# Patient Record
Sex: FEMALE | Race: WHITE | NOT HISPANIC OR LATINO | Employment: OTHER | URBAN - METROPOLITAN AREA
[De-identification: names, ages, dates, MRNs, and addresses within clinical notes are randomized per-mention and may not be internally consistent; named-entity substitution may affect disease eponyms.]

---

## 2017-05-08 ENCOUNTER — TRANSCRIBE ORDERS (OUTPATIENT)
Dept: LAB | Facility: CLINIC | Age: 72
End: 2017-05-08

## 2017-05-08 ENCOUNTER — APPOINTMENT (OUTPATIENT)
Dept: LAB | Facility: CLINIC | Age: 72
End: 2017-05-08
Payer: MEDICARE

## 2017-05-08 DIAGNOSIS — E03.9 UNSPECIFIED HYPOTHYROIDISM: ICD-10-CM

## 2017-05-08 DIAGNOSIS — Z79.899 ENCOUNTER FOR LONG-TERM (CURRENT) USE OF OTHER MEDICATIONS: ICD-10-CM

## 2017-05-08 DIAGNOSIS — Z79.82 ENCOUNTER FOR LONG-TERM (CURRENT) USE OF ASPIRIN: ICD-10-CM

## 2017-05-08 DIAGNOSIS — E78.5 OTHER AND UNSPECIFIED HYPERLIPIDEMIA: ICD-10-CM

## 2017-05-08 DIAGNOSIS — E55.9 UNSPECIFIED VITAMIN D DEFICIENCY: ICD-10-CM

## 2017-05-08 DIAGNOSIS — E10.10 TYPE 1 DIABETES MELLITUS WITH KETOACIDOSIS WITHOUT COMA (HCC): Primary | ICD-10-CM

## 2017-05-08 DIAGNOSIS — I10 ESSENTIAL HYPERTENSION, MALIGNANT: ICD-10-CM

## 2017-05-08 DIAGNOSIS — E10.10 TYPE 1 DIABETES MELLITUS WITH KETOACIDOSIS WITHOUT COMA (HCC): ICD-10-CM

## 2017-05-08 LAB
25(OH)D3 SERPL-MCNC: 32.1 NG/ML (ref 30–100)
ALBUMIN SERPL BCP-MCNC: 3.7 G/DL (ref 3.5–5)
ALP SERPL-CCNC: 110 U/L (ref 46–116)
ALT SERPL W P-5'-P-CCNC: 19 U/L (ref 12–78)
ANION GAP SERPL CALCULATED.3IONS-SCNC: 6 MMOL/L (ref 4–13)
AST SERPL W P-5'-P-CCNC: 16 U/L (ref 5–45)
BILIRUB SERPL-MCNC: 0.64 MG/DL (ref 0.2–1)
BUN SERPL-MCNC: 23 MG/DL (ref 5–25)
CALCIUM SERPL-MCNC: 9.3 MG/DL (ref 8.3–10.1)
CHLORIDE SERPL-SCNC: 102 MMOL/L (ref 100–108)
CHOLEST SERPL-MCNC: 215 MG/DL (ref 50–200)
CO2 SERPL-SCNC: 30 MMOL/L (ref 21–32)
CREAT SERPL-MCNC: 0.92 MG/DL (ref 0.6–1.3)
CREAT UR-MCNC: 68.3 MG/DL
ERYTHROCYTE [DISTWIDTH] IN BLOOD BY AUTOMATED COUNT: 13.2 % (ref 11.6–15.1)
EST. AVERAGE GLUCOSE BLD GHB EST-MCNC: 189 MG/DL
GFR SERPL CREATININE-BSD FRML MDRD: 59.8 ML/MIN/1.73SQ M
GLUCOSE P FAST SERPL-MCNC: 260 MG/DL (ref 65–99)
HBA1C MFR BLD: 8.2 % (ref 4.2–6.3)
HCT VFR BLD AUTO: 42.9 % (ref 34.8–46.1)
HDLC SERPL-MCNC: 70 MG/DL (ref 40–60)
HGB BLD-MCNC: 13.6 G/DL (ref 11.5–15.4)
LDLC SERPL CALC-MCNC: 102 MG/DL (ref 0–100)
MCH RBC QN AUTO: 29.2 PG (ref 26.8–34.3)
MCHC RBC AUTO-ENTMCNC: 31.7 G/DL (ref 31.4–37.4)
MCV RBC AUTO: 92 FL (ref 82–98)
MICROALBUMIN UR-MCNC: <5 MG/L (ref 0–20)
MICROALBUMIN/CREAT 24H UR: <7 MG/G CREATININE (ref 0–30)
PLATELET # BLD AUTO: 251 THOUSANDS/UL (ref 149–390)
PMV BLD AUTO: 12.6 FL (ref 8.9–12.7)
POTASSIUM SERPL-SCNC: 4.4 MMOL/L (ref 3.5–5.3)
PROT SERPL-MCNC: 7.4 G/DL (ref 6.4–8.2)
RBC # BLD AUTO: 4.65 MILLION/UL (ref 3.81–5.12)
SODIUM SERPL-SCNC: 138 MMOL/L (ref 136–145)
T4 FREE SERPL-MCNC: 0.88 NG/DL (ref 0.76–1.46)
TRIGL SERPL-MCNC: 214 MG/DL
TSH SERPL DL<=0.05 MIU/L-ACNC: 3.02 UIU/ML (ref 0.36–3.74)
WBC # BLD AUTO: 7.3 THOUSAND/UL (ref 4.31–10.16)

## 2017-05-08 PROCEDURE — 80053 COMPREHEN METABOLIC PANEL: CPT

## 2017-05-08 PROCEDURE — 83036 HEMOGLOBIN GLYCOSYLATED A1C: CPT

## 2017-05-08 PROCEDURE — 82306 VITAMIN D 25 HYDROXY: CPT

## 2017-05-08 PROCEDURE — 36415 COLL VENOUS BLD VENIPUNCTURE: CPT

## 2017-05-08 PROCEDURE — 85027 COMPLETE CBC AUTOMATED: CPT

## 2017-05-08 PROCEDURE — 84443 ASSAY THYROID STIM HORMONE: CPT

## 2017-05-08 PROCEDURE — 84439 ASSAY OF FREE THYROXINE: CPT

## 2017-05-08 PROCEDURE — 82570 ASSAY OF URINE CREATININE: CPT

## 2017-05-08 PROCEDURE — 80061 LIPID PANEL: CPT

## 2017-05-08 PROCEDURE — 82043 UR ALBUMIN QUANTITATIVE: CPT

## 2017-09-11 ENCOUNTER — APPOINTMENT (OUTPATIENT)
Dept: LAB | Facility: CLINIC | Age: 72
End: 2017-09-11
Payer: MEDICARE

## 2017-09-11 ENCOUNTER — TRANSCRIBE ORDERS (OUTPATIENT)
Dept: LAB | Facility: CLINIC | Age: 72
End: 2017-09-11

## 2017-09-11 DIAGNOSIS — E55.9 UNSPECIFIED VITAMIN D DEFICIENCY: ICD-10-CM

## 2017-09-11 DIAGNOSIS — E13.8 DIABETES MELLITUS OF OTHER TYPE WITH COMPLICATION, UNSPECIFIED LONG TERM INSULIN USE STATUS: ICD-10-CM

## 2017-09-11 DIAGNOSIS — I15.9 SECONDARY HYPERTENSION: Primary | ICD-10-CM

## 2017-09-11 DIAGNOSIS — E03.9 UNSPECIFIED HYPOTHYROIDISM: ICD-10-CM

## 2017-09-11 DIAGNOSIS — E78.5 HYPERLIPIDEMIA, UNSPECIFIED HYPERLIPIDEMIA TYPE: ICD-10-CM

## 2017-09-11 DIAGNOSIS — I15.9 SECONDARY HYPERTENSION: ICD-10-CM

## 2017-09-11 DIAGNOSIS — E13.8 DIABETES MELLITUS OF OTHER TYPE WITH COMPLICATION: ICD-10-CM

## 2017-09-11 LAB
25(OH)D3 SERPL-MCNC: 38 NG/ML (ref 30–100)
ALBUMIN SERPL BCP-MCNC: 3.5 G/DL (ref 3.5–5)
ALP SERPL-CCNC: 98 U/L (ref 46–116)
ALT SERPL W P-5'-P-CCNC: 14 U/L (ref 12–78)
ANION GAP SERPL CALCULATED.3IONS-SCNC: 7 MMOL/L (ref 4–13)
AST SERPL W P-5'-P-CCNC: 14 U/L (ref 5–45)
BILIRUB SERPL-MCNC: 0.59 MG/DL (ref 0.2–1)
BUN SERPL-MCNC: 17 MG/DL (ref 5–25)
CALCIUM SERPL-MCNC: 8.9 MG/DL (ref 8.3–10.1)
CHLORIDE SERPL-SCNC: 106 MMOL/L (ref 100–108)
CO2 SERPL-SCNC: 28 MMOL/L (ref 21–32)
CREAT SERPL-MCNC: 0.96 MG/DL (ref 0.6–1.3)
EST. AVERAGE GLUCOSE BLD GHB EST-MCNC: 171 MG/DL
GFR SERPL CREATININE-BSD FRML MDRD: 58 ML/MIN/1.73SQ M
GLUCOSE P FAST SERPL-MCNC: 139 MG/DL (ref 65–99)
HBA1C MFR BLD: 7.6 % (ref 4.2–6.3)
LDLC SERPL DIRECT ASSAY-MCNC: 93 MG/DL (ref 0–100)
POTASSIUM SERPL-SCNC: 4.7 MMOL/L (ref 3.5–5.3)
PROT SERPL-MCNC: 7.3 G/DL (ref 6.4–8.2)
SODIUM SERPL-SCNC: 141 MMOL/L (ref 136–145)
T4 FREE SERPL-MCNC: 0.92 NG/DL (ref 0.76–1.46)
TSH SERPL DL<=0.05 MIU/L-ACNC: 5.09 UIU/ML (ref 0.36–3.74)

## 2017-09-11 PROCEDURE — 84443 ASSAY THYROID STIM HORMONE: CPT

## 2017-09-11 PROCEDURE — 83721 ASSAY OF BLOOD LIPOPROTEIN: CPT

## 2017-09-11 PROCEDURE — 83036 HEMOGLOBIN GLYCOSYLATED A1C: CPT

## 2017-09-11 PROCEDURE — 80053 COMPREHEN METABOLIC PANEL: CPT

## 2017-09-11 PROCEDURE — 36415 COLL VENOUS BLD VENIPUNCTURE: CPT

## 2017-09-11 PROCEDURE — 84439 ASSAY OF FREE THYROXINE: CPT

## 2017-09-11 PROCEDURE — 82306 VITAMIN D 25 HYDROXY: CPT

## 2018-01-10 ENCOUNTER — TRANSCRIBE ORDERS (OUTPATIENT)
Dept: LAB | Facility: CLINIC | Age: 73
End: 2018-01-10

## 2018-01-10 ENCOUNTER — APPOINTMENT (OUTPATIENT)
Dept: LAB | Facility: CLINIC | Age: 73
End: 2018-01-10
Payer: MEDICARE

## 2018-01-10 DIAGNOSIS — E03.9 HYPOTHYROIDISM, UNSPECIFIED TYPE: Primary | ICD-10-CM

## 2018-01-10 DIAGNOSIS — E11.8 TYPE 2 DIABETES MELLITUS WITH COMPLICATION, UNSPECIFIED LONG TERM INSULIN USE STATUS: ICD-10-CM

## 2018-01-10 DIAGNOSIS — N39.0 URINARY TRACT INFECTION WITHOUT HEMATURIA, SITE UNSPECIFIED: ICD-10-CM

## 2018-01-10 DIAGNOSIS — E03.9 HYPOTHYROIDISM, UNSPECIFIED TYPE: ICD-10-CM

## 2018-01-10 LAB
BACTERIA UR QL AUTO: ABNORMAL /HPF
BILIRUB UR QL STRIP: NEGATIVE
CLARITY UR: CLEAR
COLOR UR: YELLOW
EST. AVERAGE GLUCOSE BLD GHB EST-MCNC: 160 MG/DL
GLUCOSE UR STRIP-MCNC: NEGATIVE MG/DL
HBA1C MFR BLD: 7.2 % (ref 4.2–6.3)
HGB UR QL STRIP.AUTO: NEGATIVE
HYALINE CASTS #/AREA URNS LPF: ABNORMAL /LPF
KETONES UR STRIP-MCNC: NEGATIVE MG/DL
LEUKOCYTE ESTERASE UR QL STRIP: NEGATIVE
NITRITE UR QL STRIP: NEGATIVE
NON-SQ EPI CELLS URNS QL MICRO: ABNORMAL /HPF
PH UR STRIP.AUTO: 5.5 [PH] (ref 4.5–8)
PROT UR STRIP-MCNC: NEGATIVE MG/DL
RBC #/AREA URNS AUTO: ABNORMAL /HPF
SP GR UR STRIP.AUTO: 1.01 (ref 1–1.03)
T4 FREE SERPL-MCNC: 1.26 NG/DL (ref 0.76–1.46)
TSH SERPL DL<=0.05 MIU/L-ACNC: 2.02 UIU/ML (ref 0.36–3.74)
UROBILINOGEN UR QL STRIP.AUTO: 0.2 E.U./DL
WBC #/AREA URNS AUTO: ABNORMAL /HPF

## 2018-01-10 PROCEDURE — 87086 URINE CULTURE/COLONY COUNT: CPT

## 2018-01-10 PROCEDURE — 84439 ASSAY OF FREE THYROXINE: CPT

## 2018-01-10 PROCEDURE — 81001 URINALYSIS AUTO W/SCOPE: CPT

## 2018-01-10 PROCEDURE — 83036 HEMOGLOBIN GLYCOSYLATED A1C: CPT

## 2018-01-10 PROCEDURE — 36415 COLL VENOUS BLD VENIPUNCTURE: CPT

## 2018-01-10 PROCEDURE — 84443 ASSAY THYROID STIM HORMONE: CPT

## 2018-01-11 LAB — BACTERIA UR CULT: NORMAL

## 2018-07-02 ENCOUNTER — APPOINTMENT (OUTPATIENT)
Dept: LAB | Facility: CLINIC | Age: 73
End: 2018-07-02
Payer: MEDICARE

## 2018-07-02 ENCOUNTER — TRANSCRIBE ORDERS (OUTPATIENT)
Dept: LAB | Facility: CLINIC | Age: 73
End: 2018-07-02

## 2018-07-02 DIAGNOSIS — E78.00 HYPERCHOLESTEROLEMIA: ICD-10-CM

## 2018-07-02 DIAGNOSIS — E78.5 HYPERLIPIDEMIA, UNSPECIFIED HYPERLIPIDEMIA TYPE: ICD-10-CM

## 2018-07-02 DIAGNOSIS — I10 HYPERTENSION, UNSPECIFIED TYPE: ICD-10-CM

## 2018-07-02 DIAGNOSIS — E03.9 HYPOTHYROIDISM, UNSPECIFIED TYPE: ICD-10-CM

## 2018-07-02 DIAGNOSIS — Z79.02 ENCOUNTER FOR CURRENT LONG TERM USE OF ANTITHROMBOTIC DRUG: ICD-10-CM

## 2018-07-02 DIAGNOSIS — E55.9 VITAMIN D DEFICIENCY: Primary | ICD-10-CM

## 2018-07-02 DIAGNOSIS — E11.8 TYPE 2 DIABETES MELLITUS WITH COMPLICATION, UNSPECIFIED WHETHER LONG TERM INSULIN USE: ICD-10-CM

## 2018-07-02 DIAGNOSIS — E55.9 VITAMIN D DEFICIENCY: ICD-10-CM

## 2018-07-02 LAB
25(OH)D3 SERPL-MCNC: 38.4 NG/ML (ref 30–100)
ALBUMIN SERPL BCP-MCNC: 3.7 G/DL (ref 3.5–5)
ALP SERPL-CCNC: 84 U/L (ref 46–116)
ALT SERPL W P-5'-P-CCNC: 16 U/L (ref 12–78)
ANION GAP SERPL CALCULATED.3IONS-SCNC: 11 MMOL/L (ref 4–13)
AST SERPL W P-5'-P-CCNC: 15 U/L (ref 5–45)
BILIRUB SERPL-MCNC: 0.5 MG/DL (ref 0.2–1)
BUN SERPL-MCNC: 20 MG/DL (ref 5–25)
CALCIUM SERPL-MCNC: 8.8 MG/DL (ref 8.3–10.1)
CHLORIDE SERPL-SCNC: 105 MMOL/L (ref 100–108)
CHOLEST SERPL-MCNC: 190 MG/DL (ref 50–200)
CO2 SERPL-SCNC: 26 MMOL/L (ref 21–32)
CREAT SERPL-MCNC: 1 MG/DL (ref 0.6–1.3)
CREAT UR-MCNC: 61.4 MG/DL
EST. AVERAGE GLUCOSE BLD GHB EST-MCNC: 177 MG/DL
GFR SERPL CREATININE-BSD FRML MDRD: 55 ML/MIN/1.73SQ M
GLUCOSE P FAST SERPL-MCNC: 229 MG/DL (ref 65–99)
HBA1C MFR BLD: 7.8 % (ref 4.2–6.3)
HDLC SERPL-MCNC: 64 MG/DL (ref 40–60)
LDLC SERPL CALC-MCNC: 82 MG/DL (ref 0–100)
MICROALBUMIN UR-MCNC: <5 MG/L (ref 0–20)
MICROALBUMIN/CREAT 24H UR: <8 MG/G CREATININE (ref 0–30)
NONHDLC SERPL-MCNC: 126 MG/DL
POTASSIUM SERPL-SCNC: 4.1 MMOL/L (ref 3.5–5.3)
PROT SERPL-MCNC: 7.2 G/DL (ref 6.4–8.2)
SODIUM SERPL-SCNC: 142 MMOL/L (ref 136–145)
T4 FREE SERPL-MCNC: 0.95 NG/DL (ref 0.76–1.46)
TRIGL SERPL-MCNC: 218 MG/DL
TSH SERPL DL<=0.05 MIU/L-ACNC: 2 UIU/ML (ref 0.36–3.74)

## 2018-07-02 PROCEDURE — 36415 COLL VENOUS BLD VENIPUNCTURE: CPT

## 2018-07-02 PROCEDURE — 82043 UR ALBUMIN QUANTITATIVE: CPT

## 2018-07-02 PROCEDURE — 83036 HEMOGLOBIN GLYCOSYLATED A1C: CPT

## 2018-07-02 PROCEDURE — 80061 LIPID PANEL: CPT

## 2018-07-02 PROCEDURE — 84443 ASSAY THYROID STIM HORMONE: CPT

## 2018-07-02 PROCEDURE — 84439 ASSAY OF FREE THYROXINE: CPT

## 2018-07-02 PROCEDURE — 82570 ASSAY OF URINE CREATININE: CPT

## 2018-07-02 PROCEDURE — 82306 VITAMIN D 25 HYDROXY: CPT

## 2018-07-02 PROCEDURE — 80053 COMPREHEN METABOLIC PANEL: CPT

## 2018-07-27 ENCOUNTER — TRANSCRIBE ORDERS (OUTPATIENT)
Dept: ADMINISTRATIVE | Facility: HOSPITAL | Age: 73
End: 2018-07-27

## 2018-07-27 DIAGNOSIS — Z12.39 SCREENING BREAST EXAMINATION: Primary | ICD-10-CM

## 2018-07-31 ENCOUNTER — APPOINTMENT (OUTPATIENT)
Dept: LAB | Facility: CLINIC | Age: 73
End: 2018-07-31
Payer: MEDICARE

## 2018-07-31 ENCOUNTER — TRANSCRIBE ORDERS (OUTPATIENT)
Dept: LAB | Facility: CLINIC | Age: 73
End: 2018-07-31

## 2018-07-31 DIAGNOSIS — N39.0 URINARY TRACT INFECTION WITHOUT HEMATURIA, SITE UNSPECIFIED: ICD-10-CM

## 2018-07-31 DIAGNOSIS — N39.0 URINARY TRACT INFECTION WITHOUT HEMATURIA, SITE UNSPECIFIED: Primary | ICD-10-CM

## 2018-07-31 LAB
BACTERIA UR QL AUTO: ABNORMAL /HPF
BILIRUB UR QL STRIP: NEGATIVE
CLARITY UR: CLEAR
COLOR UR: YELLOW
GLUCOSE UR STRIP-MCNC: ABNORMAL MG/DL
HGB UR QL STRIP.AUTO: NEGATIVE
HYALINE CASTS #/AREA URNS LPF: ABNORMAL /LPF
KETONES UR STRIP-MCNC: NEGATIVE MG/DL
LEUKOCYTE ESTERASE UR QL STRIP: ABNORMAL
NITRITE UR QL STRIP: NEGATIVE
NON-SQ EPI CELLS URNS QL MICRO: ABNORMAL /HPF
PH UR STRIP.AUTO: 6 [PH] (ref 4.5–8)
PROT UR STRIP-MCNC: NEGATIVE MG/DL
RBC #/AREA URNS AUTO: ABNORMAL /HPF
SP GR UR STRIP.AUTO: 1.01 (ref 1–1.03)
UROBILINOGEN UR QL STRIP.AUTO: 0.2 E.U./DL
WBC #/AREA URNS AUTO: ABNORMAL /HPF

## 2018-07-31 PROCEDURE — 81001 URINALYSIS AUTO W/SCOPE: CPT

## 2018-07-31 PROCEDURE — 87086 URINE CULTURE/COLONY COUNT: CPT

## 2018-08-01 LAB — BACTERIA UR CULT: NORMAL

## 2018-08-24 ENCOUNTER — HOSPITAL ENCOUNTER (OUTPATIENT)
Dept: MAMMOGRAPHY | Facility: HOSPITAL | Age: 73
Discharge: HOME | End: 2018-08-24
Attending: INTERNAL MEDICINE
Payer: MEDICARE

## 2018-08-24 DIAGNOSIS — Z12.39 SCREENING BREAST EXAMINATION: ICD-10-CM

## 2018-08-24 PROCEDURE — 77067 SCR MAMMO BI INCL CAD: CPT

## 2018-08-24 PROCEDURE — 77063 BREAST TOMOSYNTHESIS BI: CPT

## 2019-04-11 ENCOUNTER — HOSPITAL ENCOUNTER (EMERGENCY)
Facility: HOSPITAL | Age: 74
Discharge: HOME | End: 2019-04-11
Attending: EMERGENCY MEDICINE | Admitting: EMERGENCY MEDICINE
Payer: MEDICARE

## 2019-04-11 ENCOUNTER — APPOINTMENT (EMERGENCY)
Dept: RADIOLOGY | Facility: HOSPITAL | Age: 74
End: 2019-04-11
Payer: MEDICARE

## 2019-04-11 VITALS
HEART RATE: 68 BPM | TEMPERATURE: 97 F | BODY MASS INDEX: 24.92 KG/M2 | SYSTOLIC BLOOD PRESSURE: 129 MMHG | HEIGHT: 61 IN | OXYGEN SATURATION: 99 % | RESPIRATION RATE: 16 BRPM | DIASTOLIC BLOOD PRESSURE: 46 MMHG | WEIGHT: 132 LBS

## 2019-04-11 DIAGNOSIS — T14.8XXA SPLINTER IN SKIN: Primary | ICD-10-CM

## 2019-04-11 PROCEDURE — 73140 X-RAY EXAM OF FINGER(S): CPT

## 2019-04-11 PROCEDURE — 99283 EMERGENCY DEPT VISIT LOW MDM: CPT

## 2019-04-11 RX ORDER — INSULIN ASPART 100 [IU]/ML
15 INJECTION, SUSPENSION SUBCUTANEOUS
Status: ON HOLD | COMMUNITY
Start: 2016-04-18 | End: 2021-04-28 | Stop reason: SDUPTHER

## 2019-04-11 RX ORDER — DIPHENOXYLATE HYDROCHLORIDE AND ATROPINE SULFATE 2.5; .025 MG/1; MG/1
1 TABLET ORAL
COMMUNITY
End: 2021-01-08 | Stop reason: ALTCHOICE

## 2019-04-11 RX ORDER — GINSENG 100 MG
1 CAPSULE ORAL ONCE
Status: COMPLETED | OUTPATIENT
Start: 2019-04-11 | End: 2019-04-11

## 2019-04-11 RX ORDER — ATORVASTATIN CALCIUM 20 MG/1
20 TABLET, FILM COATED ORAL
COMMUNITY
Start: 2014-01-17 | End: 2022-02-28 | Stop reason: SDUPTHER

## 2019-04-11 RX ORDER — ALPRAZOLAM 0.5 MG/1
0.25 TABLET ORAL
COMMUNITY
Start: 2016-04-15 | End: 2021-01-08 | Stop reason: ALTCHOICE

## 2019-04-11 RX ORDER — CEFTRIAXONE 1 G/50ML
1000 INJECTION, SOLUTION INTRAVENOUS ONCE
Status: DISCONTINUED | OUTPATIENT
Start: 2019-04-11 | End: 2019-04-11

## 2019-04-11 RX ORDER — LISINOPRIL 20 MG/1
TABLET ORAL
COMMUNITY
Start: 2014-01-17 | End: 2022-04-22 | Stop reason: SDUPTHER

## 2019-04-11 RX ORDER — LEVOTHYROXINE SODIUM 0.07 MG/1
TABLET ORAL
COMMUNITY
Start: 2016-02-28 | End: 2022-05-02 | Stop reason: SDUPTHER

## 2019-04-11 RX ADMIN — BACITRACIN ZINC 1 SMALL APPLICATION: 500 OINTMENT TOPICAL at 11:07

## 2019-04-16 ENCOUNTER — TRANSCRIBE ORDERS (OUTPATIENT)
Dept: ADMINISTRATIVE | Facility: HOSPITAL | Age: 74
End: 2019-04-16

## 2019-04-16 DIAGNOSIS — Z79.899 LONG TERM USE OF DRUG: ICD-10-CM

## 2019-04-16 DIAGNOSIS — I10 HYPERTENSION, UNSPECIFIED TYPE: ICD-10-CM

## 2019-04-16 DIAGNOSIS — I65.23 BILATERAL CAROTID ARTERY STENOSIS: ICD-10-CM

## 2019-04-16 DIAGNOSIS — E78.5 HYPERLIPIDEMIA, UNSPECIFIED HYPERLIPIDEMIA TYPE: ICD-10-CM

## 2019-04-16 DIAGNOSIS — E11.8 TYPE 2 DIABETES MELLITUS WITH COMPLICATION, UNSPECIFIED WHETHER LONG TERM INSULIN USE: Primary | ICD-10-CM

## 2019-04-16 DIAGNOSIS — E55.9 VITAMIN D DEFICIENCY: ICD-10-CM

## 2019-04-16 DIAGNOSIS — E03.9 HYPOTHYROIDISM, UNSPECIFIED TYPE: ICD-10-CM

## 2019-04-18 ENCOUNTER — APPOINTMENT (OUTPATIENT)
Dept: LAB | Facility: CLINIC | Age: 74
End: 2019-04-18
Payer: MEDICARE

## 2019-04-18 DIAGNOSIS — Z79.899 LONG TERM USE OF DRUG: ICD-10-CM

## 2019-04-18 DIAGNOSIS — E03.9 HYPOTHYROIDISM, UNSPECIFIED TYPE: ICD-10-CM

## 2019-04-18 DIAGNOSIS — I10 HYPERTENSION, UNSPECIFIED TYPE: ICD-10-CM

## 2019-04-18 DIAGNOSIS — E11.8 TYPE 2 DIABETES MELLITUS WITH COMPLICATION, UNSPECIFIED WHETHER LONG TERM INSULIN USE: ICD-10-CM

## 2019-04-18 DIAGNOSIS — E55.9 VITAMIN D DEFICIENCY: ICD-10-CM

## 2019-04-18 DIAGNOSIS — E78.5 HYPERLIPIDEMIA, UNSPECIFIED HYPERLIPIDEMIA TYPE: ICD-10-CM

## 2019-04-18 LAB
25(OH)D3 SERPL-MCNC: 57 NG/ML (ref 30–100)
ALBUMIN SERPL BCP-MCNC: 3.7 G/DL (ref 3.5–5)
ALP SERPL-CCNC: 78 U/L (ref 46–116)
ALT SERPL W P-5'-P-CCNC: 13 U/L (ref 12–78)
ANION GAP SERPL CALCULATED.3IONS-SCNC: 6 MMOL/L (ref 4–13)
AST SERPL W P-5'-P-CCNC: 12 U/L (ref 5–45)
BILIRUB SERPL-MCNC: 0.44 MG/DL (ref 0.2–1)
BUN SERPL-MCNC: 22 MG/DL (ref 5–25)
CALCIUM SERPL-MCNC: 9.2 MG/DL (ref 8.3–10.1)
CHLORIDE SERPL-SCNC: 107 MMOL/L (ref 100–108)
CHOLEST SERPL-MCNC: 181 MG/DL (ref 50–200)
CO2 SERPL-SCNC: 27 MMOL/L (ref 21–32)
CREAT SERPL-MCNC: 1.09 MG/DL (ref 0.6–1.3)
CREAT UR-MCNC: 65.9 MG/DL
ERYTHROCYTE [DISTWIDTH] IN BLOOD BY AUTOMATED COUNT: 12.4 % (ref 11.6–15.1)
EST. AVERAGE GLUCOSE BLD GHB EST-MCNC: 160 MG/DL
GFR SERPL CREATININE-BSD FRML MDRD: 50 ML/MIN/1.73SQ M
GLUCOSE P FAST SERPL-MCNC: 219 MG/DL (ref 65–99)
HBA1C MFR BLD: 7.2 % (ref 4.2–6.3)
HCT VFR BLD AUTO: 40.3 % (ref 34.8–46.1)
HDLC SERPL-MCNC: 64 MG/DL (ref 40–60)
HGB BLD-MCNC: 12.4 G/DL (ref 11.5–15.4)
LDLC SERPL CALC-MCNC: 86 MG/DL (ref 0–100)
MCH RBC QN AUTO: 29.1 PG (ref 26.8–34.3)
MCHC RBC AUTO-ENTMCNC: 30.8 G/DL (ref 31.4–37.4)
MCV RBC AUTO: 95 FL (ref 82–98)
MICROALBUMIN UR-MCNC: <5 MG/L (ref 0–20)
MICROALBUMIN/CREAT 24H UR: <8 MG/G CREATININE (ref 0–30)
NONHDLC SERPL-MCNC: 117 MG/DL
PLATELET # BLD AUTO: 181 THOUSANDS/UL (ref 149–390)
PMV BLD AUTO: 12.3 FL (ref 8.9–12.7)
POTASSIUM SERPL-SCNC: 4.4 MMOL/L (ref 3.5–5.3)
PROT SERPL-MCNC: 7 G/DL (ref 6.4–8.2)
RBC # BLD AUTO: 4.26 MILLION/UL (ref 3.81–5.12)
SODIUM SERPL-SCNC: 140 MMOL/L (ref 136–145)
T4 FREE SERPL-MCNC: 1.14 NG/DL (ref 0.76–1.46)
TRIGL SERPL-MCNC: 157 MG/DL
TSH SERPL DL<=0.05 MIU/L-ACNC: 1.85 UIU/ML (ref 0.36–3.74)
WBC # BLD AUTO: 7.37 THOUSAND/UL (ref 4.31–10.16)

## 2019-04-18 PROCEDURE — 84439 ASSAY OF FREE THYROXINE: CPT

## 2019-04-18 PROCEDURE — 84443 ASSAY THYROID STIM HORMONE: CPT

## 2019-04-18 PROCEDURE — 82043 UR ALBUMIN QUANTITATIVE: CPT | Performed by: INTERNAL MEDICINE

## 2019-04-18 PROCEDURE — 82570 ASSAY OF URINE CREATININE: CPT | Performed by: INTERNAL MEDICINE

## 2019-04-18 PROCEDURE — 80053 COMPREHEN METABOLIC PANEL: CPT

## 2019-04-18 PROCEDURE — 36415 COLL VENOUS BLD VENIPUNCTURE: CPT

## 2019-04-18 PROCEDURE — 83036 HEMOGLOBIN GLYCOSYLATED A1C: CPT

## 2019-04-18 PROCEDURE — 85027 COMPLETE CBC AUTOMATED: CPT

## 2019-04-18 PROCEDURE — 82306 VITAMIN D 25 HYDROXY: CPT

## 2019-04-18 PROCEDURE — 80061 LIPID PANEL: CPT

## 2019-04-19 ENCOUNTER — HOSPITAL ENCOUNTER (OUTPATIENT)
Dept: NON INVASIVE DIAGNOSTICS | Facility: HOSPITAL | Age: 74
Discharge: HOME | End: 2019-04-19
Attending: INTERNAL MEDICINE
Payer: MEDICARE

## 2019-04-19 DIAGNOSIS — I65.23 BILATERAL CAROTID ARTERY STENOSIS: ICD-10-CM

## 2019-04-19 PROCEDURE — 93880 EXTRACRANIAL BILAT STUDY: CPT | Performed by: SURGERY

## 2019-04-19 PROCEDURE — 93880 EXTRACRANIAL BILAT STUDY: CPT

## 2019-05-27 ENCOUNTER — OFFICE VISIT (OUTPATIENT)
Dept: URGENT CARE | Facility: CLINIC | Age: 74
End: 2019-05-27
Payer: MEDICARE

## 2019-05-27 VITALS
WEIGHT: 132 LBS | BODY MASS INDEX: 24.92 KG/M2 | HEIGHT: 61 IN | DIASTOLIC BLOOD PRESSURE: 66 MMHG | TEMPERATURE: 98.7 F | RESPIRATION RATE: 16 BRPM | OXYGEN SATURATION: 97 % | SYSTOLIC BLOOD PRESSURE: 140 MMHG | HEART RATE: 88 BPM

## 2019-05-27 DIAGNOSIS — J06.9 VIRAL UPPER RESPIRATORY TRACT INFECTION: Primary | ICD-10-CM

## 2019-05-27 PROCEDURE — 99204 OFFICE O/P NEW MOD 45 MIN: CPT | Performed by: NURSE PRACTITIONER

## 2019-05-27 PROCEDURE — G0463 HOSPITAL OUTPT CLINIC VISIT: HCPCS | Performed by: NURSE PRACTITIONER

## 2019-06-18 ENCOUNTER — APPOINTMENT (OUTPATIENT)
Dept: LAB | Facility: CLINIC | Age: 74
End: 2019-06-18
Payer: MEDICARE

## 2019-06-18 ENCOUNTER — TRANSCRIBE ORDERS (OUTPATIENT)
Dept: LAB | Facility: CLINIC | Age: 74
End: 2019-06-18

## 2019-06-18 DIAGNOSIS — N39.0 URINARY TRACT INFECTION WITHOUT HEMATURIA, SITE UNSPECIFIED: ICD-10-CM

## 2019-06-18 DIAGNOSIS — N39.0 URINARY TRACT INFECTION WITHOUT HEMATURIA, SITE UNSPECIFIED: Primary | ICD-10-CM

## 2019-06-18 LAB
BACTERIA UR QL AUTO: ABNORMAL /HPF
BILIRUB UR QL STRIP: NEGATIVE
CLARITY UR: CLEAR
COLOR UR: YELLOW
GLUCOSE UR STRIP-MCNC: NEGATIVE MG/DL
HGB UR QL STRIP.AUTO: NEGATIVE
HYALINE CASTS #/AREA URNS LPF: ABNORMAL /LPF
KETONES UR STRIP-MCNC: NEGATIVE MG/DL
LEUKOCYTE ESTERASE UR QL STRIP: ABNORMAL
NITRITE UR QL STRIP: NEGATIVE
NON-SQ EPI CELLS URNS QL MICRO: ABNORMAL /HPF
PH UR STRIP.AUTO: 6 [PH]
PROT UR STRIP-MCNC: NEGATIVE MG/DL
RBC #/AREA URNS AUTO: ABNORMAL /HPF
SP GR UR STRIP.AUTO: 1.02 (ref 1–1.03)
UROBILINOGEN UR QL STRIP.AUTO: 0.2 E.U./DL
WBC #/AREA URNS AUTO: ABNORMAL /HPF

## 2019-06-18 PROCEDURE — 87086 URINE CULTURE/COLONY COUNT: CPT

## 2019-06-18 PROCEDURE — 81001 URINALYSIS AUTO W/SCOPE: CPT

## 2019-06-20 LAB — BACTERIA UR CULT: NORMAL

## 2019-07-16 ENCOUNTER — TRANSCRIBE ORDERS (OUTPATIENT)
Dept: ADMINISTRATIVE | Facility: HOSPITAL | Age: 74
End: 2019-07-16

## 2019-07-16 DIAGNOSIS — Z12.39 BREAST SCREENING, UNSPECIFIED: Primary | ICD-10-CM

## 2019-08-26 ENCOUNTER — HOSPITAL ENCOUNTER (OUTPATIENT)
Dept: MAMMOGRAPHY | Facility: HOSPITAL | Age: 74
Discharge: HOME | End: 2019-08-26
Attending: OBSTETRICS & GYNECOLOGY
Payer: MEDICARE

## 2019-08-26 VITALS — HEIGHT: 61 IN | BODY MASS INDEX: 24.17 KG/M2 | WEIGHT: 128 LBS

## 2019-08-26 DIAGNOSIS — Z12.39 BREAST SCREENING, UNSPECIFIED: ICD-10-CM

## 2019-08-26 PROCEDURE — 77063 BREAST TOMOSYNTHESIS BI: CPT

## 2019-08-26 PROCEDURE — 77067 SCR MAMMO BI INCL CAD: CPT

## 2019-10-03 ENCOUNTER — APPOINTMENT (OUTPATIENT)
Dept: RADIOLOGY | Facility: CLINIC | Age: 74
End: 2019-10-03
Payer: MEDICARE

## 2019-10-03 ENCOUNTER — TRANSCRIBE ORDERS (OUTPATIENT)
Dept: RADIOLOGY | Facility: CLINIC | Age: 74
End: 2019-10-03

## 2019-10-03 DIAGNOSIS — R05.8 PRODUCTIVE COUGH: ICD-10-CM

## 2019-10-03 DIAGNOSIS — R05.8 PRODUCTIVE COUGH: Primary | ICD-10-CM

## 2019-10-03 PROCEDURE — 71046 X-RAY EXAM CHEST 2 VIEWS: CPT

## 2019-11-25 ENCOUNTER — TRANSCRIBE ORDERS (OUTPATIENT)
Dept: LAB | Facility: CLINIC | Age: 74
End: 2019-11-25

## 2019-11-25 ENCOUNTER — APPOINTMENT (OUTPATIENT)
Dept: LAB | Facility: CLINIC | Age: 74
End: 2019-11-25
Payer: MEDICARE

## 2019-11-25 DIAGNOSIS — E55.9 VITAMIN D DEFICIENCY: ICD-10-CM

## 2019-11-25 DIAGNOSIS — Z79.899 ENCOUNTER FOR LONG-TERM (CURRENT) USE OF OTHER MEDICATIONS: ICD-10-CM

## 2019-11-25 DIAGNOSIS — Z79.01 ENCOUNTER FOR CURRENT LONG-TERM USE OF ANTICOAGULANTS: ICD-10-CM

## 2019-11-25 DIAGNOSIS — I10 HYPERTENSION, UNSPECIFIED TYPE: ICD-10-CM

## 2019-11-25 DIAGNOSIS — E78.5 HYPERLIPIDEMIA, UNSPECIFIED HYPERLIPIDEMIA TYPE: ICD-10-CM

## 2019-11-25 DIAGNOSIS — E11.69 TYPE 2 DIABETES MELLITUS WITH OTHER SPECIFIED COMPLICATION, UNSPECIFIED WHETHER LONG TERM INSULIN USE (HCC): ICD-10-CM

## 2019-11-25 DIAGNOSIS — I10 HYPERTENSION, UNSPECIFIED TYPE: Primary | ICD-10-CM

## 2019-11-25 LAB
25(OH)D3 SERPL-MCNC: 59.9 NG/ML (ref 30–100)
ALBUMIN SERPL BCP-MCNC: 3.9 G/DL (ref 3.5–5)
ALP SERPL-CCNC: 76 U/L (ref 46–116)
ALT SERPL W P-5'-P-CCNC: 12 U/L (ref 12–78)
ANION GAP SERPL CALCULATED.3IONS-SCNC: 6 MMOL/L (ref 4–13)
AST SERPL W P-5'-P-CCNC: 9 U/L (ref 5–45)
BACTERIA UR QL AUTO: ABNORMAL /HPF
BILIRUB SERPL-MCNC: 0.46 MG/DL (ref 0.2–1)
BILIRUB UR QL STRIP: NEGATIVE
BUN SERPL-MCNC: 22 MG/DL (ref 5–25)
CALCIUM SERPL-MCNC: 9.6 MG/DL (ref 8.3–10.1)
CHLORIDE SERPL-SCNC: 107 MMOL/L (ref 100–108)
CHOLEST SERPL-MCNC: 187 MG/DL (ref 50–200)
CLARITY UR: ABNORMAL
CO2 SERPL-SCNC: 30 MMOL/L (ref 21–32)
COLOR UR: YELLOW
CREAT SERPL-MCNC: 1.06 MG/DL (ref 0.6–1.3)
CREAT UR-MCNC: 93.4 MG/DL
ERYTHROCYTE [DISTWIDTH] IN BLOOD BY AUTOMATED COUNT: 12.7 % (ref 11.6–15.1)
EST. AVERAGE GLUCOSE BLD GHB EST-MCNC: 157 MG/DL
GFR SERPL CREATININE-BSD FRML MDRD: 51 ML/MIN/1.73SQ M
GLUCOSE P FAST SERPL-MCNC: 181 MG/DL (ref 65–99)
GLUCOSE UR STRIP-MCNC: NEGATIVE MG/DL
HBA1C MFR BLD: 7.1 % (ref 4.2–6.3)
HCT VFR BLD AUTO: 41.1 % (ref 34.8–46.1)
HDLC SERPL-MCNC: 64 MG/DL
HGB BLD-MCNC: 12.8 G/DL (ref 11.5–15.4)
HGB UR QL STRIP.AUTO: NEGATIVE
HYALINE CASTS #/AREA URNS LPF: ABNORMAL /LPF
KETONES UR STRIP-MCNC: NEGATIVE MG/DL
LDLC SERPL CALC-MCNC: 92 MG/DL (ref 0–100)
LEUKOCYTE ESTERASE UR QL STRIP: ABNORMAL
MCH RBC QN AUTO: 28.4 PG (ref 26.8–34.3)
MCHC RBC AUTO-ENTMCNC: 31.1 G/DL (ref 31.4–37.4)
MCV RBC AUTO: 91 FL (ref 82–98)
MICROALBUMIN UR-MCNC: 5.6 MG/L (ref 0–20)
MICROALBUMIN/CREAT 24H UR: 6 MG/G CREATININE (ref 0–30)
NITRITE UR QL STRIP: NEGATIVE
NON-SQ EPI CELLS URNS QL MICRO: ABNORMAL /HPF
NONHDLC SERPL-MCNC: 123 MG/DL
PH UR STRIP.AUTO: 5.5 [PH]
PLATELET # BLD AUTO: 248 THOUSANDS/UL (ref 149–390)
PMV BLD AUTO: 11.9 FL (ref 8.9–12.7)
POTASSIUM SERPL-SCNC: 4.5 MMOL/L (ref 3.5–5.3)
PROT SERPL-MCNC: 7 G/DL (ref 6.4–8.2)
PROT UR STRIP-MCNC: NEGATIVE MG/DL
RBC # BLD AUTO: 4.5 MILLION/UL (ref 3.81–5.12)
RBC #/AREA URNS AUTO: ABNORMAL /HPF
SODIUM SERPL-SCNC: 143 MMOL/L (ref 136–145)
SP GR UR STRIP.AUTO: 1.01 (ref 1–1.03)
T4 FREE SERPL-MCNC: 0.99 NG/DL (ref 0.76–1.46)
TRIGL SERPL-MCNC: 154 MG/DL
TSH SERPL DL<=0.05 MIU/L-ACNC: 2.29 UIU/ML (ref 0.36–3.74)
UROBILINOGEN UR QL STRIP.AUTO: 0.2 E.U./DL
WBC # BLD AUTO: 7.66 THOUSAND/UL (ref 4.31–10.16)
WBC #/AREA URNS AUTO: ABNORMAL /HPF

## 2019-11-25 PROCEDURE — 84439 ASSAY OF FREE THYROXINE: CPT

## 2019-11-25 PROCEDURE — 80053 COMPREHEN METABOLIC PANEL: CPT

## 2019-11-25 PROCEDURE — 36415 COLL VENOUS BLD VENIPUNCTURE: CPT

## 2019-11-25 PROCEDURE — 85027 COMPLETE CBC AUTOMATED: CPT

## 2019-11-25 PROCEDURE — 82043 UR ALBUMIN QUANTITATIVE: CPT

## 2019-11-25 PROCEDURE — 80061 LIPID PANEL: CPT

## 2019-11-25 PROCEDURE — 87086 URINE CULTURE/COLONY COUNT: CPT

## 2019-11-25 PROCEDURE — 82570 ASSAY OF URINE CREATININE: CPT

## 2019-11-25 PROCEDURE — 84443 ASSAY THYROID STIM HORMONE: CPT

## 2019-11-25 PROCEDURE — 83036 HEMOGLOBIN GLYCOSYLATED A1C: CPT

## 2019-11-25 PROCEDURE — 81001 URINALYSIS AUTO W/SCOPE: CPT

## 2019-11-25 PROCEDURE — 82306 VITAMIN D 25 HYDROXY: CPT

## 2019-11-27 LAB — BACTERIA UR CULT: NORMAL

## 2019-12-11 ENCOUNTER — APPOINTMENT (OUTPATIENT)
Dept: RADIOLOGY | Facility: HOSPITAL | Age: 74
End: 2019-12-11
Attending: INTERNAL MEDICINE
Payer: MEDICARE

## 2019-12-11 ENCOUNTER — TRANSCRIBE ORDERS (OUTPATIENT)
Dept: URGENT CARE | Facility: HOSPITAL | Age: 74
End: 2019-12-11

## 2019-12-11 DIAGNOSIS — R10.9 ABDOMINAL PAIN, UNSPECIFIED ABDOMINAL LOCATION: ICD-10-CM

## 2019-12-11 DIAGNOSIS — R20.0 NUMBNESS: ICD-10-CM

## 2019-12-11 DIAGNOSIS — R10.9 ABDOMINAL PAIN, UNSPECIFIED ABDOMINAL LOCATION: Primary | ICD-10-CM

## 2019-12-11 PROCEDURE — 73030 X-RAY EXAM OF SHOULDER: CPT

## 2019-12-11 PROCEDURE — 72050 X-RAY EXAM NECK SPINE 4/5VWS: CPT

## 2020-03-31 ENCOUNTER — APPOINTMENT (EMERGENCY)
Dept: CT IMAGING | Facility: HOSPITAL | Age: 75
End: 2020-03-31
Payer: MEDICARE

## 2020-03-31 ENCOUNTER — HOSPITAL ENCOUNTER (EMERGENCY)
Facility: HOSPITAL | Age: 75
Discharge: HOME | End: 2020-03-31
Attending: EMERGENCY MEDICINE
Payer: MEDICARE

## 2020-03-31 VITALS
RESPIRATION RATE: 22 BRPM | TEMPERATURE: 99 F | SYSTOLIC BLOOD PRESSURE: 119 MMHG | OXYGEN SATURATION: 98 % | HEART RATE: 77 BPM | WEIGHT: 128 LBS | DIASTOLIC BLOOD PRESSURE: 58 MMHG | BODY MASS INDEX: 24.19 KG/M2

## 2020-03-31 DIAGNOSIS — N28.9 RENAL INSUFFICIENCY: ICD-10-CM

## 2020-03-31 DIAGNOSIS — M54.9 BACK PAIN: ICD-10-CM

## 2020-03-31 DIAGNOSIS — F41.9 ANXIETY: Primary | ICD-10-CM

## 2020-03-31 LAB
ALBUMIN SERPL BCP-MCNC: 4.2 G/DL (ref 3.5–5.7)
ALP SERPL-CCNC: 67 U/L (ref 55–165)
ALT SERPL W P-5'-P-CCNC: 9 U/L (ref 7–52)
AMPHETAMINES SERPL QL SCN: NEGATIVE
ANION GAP SERPL CALCULATED.3IONS-SCNC: 10 MMOL/L (ref 4–13)
APAP SERPL-MCNC: <10 UG/ML (ref 10–20)
AST SERPL W P-5'-P-CCNC: 13 U/L (ref 13–39)
ATRIAL RATE: 76 BPM
BARBITURATES UR QL: NEGATIVE
BASOPHILS # BLD AUTO: 0.1 THOUSANDS/ΜL (ref 0–0.1)
BASOPHILS NFR BLD AUTO: 1 % (ref 0–2)
BENZODIAZ UR QL: NEGATIVE
BILIRUB SERPL-MCNC: 0.5 MG/DL (ref 0.2–1)
BILIRUB UR QL STRIP: NEGATIVE
BUN SERPL-MCNC: 31 MG/DL (ref 7–25)
CALCIUM SERPL-MCNC: 9.6 MG/DL (ref 8.6–10.5)
CHLORIDE SERPL-SCNC: 103 MMOL/L (ref 98–107)
CK SERPL-CCNC: 36 U/L (ref 30–192)
CLARITY UR: CLEAR
CO2 SERPL-SCNC: 25 MMOL/L (ref 21–31)
COCAINE UR QL: NEGATIVE
COLOR UR: NORMAL
CREAT SERPL-MCNC: 1.27 MG/DL (ref 0.6–1.2)
EOSINOPHIL # BLD AUTO: 0.3 THOUSAND/ΜL (ref 0–0.61)
EOSINOPHIL NFR BLD AUTO: 3 % (ref 0–5)
ERYTHROCYTE [DISTWIDTH] IN BLOOD BY AUTOMATED COUNT: 13.6 % (ref 11.5–14.5)
ETHANOL SERPL-MCNC: <10 MG/DL
GFR SERPL CREATININE-BSD FRML MDRD: 41 ML/MIN/1.73SQ M
GLUCOSE SERPL-MCNC: 191 MG/DL (ref 65–99)
GLUCOSE UR STRIP-MCNC: NEGATIVE MG/DL
HCT VFR BLD AUTO: 38.9 % (ref 42–47)
HGB BLD-MCNC: 12.8 G/DL (ref 12–16)
HGB UR QL STRIP.AUTO: NEGATIVE
KETONES UR STRIP-MCNC: NEGATIVE MG/DL
LEUKOCYTE ESTERASE UR QL STRIP: NEGATIVE
LYMPHOCYTES # BLD AUTO: 3 THOUSANDS/ΜL (ref 0.6–4.47)
LYMPHOCYTES NFR BLD AUTO: 31 % (ref 21–51)
MAGNESIUM SERPL-MCNC: 2.2 MG/DL (ref 1.9–2.7)
MCH RBC QN AUTO: 29.5 PG (ref 26–34)
MCHC RBC AUTO-ENTMCNC: 32.8 G/DL (ref 31–37)
MCV RBC AUTO: 90 FL (ref 81–99)
METHADONE UR QL: NEGATIVE
MONOCYTES # BLD AUTO: 0.7 THOUSAND/ΜL (ref 0.17–1.22)
MONOCYTES NFR BLD AUTO: 8 % (ref 2–12)
NEUTROPHILS # BLD AUTO: 5.7 THOUSANDS/ΜL (ref 1.4–6.5)
NEUTS SEG NFR BLD AUTO: 58 % (ref 42–75)
NITRITE UR QL STRIP: NEGATIVE
OPIATES UR QL SCN: NEGATIVE
P AXIS: 76 DEGREES
PCP UR QL: NEGATIVE
PH UR STRIP.AUTO: 5.5 [PH]
PLATELET # BLD AUTO: 229 THOUSANDS/UL (ref 149–390)
PLATELET BLD QL SMEAR: ADEQUATE
PLATELET CLUMP BLD QL SMEAR: SLIGHT
PMV BLD AUTO: 9.9 FL (ref 8.6–11.7)
POTASSIUM SERPL-SCNC: 4.7 MMOL/L (ref 3.5–5.5)
PR INTERVAL: 150 MS
PROT SERPL-MCNC: 6.4 G/DL (ref 6.4–8.9)
PROT UR STRIP-MCNC: NEGATIVE MG/DL
QRS AXIS: 40 DEGREES
QRSD INTERVAL: 82 MS
QT INTERVAL: 386 MS
QTC INTERVAL: 434 MS
RBC # BLD AUTO: 4.33 MILLION/UL (ref 3.9–5.2)
RBC MORPH BLD: NORMAL
SALICYLATES SERPL-MCNC: <5 MG/DL (ref 10–30)
SODIUM SERPL-SCNC: 138 MMOL/L (ref 134–143)
SP GR UR STRIP.AUTO: 1.02 (ref 1–1.03)
T WAVE AXIS: 61 DEGREES
THC UR QL: NEGATIVE
TROPONIN I SERPL-MCNC: <0.03 NG/ML
TSH SERPL DL<=0.05 MIU/L-ACNC: 3.73 UIU/ML (ref 0.45–5.33)
UROBILINOGEN UR QL STRIP.AUTO: 0.2 E.U./DL
VENTRICULAR RATE: 76 BPM
WBC # BLD AUTO: 9.8 THOUSAND/UL (ref 4.8–10.8)

## 2020-03-31 PROCEDURE — 82550 ASSAY OF CK (CPK): CPT | Performed by: EMERGENCY MEDICINE

## 2020-03-31 PROCEDURE — 83735 ASSAY OF MAGNESIUM: CPT | Performed by: EMERGENCY MEDICINE

## 2020-03-31 PROCEDURE — 80053 COMPREHEN METABOLIC PANEL: CPT | Performed by: EMERGENCY MEDICINE

## 2020-03-31 PROCEDURE — 93005 ELECTROCARDIOGRAM TRACING: CPT

## 2020-03-31 PROCEDURE — 36415 COLL VENOUS BLD VENIPUNCTURE: CPT | Performed by: EMERGENCY MEDICINE

## 2020-03-31 PROCEDURE — 99285 EMERGENCY DEPT VISIT HI MDM: CPT | Performed by: EMERGENCY MEDICINE

## 2020-03-31 PROCEDURE — 84484 ASSAY OF TROPONIN QUANT: CPT | Performed by: EMERGENCY MEDICINE

## 2020-03-31 PROCEDURE — 85025 COMPLETE CBC W/AUTO DIFF WBC: CPT | Performed by: EMERGENCY MEDICINE

## 2020-03-31 PROCEDURE — 80329 ANALGESICS NON-OPIOID 1 OR 2: CPT | Performed by: EMERGENCY MEDICINE

## 2020-03-31 PROCEDURE — 84443 ASSAY THYROID STIM HORMONE: CPT | Performed by: EMERGENCY MEDICINE

## 2020-03-31 PROCEDURE — 71260 CT THORAX DX C+: CPT

## 2020-03-31 PROCEDURE — 80307 DRUG TEST PRSMV CHEM ANLYZR: CPT | Performed by: EMERGENCY MEDICINE

## 2020-03-31 PROCEDURE — 96374 THER/PROPH/DIAG INJ IV PUSH: CPT

## 2020-03-31 PROCEDURE — 74177 CT ABD & PELVIS W/CONTRAST: CPT

## 2020-03-31 PROCEDURE — 80320 DRUG SCREEN QUANTALCOHOLS: CPT | Performed by: EMERGENCY MEDICINE

## 2020-03-31 PROCEDURE — 81003 URINALYSIS AUTO W/O SCOPE: CPT | Performed by: EMERGENCY MEDICINE

## 2020-03-31 PROCEDURE — 96361 HYDRATE IV INFUSION ADD-ON: CPT

## 2020-03-31 PROCEDURE — 93010 ELECTROCARDIOGRAM REPORT: CPT | Performed by: INTERNAL MEDICINE

## 2020-03-31 PROCEDURE — 99285 EMERGENCY DEPT VISIT HI MDM: CPT

## 2020-03-31 RX ORDER — LORAZEPAM 2 MG/ML
0.5 INJECTION INTRAMUSCULAR ONCE
Status: COMPLETED | OUTPATIENT
Start: 2020-03-31 | End: 2020-03-31

## 2020-03-31 RX ADMIN — SODIUM CHLORIDE 500 ML: 0.9 INJECTION, SOLUTION INTRAVENOUS at 08:32

## 2020-03-31 RX ADMIN — IOHEXOL 100 ML: 350 INJECTION, SOLUTION INTRAVENOUS at 08:13

## 2020-03-31 RX ADMIN — LORAZEPAM 0.5 MG: 2 INJECTION INTRAMUSCULAR; INTRAVENOUS at 06:08

## 2020-07-01 DIAGNOSIS — Z01.812 PRE-PROCEDURAL LABORATORY EXAMINATIONS: ICD-10-CM

## 2020-07-01 PROCEDURE — U0003 INFECTIOUS AGENT DETECTION BY NUCLEIC ACID (DNA OR RNA); SEVERE ACUTE RESPIRATORY SYNDROME CORONAVIRUS 2 (SARS-COV-2) (CORONAVIRUS DISEASE [COVID-19]), AMPLIFIED PROBE TECHNIQUE, MAKING USE OF HIGH THROUGHPUT TECHNOLOGIES AS DESCRIBED BY CMS-2020-01-R: HCPCS

## 2020-07-02 ENCOUNTER — ANESTHESIA EVENT (OUTPATIENT)
Dept: GASTROENTEROLOGY | Facility: HOSPITAL | Age: 75
End: 2020-07-02

## 2020-07-06 ENCOUNTER — HOSPITAL ENCOUNTER (OUTPATIENT)
Dept: GASTROENTEROLOGY | Facility: HOSPITAL | Age: 75
Setting detail: OUTPATIENT SURGERY
Discharge: HOME | End: 2020-07-06
Attending: COLON & RECTAL SURGERY
Payer: MEDICARE

## 2020-07-06 ENCOUNTER — ANESTHESIA (OUTPATIENT)
Dept: GASTROENTEROLOGY | Facility: HOSPITAL | Age: 75
End: 2020-07-06

## 2020-07-06 VITALS
HEART RATE: 72 BPM | RESPIRATION RATE: 18 BRPM | DIASTOLIC BLOOD PRESSURE: 62 MMHG | SYSTOLIC BLOOD PRESSURE: 133 MMHG | OXYGEN SATURATION: 100 % | TEMPERATURE: 98.9 F

## 2020-07-06 DIAGNOSIS — Z12.11 SCREENING FOR COLON CANCER: ICD-10-CM

## 2020-07-06 DIAGNOSIS — Z86.010 HISTORY OF COLON POLYPS: ICD-10-CM

## 2020-07-06 LAB
GLUCOSE SERPL-MCNC: 223 MG/DL (ref 65–140)
SARS-COV-2 RNA SPEC QL NAA+PROBE: NOT DETECTED

## 2020-07-06 PROCEDURE — 82948 REAGENT STRIP/BLOOD GLUCOSE: CPT

## 2020-07-06 PROCEDURE — 88305 TISSUE EXAM BY PATHOLOGIST: CPT | Performed by: PATHOLOGY

## 2020-07-06 RX ORDER — SODIUM CHLORIDE 9 MG/ML
125 INJECTION, SOLUTION INTRAVENOUS CONTINUOUS
Status: DISCONTINUED | OUTPATIENT
Start: 2020-07-06 | End: 2020-07-10 | Stop reason: HOSPADM

## 2020-07-06 RX ORDER — PROPOFOL 10 MG/ML
INJECTION, EMULSION INTRAVENOUS AS NEEDED
Status: DISCONTINUED | OUTPATIENT
Start: 2020-07-06 | End: 2020-07-06 | Stop reason: SURG

## 2020-07-06 RX ORDER — ONDANSETRON 2 MG/ML
4 INJECTION INTRAMUSCULAR; INTRAVENOUS ONCE AS NEEDED
Status: DISCONTINUED | OUTPATIENT
Start: 2020-07-06 | End: 2020-07-10 | Stop reason: HOSPADM

## 2020-07-06 RX ADMIN — PROPOFOL 50 MG: 10 INJECTION, EMULSION INTRAVENOUS at 07:37

## 2020-07-06 RX ADMIN — SODIUM CHLORIDE: 0.9 INJECTION, SOLUTION INTRAVENOUS at 07:31

## 2020-07-06 RX ADMIN — PROPOFOL 50 MG: 10 INJECTION, EMULSION INTRAVENOUS at 07:45

## 2020-07-06 RX ADMIN — PROPOFOL 100 MG: 10 INJECTION, EMULSION INTRAVENOUS at 07:33

## 2020-07-06 NOTE — DISCHARGE INSTRUCTIONS
[facility] Robles [address], [address] [place] [address] Johnathan  Phone: [phone]    DISCHARGE INSTRUCTIONS:    1   ___ Complete Exam - Normal    2   ___ Exam normal, but entire colon not seen  We will discuss this with you  3   ___ Polyp(s) removed by "burning" - NO pathology report will follow    4   _1__ Polyp(s) removed by excision  Pathology report will be available in 4-5 days   Someone from our office will call you with results  5   ___ Exam prompted biopsies  Pathology report will be available in 4-5 days   Someone from our office will call you with results  6   ___ Exam demonstrated findings that need treatment  Prescriptions will be   Given to you  Return to our office in ____ weeks  Please call for appt  7   ___ Original office visit or colonoscopy findings necessitate an office visit  Please call to set up a new appointment    8   ___ Medication  __________________________________________        [address]:    - Go straight home and rest today    - No driving or drinking alcohol for 24 hours    - Resume regular diet and medications unless otherwise instructed  Coumadin and Plavix are blood thinners  You can resume these medications on __________  IF YOU ARE HAVING ANY FEVER, BLEEDING OR PERSISTENT PAIN IN THE ABDOMEN, PLEASE CALL OUR OFFICE ANY DAY OR TIME  [phone]        Colorectal Polyps   WHAT YOU NEED TO KNOW:   Colorectal polyps are small growths of tissue in the lining of the colon and rectum  Most polyps are hyperplastic polyps and are usually benign (noncancerous)  Certain types of polyps, called adenomatous polyps, may turn into cancer  DISCHARGE INSTRUCTIONS:   Follow up with your healthcare provider or gastroenterologist as directed: You may need to return for more tests, such as another colonoscopy  Write down your questions so you remember to ask them during your visits    Reduce your risk for colorectal polyps:   · Eat a variety of healthy foods:  Healthy foods include fruit, vegetables, whole-grain breads, low-fat dairy products, beans, lean meat, and fish  Ask if you need to be on a special diet  · Maintain a healthy weight:  Ask your healthcare provider if you need to lose weight and how much you need to lose  Ask for help with a weight loss program     · Exercise:  Begin to exercise slowly and do more as you get stronger  Talk with your healthcare provider before you start an exercise program      · Limit alcohol:  Your risk for polyps increases the more you drink  · Do not smoke: If you smoke, it is never too late to quit  Ask for information about how to stop  For support and more information:   · Cristal Badillo ([facility]) [address], [place] [address]  Phone: [phone]  Web Address: www digestive  [employer]  Contact your healthcare provider or gastroenterologist if:   · You have a fever  · You have chills, a cough, or feel weak and achy  · You have abdominal pain that does not go away or gets worse after you take medicine  · Your abdomen is swollen  · You are losing weight without trying  · You have questions or concerns about your condition or care  Seek care immediately or call 911 if:   · You have sudden shortness of breath  · You have a fast heart rate, fast breathing, or are too dizzy to stand up  · You have severe abdominal pain  · You see blood in your bowel movement  © 2017 [address] Information is for End User's use only and may not be sold, redistributed or otherwise used for commercial purposes  All illustrations and images included in CareNotes® are the copyrighted property of [employer]  or Ritchie Kaufman  The above information is an  only  It is not intended as medical advice for individual conditions or treatments   Talk to your doctor, nurse or pharmacist before following any medical regimen to see if it is safe and effective for you

## 2020-07-06 NOTE — INTERVAL H&P NOTE
H&P reviewed  After examining the patient I find no changes in the patients condition since the H&P had been written      Vitals:    07/06/20 0718   BP: (!) 212/85   Pulse:    Resp:    Temp:    SpO2:

## 2020-07-06 NOTE — PROGRESS NOTES
Patient took her own blood sugar  Results were 237  Patient took her own insulin  Snack taken of diet gingerale and 2 packs of roberto crackers

## 2020-07-06 NOTE — ANESTHESIA PREPROCEDURE EVALUATION
Review of Systems/Medical History  Patient summary reviewed  Chart reviewed  No history of anesthetic complications     Cardiovascular  Hyperlipidemia, Hypertension controlled,    Pulmonary  Negative pulmonary ROS        GI/Hepatic  Negative GI/hepatic ROS          Negative  ROS        Endo/Other  Diabetes well controlled type 2 Insulin,      GYN  Negative gynecology ROS          Hematology  Negative hematology ROS      Musculoskeletal  Negative musculoskeletal ROS        Neurology  Negative neurology ROS      Psychology   Negative psychology ROS Anxiety, Depression ,              Physical Exam    Airway    Mallampati score: II  TM Distance: >3 FB  Neck ROM: full     Dental   No notable dental hx     Cardiovascular  Rhythm: regular, Rate: normal, Cardiovascular exam normal    Pulmonary  Pulmonary exam normal Breath sounds clear to auscultation,     Other Findings        Anesthesia Plan  ASA Score- 2     Anesthesia Type- IV sedation with anesthesia and general with ASA Monitors  Additional Monitors:   Airway Plan:         Plan Factors-Patient not instructed to abstain from smoking on day of procedure  Patient did not smoke on day of surgery  Induction- intravenous  Postoperative Plan-     Informed Consent- Anesthetic plan and risks discussed with patient

## 2020-07-15 ENCOUNTER — TRANSCRIBE ORDERS (OUTPATIENT)
Dept: URGENT CARE | Facility: CLINIC | Age: 75
End: 2020-07-15

## 2020-07-15 ENCOUNTER — APPOINTMENT (OUTPATIENT)
Dept: LAB | Facility: CLINIC | Age: 75
End: 2020-07-15
Payer: MEDICARE

## 2020-07-15 DIAGNOSIS — E55.9 VITAMIN D DEFICIENCY: ICD-10-CM

## 2020-07-15 DIAGNOSIS — Z79.4 TYPE 2 DIABETES MELLITUS WITHOUT COMPLICATION, WITH LONG-TERM CURRENT USE OF INSULIN (HCC): ICD-10-CM

## 2020-07-15 DIAGNOSIS — Z79.899 ENCOUNTER FOR LONG-TERM (CURRENT) USE OF OTHER MEDICATIONS: ICD-10-CM

## 2020-07-15 DIAGNOSIS — E78.2 MIXED HYPERLIPIDEMIA: ICD-10-CM

## 2020-07-15 DIAGNOSIS — I10 ESSENTIAL HYPERTENSION: Primary | ICD-10-CM

## 2020-07-15 DIAGNOSIS — I10 ESSENTIAL HYPERTENSION: ICD-10-CM

## 2020-07-15 DIAGNOSIS — E11.9 TYPE 2 DIABETES MELLITUS WITHOUT COMPLICATION, WITH LONG-TERM CURRENT USE OF INSULIN (HCC): ICD-10-CM

## 2020-07-15 LAB
25(OH)D3 SERPL-MCNC: 50.9 NG/ML (ref 30–100)
ALBUMIN SERPL BCP-MCNC: 3.6 G/DL (ref 3.5–5)
ALP SERPL-CCNC: 82 U/L (ref 46–116)
ALT SERPL W P-5'-P-CCNC: 14 U/L (ref 12–78)
ANION GAP SERPL CALCULATED.3IONS-SCNC: 6 MMOL/L (ref 4–13)
AST SERPL W P-5'-P-CCNC: 12 U/L (ref 5–45)
BASOPHILS # BLD AUTO: 0.08 THOUSANDS/ΜL (ref 0–0.1)
BASOPHILS NFR BLD AUTO: 1 % (ref 0–1)
BILIRUB SERPL-MCNC: 0.31 MG/DL (ref 0.2–1)
BUN SERPL-MCNC: 19 MG/DL (ref 5–25)
CALCIUM SERPL-MCNC: 9.5 MG/DL (ref 8.3–10.1)
CHLORIDE SERPL-SCNC: 105 MMOL/L (ref 100–108)
CHOLEST SERPL-MCNC: 203 MG/DL (ref 50–200)
CO2 SERPL-SCNC: 28 MMOL/L (ref 21–32)
CREAT SERPL-MCNC: 1.06 MG/DL (ref 0.6–1.3)
CREAT UR-MCNC: 55.5 MG/DL
EOSINOPHIL # BLD AUTO: 0.23 THOUSAND/ΜL (ref 0–0.61)
EOSINOPHIL NFR BLD AUTO: 3 % (ref 0–6)
ERYTHROCYTE [DISTWIDTH] IN BLOOD BY AUTOMATED COUNT: 12.4 % (ref 11.6–15.1)
EST. AVERAGE GLUCOSE BLD GHB EST-MCNC: 169 MG/DL
GFR SERPL CREATININE-BSD FRML MDRD: 51 ML/MIN/1.73SQ M
GLUCOSE P FAST SERPL-MCNC: 220 MG/DL (ref 65–99)
HBA1C MFR BLD: 7.5 %
HCT VFR BLD AUTO: 42.2 % (ref 34.8–46.1)
HDLC SERPL-MCNC: 69 MG/DL
HGB BLD-MCNC: 13 G/DL (ref 11.5–15.4)
IMM GRANULOCYTES # BLD AUTO: 0.04 THOUSAND/UL (ref 0–0.2)
IMM GRANULOCYTES NFR BLD AUTO: 1 % (ref 0–2)
LDLC SERPL CALC-MCNC: 95 MG/DL (ref 0–100)
LYMPHOCYTES # BLD AUTO: 2.21 THOUSANDS/ΜL (ref 0.6–4.47)
LYMPHOCYTES NFR BLD AUTO: 29 % (ref 14–44)
MCH RBC QN AUTO: 29.1 PG (ref 26.8–34.3)
MCHC RBC AUTO-ENTMCNC: 30.8 G/DL (ref 31.4–37.4)
MCV RBC AUTO: 95 FL (ref 82–98)
MICROALBUMIN UR-MCNC: <5 MG/L (ref 0–20)
MICROALBUMIN/CREAT 24H UR: <9 MG/G CREATININE (ref 0–30)
MONOCYTES # BLD AUTO: 0.52 THOUSAND/ΜL (ref 0.17–1.22)
MONOCYTES NFR BLD AUTO: 7 % (ref 4–12)
NEUTROPHILS # BLD AUTO: 4.52 THOUSANDS/ΜL (ref 1.85–7.62)
NEUTS SEG NFR BLD AUTO: 59 % (ref 43–75)
NONHDLC SERPL-MCNC: 134 MG/DL
NRBC BLD AUTO-RTO: 0 /100 WBCS
PLATELET # BLD AUTO: 241 THOUSANDS/UL (ref 149–390)
PMV BLD AUTO: 12.2 FL (ref 8.9–12.7)
POTASSIUM SERPL-SCNC: 4.5 MMOL/L (ref 3.5–5.3)
PROT SERPL-MCNC: 7.2 G/DL (ref 6.4–8.2)
RBC # BLD AUTO: 4.46 MILLION/UL (ref 3.81–5.12)
SODIUM SERPL-SCNC: 139 MMOL/L (ref 136–145)
TRIGL SERPL-MCNC: 194 MG/DL
WBC # BLD AUTO: 7.6 THOUSAND/UL (ref 4.31–10.16)

## 2020-07-15 PROCEDURE — 36415 COLL VENOUS BLD VENIPUNCTURE: CPT | Performed by: INTERNAL MEDICINE

## 2020-07-15 PROCEDURE — 82043 UR ALBUMIN QUANTITATIVE: CPT | Performed by: INTERNAL MEDICINE

## 2020-07-15 PROCEDURE — 82306 VITAMIN D 25 HYDROXY: CPT

## 2020-07-15 PROCEDURE — 80053 COMPREHEN METABOLIC PANEL: CPT

## 2020-07-15 PROCEDURE — 82570 ASSAY OF URINE CREATININE: CPT | Performed by: INTERNAL MEDICINE

## 2020-07-15 PROCEDURE — 80061 LIPID PANEL: CPT

## 2020-07-15 PROCEDURE — 83036 HEMOGLOBIN GLYCOSYLATED A1C: CPT

## 2020-07-15 PROCEDURE — 85025 COMPLETE CBC W/AUTO DIFF WBC: CPT | Performed by: INTERNAL MEDICINE

## 2020-08-03 ENCOUNTER — TRANSCRIBE ORDERS (OUTPATIENT)
Dept: ADMINISTRATIVE | Facility: HOSPITAL | Age: 75
End: 2020-08-03

## 2020-08-03 DIAGNOSIS — Z12.31 SCREENING MAMMOGRAM FOR HIGH-RISK PATIENT: Primary | ICD-10-CM

## 2020-09-03 ENCOUNTER — HOSPITAL ENCOUNTER (OUTPATIENT)
Dept: MAMMOGRAPHY | Facility: HOSPITAL | Age: 75
Discharge: HOME | End: 2020-09-03
Attending: INTERNAL MEDICINE
Payer: MEDICARE

## 2020-09-03 VITALS — WEIGHT: 127 LBS | HEIGHT: 61 IN | BODY MASS INDEX: 23.98 KG/M2

## 2020-09-03 DIAGNOSIS — Z12.31 SCREENING MAMMOGRAM FOR HIGH-RISK PATIENT: ICD-10-CM

## 2020-09-03 PROCEDURE — 77067 SCR MAMMO BI INCL CAD: CPT

## 2020-09-03 PROCEDURE — 77063 BREAST TOMOSYNTHESIS BI: CPT

## 2020-09-04 LAB
LEFT EYE DIABETIC RETINOPATHY: NORMAL
RIGHT EYE DIABETIC RETINOPATHY: NORMAL

## 2020-11-23 ENCOUNTER — LAB (OUTPATIENT)
Dept: LAB | Facility: CLINIC | Age: 75
End: 2020-11-23
Payer: MEDICARE

## 2020-11-23 ENCOUNTER — TRANSCRIBE ORDERS (OUTPATIENT)
Dept: URGENT CARE | Facility: CLINIC | Age: 75
End: 2020-11-23

## 2020-11-23 DIAGNOSIS — E78.5 HYPERLIPIDEMIA, UNSPECIFIED HYPERLIPIDEMIA TYPE: ICD-10-CM

## 2020-11-23 DIAGNOSIS — Z79.899 ENCOUNTER FOR LONG-TERM (CURRENT) USE OF OTHER MEDICATIONS: ICD-10-CM

## 2020-11-23 DIAGNOSIS — E55.9 VITAMIN D DEFICIENCY: ICD-10-CM

## 2020-11-23 DIAGNOSIS — E78.00 HYPERCHOLESTEROLEMIA: ICD-10-CM

## 2020-11-23 DIAGNOSIS — E11.69 TYPE 2 DIABETES MELLITUS WITH OTHER SPECIFIED COMPLICATION, UNSPECIFIED WHETHER LONG TERM INSULIN USE (HCC): ICD-10-CM

## 2020-11-23 DIAGNOSIS — E11.69 TYPE 2 DIABETES MELLITUS WITH OTHER SPECIFIED COMPLICATION, UNSPECIFIED WHETHER LONG TERM INSULIN USE (HCC): Primary | ICD-10-CM

## 2020-11-23 DIAGNOSIS — I10 HYPERTENSION, UNSPECIFIED TYPE: ICD-10-CM

## 2020-11-23 LAB
25(OH)D3 SERPL-MCNC: 45.5 NG/ML (ref 30–100)
ALBUMIN SERPL BCP-MCNC: 3.6 G/DL (ref 3.5–5)
ALP SERPL-CCNC: 85 U/L (ref 46–116)
ALT SERPL W P-5'-P-CCNC: 14 U/L (ref 12–78)
ANION GAP SERPL CALCULATED.3IONS-SCNC: 5 MMOL/L (ref 4–13)
AST SERPL W P-5'-P-CCNC: 13 U/L (ref 5–45)
BILIRUB SERPL-MCNC: 0.49 MG/DL (ref 0.2–1)
BUN SERPL-MCNC: 20 MG/DL (ref 5–25)
CALCIUM SERPL-MCNC: 9.2 MG/DL (ref 8.3–10.1)
CHLORIDE SERPL-SCNC: 111 MMOL/L (ref 100–108)
CHOLEST SERPL-MCNC: 165 MG/DL (ref 50–200)
CO2 SERPL-SCNC: 26 MMOL/L (ref 21–32)
CREAT SERPL-MCNC: 1.09 MG/DL (ref 0.6–1.3)
CREAT UR-MCNC: 108 MG/DL
EST. AVERAGE GLUCOSE BLD GHB EST-MCNC: 137 MG/DL
GFR SERPL CREATININE-BSD FRML MDRD: 49 ML/MIN/1.73SQ M
GLUCOSE P FAST SERPL-MCNC: 154 MG/DL (ref 65–99)
HBA1C MFR BLD: 6.4 %
HDLC SERPL-MCNC: 83 MG/DL
LDLC SERPL CALC-MCNC: 56 MG/DL (ref 0–100)
MICROALBUMIN UR-MCNC: 5.2 MG/L (ref 0–20)
MICROALBUMIN/CREAT 24H UR: 5 MG/G CREATININE (ref 0–30)
NONHDLC SERPL-MCNC: 82 MG/DL
POTASSIUM SERPL-SCNC: 4.4 MMOL/L (ref 3.5–5.3)
PROT SERPL-MCNC: 7 G/DL (ref 6.4–8.2)
SODIUM SERPL-SCNC: 142 MMOL/L (ref 136–145)
TRIGL SERPL-MCNC: 129 MG/DL

## 2020-11-23 PROCEDURE — 80061 LIPID PANEL: CPT

## 2020-11-23 PROCEDURE — 80053 COMPREHEN METABOLIC PANEL: CPT

## 2020-11-23 PROCEDURE — 82043 UR ALBUMIN QUANTITATIVE: CPT | Performed by: INTERNAL MEDICINE

## 2020-11-23 PROCEDURE — 82570 ASSAY OF URINE CREATININE: CPT | Performed by: INTERNAL MEDICINE

## 2020-11-23 PROCEDURE — 83036 HEMOGLOBIN GLYCOSYLATED A1C: CPT

## 2020-11-23 PROCEDURE — 82306 VITAMIN D 25 HYDROXY: CPT

## 2020-11-23 PROCEDURE — 36415 COLL VENOUS BLD VENIPUNCTURE: CPT

## 2021-01-05 ENCOUNTER — TRANSCRIBE ORDERS (OUTPATIENT)
Dept: ADMINISTRATIVE | Facility: HOSPITAL | Age: 76
End: 2021-01-05

## 2021-01-05 DIAGNOSIS — M79.662 PAIN OF LEFT CALF: Primary | ICD-10-CM

## 2021-01-07 DIAGNOSIS — R68.83 CHILLS: ICD-10-CM

## 2021-01-07 DIAGNOSIS — Z20.822 EXPOSURE TO COVID-19 VIRUS: ICD-10-CM

## 2021-01-07 DIAGNOSIS — R52 BODY ACHES: ICD-10-CM

## 2021-01-07 PROCEDURE — U0003 INFECTIOUS AGENT DETECTION BY NUCLEIC ACID (DNA OR RNA); SEVERE ACUTE RESPIRATORY SYNDROME CORONAVIRUS 2 (SARS-COV-2) (CORONAVIRUS DISEASE [COVID-19]), AMPLIFIED PROBE TECHNIQUE, MAKING USE OF HIGH THROUGHPUT TECHNOLOGIES AS DESCRIBED BY CMS-2020-01-R: HCPCS | Performed by: INTERNAL MEDICINE

## 2021-01-07 PROCEDURE — U0005 INFEC AGEN DETEC AMPLI PROBE: HCPCS | Performed by: INTERNAL MEDICINE

## 2021-01-08 ENCOUNTER — HOSPITAL ENCOUNTER (EMERGENCY)
Facility: HOSPITAL | Age: 76
Discharge: HOME | End: 2021-01-08
Attending: INTERNAL MEDICINE
Payer: COMMERCIAL

## 2021-01-08 ENCOUNTER — APPOINTMENT (EMERGENCY)
Dept: NON INVASIVE DIAGNOSTICS | Facility: HOSPITAL | Age: 76
End: 2021-01-08
Payer: COMMERCIAL

## 2021-01-08 VITALS
DIASTOLIC BLOOD PRESSURE: 80 MMHG | HEART RATE: 86 BPM | TEMPERATURE: 98.9 F | SYSTOLIC BLOOD PRESSURE: 183 MMHG | OXYGEN SATURATION: 98 % | RESPIRATION RATE: 16 BRPM

## 2021-01-08 DIAGNOSIS — M54.32 SCIATICA OF LEFT SIDE: ICD-10-CM

## 2021-01-08 DIAGNOSIS — M79.605 LEFT LEG PAIN: Primary | ICD-10-CM

## 2021-01-08 LAB — SARS-COV-2 RNA SPEC QL NAA+PROBE: NOT DETECTED

## 2021-01-08 PROCEDURE — 99282 EMERGENCY DEPT VISIT SF MDM: CPT | Performed by: INTERNAL MEDICINE

## 2021-01-08 PROCEDURE — 99283 EMERGENCY DEPT VISIT LOW MDM: CPT

## 2021-01-08 PROCEDURE — 93971 EXTREMITY STUDY: CPT | Performed by: SURGERY

## 2021-01-08 PROCEDURE — 93971 EXTREMITY STUDY: CPT

## 2021-01-08 NOTE — ED PROVIDER NOTES
History  Chief Complaint   Patient presents with    Leg Pain     49-year-old female presents with left leg pain  She also has low back pain radiating down her gluteal area she has a history of sciatica  Patient is concerned because she has left calf pain for several days  She denies any chest pain pressure heaviness shortness of breath or tachycardia  She denies any cough wheezing respiratory difficulty or hemoptysis  She has had no fever chills or rigors the patient was exposed to COVID over the holiday is she had a COVID test which is negative  Prior to Admission Medications   Prescriptions Last Dose Informant Patient Reported? Taking?    ASPIRIN 81 PO 1/8/2021 at Unknown time  Yes Yes   Sig: Take 81 mg by mouth daily   Cholecalciferol 2000 units CAPS 1/7/2021 at Unknown time  Yes Yes   Sig: Take 1 capsule by mouth   atorvastatin (LIPITOR) 20 mg tablet 1/8/2021 at Unknown time  Yes Yes   Sig: Take 20 mg by mouth   insulin aspart protamine-insulin aspart (NOVOLOG MIX 70/30) 100 units/mL injection   Yes Yes   Sig: Inject 15 Units under the skin 2 (two) times a day before meals    levothyroxine 75 mcg tablet 1/8/2021 at Unknown time  Yes Yes   Sig: levothyroxine 75 mcg tablet   take 1 tablet by mouth once daily   lisinopril (ZESTRIL) 20 mg tablet 1/8/2021 at Unknown time  Yes Yes   Sig: lisinopril 20 mg tablet      Facility-Administered Medications: None       Past Medical History:   Diagnosis Date    Anxiety     Colon polyp     Depression     Diabetes mellitus ([address] )     Diverticulosis     Hyperlipidemia     Hypertension     Osteoporosis        Past Surgical History:   Procedure Laterality Date    APPENDECTOMY      CATARACT EXTRACTION Right     COLONOSCOPY      difficult exam    HYSTERECTOMY      TONSILLECTOMY      WISDOM TOOTH EXTRACTION         Family History   Problem Relation Age of Onset    Parkinsonism Mother     Alzheimer's disease Father     Breast cancer Sister         in her 42's    Lung cancer Sister     No Known Problems Daughter     No Known Problems Maternal Grandmother     No Known Problems Maternal Grandfather     No Known Problems Paternal Grandmother     No Known Problems Paternal Grandfather     No Known Problems Daughter     No Known Problems Maternal Aunt     No Known Problems Maternal Aunt     No Known Problems Maternal Aunt     No Known Problems Paternal Aunt      I have reviewed and agree with the history as documented  E-Cigarette/Vaping    E-Cigarette Use Never User      E-Cigarette/Vaping Substances     Social History     Tobacco Use    Smoking status: Never Smoker    Smokeless tobacco: Never Used   Substance Use Topics    Alcohol use: Never     Frequency: Never    Drug use: Never       Review of Systems   Constitutional: Negative  HENT: Negative  Respiratory: Negative  Cardiovascular: Negative  Gastrointestinal: Negative  Genitourinary: Negative  Musculoskeletal: Positive for gait problem  Skin: Negative  Hematological: Negative  Psychiatric/Behavioral: Negative  Physical Exam  Physical Exam  Vitals signs and nursing note reviewed  Constitutional:       General: She is not in acute distress  Appearance: Normal appearance  She is not ill-appearing, toxic-appearing or diaphoretic  HENT:      Head: Normocephalic and atraumatic  Mouth/Throat:      Mouth: Mucous membranes are dry  Eyes:      Extraocular Movements: Extraocular movements intact  Conjunctiva/sclera: Conjunctivae normal       Pupils: Pupils are equal, round, and reactive to light  Neck:      Musculoskeletal: Normal range of motion and neck supple  Cardiovascular:      Rate and Rhythm: Normal rate and regular rhythm  Pulmonary:      Effort: Pulmonary effort is normal       Breath sounds: Normal breath sounds  Abdominal:      General: Abdomen is flat  Palpations: Abdomen is soft     Musculoskeletal:         General: Tenderness present  No swelling, deformity or signs of injury  Comments: Patient with tenderness in the left calf negative Homans sign  No cords no swelling  No erythema patient with pain on flexion and extension  Skin:     General: Skin is warm and dry  Neurological:      General: No focal deficit present  Mental Status: She is alert and oriented to person, place, and time  Psychiatric:         Mood and Affect: Mood normal          Behavior: Behavior normal          Thought Content: Thought content normal          Vital Signs  ED Triage Vitals [01/08/21 1503]   Temperature Pulse Respirations Blood Pressure SpO2   98 9 °F (37 2 °C) 86 16 (!) 183/80 98 %      Temp Source Heart Rate Source Patient Position - Orthostatic VS BP Location FiO2 (%)   Oral Monitor -- -- --      Pain Score       5           Vitals:    01/08/21 1503   BP: (!) 183/80   Pulse: 86         Visual Acuity      ED Medications  Medications - No data to display    Diagnostic Studies  Results Reviewed     None                 No orders to display              Procedures  Procedures         ED Course                                           MDM  Number of Diagnoses or Management Options  Left leg pain: established and improving  Sciatica of left side: established and improving      Disposition  Final diagnoses:   None     ED Disposition     None      Follow-up Information    None         Patient's Medications   Discharge Prescriptions    No medications on file     No discharge procedures on file      PDMP Review     None          ED Provider  Electronically Signed by           Gayle Cho MD  01/08/21 9254

## 2021-01-08 NOTE — DISCHARGE INSTRUCTIONS
Patient struck to to alternate ice and moist heat to her low back to stretching  She can alternate extra-strength Tylenol and Aleve for discomfort jenni Boggs

## 2021-01-13 ENCOUNTER — OFFICE VISIT (OUTPATIENT)
Dept: URGENT CARE | Facility: CLINIC | Age: 76
End: 2021-01-13
Payer: COMMERCIAL

## 2021-01-13 VITALS
WEIGHT: 132 LBS | TEMPERATURE: 97.8 F | SYSTOLIC BLOOD PRESSURE: 158 MMHG | RESPIRATION RATE: 18 BRPM | BODY MASS INDEX: 24.92 KG/M2 | HEIGHT: 61 IN | OXYGEN SATURATION: 99 % | HEART RATE: 86 BPM | DIASTOLIC BLOOD PRESSURE: 72 MMHG

## 2021-01-13 DIAGNOSIS — T50.Z95A VACCINE REACTION, INITIAL ENCOUNTER: Primary | ICD-10-CM

## 2021-01-13 DIAGNOSIS — L03.114 CELLULITIS OF LEFT UPPER EXTREMITY: ICD-10-CM

## 2021-01-13 PROCEDURE — G0463 HOSPITAL OUTPT CLINIC VISIT: HCPCS | Performed by: NURSE PRACTITIONER

## 2021-01-13 PROCEDURE — 99213 OFFICE O/P EST LOW 20 MIN: CPT | Performed by: NURSE PRACTITIONER

## 2021-01-13 RX ORDER — CEPHALEXIN 500 MG/1
500 CAPSULE ORAL EVERY 8 HOURS SCHEDULED
Qty: 21 CAPSULE | Refills: 0 | Status: SHIPPED | OUTPATIENT
Start: 2021-01-13 | End: 2021-01-20

## 2021-01-13 NOTE — PROGRESS NOTES
[facility]        NAME: Casey Hinkle is a 66 y o  female  : 1945    MRN: [id number]  DATE: 2021  TIME: 6:41 PM    Assessment and Plan   Vaccine reaction, initial encounter [T50  Z95A]  1  Vaccine reaction, initial encounter     2  Cellulitis of left upper extremity  cephalexin (KEFLEX) 500 mg capsule         Patient Instructions     Patient Instructions   This may be a localized reaction versus cellulitis  Will start antibiotic  Cool compresses every 4 hours  Continue to monitor site  If you have any increased swelling, redness or streaking go directly to the ER  Develops any from the rash, shortness breath, chest pain or difficulty breathing go directly to the emergency room  Chief Complaint     Chief Complaint   Patient presents with    Arm Pain     Left arm pain from an pnuemonia shot red and swollen  History of Present Illness   Casey Hinkle presents to the clinic c/o    This is a 58-year-old female here today with complaints of left arm redness, pain and swelling  She states she had her pneumococcal vaccine today  He states area swelled up  She has been you compresses  He states the swelling has actually improved  There is warmth over site  She denies any fevers bodies or chills  No shortness of breath, chest pain or difficulty breathing  No tongue or lip swelling  No hives or rash  Review of Systems   Review of Systems   Constitutional: Negative for activity change, chills, fatigue and fever  Respiratory: Negative  Cardiovascular: Negative  Skin: Positive for rash  Neurological: Negative            Current Medications     Long-Term Medications   Medication Sig Dispense Refill    atorvastatin (LIPITOR) 20 mg tablet Take 20 mg by mouth      Cholecalciferol 2000 units CAPS Take 1 capsule by mouth      insulin aspart protamine-insulin aspart (NOVOLOG MIX 70/30) 100 units/mL injection Inject 15 Units under the skin 2 (two) times a day before meals       levothyroxine 75 mcg tablet levothyroxine 75 mcg tablet   take 1 tablet by mouth once daily      lisinopril (ZESTRIL) 20 mg tablet lisinopril 20 mg tablet         Current Allergies     Allergies as of 01/13/2021 - Reviewed 01/13/2021   Allergen Reaction Noted    Diphenhydramine Shortness Of Breath 04/21/2016    Meperidine Hives and Shortness Of Breath 04/21/2016    Prednisone Hives and Shortness Of Breath 01/19/2018    Bee pollen  04/21/2016    Ciprofloxacin Hives 04/21/2016    Sulfamethoxazole-trimethoprim GI Intolerance 07/03/2019            The following portions of the patient's history were reviewed and updated as appropriate: allergies, current medications, past family history, past medical history, past social history, past surgical history and problem list     Objective   /72   Pulse 86   Temp 97 8 °F (36 6 °C)   Resp 18   Ht 5' 1" (1 549 m)   Wt 59 9 kg (132 lb)   SpO2 99%   BMI 24 94 kg/m²        Physical Exam     Physical Exam  Vitals signs and nursing note reviewed  Constitutional:       Appearance: Normal appearance  Cardiovascular:      Rate and Rhythm: Normal rate and regular rhythm  Pulses: Normal pulses  Heart sounds: Normal heart sounds  Skin:     Comments: Left upper arm: There is redness, swelling and warmth over site  Slight induration  Neurological:      Mental Status: She is alert  Psychiatric:         Mood and Affect: Mood normal          Behavior: Behavior normal          Thought Content:  Thought content normal          Judgment: Judgment normal

## 2021-01-13 NOTE — PATIENT INSTRUCTIONS
This may be a localized reaction versus cellulitis  Will start antibiotic  Cool compresses every 4 hours  Continue to monitor site  If you have any increased swelling, redness or streaking go directly to the ER  Develops any from the rash, shortness breath, chest pain or difficulty breathing go directly to the emergency room

## 2021-03-24 ENCOUNTER — TRANSCRIBE ORDERS (OUTPATIENT)
Dept: URGENT CARE | Facility: CLINIC | Age: 76
End: 2021-03-24

## 2021-03-24 ENCOUNTER — APPOINTMENT (OUTPATIENT)
Dept: LAB | Facility: CLINIC | Age: 76
End: 2021-03-24
Payer: MEDICARE

## 2021-03-24 DIAGNOSIS — E11.9 TYPE 2 DIABETES MELLITUS WITHOUT COMPLICATION, WITH LONG-TERM CURRENT USE OF INSULIN (HCC): ICD-10-CM

## 2021-03-24 DIAGNOSIS — I10 ESSENTIAL HYPERTENSION: ICD-10-CM

## 2021-03-24 DIAGNOSIS — Z79.4 TYPE 2 DIABETES MELLITUS WITHOUT COMPLICATION, WITH LONG-TERM CURRENT USE OF INSULIN (HCC): ICD-10-CM

## 2021-03-24 DIAGNOSIS — E11.9 TYPE 2 DIABETES MELLITUS WITHOUT COMPLICATION, WITH LONG-TERM CURRENT USE OF INSULIN (HCC): Primary | ICD-10-CM

## 2021-03-24 DIAGNOSIS — Z79.4 TYPE 2 DIABETES MELLITUS WITHOUT COMPLICATION, WITH LONG-TERM CURRENT USE OF INSULIN (HCC): Primary | ICD-10-CM

## 2021-03-24 LAB
ALBUMIN SERPL BCP-MCNC: 3.6 G/DL (ref 3.5–5)
ALP SERPL-CCNC: 91 U/L (ref 46–116)
ALT SERPL W P-5'-P-CCNC: 18 U/L (ref 12–78)
ANION GAP SERPL CALCULATED.3IONS-SCNC: 4 MMOL/L (ref 4–13)
AST SERPL W P-5'-P-CCNC: 15 U/L (ref 5–45)
BILIRUB SERPL-MCNC: 0.94 MG/DL (ref 0.2–1)
BUN SERPL-MCNC: 22 MG/DL (ref 5–25)
CALCIUM SERPL-MCNC: 9 MG/DL (ref 8.3–10.1)
CHLORIDE SERPL-SCNC: 104 MMOL/L (ref 100–108)
CO2 SERPL-SCNC: 28 MMOL/L (ref 21–32)
CREAT SERPL-MCNC: 1.17 MG/DL (ref 0.6–1.3)
EST. AVERAGE GLUCOSE BLD GHB EST-MCNC: 154 MG/DL
GFR SERPL CREATININE-BSD FRML MDRD: 45 ML/MIN/1.73SQ M
GLUCOSE P FAST SERPL-MCNC: 154 MG/DL (ref 65–99)
HBA1C MFR BLD: 7 %
POTASSIUM SERPL-SCNC: 4.6 MMOL/L (ref 3.5–5.3)
PROT SERPL-MCNC: 7.1 G/DL (ref 6.4–8.2)
SODIUM SERPL-SCNC: 136 MMOL/L (ref 136–145)

## 2021-03-24 PROCEDURE — 36415 COLL VENOUS BLD VENIPUNCTURE: CPT

## 2021-03-24 PROCEDURE — 80053 COMPREHEN METABOLIC PANEL: CPT

## 2021-03-24 PROCEDURE — 83036 HEMOGLOBIN GLYCOSYLATED A1C: CPT

## 2021-04-11 ENCOUNTER — IMMUNIZATIONS (OUTPATIENT)
Dept: FAMILY MEDICINE CLINIC | Facility: HOSPITAL | Age: 76
End: 2021-04-11

## 2021-04-11 DIAGNOSIS — Z23 ENCOUNTER FOR IMMUNIZATION: Primary | ICD-10-CM

## 2021-04-11 PROCEDURE — 0001A SARS-COV-2 / COVID-19 MRNA VACCINE (PFIZER-BIONTECH) 30 MCG: CPT

## 2021-04-11 PROCEDURE — 91300 SARS-COV-2 / COVID-19 MRNA VACCINE (PFIZER-BIONTECH) 30 MCG: CPT

## 2021-04-25 ENCOUNTER — APPOINTMENT (EMERGENCY)
Dept: RADIOLOGY | Facility: HOSPITAL | Age: 76
DRG: 871 | End: 2021-04-25
Payer: MEDICARE

## 2021-04-25 ENCOUNTER — HOSPITAL ENCOUNTER (INPATIENT)
Facility: HOSPITAL | Age: 76
LOS: 3 days | Discharge: HOME | DRG: 871 | End: 2021-04-28
Attending: FAMILY MEDICINE | Admitting: INTERNAL MEDICINE
Payer: MEDICARE

## 2021-04-25 ENCOUNTER — APPOINTMENT (EMERGENCY)
Dept: CT IMAGING | Facility: HOSPITAL | Age: 76
DRG: 871 | End: 2021-04-25
Payer: MEDICARE

## 2021-04-25 DIAGNOSIS — A41.9 SEPSIS (HCC): ICD-10-CM

## 2021-04-25 DIAGNOSIS — D72.829 LEUKOCYTOSIS: ICD-10-CM

## 2021-04-25 DIAGNOSIS — R30.0 DYSURIA: ICD-10-CM

## 2021-04-25 DIAGNOSIS — E11.9 TYPE 2 DIABETES MELLITUS WITHOUT COMPLICATION, WITHOUT LONG-TERM CURRENT USE OF INSULIN (HCC): ICD-10-CM

## 2021-04-25 DIAGNOSIS — N28.89 URETERITIS: Primary | ICD-10-CM

## 2021-04-25 DIAGNOSIS — I50.9 CHF (CONGESTIVE HEART FAILURE) (HCC): ICD-10-CM

## 2021-04-25 DIAGNOSIS — R53.1 GENERALIZED WEAKNESS: ICD-10-CM

## 2021-04-25 DIAGNOSIS — R50.9 FEVER: ICD-10-CM

## 2021-04-25 DIAGNOSIS — R79.89 ELEVATED LACTIC ACID LEVEL: ICD-10-CM

## 2021-04-25 PROBLEM — F41.1 GENERALIZED ANXIETY DISORDER: Status: ACTIVE | Noted: 2021-04-25

## 2021-04-25 PROBLEM — N18.31 STAGE 3A CHRONIC KIDNEY DISEASE (HCC): Status: ACTIVE | Noted: 2021-04-25

## 2021-04-25 LAB
ALBUMIN SERPL BCP-MCNC: 4.1 G/DL (ref 3.5–5.7)
ALP SERPL-CCNC: 66 U/L (ref 55–165)
ALT SERPL W P-5'-P-CCNC: 6 U/L (ref 7–52)
AMORPH URATE CRY URNS QL MICRO: ABNORMAL /HPF
ANION GAP SERPL CALCULATED.3IONS-SCNC: 11 MMOL/L (ref 4–13)
APTT PPP: 33 SECONDS (ref 23–37)
AST SERPL W P-5'-P-CCNC: 8 U/L (ref 13–39)
ATRIAL RATE: 107 BPM
BACTERIA UR QL AUTO: ABNORMAL /HPF
BILIRUB SERPL-MCNC: 0.7 MG/DL (ref 0.2–1)
BILIRUB UR QL STRIP: NEGATIVE
BNP SERPL-MCNC: 121 PG/ML (ref 1–100)
BUN SERPL-MCNC: 21 MG/DL (ref 7–25)
CALCIUM SERPL-MCNC: 8.8 MG/DL (ref 8.6–10.5)
CHLORIDE SERPL-SCNC: 101 MMOL/L (ref 98–107)
CLARITY UR: CLEAR
CO2 SERPL-SCNC: 25 MMOL/L (ref 21–31)
COLOR UR: YELLOW
CREAT SERPL-MCNC: 1.29 MG/DL (ref 0.6–1.2)
EOSINOPHIL # BLD AUTO: 0.53 THOUSAND/UL (ref 0–0.61)
EOSINOPHIL NFR BLD MANUAL: 2 % (ref 0–6)
ERYTHROCYTE [DISTWIDTH] IN BLOOD BY AUTOMATED COUNT: 13.4 % (ref 11.5–14.5)
GFR SERPL CREATININE-BSD FRML MDRD: 40 ML/MIN/1.73SQ M
GLUCOSE SERPL-MCNC: 137 MG/DL (ref 65–140)
GLUCOSE SERPL-MCNC: 230 MG/DL (ref 65–140)
GLUCOSE SERPL-MCNC: 268 MG/DL (ref 65–99)
GLUCOSE UR STRIP-MCNC: ABNORMAL MG/DL
HCT VFR BLD AUTO: 39.9 % (ref 42–47)
HGB BLD-MCNC: 12.9 G/DL (ref 12–16)
HGB UR QL STRIP.AUTO: ABNORMAL
INR PPP: 1.29 (ref 0.84–1.19)
INR PPP: 1.37 (ref 0.84–1.19)
KETONES UR STRIP-MCNC: ABNORMAL MG/DL
LACTATE SERPL-SCNC: 1.3 MMOL/L (ref 0.5–2)
LACTATE SERPL-SCNC: 2.8 MMOL/L (ref 0.5–2)
LEUKOCYTE ESTERASE UR QL STRIP: NEGATIVE
LYMPHOCYTES # BLD AUTO: 1.05 THOUSAND/UL (ref 0.6–4.47)
LYMPHOCYTES # BLD AUTO: 4 % (ref 20–51)
MAGNESIUM SERPL-MCNC: 1.9 MG/DL (ref 1.9–2.7)
MCH RBC QN AUTO: 28.3 PG (ref 26–34)
MCHC RBC AUTO-ENTMCNC: 32.3 G/DL (ref 31–37)
MCV RBC AUTO: 88 FL (ref 81–99)
MONOCYTES # BLD AUTO: 1.05 THOUSAND/UL (ref 0–1.22)
MONOCYTES NFR BLD AUTO: 4 % (ref 4–12)
NEUTS BAND NFR BLD MANUAL: 11 % (ref 0–8)
NEUTS SEG # BLD: 23.67 THOUSAND/UL (ref 1.81–6.82)
NEUTS SEG NFR BLD AUTO: 79 % (ref 42–75)
NITRITE UR QL STRIP: NEGATIVE
NON-SQ EPI CELLS URNS QL MICRO: ABNORMAL /HPF
P AXIS: 67 DEGREES
PH UR STRIP.AUTO: 5 [PH]
PLATELET # BLD AUTO: 206 THOUSANDS/UL (ref 149–390)
PLATELET BLD QL SMEAR: ADEQUATE
PMV BLD AUTO: 9.9 FL (ref 8.6–11.7)
POTASSIUM SERPL-SCNC: 3.9 MMOL/L (ref 3.5–5.5)
PR INTERVAL: 136 MS
PROT SERPL-MCNC: 6.8 G/DL (ref 6.4–8.9)
PROT UR STRIP-MCNC: ABNORMAL MG/DL
PROTHROMBIN TIME: 16 SECONDS (ref 11.6–14.5)
PROTHROMBIN TIME: 16.7 SECONDS (ref 11.6–14.5)
QRS AXIS: 59 DEGREES
QRSD INTERVAL: 92 MS
QT INTERVAL: 326 MS
QTC INTERVAL: 435 MS
RBC # BLD AUTO: 4.55 MILLION/UL (ref 3.9–5.2)
RBC #/AREA URNS AUTO: ABNORMAL /HPF
RBC MORPH BLD: NORMAL
SARS-COV-2 RNA RESP QL NAA+PROBE: NEGATIVE
SODIUM SERPL-SCNC: 137 MMOL/L (ref 134–143)
SP GR UR STRIP.AUTO: 1.02 (ref 1–1.03)
T WAVE AXIS: 59 DEGREES
TOTAL CELLS COUNTED SPEC: 100
TROPONIN I SERPL-MCNC: 0.03 NG/ML
UROBILINOGEN UR QL STRIP.AUTO: 0.2 E.U./DL
VENTRICULAR RATE: 107 BPM
WBC # BLD AUTO: 26.3 THOUSAND/UL (ref 4.8–10.8)
WBC #/AREA URNS AUTO: ABNORMAL /HPF

## 2021-04-25 PROCEDURE — 83605 ASSAY OF LACTIC ACID: CPT | Performed by: PHYSICIAN ASSISTANT

## 2021-04-25 PROCEDURE — 96365 THER/PROPH/DIAG IV INF INIT: CPT

## 2021-04-25 PROCEDURE — 85730 THROMBOPLASTIN TIME PARTIAL: CPT | Performed by: PHYSICIAN ASSISTANT

## 2021-04-25 PROCEDURE — 85027 COMPLETE CBC AUTOMATED: CPT | Performed by: PHYSICIAN ASSISTANT

## 2021-04-25 PROCEDURE — 74177 CT ABD & PELVIS W/CONTRAST: CPT

## 2021-04-25 PROCEDURE — 84484 ASSAY OF TROPONIN QUANT: CPT | Performed by: PHYSICIAN ASSISTANT

## 2021-04-25 PROCEDURE — 36415 COLL VENOUS BLD VENIPUNCTURE: CPT | Performed by: PHYSICIAN ASSISTANT

## 2021-04-25 PROCEDURE — 93010 ELECTROCARDIOGRAM REPORT: CPT | Performed by: INTERNAL MEDICINE

## 2021-04-25 PROCEDURE — 99285 EMERGENCY DEPT VISIT HI MDM: CPT

## 2021-04-25 PROCEDURE — 87449 NOS EACH ORGANISM AG IA: CPT | Performed by: NURSE PRACTITIONER

## 2021-04-25 PROCEDURE — 93005 ELECTROCARDIOGRAM TRACING: CPT

## 2021-04-25 PROCEDURE — 85610 PROTHROMBIN TIME: CPT | Performed by: NURSE PRACTITIONER

## 2021-04-25 PROCEDURE — 99223 1ST HOSP IP/OBS HIGH 75: CPT | Performed by: NURSE PRACTITIONER

## 2021-04-25 PROCEDURE — 83880 ASSAY OF NATRIURETIC PEPTIDE: CPT | Performed by: NURSE PRACTITIONER

## 2021-04-25 PROCEDURE — U0003 INFECTIOUS AGENT DETECTION BY NUCLEIC ACID (DNA OR RNA); SEVERE ACUTE RESPIRATORY SYNDROME CORONAVIRUS 2 (SARS-COV-2) (CORONAVIRUS DISEASE [COVID-19]), AMPLIFIED PROBE TECHNIQUE, MAKING USE OF HIGH THROUGHPUT TECHNOLOGIES AS DESCRIBED BY CMS-2020-01-R: HCPCS | Performed by: PHYSICIAN ASSISTANT

## 2021-04-25 PROCEDURE — 85610 PROTHROMBIN TIME: CPT | Performed by: PHYSICIAN ASSISTANT

## 2021-04-25 PROCEDURE — 71045 X-RAY EXAM CHEST 1 VIEW: CPT

## 2021-04-25 PROCEDURE — 85007 BL SMEAR W/DIFF WBC COUNT: CPT | Performed by: PHYSICIAN ASSISTANT

## 2021-04-25 PROCEDURE — U0005 INFEC AGEN DETEC AMPLI PROBE: HCPCS | Performed by: PHYSICIAN ASSISTANT

## 2021-04-25 PROCEDURE — 80053 COMPREHEN METABOLIC PANEL: CPT | Performed by: PHYSICIAN ASSISTANT

## 2021-04-25 PROCEDURE — 99285 EMERGENCY DEPT VISIT HI MDM: CPT | Performed by: PHYSICIAN ASSISTANT

## 2021-04-25 PROCEDURE — 96361 HYDRATE IV INFUSION ADD-ON: CPT

## 2021-04-25 PROCEDURE — 84145 PROCALCITONIN (PCT): CPT | Performed by: NURSE PRACTITIONER

## 2021-04-25 PROCEDURE — 1124F ACP DISCUSS-NO DSCNMKR DOCD: CPT | Performed by: INTERNAL MEDICINE

## 2021-04-25 PROCEDURE — 81001 URINALYSIS AUTO W/SCOPE: CPT | Performed by: PHYSICIAN ASSISTANT

## 2021-04-25 PROCEDURE — 87040 BLOOD CULTURE FOR BACTERIA: CPT | Performed by: PHYSICIAN ASSISTANT

## 2021-04-25 PROCEDURE — 82948 REAGENT STRIP/BLOOD GLUCOSE: CPT

## 2021-04-25 PROCEDURE — 71260 CT THORAX DX C+: CPT

## 2021-04-25 PROCEDURE — 81003 URINALYSIS AUTO W/O SCOPE: CPT | Performed by: PHYSICIAN ASSISTANT

## 2021-04-25 PROCEDURE — 87086 URINE CULTURE/COLONY COUNT: CPT | Performed by: NURSE PRACTITIONER

## 2021-04-25 PROCEDURE — 83735 ASSAY OF MAGNESIUM: CPT | Performed by: PHYSICIAN ASSISTANT

## 2021-04-25 RX ORDER — ATORVASTATIN CALCIUM 20 MG/1
20 TABLET, FILM COATED ORAL
Status: DISCONTINUED | OUTPATIENT
Start: 2021-04-25 | End: 2021-04-28 | Stop reason: HOSPADM

## 2021-04-25 RX ORDER — ASPIRIN 81 MG/1
81 TABLET, CHEWABLE ORAL DAILY
Status: DISCONTINUED | OUTPATIENT
Start: 2021-04-26 | End: 2021-04-28 | Stop reason: HOSPADM

## 2021-04-25 RX ORDER — ACETAMINOPHEN 325 MG/1
650 TABLET ORAL EVERY 6 HOURS PRN
Status: DISCONTINUED | OUTPATIENT
Start: 2021-04-25 | End: 2021-04-28 | Stop reason: HOSPADM

## 2021-04-25 RX ORDER — LISINOPRIL 20 MG/1
20 TABLET ORAL DAILY
Status: DISCONTINUED | OUTPATIENT
Start: 2021-04-26 | End: 2021-04-28 | Stop reason: HOSPADM

## 2021-04-25 RX ORDER — CEFTRIAXONE 2 G/50ML
2000 INJECTION, SOLUTION INTRAVENOUS ONCE
Status: DISCONTINUED | OUTPATIENT
Start: 2021-04-25 | End: 2021-04-25

## 2021-04-25 RX ORDER — SODIUM CHLORIDE, SODIUM GLUCONATE, SODIUM ACETATE, POTASSIUM CHLORIDE, MAGNESIUM CHLORIDE, SODIUM PHOSPHATE, DIBASIC, AND POTASSIUM PHOSPHATE .53; .5; .37; .037; .03; .012; .00082 G/100ML; G/100ML; G/100ML; G/100ML; G/100ML; G/100ML; G/100ML
75 INJECTION, SOLUTION INTRAVENOUS ONCE
Status: COMPLETED | OUTPATIENT
Start: 2021-04-25 | End: 2021-04-25

## 2021-04-25 RX ORDER — CEFEPIME HYDROCHLORIDE 1 G/1
1000 INJECTION, POWDER, FOR SOLUTION INTRAMUSCULAR; INTRAVENOUS EVERY 12 HOURS
Status: DISCONTINUED | OUTPATIENT
Start: 2021-04-25 | End: 2021-04-25 | Stop reason: CLARIF

## 2021-04-25 RX ORDER — HEPARIN SODIUM 5000 [USP'U]/ML
5000 INJECTION, SOLUTION INTRAVENOUS; SUBCUTANEOUS EVERY 8 HOURS SCHEDULED
Status: DISCONTINUED | OUTPATIENT
Start: 2021-04-25 | End: 2021-04-28 | Stop reason: HOSPADM

## 2021-04-25 RX ORDER — GUAIFENESIN 600 MG
600 TABLET, EXTENDED RELEASE 12 HR ORAL 2 TIMES DAILY
Status: DISCONTINUED | OUTPATIENT
Start: 2021-04-25 | End: 2021-04-28 | Stop reason: HOSPADM

## 2021-04-25 RX ORDER — CEFEPIME HYDROCHLORIDE 1 G/50ML
1000 INJECTION, SOLUTION INTRAVENOUS EVERY 12 HOURS
Status: DISCONTINUED | OUTPATIENT
Start: 2021-04-25 | End: 2021-04-28 | Stop reason: HOSPADM

## 2021-04-25 RX ORDER — ALPRAZOLAM 0.25 MG/1
0.25 TABLET ORAL 3 TIMES DAILY PRN
Status: DISCONTINUED | OUTPATIENT
Start: 2021-04-25 | End: 2021-04-28 | Stop reason: HOSPADM

## 2021-04-25 RX ORDER — MELATONIN
2000 DAILY
Status: DISCONTINUED | OUTPATIENT
Start: 2021-04-26 | End: 2021-04-28 | Stop reason: HOSPADM

## 2021-04-25 RX ORDER — INSULIN ASPART 100 [IU]/ML
10 INJECTION, SUSPENSION SUBCUTANEOUS
Status: DISCONTINUED | OUTPATIENT
Start: 2021-04-25 | End: 2021-04-26

## 2021-04-25 RX ORDER — LEVOTHYROXINE SODIUM 0.07 MG/1
75 TABLET ORAL
Status: DISCONTINUED | OUTPATIENT
Start: 2021-04-26 | End: 2021-04-28 | Stop reason: HOSPADM

## 2021-04-25 RX ORDER — VANCOMYCIN HYDROCHLORIDE 1 G/200ML
15 INJECTION, SOLUTION INTRAVENOUS ONCE
Status: COMPLETED | OUTPATIENT
Start: 2021-04-25 | End: 2021-04-25

## 2021-04-25 RX ORDER — CEFTRIAXONE 1 G/50ML
1000 INJECTION, SOLUTION INTRAVENOUS EVERY 24 HOURS
Status: DISCONTINUED | OUTPATIENT
Start: 2021-04-25 | End: 2021-04-25

## 2021-04-25 RX ORDER — ACETAMINOPHEN 325 MG/1
650 TABLET ORAL ONCE
Status: COMPLETED | OUTPATIENT
Start: 2021-04-25 | End: 2021-04-25

## 2021-04-25 RX ADMIN — ACETAMINOPHEN 650 MG: 325 TABLET ORAL at 12:35

## 2021-04-25 RX ADMIN — SODIUM CHLORIDE 1000 ML: 0.9 INJECTION, SOLUTION INTRAVENOUS at 12:20

## 2021-04-25 RX ADMIN — AZITHROMYCIN MONOHYDRATE 500 MG: 500 INJECTION, POWDER, LYOPHILIZED, FOR SOLUTION INTRAVENOUS at 22:16

## 2021-04-25 RX ADMIN — CEFEPIME HYDROCHLORIDE 1000 MG: 1 INJECTION, SOLUTION INTRAVENOUS at 21:47

## 2021-04-25 RX ADMIN — HEPARIN SODIUM 5000 UNITS: 5000 INJECTION INTRAVENOUS; SUBCUTANEOUS at 21:47

## 2021-04-25 RX ADMIN — VANCOMYCIN HYDROCHLORIDE 1000 MG: 1 INJECTION, SOLUTION INTRAVENOUS at 15:37

## 2021-04-25 RX ADMIN — SODIUM CHLORIDE 1000 ML: 0.9 INJECTION, SOLUTION INTRAVENOUS at 15:32

## 2021-04-25 RX ADMIN — ATORVASTATIN CALCIUM 20 MG: 20 TABLET, FILM COATED ORAL at 18:22

## 2021-04-25 RX ADMIN — INSULIN LISPRO 2 UNITS: 100 INJECTION, SOLUTION INTRAVENOUS; SUBCUTANEOUS at 18:05

## 2021-04-25 RX ADMIN — INSULIN ASPART 10 UNITS: 100 INJECTION, SUSPENSION SUBCUTANEOUS at 18:43

## 2021-04-25 RX ADMIN — GUAIFENESIN 600 MG: 600 TABLET, EXTENDED RELEASE ORAL at 18:22

## 2021-04-25 RX ADMIN — SODIUM CHLORIDE, SODIUM GLUCONATE, SODIUM ACETATE, POTASSIUM CHLORIDE, MAGNESIUM CHLORIDE, SODIUM PHOSPHATE, DIBASIC, AND POTASSIUM PHOSPHATE 75 ML/HR: .53; .5; .37; .037; .03; .012; .00082 INJECTION, SOLUTION INTRAVENOUS at 18:05

## 2021-04-25 RX ADMIN — IOHEXOL 80 ML: 350 INJECTION, SOLUTION INTRAVENOUS at 15:13

## 2021-04-25 RX ADMIN — PIPERACILLIN SODIUM AND TAZOBACTAM SODIUM 3.38 G: 3; .375 INJECTION, POWDER, LYOPHILIZED, FOR SOLUTION INTRAVENOUS at 15:40

## 2021-04-25 RX ADMIN — ACETAMINOPHEN 650 MG: 325 TABLET ORAL at 19:38

## 2021-04-25 NOTE — ASSESSMENT & PLAN NOTE
·  The patient meets sepsis criteria with tachycardia and leukocytosis with underlying  Possible pneumonia and  Complicated UTI with ascending right ureteritis /pyelitis  ·  received Zosyn and vancomycin in the ED  Will transition antibiotics of cefepime and azithromycin  ·  follow-up with cultures  ·  CT  Chest, abdomen, and pelvis with IV contrast show scattered patchy ground-glass airspace disease at the right lung apex with trace bilateral pleural effusion and CHF  Ascending  right ureteritis/pyelitis     ·  obtain procalcitonin  · Obtain urine for Legionella and strep

## 2021-04-25 NOTE — ED PROVIDER NOTES
History  Chief Complaint   Patient presents with    Weakness - Generalized     Patient seen for UTI previously and finished course of antibiotics  Patient reports fever, chills, weakness and urinary frequency/pain     Patient is a 68-year-old female with a past medical history of hypertension, hyperlipidemia and diabetes mellitus, presenting to the ED for evaluation of fevers, generalized weakness and urinary symptoms  Patient states that she completed a 7-day course of antibiotics two days ago for a UTI  She does not know the name of the antibiotic  Patient reports no improvement of symptoms and continues to have urinary frequency and dysuria  She has also been having fevers and chills since completing antibiotics  She took one Tylenol (325mg) this morning around 9:00AM  She also reports back pain in thoracic and lumbar regions bilaterally  Patient also reports a dry intermittent cough and myalgias over the last day  She also reports constipation over the past 3 days and took MiraLax yesterday with no relief  She denies headaches, dizziness, chest pain, shortness of breath, nausea, vomiting, diarrhea or abdominal pain  Prior to Admission Medications   Prescriptions Last Dose Informant Patient Reported? Taking?    ASPIRIN 81 PO 4/25/2021 at Unknown time  Yes Yes   Sig: Take 81 mg by mouth daily   Cholecalciferol 2000 units CAPS 4/24/2021 at Unknown time  Yes Yes   Sig: Take 1 capsule by mouth   atorvastatin (LIPITOR) 20 mg tablet 4/24/2021 at Unknown time  Yes Yes   Sig: Take 20 mg by mouth   insulin aspart protamine-insulin aspart (NOVOLOG MIX 70/30) 100 units/mL injection 4/25/2021 at Unknown time  Yes Yes   Sig: Inject 15 Units under the skin 2 (two) times a day before meals    levothyroxine 75 mcg tablet 4/25/2021 at Unknown time  Yes Yes   Sig: levothyroxine 75 mcg tablet   take 1 tablet by mouth once daily   lisinopril (ZESTRIL) 20 mg tablet 4/25/2021 at Unknown time  Yes Yes   Sig: lisinopril 20 mg tablet      Facility-Administered Medications: None       Past Medical History:   Diagnosis Date    Anxiety     Colon polyp     Depression     Diabetes mellitus ([address] )     Diverticulosis     Hyperlipidemia     Hypertension     Osteoporosis        Past Surgical History:   Procedure Laterality Date    APPENDECTOMY      CATARACT EXTRACTION Right     COLONOSCOPY      difficult exam    HYSTERECTOMY      TONSILLECTOMY      WISDOM TOOTH EXTRACTION         Family History   Problem Relation Age of Onset   [facility] Parkinsonism Mother     Alzheimer's disease Father     Breast cancer Sister         in her 42's   [facility] Lung cancer Sister     No Known Problems Daughter     No Known Problems Maternal Grandmother     No Known Problems Maternal Grandfather     No Known Problems Paternal Grandmother     No Known Problems Paternal Grandfather     No Known Problems Daughter     No Known Problems Maternal Aunt     No Known Problems Maternal Aunt     No Known Problems Maternal Aunt     No Known Problems Paternal Aunt      I have reviewed and agree with the history as documented  E-Cigarette/Vaping    E-Cigarette Use Never User      E-Cigarette/Vaping Substances     Social History     Tobacco Use    Smoking status: Never Smoker    Smokeless tobacco: Never Used   Substance Use Topics    Alcohol use: Never     Frequency: Never    Drug use: Never       Review of Systems   Constitutional: Positive for chills and fever  Negative for appetite change and fatigue  HENT: Negative for congestion, rhinorrhea, sinus pressure, sinus pain and sore throat  Eyes: Negative for photophobia and visual disturbance  Respiratory: Positive for cough  Negative for shortness of breath and wheezing  Cardiovascular: Negative for chest pain, palpitations and leg swelling  Gastrointestinal: Positive for constipation  Negative for abdominal pain, blood in stool, diarrhea, nausea and vomiting     Genitourinary: Positive for dysuria, frequency and urgency  Negative for difficulty urinating, flank pain and hematuria  Musculoskeletal: Positive for back pain and myalgias  Negative for arthralgias, joint swelling and neck pain  Skin: Negative for color change, pallor and rash  Neurological: Negative for dizziness, syncope, weakness, light-headedness and headaches  Psychiatric/Behavioral: Negative for confusion and sleep disturbance  All other systems reviewed and are negative  Physical Exam  Physical Exam  Vitals signs and nursing note reviewed  Constitutional:       General: She is awake  Appearance: Normal appearance  She is well-developed  She is not toxic-appearing or diaphoretic  HENT:      Head: Normocephalic and atraumatic  Right Ear: External ear normal       Left Ear: External ear normal       Nose: Nose normal       Mouth/Throat:      Lips: Pink  Mouth: Mucous membranes are moist    Eyes:      General: Lids are normal  No scleral icterus  Conjunctiva/sclera: Conjunctivae normal       Pupils: Pupils are equal, round, and reactive to light  Neck:      Musculoskeletal: Full passive range of motion without pain and neck supple  No injury or pain with movement  Cardiovascular:      Rate and Rhythm: Normal rate and regular rhythm  Pulses: Normal pulses  Radial pulses are 2+ on the right side and 2+ on the left side  Heart sounds: Normal heart sounds, S1 normal and S2 normal    Pulmonary:      Effort: Pulmonary effort is normal  No accessory muscle usage  Breath sounds: Normal breath sounds  No stridor  No decreased breath sounds, wheezing, rhonchi or rales  Abdominal:      General: Abdomen is flat  Bowel sounds are normal  There is no distension  Palpations: Abdomen is soft  Tenderness: There is no abdominal tenderness  There is no right CVA tenderness, left CVA tenderness, guarding or rebound  Musculoskeletal:      Right lower leg: No edema  Left lower leg: No edema  Comments: Tenderness to palpation of bilateral thoracic and lumbar paraspinal muscles  No midline tenderness, deformities or step-offs  Strength 5/5 in bilateral lower extremities  Sensation intact, neurovascularly intact  Lymphadenopathy:      Cervical: No cervical adenopathy  Skin:     General: Skin is warm and dry  Capillary Refill: Capillary refill takes less than 2 seconds  Coloration: Skin is not cyanotic, jaundiced or pale  Neurological:      Mental Status: She is alert and oriented to person, place, and time  GCS: GCS eye subscore is 4  GCS verbal subscore is 5  GCS motor subscore is 6  Gait: Gait normal    Psychiatric:         Mood and Affect: Mood normal          Speech: Speech normal          Behavior: Behavior is cooperative           Vital Signs  ED Triage Vitals [04/25/21 1154]   Temperature Pulse Respirations Blood Pressure SpO2   (!) 100 9 °F (38 3 °C) 104 16 (!) 177/85 97 %      Temp Source Heart Rate Source Patient Position - Orthostatic VS BP Location FiO2 (%)   Temporal Monitor Lying Left arm --      Pain Score       4           Vitals:    04/25/21 1400 04/25/21 1430 04/25/21 1645 04/25/21 1711   BP: 127/75 132/61 (!) 177/70 127/60   Pulse: 93 91 98 105   Patient Position - Orthostatic VS:    Lying         Visual Acuity      ED Medications  Medications   aspirin chewable tablet 81 mg (has no administration in time range)   insulin aspart protamine-insulin aspart (NovoLOG 70/30) 100 units/mL subcutaneous injection 10 Units (has no administration in time range)   atorvastatin (LIPITOR) tablet 20 mg (20 mg Oral Given 4/25/21 1822)   cholecalciferol (VITAMIN D3) tablet 2,000 Units (has no administration in time range)   levothyroxine tablet 75 mcg (has no administration in time range)   lisinopril (ZESTRIL) tablet 20 mg (has no administration in time range)   acetaminophen (TYLENOL) tablet 650 mg (has no administration in time range) guaiFENesin (MUCINEX) 12 hr tablet 600 mg (600 mg Oral Given 4/25/21 1822)   heparin (porcine) subcutaneous injection 5,000 Units (has no administration in time range)   insulin lispro (HumaLOG) 100 units/mL subcutaneous injection 1-5 Units (2 Units Subcutaneous Given 4/25/21 1805)   insulin lispro (HumaLOG) 100 units/mL subcutaneous injection 1-5 Units (has no administration in time range)   azithromycin (ZITHROMAX) 500 mg in sodium chloride 0 9 % 250 mL IVPB (has no administration in time range)   ALPRAZolam (XANAX) tablet 0 25 mg (has no administration in time range)   cefepime (MAXIPIME) IVPB (premix in dextrose) 1,000 mg 50 mL (has no administration in time range)   sodium chloride 0 9 % bolus 1,000 mL (0 mL Intravenous Stopped 4/25/21 1337)   acetaminophen (TYLENOL) tablet 650 mg (650 mg Oral Given 4/25/21 1235)   sodium chloride 0 9 % bolus 1,000 mL (0 mL Intravenous Stopped 4/25/21 1805)   piperacillin-tazobactam (ZOSYN) 3 375 g in sodium chloride 0 9 % 100 mL IVPB (0 g Intravenous Stopped 4/25/21 1634)   vancomycin (VANCOCIN) IVPB (premix in dextrose) 1,000 mg 200 mL (0 mg/kg × 59 9 kg Intravenous Stopped 4/25/21 1644)   iohexol (OMNIPAQUE) 350 MG/ML injection (MULTI-DOSE) 80 mL (80 mL Intravenous Given 4/25/21 1513)   multi-electrolyte (PLASMALYTE-A/ISOLYTE-S PH 7 4) IV solution (75 mL/hr Intravenous New Bag 4/25/21 1805)       Diagnostic Studies  Results Reviewed     Procedure Component Value Units Date/Time    Lactic acid 2 Hours [[id number]]  (Normal) Collected: 04/25/21 1359    Lab Status: Final result Specimen: Blood from Arm, Right Updated: 04/25/21 1434     LACTIC ACID 1 3 mmol/L     Narrative:      Result may be elevated if tourniquet was used during collection      Lactic acid [[id number]]  (Abnormal) Collected: 04/25/21 1215    Lab Status: Final result Specimen: Blood from Arm, Left Updated: 04/25/21 1342     LACTIC ACID 2 8 mmol/L     Narrative:      Result may be elevated if tourniquet was used during collection  Novel Coronavirus (Covid-19),PCR [facility] - 2 Hour Stat [[id number]]  (Normal) Collected: 04/25/21 1215    Lab Status: Final result Specimen: Nares from Nasopharyngeal Swab Updated: 04/25/21 1334     SARS-CoV-2 Negative    Narrative: The specimen collection materials, transport medium, and/or testing methodology utilized in the production of these test results have been proven to be reliable in a limited validation with an abbreviated program under the Emergency Utilization Authorization provided by the FDA  Testing reported as "Presumptive positive" will be confirmed with secondary testing to ensure result accuracy  Clinical caution and judgement should be used with the interpretation of these results with consideration of the clinical impression and other laboratory testing  Testing reported as "Positive" or "Negative" has been proven to be accurate according to standard laboratory validation requirements  All testing is performed with control materials showing appropriate reactivity at standard intervals        Urine Microscopic [[id number]]  (Abnormal) Collected: 04/25/21 1251    Lab Status: Final result Specimen: Urine, Clean Catch Updated: 04/25/21 1322     RBC, UA 4-10 /hpf      WBC, UA 2-4 /hpf      Epithelial Cells Moderate /hpf      Bacteria, UA Moderate /hpf      AMORPH URATES Moderate /hpf     UA w Reflex to Microscopic w Reflex to Culture [[id number]]  (Abnormal) Collected: 04/25/21 1251    Lab Status: Final result Specimen: Urine, Clean Catch Updated: 04/25/21 1316     Color, UA Yellow     Clarity, UA Clear     Specific Gravity, UA 1 025     pH, UA 5 0     Leukocytes, UA Negative     Nitrite, UA Negative     Protein, UA 1+ mg/dl      Glucose, UA 3+ mg/dl      Ketones, UA Trace mg/dl      Urobilinogen, UA 0 2 E U /dl      Bilirubin, UA Negative     Blood, UA 2+    Protime-INR [[id number]]  (Abnormal) Collected: 04/25/21 1246    Lab Status: Final result Specimen: Blood from Arm, Left Updated: 04/25/21 1316     Protime 16 0 seconds      INR 1 29    APTT [[id number]]  (Normal) Collected: 04/25/21 1246    Lab Status: Final result Specimen: Blood from Arm, Left Updated: 04/25/21 1316     PTT 33 seconds     Comprehensive metabolic panel [[id number]]  (Abnormal) Collected: 04/25/21 1243    Lab Status: Final result Specimen: Blood from Arm, Right Updated: 04/25/21 1316     Sodium 137 mmol/L      Potassium 3 9 mmol/L      Chloride 101 mmol/L      CO2 25 mmol/L      ANION GAP 11 mmol/L      BUN 21 mg/dL      Creatinine 1 29 mg/dL      Glucose 268 mg/dL      Calcium 8 8 mg/dL      AST 8 U/L      ALT 6 U/L      Alkaline Phosphatase 66 U/L      Total Protein 6 8 g/dL      Albumin 4 1 g/dL      Total Bilirubin 0 70 mg/dL      eGFR 40 ml/min/1 73sq m     Narrative:      [place] guidelines for Chronic Kidney Disease (CKD):     Stage 1 with normal or high GFR (GFR > 90 mL/min/1 73 square meters)    Stage 2 Mild CKD (GFR = 60-89 mL/min/1 73 square meters)    Stage 3A Moderate CKD (GFR = 45-59 mL/min/1 73 square meters)    Stage 3B Moderate CKD (GFR = 30-44 mL/min/1 73 square meters)    Stage 4 Severe CKD (GFR = 15-29 mL/min/1 73 square meters)    Stage 5 End Stage CKD (GFR <15 mL/min/1 73 square meters)  Note: GFR calculation is accurate only with a steady state creatinine    Troponin I [[id number]]  (Normal) Collected: 04/25/21 1243    Lab Status: Final result Specimen: Blood from Arm, Right Updated: 04/25/21 1313     Troponin I 0 03 ng/mL     Magnesium [[id number]]  (Normal) Collected: 04/25/21 1243    Lab Status: Final result Specimen: Blood from Arm, Right Updated: 04/25/21 1308     Magnesium 1 9 mg/dL     Manual Differential (Non Wam) [[id number]]  (Abnormal) Collected: 04/25/21 1214    Lab Status: Final result Specimen: Blood from Arm, Left Updated: 04/25/21 1300     Segmented % 79 %      Bands % 11 %      Lymphocytes % 4 %      Monocytes % 4 %      Eosinophils, % 2 % Neutrophils Absolute 23 67 Thousand/uL      Lymphocytes Absolute 1 05 Thousand/uL      Monocytes Absolute 1 05 Thousand/uL      Eosinophils Absolute 0 53 Thousand/uL      Total Counted 100     RBC Morphology Normal     Platelet Estimate Adequate    Blood culture #2 [[id number]] Collected: 04/25/21 1215    Lab Status: In process Specimen: Blood from Arm, Left Updated: 04/25/21 1245    Blood culture #1 [[id number]] Collected: 04/25/21 1231    Lab Status: In process Specimen: Blood from Arm, Right Updated: 04/25/21 1245    CBC and differential [[id number]]  (Abnormal) Collected: 04/25/21 1214    Lab Status: Final result Specimen: Blood from Arm, Left Updated: 04/25/21 1230     WBC 26 30 Thousand/uL      RBC 4 55 Million/uL      Hemoglobin 12 9 g/dL      Hematocrit 39 9 %      MCV 88 fL      MCH 28 3 pg      MCHC 32 3 g/dL      RDW 13 4 %      MPV 9 9 fL      Platelets 803 Thousands/uL                  CT chest abdomen pelvis w contrast   Final Result by Adan Jolly MD (04/25 1543)      Minimal patchy groundglass airspace disease at the right lung apex consistent with an acute infiltrate in appropriate clinical context  CHF  Findings consistent with ascending right ureteritis/pyelitis              Workstation performed: [id number]         XR chest 1 view portable    (Results Pending)              Procedures  ECG 12 Lead Documentation Only    Date/Time: 4/25/2021 12:34 PM  Performed by: Maritza Mendoza PA-C  Authorized by: Maritza Mendoza PA-C     Indications / Diagnosis:  Generalized weakness  ECG reviewed by me, the ED Provider: yes    Patient location:  ED  Previous ECG:     Previous ECG:  Compared to current    Comparison ECG info:  3/31/20    Similarity:  Changes noted    Comparison to cardiac monitor: Yes    Interpretation:     Interpretation: abnormal    Rate:     ECG rate:  107    ECG rate assessment: tachycardic    Rhythm:     Rhythm: sinus tachycardia    Ectopy:     Ectopy: none    Conduction: Conduction: abnormal      Abnormal conduction: incomplete RBBB    ST segments:     ST segments:  Non-specific  T waves:     T waves: non-specific    Comments:      No STEMI             ED Course  ED Course as of Apr 25 1842   Sun Apr 25, 2021   1214 Temperature(!): 100 9 °F (38 3 °C)   1214 Pulse: 104   1237 WBC(!): 26 30   1323 Creatinine(!): 1 29   1323 Bands Relative(!): 11   1339 SARS-COV-2: Negative   1354 LACTIC ACID(!!): 2 8   1603 Findings consistent with ascending right ureteritis/pyelitis  1630 Minimal patchy groundglass airspace disease at the right lung apex consistent with an acute infiltrate in appropriate clinical context      CHF  Initial Sepsis Screening     Row Name 04/25/21 1238                Is the patient's history suggestive of a new or worsening infection? (!) Yes (Proceed)  -LH        Suspected source of infection  urinary tract infection  -LH        Are two or more of the following signs & symptoms of infection both present and new to the patient? (!) Yes (Proceed)  -LH        Indicate SIRS criteria  Tachycardia > 90 bpm;Leukocytosis (WBC > 89103 IJL);WBC > 10% bands  -        If the answer is yes to both questions, suspicion of sepsis is present  --        If severe sepsis is present AND tissue hypoperfusion perists in the hour after fluid resuscitation or lactate > 4, the patient meets criteria for SEPTIC SHOCK  --        Are any of the following organ dysfunction criteria present within 6 hours of suspected infection and SIRS criteria that are NOT considered to be chronic conditions?   --        Organ dysfunction  Creatinine > 0 5 mg/dl ABOVE BASELINE;Lactate > 2 0 mmol/L  -        Date of presentation of severe sepsis  --        Time of presentation of severe sepsis  --        Tissue hypoperfusion persists in the hour after crystalloid fluid administration, evidenced, by either:  --        Was hypotension present within one hour of the conclusion of crystalloid fluid administration?  --        Date of presentation of septic shock  --        Time of presentation of septic shock  --          User Key  (r) = Recorded By, (t) = Taken By, (c) = Cosigned By    [address] Name Provider Type    Ana GRISSOM PA-C Physician Assistant          SBIRT 22yo+      Most Recent Value   SBIRT (25 yo +)   In order to provide better care to our patients, we are screening all of our patients for alcohol and drug use  Would it be okay to ask you these screening questions? Yes Filed at: 04/25/2021 1253   Initial Alcohol Screen: US AUDIT-C    1  How often do you have a drink containing alcohol?  0 Filed at: 04/25/2021 1253   2  How many drinks containing alcohol do you have on a typical day you are drinking? 0 Filed at: 04/25/2021 1253   3a  Male UNDER 65: How often do you have five or more drinks on one occasion? 0 Filed at: 04/25/2021 1253   3b  FEMALE Any Age, or MALE 65+: How often do you have 4 or more drinks on one occassion? 0 Filed at: 04/25/2021 1253   Audit-C Score  0 Filed at: 04/25/2021 1253   TAHMINA: How many times in the past year have you    Used an illegal drug or used a prescription medication for non-medical reasons? Never Filed at: 04/25/2021 1253                    MDM  Number of Diagnoses or Management Options  CHF (congestive heart failure) ([facility] [address] ): new and requires workup  Dysuria:   Elevated lactic acid level:   Fever:   Generalized weakness:   Leukocytosis: new and requires workup  Sepsis [facility]): new and requires workup  Ureteritis: new and requires workup  Diagnosis management comments: Patient is a 57-year-old female presenting to the ED for evaluation of fevers, generalized weakness and urinary symptoms  Patient was febrile and tachycardic on presentation and met sepsis criteria with WBC count of 26, lactic 2 8 and creatinine 1 29  CT c/a/p shows ascending ureteritis/pyelitis, CHF and possible infiltrate  Covid test negative    Patient given fluids and antibiotics in the ED  Discussed case with SLIM and the patient will be admitted under the service of Dr Barbee Meckel  The management plan was discussed with patient and all questions were answered  Amount and/or Complexity of Data Reviewed  Clinical lab tests: ordered and reviewed  Tests in the radiology section of CPT®: ordered and reviewed    Patient Progress  Patient progress: stable      Disposition  Final diagnoses:   Ureteritis   Sepsis ([facility] [address] )   Leukocytosis   Elevated lactic acid level   Generalized weakness   Fever   Dysuria   CHF (congestive heart failure) ([facility] )     Time reflects when diagnosis was documented in both MDM as applicable and the Disposition within this note     Time User Action Codes Description Comment    4/25/2021  4:07 PM Saddie Osler Add [N28 89] Ureteritis     4/25/2021  4:07 PM JulianKavitha anand Add [A41 9] Sepsis ([facility] [address] )     4/25/2021  4:08 PM JulianKavitha marie Add [D72 829] Leukocytosis     4/25/2021  4:10 PM JulianKavitha anand Add [R79 89] Elevated lactic acid level     4/25/2021  4:10 PM JulianKavitha anand Add [R53 1] Generalized weakness     4/25/2021  4:10 PM JulianKavitha anand Add [R50 9] Fever     4/25/2021  4:10 PM JulianKavitha anand Add [R30 0] Dysuria     4/25/2021  4:30 PM JulianKavitha marie Add [I50 9] CHF (congestive heart failure) [facility])       ED Disposition     ED Disposition Condition Date/Time Comment    Admit Stable Sun Apr 25, 2021  4:30 PM Case was discussed with Omar Brian and the patient's admission status was agreed to be Admission Status: inpatient status to the service of Dr Barbee Meckel          Follow-up Information    None         Current Discharge Medication List      CONTINUE these medications which have NOT CHANGED    Details   ASPIRIN 81 PO Take 81 mg by mouth daily      atorvastatin (LIPITOR) 20 mg tablet Take 20 mg by mouth      Cholecalciferol 2000 units CAPS Take 1 capsule by mouth      insulin aspart protamine-insulin aspart (NOVOLOG MIX 70/30) 100 units/mL injection Inject 15 Units under the skin 2 (two) times a day before meals       levothyroxine 75 mcg tablet levothyroxine 75 mcg tablet   take 1 tablet by mouth once daily      lisinopril (ZESTRIL) 20 mg tablet lisinopril 20 mg tablet           No discharge procedures on file      PDMP Review     None          ED Provider  Electronically Signed by           Joni Moulton PA-C  04/25/21 2464

## 2021-04-25 NOTE — ED NOTES
Patient requests that RN give daughter a call for update  Jody Edwards contacted and left a message at [phone]        Jose Luis Willett RN  04/25/21 1285

## 2021-04-25 NOTE — ASSESSMENT & PLAN NOTE
·  Currently not on any medication at home as she has been trying to controlled with breathing exercise  ·  the patient is requesting some medication for anxiety because is currently worse

## 2021-04-25 NOTE — ASSESSMENT & PLAN NOTE
·  Likely due to complicated urinary tract infection and possible pneumonia   · Continue treatment outlined above  · PT/OT evaluation

## 2021-04-25 NOTE — ASSESSMENT & PLAN NOTE
Wt Readings from Last 3 Encounters:   04/25/21 58 8 kg (129 lb 10 1 oz)   01/13/21 59 9 kg (132 lb)   09/03/20 57 6 kg (127 lb)     ·  no prior records of echocardiogram;  Unknown type  · evidence on CT scan of chest, abdomen, and pelvis    · Will obtain BNP   · The patient does not appears to be overtly volume overloaded   · Will obtain echocardiogram

## 2021-04-25 NOTE — ASSESSMENT & PLAN NOTE
Lab Results   Component Value Date    HGBA1C 7 0 (H) 03/24/2021       Recent Labs     04/25/21  1717   POCGLU 230*       Blood Sugar Average: Last 72 hrs:  (P) 230   ·  will continue with NovoLog 70/30-  Will cut the dose down to 10 b i d    ·  placed on insulin sliding scale

## 2021-04-25 NOTE — PLAN OF CARE
Problem: Potential for Falls  Goal: Patient will remain free of falls  Description: INTERVENTIONS:  - Assess patient frequently for physical needs  -  Identify cognitive and physical deficits and behaviors that affect risk of falls    -  [place] fall precautions as indicated by assessment   - Educate patient/family on patient safety including physical limitations  - Instruct patient to call for assistance with activity based on assessment  - Modify environment to reduce risk of injury  - Consider OT/PT consult to assist with strengthening/mobility  Outcome: Progressing     Problem: PAIN - ADULT  Goal: Verbalizes/displays adequate comfort level or baseline comfort level  Description: Interventions:  - Encourage patient to monitor pain and request assistance  - Assess pain using appropriate pain scale  - Administer analgesics based on type and severity of pain and evaluate response  - Implement non-pharmacological measures as appropriate and evaluate response  - Consider cultural and social influences on pain and pain management  - Notify physician/advanced practitioner if interventions unsuccessful or patient reports new pain  Outcome: Progressing     Problem: INFECTION - ADULT  Goal: Absence or prevention of progression during hospitalization  Description: INTERVENTIONS:  - Assess and monitor for signs and symptoms of infection  - Monitor lab/diagnostic results  - Monitor all insertion sites, i e  indwelling lines, tubes, and drains  - Monitor endotracheal if appropriate and nasal secretions for changes in amount and color  - [place] appropriate cooling/warming therapies per order  - Administer medications as ordered  - Instruct and encourage patient and family to use good hand hygiene technique  - Identify and instruct in appropriate isolation precautions for identified infection/condition  Outcome: Progressing  Goal: Absence of fever/infection during neutropenic period  Description: INTERVENTIONS:  - Monitor WBC    Outcome: Progressing     Problem: SAFETY ADULT  Goal: Patient will remain free of falls  Description: INTERVENTIONS:  - Assess patient frequently for physical needs  -  Identify cognitive and physical deficits and behaviors that affect risk of falls    -  [place] fall precautions as indicated by assessment   - Educate patient/family on patient safety including physical limitations  - Instruct patient to call for assistance with activity based on assessment  - Modify environment to reduce risk of injury  - Consider OT/PT consult to assist with strengthening/mobility  Outcome: Progressing  Goal: Maintain or return to baseline ADL function  Description: INTERVENTIONS:  -  Assess patient's ability to carry out ADLs; assess patient's baseline for ADL function and identify physical deficits which impact ability to perform ADLs (bathing, care of mouth/teeth, toileting, grooming, dressing, etc )  - Assess/evaluate cause of self-care deficits   - Assess range of motion  - Assess patient's mobility; develop plan if impaired  - Assess patient's need for assistive devices and provide as appropriate  - Encourage maximum independence but intervene and supervise when necessary  - Involve family in performance of ADLs  - Assess for home care needs following discharge   - Consider OT consult to assist with ADL evaluation and planning for discharge  - Provide patient education as appropriate  Outcome: Progressing  Goal: Maintain or return mobility status to optimal level  Description: INTERVENTIONS:  - Assess patient's baseline mobility status (ambulation, transfers, stairs, etc )    - Identify cognitive and physical deficits and behaviors that affect mobility  - Identify mobility aids required to assist with transfers and/or ambulation (gait belt, sit-to-stand, lift, walker, cane, etc )  - [place] fall precautions as indicated by assessment  - Record patient progress and toleration of activity level on Mobility SBAR; progress patient to next Phase/Stage  - Instruct patient to call for assistance with activity based on assessment  - Consider rehabilitation consult to assist with strengthening/weightbearing, etc   Outcome: Progressing     Problem: DISCHARGE PLANNING  Goal: Discharge to home or other facility with appropriate resources  Description: INTERVENTIONS:  - Identify barriers to discharge w/patient and caregiver  - Arrange for needed discharge resources and transportation as appropriate  - Identify discharge learning needs (meds, wound care, etc )  - Arrange for interpretive services to assist at discharge as needed  - Refer to Case Management Department for coordinating discharge planning if the patient needs post-hospital services based on physician/advanced practitioner order or complex needs related to functional status, cognitive ability, or social support system  Outcome: Progressing     Problem: Knowledge Deficit  Goal: Patient/family/caregiver demonstrates understanding of disease process, treatment plan, medications, and discharge instructions  Description: Complete learning assessment and assess knowledge base    Interventions:  - Provide teaching at level of understanding  - Provide teaching via preferred learning methods  Outcome: Progressing     Problem: CARDIOVASCULAR - ADULT  Goal: Maintains optimal cardiac output and hemodynamic stability  Description: INTERVENTIONS:  - Monitor I/O, vital signs and rhythm  - Monitor for S/S and trends of decreased cardiac output  - Administer and titrate ordered vasoactive medications to optimize hemodynamic stability  - Assess quality of pulses, skin color and temperature  - Assess for signs of decreased coronary artery perfusion  - Instruct patient to report change in severity of symptoms  Outcome: Progressing  Goal: Absence of cardiac dysrhythmias or at baseline rhythm  Description: INTERVENTIONS:  - Continuous cardiac monitoring, vital signs, obtain 12 lead EKG if ordered  - Administer antiarrhythmic and heart rate control medications as ordered  - Monitor electrolytes and administer replacement therapy as ordered  Outcome: Progressing     Problem: GENITOURINARY - ADULT  Goal: Maintains or returns to baseline urinary function  Description: INTERVENTIONS:  - Assess urinary function  - Encourage oral fluids to ensure adequate hydration if ordered  - Administer IV fluids as ordered to ensure adequate hydration  - Administer ordered medications as needed  - Offer frequent toileting  - Follow urinary retention protocol if ordered  Outcome: Progressing  Goal: Absence of urinary retention  Description: INTERVENTIONS:  - Assess patients ability to void and empty bladder  - Monitor I/O  - Bladder scan as needed  - Discuss with physician/AP medications to alleviate retention as needed  - Discuss catheterization for long term situations as appropriate  Outcome: Progressing  Goal: Urinary catheter remains patent  Description: INTERVENTIONS:  - Assess patency of urinary catheter  - If patient has a chronic griffin, consider changing catheter if non-functioning  - Follow guidelines for intermittent irrigation of non-functioning urinary catheter  Outcome: Progressing     Problem: METABOLIC, FLUID AND ELECTROLYTES - ADULT  Goal: Electrolytes maintained within normal limits  Description: INTERVENTIONS:  - Monitor labs and assess patient for signs and symptoms of electrolyte imbalances  - Administer electrolyte replacement as ordered  - Monitor response to electrolyte replacements, including repeat lab results as appropriate  - Instruct patient on fluid and nutrition as appropriate  Outcome: Progressing  Goal: Fluid balance maintained  Description: INTERVENTIONS:  - Monitor labs   - Monitor I/O and WT  - Instruct patient on fluid and nutrition as appropriate  - Assess for signs & symptoms of volume excess or deficit  Outcome: Progressing  Goal: Glucose maintained within target range  Description: INTERVENTIONS:  - Monitor Blood Glucose as ordered  - Assess for signs and symptoms of hyperglycemia and hypoglycemia  - Administer ordered medications to maintain glucose within target range  - Assess nutritional intake and initiate nutrition service referral as needed  Outcome: Progressing     Problem: HEMATOLOGIC - ADULT  Goal: Maintains hematologic stability  Description: INTERVENTIONS  - Assess for signs and symptoms of bleeding or hemorrhage  - Monitor labs  - Administer supportive blood products/factors as ordered and appropriate  Outcome: Progressing

## 2021-04-25 NOTE — H&P
[address] 1945, 66 y o  female MRN: [id number]  Unit/Bed#: -02 Encounter: [id number]  Primary Care Provider: Dayne Riojas MD   Date and time admitted to hospital: 4/25/2021 11:51 AM    * Sepsis [facility])  Assessment & Plan  ·  The patient meets sepsis criteria with tachycardia and leukocytosis with underlying  Possible pneumonia and  Complicated UTI with ascending right ureteritis /pyelitis  ·  received Zosyn and vancomycin in the ED  Will transition antibiotics of cefepime and azithromycin  ·  follow-up with cultures  ·  CT  Chest, abdomen, and pelvis with IV contrast show scattered patchy ground-glass airspace disease at the right lung apex with trace bilateral pleural effusion and CHF  Ascending  right ureteritis/pyelitis     ·  obtain procalcitonin  · Obtain urine for Legionella and strep    Stage 3a chronic kidney disease [facility])  Assessment & Plan  Lab Results   Component Value Date    EGFR 40 04/25/2021    EGFR 45 03/24/2021    EGFR 49 11/23/2020    CREATININE 1 29 (H) 04/25/2021    CREATININE 1 17 03/24/2021    CREATININE 1 09 11/23/2020   ·  with baseline creatinine appears to be between 1 0-1 2 mg/dL  · Currently is at baseline   · Will give gentle IV fluid with evidence of CHF on CT scan  · Monitor renal function closely   · Avoid nephrotoxin and hypotension    Generalized anxiety disorder  Assessment & Plan  ·  Currently not on any medication at home as she has been trying to controlled with breathing exercise  ·  the patient is requesting some medication for anxiety because is currently worse    Type 2 diabetes mellitus without complication, without long-term current use of insulin [facility])  Assessment & Plan  Lab Results   Component Value Date    HGBA1C 7 0 (H) 03/24/2021       Recent Labs     04/25/21  1717   POCGLU 230*       Blood Sugar Average: Last 72 hrs:  (P) 230   ·  will continue with NovoLog 70/30-  Will cut the dose down to 10 b i d  ·  placed on insulin sliding scale    Congestive heart disease ([facility] [address] )  Assessment & Plan  Wt Readings from Last 3 Encounters:   04/25/21 58 8 kg (129 lb 10 1 oz)   01/13/21 59 9 kg (132 lb)   09/03/20 57 6 kg (127 lb)     ·  no prior records of echocardiogram;  Unknown type  · evidence on CT scan of chest, abdomen, and pelvis  · Will obtain BNP   · The patient does not appears to be overtly volume overloaded   · Will obtain echocardiogram        Generalized weakness  Assessment & Plan  ·  Likely due to complicated urinary tract infection and possible pneumonia   · Continue treatment outlined above  · PT/OT evaluation    VTE Prophylaxis: Heparin  Code Status: full code   Discussion with family: none present during exam   Offered to contact family however the patient would like to do so herself  Anticipated Length of Stay:  Patient will be admitted on an Inpatient basis with an anticipated length of stay of  > 2 midnights  Justification for Hospital Stay:   Sepsis    Chief Complaint:    Generalized weakness    History of Present Illness:    Gustavo Ansari is a 66 y o  female who presents with  Generalized weakness  The patient with past medical history of hypertension, diabetes and anxiety  She presented to the emergency department today with complaint of worsening generalized weakness and dysuria and chills  The patient was seen by her PCP last Friday and was prescribed antibiotics for suspected urinary tract infection  Patient stated that she finish 7 day course of antibiotic-  Unclear what medication it was  She did not completely feel much improved and yesterday developed increasing chills, worsening generalized weakness where she has been sleeping a lot and worsening right lower back pain  Additionally, the patient also complained of dry nonproductive cough which has been slightly worsening  Patient was found to meet sepsis criteria in the ED       Review of Systems:    Review of Systems   Constitutional: Positive for chills  Negative for activity change, appetite change, diaphoresis and fever  HENT: Negative for congestion, ear pain, hearing loss, tinnitus and trouble swallowing  Eyes: Negative for photophobia, pain, discharge, itching and visual disturbance  Respiratory: Positive for cough (dry cough)  Negative for shortness of breath, wheezing and stridor  Cardiovascular: Negative for chest pain, palpitations and leg swelling  Gastrointestinal: Negative for abdominal pain, blood in stool, constipation, diarrhea, nausea and vomiting  Endocrine: Negative for cold intolerance, heat intolerance, polydipsia, polyphagia and polyuria  Genitourinary: Negative for difficulty urinating, dysuria, frequency, hematuria and urgency  Musculoskeletal: Positive for back pain (right lower back )  Negative for gait problem and neck stiffness  Skin: Negative for pallor, rash and wound  Allergic/Immunologic: Negative for environmental allergies, food allergies and immunocompromised state  Neurological: Negative for dizziness, tremors, speech difficulty, weakness, light-headedness, numbness and headaches  Hematological: Negative for adenopathy  Does not bruise/bleed easily  Psychiatric/Behavioral: Negative for confusion, hallucinations and sleep disturbance  The patient is nervous/anxious  Past Medical and Surgical History:     Past Medical History:   Diagnosis Date    Anxiety     Colon polyp     Depression     Diabetes mellitus ([facility] [address] )     Diverticulosis     Hyperlipidemia     Hypertension     Osteoporosis        Past Surgical History:   Procedure Laterality Date    APPENDECTOMY      CATARACT EXTRACTION Right     COLONOSCOPY      difficult exam    HYSTERECTOMY      TONSILLECTOMY      WISDOM TOOTH EXTRACTION         Meds/Allergies:    Prior to Admission medications    Medication Sig Start Date End Date Taking?  Authorizing Provider   ASPIRIN 81 PO Take 81 mg by mouth daily 1/17/14  Yes Historical Provider, MD   atorvastatin (LIPITOR) 20 mg tablet Take 20 mg by mouth 1/17/14  Yes Historical Provider, MD   Cholecalciferol 2000 units CAPS Take 1 capsule by mouth   Yes Historical Provider, MD   insulin aspart protamine-insulin aspart (NOVOLOG MIX 70/30) 100 units/mL injection Inject 15 Units under the skin 2 (two) times a day before meals  4/18/16  Yes Historical Provider, MD   levothyroxine 75 mcg tablet levothyroxine 75 mcg tablet   take 1 tablet by mouth once daily 2/28/16  Yes Historical Provider, MD   lisinopril (ZESTRIL) 20 mg tablet lisinopril 20 mg tablet 1/17/14  Yes Historical Provider, MD     all medications and allergies reviewed    Allergies: Allergies   Allergen Reactions    Diphenhydramine Shortness Of Breath    Meperidine Hives and Shortness Of Breath    Prednisone Hives and Shortness Of Breath    Bee Pollen     Ciprofloxacin Hives    Sulfamethoxazole-Trimethoprim GI Intolerance       Social History:     Marital Status:    Occupation: retired   Patient Pre-hospital Living Situation: alone   Patient Pre-hospital Level of Mobility: independent   Patient Pre-hospital Diet Restrictions: diabetes  Substance Use History:   Social History     Substance and Sexual Activity   Alcohol Use Never    Frequency: Never     Social History     Tobacco Use   Smoking Status Never Smoker   Smokeless Tobacco Never Used     Social History     Substance and Sexual Activity   Drug Use Never       Family History:  I have reviewed the patient's family history    Physical Exam:     Vitals:   Blood Pressure: 127/60 (04/25/21 1711)  Pulse: 105 (04/25/21 1711)  Temperature: 99 7 °F (37 6 °C) (04/25/21 1711)  Temp Source: Temporal (04/25/21 1711)  Respirations: 18 (04/25/21 1711)  Height: 5' 1" (154 9 cm) (04/25/21 1711)  Weight - Scale: 58 8 kg (129 lb 10 1 oz) (04/25/21 1711)  SpO2: 97 % (04/25/21 1711)    Physical Exam  Vitals signs and nursing note reviewed  Constitutional:       General: She is not in acute distress  Appearance: Normal appearance  HENT:      Head: Normocephalic and atraumatic  Right Ear: External ear normal       Left Ear: External ear normal       Nose: Nose normal  No rhinorrhea  Mouth/Throat:      Mouth: Mucous membranes are moist       Pharynx: Oropharynx is clear  Eyes:      General:         Right eye: No discharge  Left eye: No discharge  Pupils: Pupils are equal, round, and reactive to light  Neck:      Musculoskeletal: Normal range of motion and neck supple  No muscular tenderness  Cardiovascular:      Rate and Rhythm: Normal rate and regular rhythm  Pulses: Normal pulses  Heart sounds: Normal heart sounds  No murmur  Pulmonary:      Effort: Pulmonary effort is normal  No respiratory distress  Comments: Coarse breath sounds    Abdominal:      General: Bowel sounds are normal  There is no distension  Palpations: Abdomen is soft  There is no mass  Tenderness: There is no abdominal tenderness  Musculoskeletal: Normal range of motion  General: No swelling or tenderness  Skin:     General: Skin is warm and dry  Capillary Refill: Capillary refill takes less than 2 seconds  Findings: No erythema or rash  Neurological:      General: No focal deficit present  Mental Status: She is alert and oriented to person, place, and time  Mental status is at baseline  Psychiatric:         Mood and Affect: Mood is anxious  Speech: Speech is rapid and pressured  Behavior: Behavior normal          Thought Content: Thought content normal          Additional Data:     Lab Results: I have personally reviewed pertinent reports        Results from last 7 days   Lab Units 04/25/21  1214   WBC Thousand/uL 26 30*   HEMOGLOBIN g/dL 12 9   HEMATOCRIT % 39 9*   PLATELETS Thousands/uL 206   BANDS PCT % 11*   LYMPHO PCT % 4*   MONO PCT % 4   EOS PCT % 2     Results from last 7 days   Lab Units 04/25/21  1243   SODIUM mmol/L 137   POTASSIUM mmol/L 3 9   CHLORIDE mmol/L 101   CO2 mmol/L 25   BUN mg/dL 21   CREATININE mg/dL 1 29*   ANION GAP mmol/L 11   CALCIUM mg/dL 8 8   ALBUMIN g/dL 4 1   TOTAL BILIRUBIN mg/dL 0 70   ALK PHOS U/L 66   ALT U/L 6*   AST U/L 8*   GLUCOSE RANDOM mg/dL 268*     Results from last 7 days   Lab Units 04/25/21  1246   INR  1 29*     Results from last 7 days   Lab Units 04/25/21  1717   POC GLUCOSE mg/dl 230*         Results from last 7 days   Lab Units 04/25/21  1359 04/25/21  1215   LACTIC ACID mmol/L 1 3 2 8*       Imaging: I have personally reviewed pertinent reports  CT chest abdomen pelvis w contrast   Final Result by Jiles Bumpers, MD (04/25 2843)      Minimal patchy groundglass airspace disease at the right lung apex consistent with an acute infiltrate in appropriate clinical context  CHF  Findings consistent with ascending right ureteritis/pyelitis  Workstation performed: [id number]         XR chest 1 view portable    (Results Pending)         EKG, Pathology, and Other Studies Reviewed on Admission:   · EKG:  Sinus tachycardia with incomplete right bundle branch block heart rate 107    Epic / Care Everywhere Records Reviewed: Yes    ** Please Note: This note has been constructed using a voice recognition system   **

## 2021-04-25 NOTE — ASSESSMENT & PLAN NOTE
Lab Results   Component Value Date    EGFR 40 04/25/2021    EGFR 45 03/24/2021    EGFR 49 11/23/2020    CREATININE 1 29 (H) 04/25/2021    CREATININE 1 17 03/24/2021    CREATININE 1 09 11/23/2020   ·  with baseline creatinine appears to be between 1 0-1 2 mg/dL  · Currently is at baseline   · Will give gentle IV fluid with evidence of CHF on CT scan  · Monitor renal function closely   · Avoid nephrotoxin and hypotension

## 2021-04-26 PROBLEM — I10 ESSENTIAL HYPERTENSION: Status: ACTIVE | Noted: 2021-04-26

## 2021-04-26 PROBLEM — E03.9 HYPOTHYROIDISM: Status: ACTIVE | Noted: 2021-04-26

## 2021-04-26 LAB
ANION GAP SERPL CALCULATED.3IONS-SCNC: 8 MMOL/L (ref 4–13)
BASOPHILS # BLD AUTO: 0 THOUSANDS/ΜL (ref 0–0.1)
BASOPHILS NFR BLD AUTO: 0 % (ref 0–2)
BUN SERPL-MCNC: 17 MG/DL (ref 7–25)
CALCIUM SERPL-MCNC: 7.9 MG/DL (ref 8.6–10.5)
CHLORIDE SERPL-SCNC: 106 MMOL/L (ref 98–107)
CO2 SERPL-SCNC: 23 MMOL/L (ref 21–31)
CREAT SERPL-MCNC: 1.05 MG/DL (ref 0.6–1.2)
EOSINOPHIL # BLD AUTO: 0.4 THOUSAND/ΜL (ref 0–0.61)
EOSINOPHIL NFR BLD AUTO: 3 % (ref 0–5)
ERYTHROCYTE [DISTWIDTH] IN BLOOD BY AUTOMATED COUNT: 13.7 % (ref 11.5–14.5)
GFR SERPL CREATININE-BSD FRML MDRD: 51 ML/MIN/1.73SQ M
GLUCOSE SERPL-MCNC: 110 MG/DL (ref 65–140)
GLUCOSE SERPL-MCNC: 139 MG/DL (ref 65–140)
GLUCOSE SERPL-MCNC: 158 MG/DL (ref 65–99)
GLUCOSE SERPL-MCNC: 187 MG/DL (ref 65–140)
GLUCOSE SERPL-MCNC: 206 MG/DL (ref 65–140)
HCT VFR BLD AUTO: 32.7 % (ref 42–47)
HGB BLD-MCNC: 10.9 G/DL (ref 12–16)
L PNEUMO1 AG UR QL IA.RAPID: NEGATIVE
LYMPHOCYTES # BLD AUTO: 0.5 THOUSANDS/ΜL (ref 0.6–4.47)
LYMPHOCYTES NFR BLD AUTO: 3 % (ref 21–51)
MCH RBC QN AUTO: 29 PG (ref 26–34)
MCHC RBC AUTO-ENTMCNC: 33.2 G/DL (ref 31–37)
MCV RBC AUTO: 87 FL (ref 81–99)
MONOCYTES # BLD AUTO: 0.8 THOUSAND/ΜL (ref 0.17–1.22)
MONOCYTES NFR BLD AUTO: 5 % (ref 2–12)
NEUTROPHILS # BLD AUTO: 14.8 THOUSANDS/ΜL (ref 1.4–6.5)
NEUTS SEG NFR BLD AUTO: 90 % (ref 42–75)
PLATELET # BLD AUTO: 170 THOUSANDS/UL (ref 149–390)
PMV BLD AUTO: 10 FL (ref 8.6–11.7)
POTASSIUM SERPL-SCNC: 3.6 MMOL/L (ref 3.5–5.5)
PROCALCITONIN SERPL-MCNC: 1.31 NG/ML
PROCALCITONIN SERPL-MCNC: 1.8 NG/ML
RBC # BLD AUTO: 3.75 MILLION/UL (ref 3.9–5.2)
S PNEUM AG UR QL: NEGATIVE
SODIUM SERPL-SCNC: 137 MMOL/L (ref 134–143)
WBC # BLD AUTO: 16.5 THOUSAND/UL (ref 4.8–10.8)

## 2021-04-26 PROCEDURE — 84145 PROCALCITONIN (PCT): CPT | Performed by: NURSE PRACTITIONER

## 2021-04-26 PROCEDURE — 85025 COMPLETE CBC W/AUTO DIFF WBC: CPT | Performed by: NURSE PRACTITIONER

## 2021-04-26 PROCEDURE — 82948 REAGENT STRIP/BLOOD GLUCOSE: CPT

## 2021-04-26 PROCEDURE — 97163 PT EVAL HIGH COMPLEX 45 MIN: CPT

## 2021-04-26 PROCEDURE — 80048 BASIC METABOLIC PNL TOTAL CA: CPT | Performed by: NURSE PRACTITIONER

## 2021-04-26 PROCEDURE — 97166 OT EVAL MOD COMPLEX 45 MIN: CPT

## 2021-04-26 PROCEDURE — 99232 SBSQ HOSP IP/OBS MODERATE 35: CPT | Performed by: INTERNAL MEDICINE

## 2021-04-26 RX ORDER — AZITHROMYCIN 250 MG/1
250 TABLET, FILM COATED ORAL EVERY 24 HOURS
Status: DISCONTINUED | OUTPATIENT
Start: 2021-04-26 | End: 2021-04-28 | Stop reason: HOSPADM

## 2021-04-26 RX ORDER — INSULIN ASPART 100 [IU]/ML
15 INJECTION, SUSPENSION SUBCUTANEOUS
Status: DISCONTINUED | OUTPATIENT
Start: 2021-04-26 | End: 2021-04-28 | Stop reason: HOSPADM

## 2021-04-26 RX ADMIN — ALPRAZOLAM 0.25 MG: 0.25 TABLET ORAL at 22:17

## 2021-04-26 RX ADMIN — CEFEPIME HYDROCHLORIDE 1000 MG: 1 INJECTION, SOLUTION INTRAVENOUS at 09:17

## 2021-04-26 RX ADMIN — INSULIN ASPART 15 UNITS: 100 INJECTION, SUSPENSION SUBCUTANEOUS at 17:50

## 2021-04-26 RX ADMIN — HEPARIN SODIUM 5000 UNITS: 5000 INJECTION INTRAVENOUS; SUBCUTANEOUS at 22:15

## 2021-04-26 RX ADMIN — AZITHROMYCIN MONOHYDRATE 250 MG: 250 TABLET ORAL at 22:15

## 2021-04-26 RX ADMIN — HEPARIN SODIUM 5000 UNITS: 5000 INJECTION INTRAVENOUS; SUBCUTANEOUS at 15:33

## 2021-04-26 RX ADMIN — INSULIN LISPRO 1 UNITS: 100 INJECTION, SOLUTION INTRAVENOUS; SUBCUTANEOUS at 17:50

## 2021-04-26 RX ADMIN — INSULIN LISPRO 1 UNITS: 100 INJECTION, SOLUTION INTRAVENOUS; SUBCUTANEOUS at 11:58

## 2021-04-26 RX ADMIN — INSULIN ASPART 10 UNITS: 100 INJECTION, SUSPENSION SUBCUTANEOUS at 09:19

## 2021-04-26 RX ADMIN — GUAIFENESIN 600 MG: 600 TABLET, EXTENDED RELEASE ORAL at 09:17

## 2021-04-26 RX ADMIN — ATORVASTATIN CALCIUM 20 MG: 20 TABLET, FILM COATED ORAL at 17:50

## 2021-04-26 RX ADMIN — CEFEPIME HYDROCHLORIDE 1000 MG: 1 INJECTION, SOLUTION INTRAVENOUS at 22:15

## 2021-04-26 RX ADMIN — ASPIRIN 81 MG: 81 TABLET, CHEWABLE ORAL at 09:17

## 2021-04-26 RX ADMIN — HEPARIN SODIUM 5000 UNITS: 5000 INJECTION INTRAVENOUS; SUBCUTANEOUS at 06:03

## 2021-04-26 RX ADMIN — LEVOTHYROXINE SODIUM 75 MCG: 75 TABLET ORAL at 06:03

## 2021-04-26 RX ADMIN — LISINOPRIL 20 MG: 20 TABLET ORAL at 09:17

## 2021-04-26 RX ADMIN — Medication 2000 UNITS: at 09:17

## 2021-04-26 RX ADMIN — GUAIFENESIN 600 MG: 600 TABLET, EXTENDED RELEASE ORAL at 17:50

## 2021-04-26 NOTE — PROGRESS NOTES
[facility]  Progress Note - Reford Labrador 1945, 66 y o  female MRN: [id number]  Unit/Bed#: -02 Encounter: [id number]  Primary Care Provider: Annita Husain MD   Date and time admitted to hospital: 4/25/2021 11:51 AM    * Sepsis [facility])  Assessment & Plan  · Sepsis secondary to right upper lobe pneumonia + UTI/right ureteritis  · Follow up on urine and blood cultures  · Procalcitonin still elevated  · Continue azithromycin and cefepime  Results from last 7 days   Lab Units 04/26/21  0601 04/25/21  1804   PROCALCITONIN ng/ml 1 80* 1 31*       Essential hypertension  Assessment & Plan  · Continue lisinopril 20 mg daily    Hypothyroidism  Assessment & Plan  · Continue levothyroxine    Stage 3a chronic kidney disease ([facility] [address] )  Assessment & Plan  · CKD 3a stable    Results from last 7 days   Lab Units 04/26/21  0601 04/25/21  1243   BUN mg/dL 17 21   CREATININE mg/dL 1 05 1 29*   EGFR ml/min/1 73sq m 51 40       Generalized anxiety disorder  Assessment & Plan  · Started on alprazolam p r n  during hospitalization    Type 2 diabetes mellitus without complication, without long-term current use of insulin [facility])  Assessment & Plan  Lab Results   Component Value Date    HGBA1C 7 0 (H) 03/24/2021     Recent Labs     04/25/21  1717 04/25/21  2017 04/26/21  0632 04/26/21  1052   POCGLU 230* 137 139 187*     · Will increase 70/30 insulin to 15 units b i d   Continue sliding scale  Congestive heart disease ([facility] [address] )  Assessment & Plan  Wt Readings from Last 3 Encounters:   04/25/21 58 8 kg (129 lb 10 1 oz)   01/13/21 59 9 kg (132 lb)   09/03/20 57 6 kg (127 lb)     Results from last 7 days   Lab Units 04/25/21  1804   BNP pg/mL 121*     · Concerning findings for CHF on imaging  Patient does not appear volume overloaded    Will check echocardiogram        VTE Pharmacologic Prophylaxis: VTE Score: 4 Moderate Risk (Score 3-4) - Pharmacological DVT Prophylaxis Ordered: Heparin  Patient Centered Rounds: I have performed bedside rounds with nursing staff today  Discussions with Specialists or Other Care Team Provider:  Case management    Education and Discussions with Family / Patient:  Left voicemail with daughter Gina Graham    Time Spent for Care: 25 mins  More than 50% of total time spent on counseling and coordination of care as described above  Current Length of Stay: 1 day(s)  Current Patient Status: Inpatient   Certification Statement: The patient will continue to require additional inpatient hospital stay due to pneumonia and UTI  Discharge Plan / Estimated Discharge Date: 24-48 hours    Code Status: Level 1 - Full Code      Subjective:   Patient seen and examined  Still feeling weak but better  No chest pain    Objective:   Vitals: Blood pressure 143/66, pulse 90, temperature 99 7 °F (37 6 °C), temperature source Temporal, resp  rate 18, height 5' 1" (1 549 m), weight 58 8 kg (129 lb 10 1 oz), SpO2 93 %  Physical Exam  Vitals signs reviewed  Constitutional:       General: She is not in acute distress  Appearance: Normal appearance  HENT:      Head: Atraumatic  Eyes:      General: No scleral icterus  Cardiovascular:      Rate and Rhythm: Regular rhythm  Heart sounds: Normal heart sounds  Pulmonary:      Breath sounds: Decreased breath sounds present  No wheezing  Abdominal:      General: Bowel sounds are normal       Palpations: Abdomen is soft  Tenderness: There is no guarding or rebound  Musculoskeletal:         General: No swelling  Skin:     General: Skin is warm  Neurological:      Mental Status: She is alert and oriented to person, place, and time     Psychiatric:         Mood and Affect: Mood normal        Additional Data:   Labs:  Results from last 7 days   Lab Units 04/26/21  0601 04/25/21  1804 04/25/21  1246 04/25/21  1214   WBC Thousand/uL 16 50*  --   --  26 30*   HEMOGLOBIN g/dL 10 9*  --   --  12 9   HEMATOCRIT % 32 7*  -- --  39 9*   MCV fL 87  --   --  88   BANDS PCT %  --   --   --  11*   PLATELETS Thousands/uL 170  --   --  206   INR   --  1 37* 1 29*  --      Results from last 7 days   Lab Units 04/26/21  0601 04/25/21  1243   SODIUM mmol/L 137 137   POTASSIUM mmol/L 3 6 3 9   CHLORIDE mmol/L 106 101   CO2 mmol/L 23 25   ANION GAP mmol/L 8 11   BUN mg/dL 17 21   CREATININE mg/dL 1 05 1 29*   CALCIUM mg/dL 7 9* 8 8   ALBUMIN g/dL  --  4 1   TOTAL BILIRUBIN mg/dL  --  0 70   ALK PHOS U/L  --  66   ALT U/L  --  6*   AST U/L  --  8*   EGFR ml/min/1 73sq m 51 40   GLUCOSE RANDOM mg/dL 158* 268*     Results from last 7 days   Lab Units 04/25/21  1243   MAGNESIUM mg/dL 1 9     Results from last 7 days   Lab Units 04/25/21  1243   TROPONIN I ng/mL 0 03          Results from last 7 days   Lab Units 04/26/21  0601  04/25/21  1359 04/25/21  1215   LACTIC ACID mmol/L  --   --  1 3 2 8*   PROCALCITONIN ng/ml 1 80*   < >  --   --     < > = values in this interval not displayed  Results from last 7 days   Lab Units 04/26/21  1052 04/26/21  0632 04/25/21  2017 04/25/21  1717   POC GLUCOSE mg/dl 187* 139 137 230*             * I Have Reviewed All Lab Data Listed Above  Cultures:   Results from last 7 days   Lab Units 04/25/21  2105 04/25/21  1231 04/25/21  1215   BLOOD CULTURE   --  Received in Microbiology Lab  Culture in Progress  Received in Microbiology Lab  Culture in Progress     LEGIONELLA URINARY ANTIGEN  Negative  --   --    STREP PNEUMONIAE ANTIGEN, URINE  Negative  --   --        Results from last 7 days   Lab Units 04/25/21  1215   SARS-COV-2  Negative           Lines/Drains:  Invasive Devices     Peripheral Intravenous Line            Peripheral IV 04/25/21 Left Forearm 1 day    Peripheral IV 04/25/21 Right Wrist 1 day              Telemetry:   Telemetry Orders (From admission, onward)             48 Hour Telemetry Monitoring  Continuous x 48 hours     Comments: sepsis   Question:  Reason for 48 Hour Telemetry  Answer: Arrhythmias Requiring Medical Therapy (eg  SVT, Vtach/fib, Bradycardia, Uncontrolled A-fib)                  Imaging:  Imaging Reports Reviewed Today Include:   Xr Chest 1 View Portable    Result Date: 4/26/2021  Impression: No radiographic evidence of acute intrathoracic process  Workstation performed: [id number]     Ct Chest Abdomen Pelvis W Contrast    Result Date: 4/25/2021  Impression: Minimal patchy groundglass airspace disease at the right lung apex consistent with an acute infiltrate in appropriate clinical context  CHF  Findings consistent with ascending right ureteritis/pyelitis  Workstation performed: [id number]     Scheduled Meds:  Current Facility-Administered Medications   Medication Dose Route Frequency Provider Last Rate    acetaminophen  650 mg Oral Q6H PRN Cleaster Corolla, CRNP      ALPRAZolam  0 25 mg Oral TID PRN Cleaster Corolla, CRNP      aspirin  81 mg Oral Daily Cleaster Corolla, CRNP      atorvastatin  20 mg Oral Daily With Dinner Cleaster Corolla, CRNP      azithromycin  500 mg Intravenous Q24H Cleaster Corolla, CRNP 500 mg (04/25/21 2216)    cefepime  1,000 mg Intravenous Q12H Cleaster Corolla, CRNP 1,000 mg (04/26/21 0917)    cholecalciferol  2,000 Units Oral Daily Cleaster Corolla, CRNP      guaiFENesin  600 mg Oral BID Cleaster Corolla, CRNP      heparin (porcine)  5,000 Units Subcutaneous [facility] Cleaster Corolla, CRNP      insulin aspart protamine-insulin aspart  10 Units Subcutaneous BID AC Cleaster Corolla, CRNP      insulin lispro  1-5 Units Subcutaneous TID AC Cleaster Corolla, CRNP      insulin lispro  1-5 Units Subcutaneous HS Cleaster Corolla, CRNP      levothyroxine  75 mcg Oral Early Morning Cleaster Corolla, CRNP      lisinopril  20 mg Oral Daily Cleaster Corolla, CRNP         DO [facility] Internal Medicine  Hospitalist    ** Please Note: This note has been constructed using a voice recognition system   **

## 2021-04-26 NOTE — PLAN OF CARE
Problem: Potential for Falls  Goal: Patient will remain free of falls  Description: INTERVENTIONS:  - Assess patient frequently for physical needs  -  Identify cognitive and physical deficits and behaviors that affect risk of falls    -  [place] fall precautions as indicated by assessment   - Educate patient/family on patient safety including physical limitations  - Instruct patient to call for assistance with activity based on assessment  - Modify environment to reduce risk of injury  - Consider OT/PT consult to assist with strengthening/mobility  Outcome: Progressing     Problem: PAIN - ADULT  Goal: Verbalizes/displays adequate comfort level or baseline comfort level  Description: Interventions:  - Encourage patient to monitor pain and request assistance  - Assess pain using appropriate pain scale  - Administer analgesics based on type and severity of pain and evaluate response  - Implement non-pharmacological measures as appropriate and evaluate response  - Consider cultural and social influences on pain and pain management  - Notify physician/advanced practitioner if interventions unsuccessful or patient reports new pain  Outcome: Progressing     Problem: INFECTION - ADULT  Goal: Absence or prevention of progression during hospitalization  Description: INTERVENTIONS:  - Assess and monitor for signs and symptoms of infection  - Monitor lab/diagnostic results  - Monitor all insertion sites, i e  indwelling lines, tubes, and drains  - Monitor endotracheal if appropriate and nasal secretions for changes in amount and color  - [place] appropriate cooling/warming therapies per order  - Administer medications as ordered  - Instruct and encourage patient and family to use good hand hygiene technique  - Identify and instruct in appropriate isolation precautions for identified infection/condition  Outcome: Progressing  Goal: Absence of fever/infection during neutropenic period  Description: INTERVENTIONS:  - Monitor WBC    Outcome: Progressing     Problem: SAFETY ADULT  Goal: Patient will remain free of falls  Description: INTERVENTIONS:  - Assess patient frequently for physical needs  -  Identify cognitive and physical deficits and behaviors that affect risk of falls    -  [place] fall precautions as indicated by assessment   - Educate patient/family on patient safety including physical limitations  - Instruct patient to call for assistance with activity based on assessment  - Modify environment to reduce risk of injury  - Consider OT/PT consult to assist with strengthening/mobility  Outcome: Progressing  Goal: Maintain or return to baseline ADL function  Description: INTERVENTIONS:  -  Assess patient's ability to carry out ADLs; assess patient's baseline for ADL function and identify physical deficits which impact ability to perform ADLs (bathing, care of mouth/teeth, toileting, grooming, dressing, etc )  - Assess/evaluate cause of self-care deficits   - Assess range of motion  - Assess patient's mobility; develop plan if impaired  - Assess patient's need for assistive devices and provide as appropriate  - Encourage maximum independence but intervene and supervise when necessary  - Involve family in performance of ADLs  - Assess for home care needs following discharge   - Consider OT consult to assist with ADL evaluation and planning for discharge  - Provide patient education as appropriate  Outcome: Progressing  Goal: Maintain or return mobility status to optimal level  Description: INTERVENTIONS:  - Assess patient's baseline mobility status (ambulation, transfers, stairs, etc )    - Identify cognitive and physical deficits and behaviors that affect mobility  - Identify mobility aids required to assist with transfers and/or ambulation (gait belt, sit-to-stand, lift, walker, cane, etc )  - [place] fall precautions as indicated by assessment  - Record patient progress and toleration of activity level on Mobility SBAR; progress patient to next Phase/Stage  - Instruct patient to call for assistance with activity based on assessment  - Consider rehabilitation consult to assist with strengthening/weightbearing, etc   Outcome: Progressing     Problem: DISCHARGE PLANNING  Goal: Discharge to home or other facility with appropriate resources  Description: INTERVENTIONS:  - Identify barriers to discharge w/patient and caregiver  - Arrange for needed discharge resources and transportation as appropriate  - Identify discharge learning needs (meds, wound care, etc )  - Arrange for interpretive services to assist at discharge as needed  - Refer to Case Management Department for coordinating discharge planning if the patient needs post-hospital services based on physician/advanced practitioner order or complex needs related to functional status, cognitive ability, or social support system  Outcome: Progressing     Problem: Knowledge Deficit  Goal: Patient/family/caregiver demonstrates understanding of disease process, treatment plan, medications, and discharge instructions  Description: Complete learning assessment and assess knowledge base    Interventions:  - Provide teaching at level of understanding  - Provide teaching via preferred learning methods  Outcome: Progressing     Problem: CARDIOVASCULAR - ADULT  Goal: Maintains optimal cardiac output and hemodynamic stability  Description: INTERVENTIONS:  - Monitor I/O, vital signs and rhythm  - Monitor for S/S and trends of decreased cardiac output  - Administer and titrate ordered vasoactive medications to optimize hemodynamic stability  - Assess quality of pulses, skin color and temperature  - Assess for signs of decreased coronary artery perfusion  - Instruct patient to report change in severity of symptoms  Outcome: Progressing  Goal: Absence of cardiac dysrhythmias or at baseline rhythm  Description: INTERVENTIONS:  - Continuous cardiac monitoring, vital signs, obtain 12 lead EKG if ordered  - Administer antiarrhythmic and heart rate control medications as ordered  - Monitor electrolytes and administer replacement therapy as ordered  Outcome: Progressing     Problem: GENITOURINARY - ADULT  Goal: Maintains or returns to baseline urinary function  Description: INTERVENTIONS:  - Assess urinary function  - Encourage oral fluids to ensure adequate hydration if ordered  - Administer IV fluids as ordered to ensure adequate hydration  - Administer ordered medications as needed  - Offer frequent toileting  - Follow urinary retention protocol if ordered  Outcome: Progressing  Goal: Absence of urinary retention  Description: INTERVENTIONS:  - Assess patients ability to void and empty bladder  - Monitor I/O  - Bladder scan as needed  - Discuss with physician/AP medications to alleviate retention as needed  - Discuss catheterization for long term situations as appropriate  Outcome: Progressing  Goal: Urinary catheter remains patent  Description: INTERVENTIONS:  - Assess patency of urinary catheter  - If patient has a chronic griffin, consider changing catheter if non-functioning  - Follow guidelines for intermittent irrigation of non-functioning urinary catheter  Outcome: Progressing     Problem: METABOLIC, FLUID AND ELECTROLYTES - ADULT  Goal: Electrolytes maintained within normal limits  Description: INTERVENTIONS:  - Monitor labs and assess patient for signs and symptoms of electrolyte imbalances  - Administer electrolyte replacement as ordered  - Monitor response to electrolyte replacements, including repeat lab results as appropriate  - Instruct patient on fluid and nutrition as appropriate  Outcome: Progressing  Goal: Fluid balance maintained  Description: INTERVENTIONS:  - Monitor labs   - Monitor I/O and WT  - Instruct patient on fluid and nutrition as appropriate  - Assess for signs & symptoms of volume excess or deficit  Outcome: Progressing  Goal: Glucose maintained within target range  Description: INTERVENTIONS:  - Monitor Blood Glucose as ordered  - Assess for signs and symptoms of hyperglycemia and hypoglycemia  - Administer ordered medications to maintain glucose within target range  - Assess nutritional intake and initiate nutrition service referral as needed  Outcome: Progressing     Problem: HEMATOLOGIC - ADULT  Goal: Maintains hematologic stability  Description: INTERVENTIONS  - Assess for signs and symptoms of bleeding or hemorrhage  - Monitor labs  - Administer supportive blood products/factors as ordered and appropriate  Outcome: Progressing

## 2021-04-26 NOTE — ASSESSMENT & PLAN NOTE
· CKD 3a stable    Results from last 7 days   Lab Units 04/26/21  0601 04/25/21  1243   BUN mg/dL 17 21   CREATININE mg/dL 1 05 1 29*   EGFR ml/min/1 73sq m 51 40

## 2021-04-26 NOTE — ASSESSMENT & PLAN NOTE
· Sepsis secondary to right upper lobe pneumonia + UTI/right ureteritis  · Follow up on urine and blood cultures  · Procalcitonin still elevated  · Continue azithromycin and cefepime      Results from last 7 days   Lab Units 04/26/21  0601 04/25/21  1804   PROCALCITONIN ng/ml 1 80* 1 31*

## 2021-04-26 NOTE — CASE MANAGEMENT
Evaluated the pt at the bedside  Pt was admitted to the hospital for sepsis  Explained the role of CM and the options of discharge planning with the pt  Pt states she lives alone in an apartment with 1 EL  Pt indicated she is completely independent and drives  Pt PCP is Dr Yesenia Hand  Pt gets her medications from [employer] in [place]  Pt reports she daughter will transport her home upon discharge  Patient/caregiver received discharge checklist   Content reviewed  Patient/caregiver encouraged to participate in discharge plan of care prior to discharge home  CM reviewed d/c planning process including the following: identifying help at home, patient preference for d/c planning needs, availability of treatment team to discuss questions or concerns patient and/or family may have regarding understanding medications and recognizing signs and symptoms once discharged  CM also encouraged patient to follow up with all recommended appointments after discharge  Patient advised of importance for patient and family to participate in managing patients medical well being

## 2021-04-26 NOTE — PLAN OF CARE
Problem: PHYSICAL THERAPY ADULT  Goal: Performs mobility at highest level of function for planned discharge setting  See evaluation for individualized goals  Description: Treatment/Interventions: LE strengthening/ROM, Functional transfer training, Therapeutic exercise, Equipment eval/education, Patient/family training, Bed mobility, Gait training, Spoke to nursing  Equipment Recommended: Sp Pierre       See flowsheet documentation for full assessment, interventions and recommendations  Note: Prognosis: Good  Problem List: Impaired balance, Decreased mobility, Decreased strength, Decreased endurance, Decreased safety awareness  Assessment: Pt is 66 y o  female seen for PT evaluation on 4/26/2021 s/p admit to [address] on 4/25/2021 w/ Sepsis ([address] )  PT consulted to assess pt's functional mobility and d/c needs  Order placed for PT eval and tx, w/ up and OOB as tolerated order  PT performed at least 2 patient identifiers during session: Name and wristband  Comorbidities affecting pt's physical performance at time of assessment include: heart disease, type 2 DM, stage 3 CKD, hypertension, and anxiety  Before her admission, pt was independent w/ all functional mobility w/ no AD, ambulates community distances and elevations, lives alone in 1 level apartment and retired  Personal factors affecting pt at time of IE include: unable to perform dynamic tasks in community and limited home support  Please find objective findings from PT assessment regarding body systems outlined above with impairments and limitations including weakness, impaired balance, gait deviations, decreased functional mobility tolerance and fall risk, as well as mobility assessment (need for supervision to contact guard assist w/ all phases of mobility when usually ambulating independently)  The following objective measures performed on IE also reveal limitations: AM-PAC 6-Clicks: 19/62   Pt's clinical presentation is currently unstable/unpredictable seen in pt's presentation of abnormal lab value(s), need for supervision to contact guard assist w/ all phases of mobility when usually mobilizing independently, on telemetry monitoring and ongoing medical assessment  Pt to benefit from continued PT tx to address deficits as defined above and maximize level of functional independent mobility and consistency  From PT/mobility standpoint, recommendation at time of d/c would be home with outpatient rehabilitation pending progress in order to facilitate return to PLOF  PT Discharge Recommendation: Home with outpatient rehabilitation     PT - OK to Discharge: Yes(when medically cleared)    See flowsheet documentation for full assessment

## 2021-04-26 NOTE — ASSESSMENT & PLAN NOTE
Wt Readings from Last 3 Encounters:   04/25/21 58 8 kg (129 lb 10 1 oz)   01/13/21 59 9 kg (132 lb)   09/03/20 57 6 kg (127 lb)     Results from last 7 days   Lab Units 04/25/21  1804   BNP pg/mL 121*     · Concerning findings for CHF on imaging  Patient does not appear volume overloaded    Will check echocardiogram

## 2021-04-26 NOTE — PHYSICAL THERAPY NOTE
Physical Therapy Evaluation     Patient's Name: Elena Sigala    Admitting Diagnosis  Dysuria [R30 0]  CHF (congestive heart failure) ([place] [address] ) [I50 9]  Leukocytosis [D72 829]  Ureteritis [N28 89]  Weakness [R53 1]  Fever [R50 9]  Generalized weakness [R53 1]  Elevated lactic acid level [R79 89]  Sepsis ([place] [address] ) [A41 9]    Problem List  Patient Active Problem List   Diagnosis    Sepsis ([place] [address] )    Generalized weakness    Congestive heart disease ([place] [address] )    Type 2 diabetes mellitus without complication, without long-term current use of insulin [facility])    Generalized anxiety disorder    Stage 3a chronic kidney disease ([place] [address] )       Past Medical History  Past Medical History:   Diagnosis Date    Anxiety     Colon polyp     Depression     Diabetes mellitus ([place] [address] )     Diverticulosis     Hyperlipidemia     Hypertension     Osteoporosis        Past Surgical History  Past Surgical History:   Procedure Laterality Date    APPENDECTOMY      CATARACT EXTRACTION Right     COLONOSCOPY      difficult exam    HYSTERECTOMY      TONSILLECTOMY      WISDOM TOOTH EXTRACTION            04/26/21 0816   PT Last Visit   PT Visit Date 04/26/21   Note Type   Note type Evaluation   Pain Assessment   Pain Assessment Tool Pain Assessment not indicated - pt denies pain   Pain Score No Pain   Home Living   Type of Home Apartment   Home Layout One level;Performs ADLs on one level; Able to live on main level with bedroom/bathroom;Stairs to enter without rails   Bathroom Shower/Tub Tub/shower unit   Bathroom Toilet Standard   Bathroom Accessibility Accessible   Home Equipment Life alert  (no reported DME, no use at baseline)   Prior Function   Level of [place] Independent with ADLs and functional mobility   Lives With Alone   Receives Help From Family  (family can come help if needed)   ADL Assistance Independent   IADLs Independent   Falls in the last 6 months 0  ("leg gives out when I walk")   Vocational Retired   Comments pt does drive   Restrictions/Precautions   Weight Bearing Precautions Per Order No   Other Precautions Fall Risk;Pain;Telemetry; Chair Alarm; Bed Alarm   General   Family/Caregiver Present No   Cognition   Overall Cognitive Status WFL   Arousal/Participation Alert   Attention Within functional limits   Orientation Level Oriented X4   Memory Within functional limits   Following Commands Follows one step commands without difficulty   Comments pt agreeable to PT evaluation   RLE Assessment   RLE Assessment WFL  (grossly 4+/5)   LLE Assessment   LLE Assessment WFL  (grossly 4+/5)   Coordination   Movements are Fluid and Coordinated 1   Light Touch   RLE Light Touch Grossly intact   LLE Light Touch Grossly intact   Bed Mobility   Supine to Sit 6  Modified independent   Additional items Assist x 1;HOB elevated   Additional Comments pt denies any lightheadedness or dizziness   Transfers   Sit to Stand 5  Supervision   Additional items Assist x 1;Bedrails   Stand to Sit 5  Supervision   Additional items Assist x 1; Increased time required   Ambulation/Elevation   Gait pattern Improper Weight shift;Narrow NISHANT; Inconsistent jacque; Foward flexed; Short stride   Gait Assistance 5  Supervision  (CGA <--> close supervision)   Additional items Assist x 1;Verbal cues   Assistive Device Rolling walker   Distance 100 ft   Balance   Static Sitting Good   Dynamic Sitting Fair +   Static Standing Fair -   Dynamic Standing Fair -   Ambulatory Fair -   Endurance Deficit   Endurance Deficit Yes   Activity Tolerance   Activity Tolerance Patient limited by fatigue   Medical Staff Made Aware SHANA Hall present for evaluation   Nurse Made Aware ANGELA Escamilla made aware of outcomes, verbalized pt appropriate to evaluate   Assessment   Prognosis Good   Problem List Impaired balance;Decreased mobility; Decreased strength;Decreased endurance;Decreased safety awareness   Assessment Pt is 66 y o  female seen for PT evaluation on 4/26/2021 s/p admit to [facility] on 4/25/2021 w/ Sepsis ([address] )  PT consulted to assess pt's functional mobility and d/c needs  Order placed for PT eval and tx, w/ up and OOB as tolerated order  PT performed at least 2 patient identifiers during session: Name and wristband  Comorbidities affecting pt's physical performance at time of assessment include: heart disease, type 2 DM, stage 3 CKD, hypertension, and anxiety  Before her admission, pt was independent w/ all functional mobility w/ no AD, ambulates community distances and elevations, lives alone in 1 level apartment and retired  Personal factors affecting pt at time of IE include: unable to perform dynamic tasks in community and limited home support  Please find objective findings from PT assessment regarding body systems outlined above with impairments and limitations including weakness, impaired balance, gait deviations, decreased functional mobility tolerance and fall risk, as well as mobility assessment (need for supervision to contact guard assist w/ all phases of mobility when usually ambulating independently)  The following objective measures performed on IE also reveal limitations: AM-PAC 6-Clicks: 38/27  Pt's clinical presentation is currently unstable/unpredictable een in pt's presentation of abnormal lab value(s), need for supervision to contact guard assist w/ all phases of mobility when usually mobilizing sindependently, on telemetry monitoring and ongoing medical assessment  Pt to benefit from continued PT tx to address deficits as defined above and maximize level of functional independent mobility and consistency  From PT/mobility standpoint, recommendation at time of d/c would be home with outpatient rehabilitation pending progress in order to facilitate return to PLOF     Goals   Patient Goals "to go home"   LTG Expiration Date 05/06/21   Long Term Goal #1 In 7-10 days: Increase bilateral LE strength 1/2 grade to facilitate independent mobility, Perform all bed mobility tasks independently to decrease caregiver burden, Perform all transfers modified independent to improve independence, Ambulate > 200 ft  with least restrictive assistive device modified independent w/o LOB and w/ normalized gait pattern 100% of the time, Increase all balance 1/2 grade to decrease risk for falls and Tolerate standing 10 minutes to facilitate functional task performance    PT Treatment Day 0   Plan   Treatment/Interventions LE strengthening/ROM; Functional transfer training; Therapeutic exercise;Equipment eval/education;Patient/family training;Bed mobility;Gait training;Spoke to nursing   PT Frequency   (3-5x/ week)   Recommendation   PT Discharge Recommendation Home with outpatient rehabilitation   Equipment Recommended [facility] Recommended Wheeled walker   PT - OK to Discharge Yes  (when medically cleared)   Additional Comments Pt's raw score on the [employer] [address] inpatient short form is 19, standardized score is 42 48  Patients at this level are likely to benefit from DC to home with home care, however, please refer to therapist recommendation for safe DC planning     AM-PAC Basic Mobility Inpatient   Turning in Bed Without Bedrails 4   Lying on Back to Sitting on Edge of Flat Bed 3   Moving Bed to Chair 3   Standing Up From Chair 3   Walk in Room 3   Climb 3-5 Stairs 3   Basic Mobility Inpatient Raw Score 19   Basic Mobility Standardized Score 42 48         Cayetano Liang, SPT beer

## 2021-04-26 NOTE — PLAN OF CARE
Problem: INFECTION - ADULT  Goal: Absence or prevention of progression during hospitalization  Description: INTERVENTIONS:  - Assess and monitor for signs and symptoms of infection  - Monitor lab/diagnostic results  - Monitor all insertion sites, i e  indwelling lines, tubes, and drains  - Monitor endotracheal if appropriate and nasal secretions for changes in amount and color  - [place] appropriate cooling/warming therapies per order  - Administer medications as ordered  - Instruct and encourage patient and family to use good hand hygiene technique  - Identify and instruct in appropriate isolation precautions for identified infection/condition  Outcome: Progressing  Goal: Absence of fever/infection during neutropenic period  Description: INTERVENTIONS:  - Monitor WBC    Outcome: Progressing

## 2021-04-26 NOTE — ASSESSMENT & PLAN NOTE
Lab Results   Component Value Date    HGBA1C 7 0 (H) 03/24/2021     Recent Labs     04/25/21  1717 04/25/21 2017 04/26/21  0632 04/26/21  1052   POCGLU 230* 137 139 187*     · Will increase 70/30 insulin to 15 units b i d   Continue sliding scale

## 2021-04-26 NOTE — OCCUPATIONAL THERAPY NOTE
Occupational Therapy Evaluation      Becca Isma    4/26/2021    Patient Active Problem List   Diagnosis    [facility]epsis [facility] )    Generalized weakness    Congestive heart disease ([place] [address] )    Type 2 diabetes mellitus without complication, without long-term current use of insulin (HCC)    Generalized anxiety disorder    Stage 3a chronic kidney disease ([place] [address] )       Past Medical History:   Diagnosis Date    Anxiety     Colon polyp     Depression     Diabetes mellitus ([place] [address] )     Diverticulosis     Hyperlipidemia     Hypertension     Osteoporosis        Past Surgical History:   Procedure Laterality Date    APPENDECTOMY      CATARACT EXTRACTION Right     COLONOSCOPY      difficult exam    HYSTERECTOMY      TONSILLECTOMY      WISDOM TOOTH EXTRACTION          04/26/21 0814   OT Last Visit   OT Visit Date 04/26/21   Note Type   Note type Evaluation   Restrictions/Precautions   Weight Bearing Precautions Per Order No   Other Precautions Multiple lines; Fall Risk; Chair Alarm   Pain Assessment   Pain Assessment Tool Pain Assessment not indicated - pt denies pain   Pain Score No Pain   Home Living   Type of Home Apartment   Home Layout One level;Performs ADLs on one level; Able to live on main level with bedroom/bathroom; Access   Bathroom Shower/Tub Tub/shower unit   Bathroom Toilet Standard   Bathroom Equipment   (no DME reported )   [address] alert  (no AD used at baseline )   Prior Function   Level of [place] Independent with ADLs and functional mobility   Lives With Alone   ADL Assistance Independent   IADLs Independent   Falls in the last 6 months 0   Vocational Retired   Comments (+) driving, pt does grocery shopping independently    Lifestyle   Autonomy Patient reporting being independent with ADLs/IADLs, ambulatory with no AD and lives alone in a one level apartment with 0 EL   Reciprocal Relationships supportive daughter    Service to Others retired Intrinsic Gratification enjoys training dogs    ADL   Eating Assistance 6  Modified independent   Grooming Assistance 6  Modified Independent   UB Bathing Assistance 6  Modified Independent   LB Bathing Assistance 5  Supervision/Setup   UB Dressing Assistance 6  Modified independent   LB Dressing Assistance 5  Supervision/Setup   Toileting Assistance  5  Supervision/Setup   Bed Mobility   Supine to Sit 6  Modified independent   Additional items HOB elevated   Sit to Supine   (DNT: pt seated OOB in chair at end of eval )   Transfers   Sit to Stand 5  Supervision   Additional items Assist x 1;Verbal cues   Stand to Sit 5  Supervision   Additional items Assist x 1;Verbal cues;Armrests   Functional Mobility   Functional Mobility 5  Supervision   Additional Comments Pt ambulated in hallway with no LOB or SOB  Pt denied dizziness or lightheadedness with transitional movements    Additional items   (Pt utilized IV pole for stability )   Balance   Static Sitting Good   Dynamic Sitting Fair +   Static Standing Fair +   Dynamic Standing Fair   Activity Tolerance   Activity Tolerance Patient limited by fatigue   Nurse Made Aware RN made aware of outcomes    RUE Assessment   RUE Assessment WFL  (full AROM, 4+/5 MMT )   LUE Assessment   LUE Assessment WFL  (full AROM, 4+/5 MMT )   Hand Function   Gross Motor Coordination Functional   Fine Motor Coordination Functional   Sensation   Light Touch No apparent deficits  (per pt )   Vision-Basic Assessment   Current Vision   (wears glasses only for driving )   Vision - Complex Assessment   Acuity Able to read employee name badge without difficulty; Able to read clock/calendar on wall without difficulty   Cognition   Overall Cognitive Status [facility]   Arousal/Participation Alert; Responsive; Cooperative   Attention Within functional limits   Orientation Level Oriented X4   Memory Within functional limits   Following Commands Follows all commands and directions without difficulty   Comments Pt agreeable to OT eval    Assessment   Prognosis Good   Assessment Pt is a 66 y o  female who was admitted to [address] [place] [address] on 4/25/2021 with Sepsis ([address] )  At this time, patient is also affected by the comorbidities of: congestive heart failure, generalized anxiety, CKD, and DM2  Additionally, patient is affected by the following personal factors:steps to enter environment and limited home support  Orders placed for OT evaluation/treatment with up with assistance orders  Prior to admission, Patient reporting being independent with ADLs/IADLs, ambulatory with no AD and lives alone in a one level apartment with 0 EL  Upon OT evaluation, patient requires modified independent  for UB ADLs and supervision/set-up for LB ADLs  Patient presents with functional use of BUEs, with intact prehension and fine motor coordination, and symmetrical muscle strength  Patient appears to be functioning at baseline, no further Acute OT needs identified at this time to warrant OT services  D/C OT and from OT standpoint, recommendation at time of d/c would be No rehabilitation needs     Goals   Patient Goals to go home soon    Plan   OT Frequency Eval only   Recommendation   OT Discharge Recommendation No rehabilitation needs   OT - OK to Discharge Yes  (Once medically cleared )   AM-PAC Daily Activity Inpatient   Lower Body Dressing 3   Bathing 3   Toileting 3   Upper Body Dressing 4   Grooming 4   Eating 4   Daily Activity Raw Score 21   Daily Activity Standardized Score (Calc for Raw Score >=11) 44 27   AM-PAC Applied Cognition Inpatient   Following a Speech/Presentation 4   Understanding Ordinary Conversation 4   Taking Medications 4   Remembering Where Things Are Placed or Put Away 4   Remembering List of 4-5 Errands 4   Taking Care of Complicated Tasks 4   Applied Cognition Raw Score 24   Applied Cognition Standardized Score 62 21     [facility], OT

## 2021-04-27 ENCOUNTER — APPOINTMENT (INPATIENT)
Dept: NON INVASIVE DIAGNOSTICS | Facility: HOSPITAL | Age: 76
DRG: 871 | End: 2021-04-27
Payer: MEDICARE

## 2021-04-27 LAB
ALBUMIN SERPL BCP-MCNC: 3.2 G/DL (ref 3.5–5.7)
ALP SERPL-CCNC: 54 U/L (ref 55–165)
ALT SERPL W P-5'-P-CCNC: 8 U/L (ref 7–52)
ANION GAP SERPL CALCULATED.3IONS-SCNC: 9 MMOL/L (ref 4–13)
AST SERPL W P-5'-P-CCNC: 10 U/L (ref 13–39)
BILIRUB SERPL-MCNC: 0.5 MG/DL (ref 0.2–1)
BUN SERPL-MCNC: 14 MG/DL (ref 7–25)
CALCIUM ALBUM COR SERPL-MCNC: 8.9 MG/DL (ref 8.3–10.1)
CALCIUM SERPL-MCNC: 8.3 MG/DL (ref 8.6–10.5)
CHLORIDE SERPL-SCNC: 106 MMOL/L (ref 98–107)
CO2 SERPL-SCNC: 25 MMOL/L (ref 21–31)
CREAT SERPL-MCNC: 1.05 MG/DL (ref 0.6–1.2)
ERYTHROCYTE [DISTWIDTH] IN BLOOD BY AUTOMATED COUNT: 13.3 % (ref 11.5–14.5)
GFR SERPL CREATININE-BSD FRML MDRD: 51 ML/MIN/1.73SQ M
GLUCOSE SERPL-MCNC: 113 MG/DL (ref 65–140)
GLUCOSE SERPL-MCNC: 171 MG/DL (ref 65–140)
GLUCOSE SERPL-MCNC: 60 MG/DL (ref 65–140)
GLUCOSE SERPL-MCNC: 80 MG/DL (ref 65–99)
GLUCOSE SERPL-MCNC: 81 MG/DL (ref 65–140)
GLUCOSE SERPL-MCNC: 99 MG/DL (ref 65–140)
HCT VFR BLD AUTO: 33.3 % (ref 42–47)
HGB BLD-MCNC: 11.1 G/DL (ref 12–16)
MCH RBC QN AUTO: 29 PG (ref 26–34)
MCHC RBC AUTO-ENTMCNC: 33.4 G/DL (ref 31–37)
MCV RBC AUTO: 87 FL (ref 81–99)
PLATELET # BLD AUTO: 178 THOUSANDS/UL (ref 149–390)
PMV BLD AUTO: 10.2 FL (ref 8.6–11.7)
POTASSIUM SERPL-SCNC: 3.7 MMOL/L (ref 3.5–5.5)
PROCALCITONIN SERPL-MCNC: 1.34 NG/ML
PROT SERPL-MCNC: 5.7 G/DL (ref 6.4–8.9)
RBC # BLD AUTO: 3.84 MILLION/UL (ref 3.9–5.2)
SODIUM SERPL-SCNC: 140 MMOL/L (ref 134–143)
WBC # BLD AUTO: 9.7 THOUSAND/UL (ref 4.8–10.8)

## 2021-04-27 PROCEDURE — 85027 COMPLETE CBC AUTOMATED: CPT | Performed by: INTERNAL MEDICINE

## 2021-04-27 PROCEDURE — 93306 TTE W/DOPPLER COMPLETE: CPT | Performed by: INTERNAL MEDICINE

## 2021-04-27 PROCEDURE — 99232 SBSQ HOSP IP/OBS MODERATE 35: CPT | Performed by: STUDENT IN AN ORGANIZED HEALTH CARE EDUCATION/TRAINING PROGRAM

## 2021-04-27 PROCEDURE — 80053 COMPREHEN METABOLIC PANEL: CPT | Performed by: INTERNAL MEDICINE

## 2021-04-27 PROCEDURE — 82948 REAGENT STRIP/BLOOD GLUCOSE: CPT

## 2021-04-27 PROCEDURE — 84145 PROCALCITONIN (PCT): CPT | Performed by: INTERNAL MEDICINE

## 2021-04-27 PROCEDURE — 93306 TTE W/DOPPLER COMPLETE: CPT

## 2021-04-27 RX ORDER — LANOLIN ALCOHOL/MO/W.PET/CERES
3 CREAM (GRAM) TOPICAL
Status: DISCONTINUED | OUTPATIENT
Start: 2021-04-27 | End: 2021-04-28 | Stop reason: HOSPADM

## 2021-04-27 RX ADMIN — HEPARIN SODIUM 5000 UNITS: 5000 INJECTION INTRAVENOUS; SUBCUTANEOUS at 13:50

## 2021-04-27 RX ADMIN — Medication 2000 UNITS: at 09:04

## 2021-04-27 RX ADMIN — AZITHROMYCIN MONOHYDRATE 250 MG: 250 TABLET ORAL at 21:02

## 2021-04-27 RX ADMIN — INSULIN ASPART 15 UNITS: 100 INJECTION, SUSPENSION SUBCUTANEOUS at 17:21

## 2021-04-27 RX ADMIN — HEPARIN SODIUM 5000 UNITS: 5000 INJECTION INTRAVENOUS; SUBCUTANEOUS at 05:43

## 2021-04-27 RX ADMIN — CEFEPIME HYDROCHLORIDE 1000 MG: 1 INJECTION, SOLUTION INTRAVENOUS at 21:08

## 2021-04-27 RX ADMIN — GUAIFENESIN 600 MG: 600 TABLET, EXTENDED RELEASE ORAL at 17:20

## 2021-04-27 RX ADMIN — ASPIRIN 81 MG: 81 TABLET, CHEWABLE ORAL at 09:04

## 2021-04-27 RX ADMIN — LISINOPRIL 20 MG: 20 TABLET ORAL at 09:04

## 2021-04-27 RX ADMIN — LEVOTHYROXINE SODIUM 75 MCG: 75 TABLET ORAL at 05:43

## 2021-04-27 RX ADMIN — MELATONIN 3 MG: at 21:02

## 2021-04-27 RX ADMIN — INSULIN LISPRO 1 UNITS: 100 INJECTION, SOLUTION INTRAVENOUS; SUBCUTANEOUS at 11:27

## 2021-04-27 RX ADMIN — CEFEPIME HYDROCHLORIDE 1000 MG: 1 INJECTION, SOLUTION INTRAVENOUS at 09:04

## 2021-04-27 RX ADMIN — GUAIFENESIN 600 MG: 600 TABLET, EXTENDED RELEASE ORAL at 09:04

## 2021-04-27 RX ADMIN — ATORVASTATIN CALCIUM 20 MG: 20 TABLET, FILM COATED ORAL at 17:20

## 2021-04-27 RX ADMIN — INSULIN ASPART 15 UNITS: 100 INJECTION, SUSPENSION SUBCUTANEOUS at 09:04

## 2021-04-27 RX ADMIN — HEPARIN SODIUM 5000 UNITS: 5000 INJECTION INTRAVENOUS; SUBCUTANEOUS at 21:02

## 2021-04-27 NOTE — ASSESSMENT & PLAN NOTE
Wt Readings from Last 3 Encounters:   04/25/21 58 8 kg (129 lb 10 1 oz)   01/13/21 59 9 kg (132 lb)   09/03/20 57 6 kg (127 lb)     Results from last 7 days   Lab Units 04/25/21  1804   BNP pg/mL 121*     · Xray showing possible cardiomegaly  ECHO completed, pending interpretation  Follow up results

## 2021-04-27 NOTE — PROGRESS NOTES
[facility]  Progress Note - Praneeth Herreraer 1945, 66 y o  female MRN: [id number]  Unit/Bed#: -02 Encounter: [id number]  Primary Care Provider: Jonny Cortez MD   Date and time admitted to hospital: 4/25/2021 11:51 AM    * Sepsis [facility])  Assessment & Plan  · Likely secondary to UTI/utereritis with possible superimposed right lower lobe pneumonia  On Cefepime and azithromycin for dual coverage  Blood cultures negative  Follow up urine culture  Improving     Results from last 7 days   Lab Units 04/26/21  0601 04/25/21  1804   PROCALCITONIN ng/ml 1 80* 1 31*       Essential hypertension  Assessment & Plan  · Adequately controlled  Continue lisinopril 20 mg daily    Hypothyroidism  Assessment & Plan  · Continue levothyroxine    Stage 3a chronic kidney disease (HCC)  Assessment & Plan  · CKD 3a stable    Results from last 7 days   Lab Units 04/27/21  0544 04/26/21  0601 04/25/21  1243   BUN mg/dL 14 17 21   CREATININE mg/dL 1 05 1 05 1 29*   EGFR ml/min/1 73sq m 51 51 40       Generalized anxiety disorder  Assessment & Plan  · Started on alprazolam p r n  during hospitalization  Controlled    Type 2 diabetes mellitus without complication, without long-term current use of insulin [facility])  Assessment & Plan  Lab Results   Component Value Date    HGBA1C 7 0 (H) 03/24/2021     Recent Labs     04/26/21  1052 04/26/21  1629 04/26/21  1944 04/27/21  0540   POCGLU 187* 206* 110 81     · Increased 70/30 insulin to 15 units b i d   Continue sliding scale  Congestive heart disease ([address] )  Assessment & Plan  Wt Readings from Last 3 Encounters:   04/25/21 58 8 kg (129 lb 10 1 oz)   01/13/21 59 9 kg (132 lb)   09/03/20 57 6 kg (127 lb)     Results from last 7 days   Lab Units 04/25/21  1804   BNP pg/mL 121*     · Xray showing possible cardiomegaly  ECHO completed, pending interpretation  Follow up results         Generalized weakness  Assessment & Plan  ·  Likely due to complicated urinary tract infection and possible pneumonia  PT recommending home with outpatient services     VTE Pharmacologic Prophylaxis:   Pharmacologic: Heparin  Mechanical VTE Prophylaxis in Place: Yes    Patient Centered Rounds: I have performed bedside rounds with nursing staff today  Time Spent for Care: 30 minutes  More than 50% of total time spent on counseling and coordination of care as described above  Current Length of Stay: 2 day(s)    Current Patient Status: Inpatient   Certification Statement: The patient will continue to require additional inpatient hospital stay due to IV antibiotics    Discharge Plan: 24 hours     Code Status: Level 1 - Full Code      Subjective:   Patient feels better today, no chest pain or chest palpitations  No shortness of breath  Appetite is good  Objective:     Vitals:   Temp (24hrs), Av 2 °F (37 3 °C), Min:98 2 °F (36 8 °C), Max:99 7 °F (37 6 °C)    Temp:  [98 2 °F (36 8 °C)-99 7 °F (37 6 °C)] 98 7 °F (37 1 °C)  HR:  [78-94] 88  Resp:  [16-18] 18  BP: (112-151)/(54-67) 148/67  SpO2:  [90 %-94 %] 90 %  Body mass index is 24 49 kg/m²  Input and Output Summary (last 24 hours): Intake/Output Summary (Last 24 hours) at 2021 0815  Last data filed at 2021 1300  Gross per 24 hour   Intake 360 ml   Output 400 ml   Net -40 ml       Physical Exam:     Physical Exam  Vitals signs and nursing note reviewed  Constitutional:       Appearance: Normal appearance  HENT:      Head: Normocephalic and atraumatic  Nose: Nose normal  No congestion  Mouth/Throat:      Mouth: Mucous membranes are dry  Pharynx: Oropharynx is clear  Eyes:      General:         Right eye: No discharge  Left eye: No discharge  Neck:      Musculoskeletal: Normal range of motion and neck supple  Cardiovascular:      Rate and Rhythm: Normal rate and regular rhythm  Pulmonary:      Effort: Pulmonary effort is normal  No respiratory distress     Abdominal: General: Abdomen is flat  Palpations: Abdomen is soft  Musculoskeletal:      Right lower leg: No edema  Left lower leg: No edema  Skin:     General: Skin is warm and dry  Neurological:      General: No focal deficit present  Mental Status: She is alert  Mental status is at baseline  Psychiatric:         Mood and Affect: Mood normal          Behavior: Behavior normal        Additional Data:     Labs:    Results from last 7 days   Lab Units 04/27/21  0544 04/26/21  0601 04/25/21  1214   WBC Thousand/uL 9 70 16 50* 26 30*   HEMOGLOBIN g/dL 11 1* 10 9* 12 9   HEMATOCRIT % 33 3* 32 7* 39 9*   PLATELETS Thousands/uL 178 170 206   BANDS PCT %  --   --  11*   NEUTROS PCT %  --  90*  --    LYMPHS PCT %  --  3*  --    LYMPHO PCT %  --   --  4*   MONOS PCT %  --  5  --    MONO PCT %  --   --  4   EOS PCT %  --  3 2     Results from last 7 days   Lab Units 04/27/21  0544   SODIUM mmol/L 140   POTASSIUM mmol/L 3 7   CHLORIDE mmol/L 106   CO2 mmol/L 25   BUN mg/dL 14   CREATININE mg/dL 1 05   ANION GAP mmol/L 9   CALCIUM mg/dL 8 3*   ALBUMIN g/dL 3 2*   TOTAL BILIRUBIN mg/dL 0 50   ALK PHOS U/L 54*   ALT U/L 8   AST U/L 10*   GLUCOSE RANDOM mg/dL 80     Results from last 7 days   Lab Units 04/25/21  1804   INR  1 37*     Results from last 7 days   Lab Units 04/27/21  0540 04/26/21  1944 04/26/21  1629 04/26/21  1052 04/26/21  0632 04/25/21  2017 04/25/21  1717   POC GLUCOSE mg/dl 81 110 206* 187* 139 137 230*         Results from last 7 days   Lab Units 04/26/21  0601 04/25/21  1804 04/25/21  1359 04/25/21  1215   LACTIC ACID mmol/L  --   --  1 3 2 8*   PROCALCITONIN ng/ml 1 80* 1 31*  --   --            * I Have Reviewed All Lab Data Listed Above  * Additional Pertinent Lab Tests Reviewed:  [address] Admission Reviewed    Imaging:    Imaging Reports Reviewed Today Include: na  Imaging Personally Reviewed by Myself Includes:  na    Recent Cultures (last 7 days):     Results from last 7 days   Lab Units 04/25/21 2105 04/25/21  1231 04/25/21  1215   BLOOD CULTURE   --  No Growth at 24 hrs  No Growth at 24 hrs  LEGIONELLA URINARY ANTIGEN  Negative  --   --        Last 24 Hours Medication List:   Current Facility-Administered Medications   Medication Dose Route Frequency Provider Last Rate    acetaminophen  650 mg Oral Q6H PRN Willian Cleverly, CRNP      ALPRAZolam  0 25 mg Oral TID PRN Willian Cleverly, CRNP      aspirin  81 mg Oral Daily Willian Cleverly, CRNP      atorvastatin  20 mg Oral Daily With Dinner Willian Cleverly, CRNP      azithromycin  250 mg Oral Q24H Frelina Rangel,       cefepime  1,000 mg Intravenous Q12H Willian Cleverly, CRNP 1,000 mg (04/26/21 2215)    cholecalciferol  2,000 Units Oral Daily Willian Cleverly, CRNP      guaiFENesin  600 mg Oral BID Willian Cleverly, CRNP      heparin (porcine)  5,000 Units Subcutaneous [facility] Willian Cleverly, CRNP      insulin aspart protamine-insulin aspart  15 Units Subcutaneous BID AC Edwar Lebron, DO      insulin lispro  1-5 Units Subcutaneous TID AC Willian Cleverly, CRNP      insulin lispro  1-5 Units Subcutaneous HS Willian Cleverly, CRNP      levothyroxine  75 mcg Oral Early Morning Willian Cleverly, CRNP      lisinopril  20 mg Oral Daily Willian Cleverly, CRNP          Today, Patient Was Seen By: KWABENA Varghese      ** Please Note: Dictation voice to text software may have been used in the creation of this document   **

## 2021-04-27 NOTE — ASSESSMENT & PLAN NOTE
·  Likely due to complicated urinary tract infection and possible pneumonia   PT recommending home with outpatient services

## 2021-04-27 NOTE — APP STUDENT NOTE
Assessment/Plan:    1  Sepsis  · Secondary to right upper lobe pneumonia and UTI  · Urine culture pending  · Blood cultures showed no growth in 24 hours  · Continue azithromycin and cefepime    2  Essential hypertension  · Continue lisinopril 20 mg daily    3  Hypothyroidism  · Continue levothyroxine 75 mcg    4  Stage 3a chronic kidney disease  · CKD stable    5  Generalized anxiety disorder  · Patient uses deep breathing exercises to control her anxiety  · Started on alprazolam prn during hospitalization    6  Type 2 diabetes mellitus without complication, without long-term current use insulin  · Blood sugars low this morning  May need to adjust 70/30 insulin  Continue sliding scale    7  Congestive heart disease  · Imaging concerning for findings for CHF  Patient does not appear volume overloaded  · Echo obtained this morning  No evidence of heart failure  EF 60%    VTE Pharmacologic Prophylaxis: Heparin      Time Spent for Care: 20 minutes  More than 50% of total time spent on counseling and coordination of care as described above  Current Length of Stay: 2 day(s)  Current Patient Status: Inpatient     Code Status: Level 1 - Full Code    Subjective:   Patient seen and evaluated  She reports feeling much better this morning and has no complaints  Still has occasional dry cough  No overnight issues  Denies chest pain, SOB, fever, chills, N/V, abdominal pain, changes in BM, urinary symptoms, lightheadedness, dizziness  Objective:     Vitals:   Temp (24hrs), Av °F (37 2 °C), Min:98 2 °F (36 8 °C), Max:99 7 °F (37 6 °C)    Temp:  [98 2 °F (36 8 °C)-99 7 °F (37 6 °C)] 98 7 °F (37 1 °C)  HR:  [78-94] 88  Resp:  [16-18] 18  BP: (112-151)/(54-67) 148/67  SpO2:  [90 %-94 %] 90 %  Body mass index is 24 49 kg/m²  Input and Output Summary (last 24 hours):      Intake/Output Summary (Last 24 hours) at 2021 1117  Last data filed at 2021 1115  Gross per 24 hour   Intake 360 ml   Output 1200 ml   Net -840 ml       Physical Exam:   Physical Exam  Constitutional:       General: She is not in acute distress  HENT:      Head: Normocephalic and atraumatic  Eyes:      Conjunctiva/sclera: Conjunctivae normal       Pupils: Pupils are equal, round, and reactive to light  Neck:      Musculoskeletal: Neck supple  Cardiovascular:      Rate and Rhythm: Normal rate and regular rhythm  Heart sounds: Normal heart sounds  Pulmonary:      Effort: Pulmonary effort is normal       Breath sounds: Normal breath sounds  Abdominal:      Palpations: Abdomen is soft  Tenderness: There is no abdominal tenderness  Musculoskeletal: Normal range of motion  Right lower leg: No edema  Left lower leg: No edema  Skin:     General: Skin is warm and dry  Neurological:      General: No focal deficit present  Mental Status: She is alert            Additional Data:     Labs:  Results from last 7 days   Lab Units 04/27/21  0544 04/26/21  0601 04/25/21  1214   WBC Thousand/uL 9 70 16 50* 26 30*   HEMOGLOBIN g/dL 11 1* 10 9* 12 9   HEMATOCRIT % 33 3* 32 7* 39 9*   PLATELETS Thousands/uL 178 170 206   BANDS PCT %  --   --  11*   NEUTROS PCT %  --  90*  --    LYMPHS PCT %  --  3*  --    LYMPHO PCT %  --   --  4*   MONOS PCT %  --  5  --    MONO PCT %  --   --  4   EOS PCT %  --  3 2     Results from last 7 days   Lab Units 04/27/21  0544   SODIUM mmol/L 140   POTASSIUM mmol/L 3 7   CHLORIDE mmol/L 106   CO2 mmol/L 25   BUN mg/dL 14   CREATININE mg/dL 1 05   ANION GAP mmol/L 9   CALCIUM mg/dL 8 3*   ALBUMIN g/dL 3 2*   TOTAL BILIRUBIN mg/dL 0 50   ALK PHOS U/L 54*   ALT U/L 8   AST U/L 10*   GLUCOSE RANDOM mg/dL 80     Results from last 7 days   Lab Units 04/25/21  1804   INR  1 37*     Results from last 7 days   Lab Units 04/27/21  1100 04/27/21  0540 04/26/21  1944 04/26/21  1629 04/26/21  1052 04/26/21  0632 04/25/21  2017 04/25/21  1717   POC GLUCOSE mg/dl 171* 81 110 206* 187* 139 137 230*         Results from last 7 days   Lab Units 04/26/21  0601 04/25/21  1804 04/25/21  1359 04/25/21  1215   LACTIC ACID mmol/L  --   --  1 3 2 8*   PROCALCITONIN ng/ml 1 80* 1 31*  --   --        Lines/Drains:  Invasive Devices     Peripheral Intravenous Line            Peripheral IV 04/25/21 Right Wrist 1 day                      Imaging: Reviewed radiology reports from this admission including: ECHO    Recent Cultures (last 7 days):   Results from last 7 days   Lab Units 04/25/21  2105 04/25/21  1251 04/25/21  1231 04/25/21  1215   BLOOD CULTURE   --   --  No Growth at 24 hrs  No Growth at 24 hrs     URINE CULTURE   --  Culture too young- will reincubate  --   --    LEGIONELLA URINARY ANTIGEN  Negative  --   --   --        Last 24 Hours Medication List:   Current Facility-Administered Medications   Medication Dose Route Frequency Provider Last Rate    acetaminophen  650 mg Oral Q6H PRN Dinesh Cassy, CRNP      ALPRAZolam  0 25 mg Oral TID PRN Dinesh Cassy, CRNP      aspirin  81 mg Oral Daily Dinesh Cassy, CRNP      atorvastatin  20 mg Oral Daily With Dinner Dinesh Cassy, CRNP      azithromycin  250 mg Oral Q24H Niyah Lange DO      cefepime  1,000 mg Intravenous Q12H Dinesh Cassy, CRNP 1,000 mg (04/27/21 0904)    cholecalciferol  2,000 Units Oral Daily Dinesh Cassy, CRNP      guaiFENesin  600 mg Oral BID Dinesh Cassy, CRNP      heparin (porcine)  5,000 Units Subcutaneous [facility] Dinesh Cassy, CRNP      insulin aspart protamine-insulin aspart  15 Units Subcutaneous BID AC Edwar Lebron, DO      insulin lispro  1-5 Units Subcutaneous TID AC Dinesh Cassy, CRNP      insulin lispro  1-5 Units Subcutaneous HS Dinesh Cassy, CRNP      levothyroxine  75 mcg Oral Early Morning Dinesh Cassy, CRNP      lisinopril  20 mg Oral Daily Dinesh Cassy, CRNP          Today, Patient Was Seen By: Sho Anderson    **Please Note: This note may have been constructed using a voice recognition system  **

## 2021-04-27 NOTE — PLAN OF CARE
Problem: INFECTION - ADULT  Goal: Absence or prevention of progression during hospitalization  Description: INTERVENTIONS:  - Assess and monitor for signs and symptoms of infection  - Monitor lab/diagnostic results  - Monitor all insertion sites, i e  indwelling lines, tubes, and drains  - Monitor endotracheal if appropriate and nasal secretions for changes in amount and color  - [place] appropriate cooling/warming therapies per order  - Administer medications as ordered  - Instruct and encourage patient and family to use good hand hygiene technique  - Identify and instruct in appropriate isolation precautions for identified infection/condition  Outcome: Progressing  Goal: Absence of fever/infection during neutropenic period  Description: INTERVENTIONS:  - Monitor WBC    Outcome: Progressing

## 2021-04-27 NOTE — ASSESSMENT & PLAN NOTE
· Likely secondary to UTI/utereritis with possible superimposed right lower lobe pneumonia  On Cefepime and azithromycin for dual coverage  Blood cultures negative  Follow up urine culture   Improving     Results from last 7 days   Lab Units 04/26/21  0601 04/25/21  1804   PROCALCITONIN ng/ml 1 80* 1 31*

## 2021-04-27 NOTE — PLAN OF CARE
Problem: Potential for Falls  Goal: Patient will remain free of falls  Description: INTERVENTIONS:  - Assess patient frequently for physical needs  -  Identify cognitive and physical deficits and behaviors that affect risk of falls    -  [place] fall precautions as indicated by assessment   - Educate patient/family on patient safety including physical limitations  - Instruct patient to call for assistance with activity based on assessment  - Modify environment to reduce risk of injury  - Consider OT/PT consult to assist with strengthening/mobility  Outcome: Progressing     Problem: PAIN - ADULT  Goal: Verbalizes/displays adequate comfort level or baseline comfort level  Description: Interventions:  - Encourage patient to monitor pain and request assistance  - Assess pain using appropriate pain scale  - Administer analgesics based on type and severity of pain and evaluate response  - Implement non-pharmacological measures as appropriate and evaluate response  - Consider cultural and social influences on pain and pain management  - Notify physician/advanced practitioner if interventions unsuccessful or patient reports new pain  Outcome: Progressing     Problem: INFECTION - ADULT  Goal: Absence or prevention of progression during hospitalization  Description: INTERVENTIONS:  - Assess and monitor for signs and symptoms of infection  - Monitor lab/diagnostic results  - Monitor all insertion sites, i e  indwelling lines, tubes, and drains  - Monitor endotracheal if appropriate and nasal secretions for changes in amount and color  - [place] appropriate cooling/warming therapies per order  - Administer medications as ordered  - Instruct and encourage patient and family to use good hand hygiene technique  - Identify and instruct in appropriate isolation precautions for identified infection/condition  Outcome: Progressing  Goal: Absence of fever/infection during neutropenic period  Description: INTERVENTIONS:  - Monitor WBC    Outcome: Progressing     Problem: SAFETY ADULT  Goal: Patient will remain free of falls  Description: INTERVENTIONS:  - Assess patient frequently for physical needs  -  Identify cognitive and physical deficits and behaviors that affect risk of falls    -  [place] fall precautions as indicated by assessment   - Educate patient/family on patient safety including physical limitations  - Instruct patient to call for assistance with activity based on assessment  - Modify environment to reduce risk of injury  - Consider OT/PT consult to assist with strengthening/mobility  Outcome: Progressing  Goal: Maintain or return to baseline ADL function  Description: INTERVENTIONS:  -  Assess patient's ability to carry out ADLs; assess patient's baseline for ADL function and identify physical deficits which impact ability to perform ADLs (bathing, care of mouth/teeth, toileting, grooming, dressing, etc )  - Assess/evaluate cause of self-care deficits   - Assess range of motion  - Assess patient's mobility; develop plan if impaired  - Assess patient's need for assistive devices and provide as appropriate  - Encourage maximum independence but intervene and supervise when necessary  - Involve family in performance of ADLs  - Assess for home care needs following discharge   - Consider OT consult to assist with ADL evaluation and planning for discharge  - Provide patient education as appropriate  Outcome: Progressing  Goal: Maintain or return mobility status to optimal level  Description: INTERVENTIONS:  - Assess patient's baseline mobility status (ambulation, transfers, stairs, etc )    - Identify cognitive and physical deficits and behaviors that affect mobility  - Identify mobility aids required to assist with transfers and/or ambulation (gait belt, sit-to-stand, lift, walker, cane, etc )  - [place] fall precautions as indicated by assessment  - Record patient progress and toleration of activity level on Mobility SBAR; progress patient to next Phase/Stage  - Instruct patient to call for assistance with activity based on assessment  - Consider rehabilitation consult to assist with strengthening/weightbearing, etc   Outcome: Progressing     Problem: DISCHARGE PLANNING  Goal: Discharge to home or other facility with appropriate resources  Description: INTERVENTIONS:  - Identify barriers to discharge w/patient and caregiver  - Arrange for needed discharge resources and transportation as appropriate  - Identify discharge learning needs (meds, wound care, etc )  - Arrange for interpretive services to assist at discharge as needed  - Refer to Case Management Department for coordinating discharge planning if the patient needs post-hospital services based on physician/advanced practitioner order or complex needs related to functional status, cognitive ability, or social support system  Outcome: Progressing     Problem: Knowledge Deficit  Goal: Patient/family/caregiver demonstrates understanding of disease process, treatment plan, medications, and discharge instructions  Description: Complete learning assessment and assess knowledge base    Interventions:  - Provide teaching at level of understanding  - Provide teaching via preferred learning methods  Outcome: Progressing     Problem: CARDIOVASCULAR - ADULT  Goal: Maintains optimal cardiac output and hemodynamic stability  Description: INTERVENTIONS:  - Monitor I/O, vital signs and rhythm  - Monitor for S/S and trends of decreased cardiac output  - Administer and titrate ordered vasoactive medications to optimize hemodynamic stability  - Assess quality of pulses, skin color and temperature  - Assess for signs of decreased coronary artery perfusion  - Instruct patient to report change in severity of symptoms  Outcome: Progressing  Goal: Absence of cardiac dysrhythmias or at baseline rhythm  Description: INTERVENTIONS:  - Continuous cardiac monitoring, vital signs, obtain 12 lead EKG if ordered  - Administer antiarrhythmic and heart rate control medications as ordered  - Monitor electrolytes and administer replacement therapy as ordered  Outcome: Progressing     Problem: GENITOURINARY - ADULT  Goal: Maintains or returns to baseline urinary function  Description: INTERVENTIONS:  - Assess urinary function  - Encourage oral fluids to ensure adequate hydration if ordered  - Administer IV fluids as ordered to ensure adequate hydration  - Administer ordered medications as needed  - Offer frequent toileting  - Follow urinary retention protocol if ordered  Outcome: Progressing  Goal: Absence of urinary retention  Description: INTERVENTIONS:  - Assess patients ability to void and empty bladder  - Monitor I/O  - Bladder scan as needed  - Discuss with physician/AP medications to alleviate retention as needed  - Discuss catheterization for long term situations as appropriate  Outcome: Progressing  Goal: Urinary catheter remains patent  Description: INTERVENTIONS:  - Assess patency of urinary catheter  - If patient has a chronic griffin, consider changing catheter if non-functioning  - Follow guidelines for intermittent irrigation of non-functioning urinary catheter  Outcome: Progressing     Problem: METABOLIC, FLUID AND ELECTROLYTES - ADULT  Goal: Electrolytes maintained within normal limits  Description: INTERVENTIONS:  - Monitor labs and assess patient for signs and symptoms of electrolyte imbalances  - Administer electrolyte replacement as ordered  - Monitor response to electrolyte replacements, including repeat lab results as appropriate  - Instruct patient on fluid and nutrition as appropriate  Outcome: Progressing  Goal: Fluid balance maintained  Description: INTERVENTIONS:  - Monitor labs   - Monitor I/O and WT  - Instruct patient on fluid and nutrition as appropriate  - Assess for signs & symptoms of volume excess or deficit  Outcome: Progressing  Goal: Glucose maintained within target range  Description: INTERVENTIONS:  - Monitor Blood Glucose as ordered  - Assess for signs and symptoms of hyperglycemia and hypoglycemia  - Administer ordered medications to maintain glucose within target range  - Assess nutritional intake and initiate nutrition service referral as needed  Outcome: Progressing     Problem: HEMATOLOGIC - ADULT  Goal: Maintains hematologic stability  Description: INTERVENTIONS  - Assess for signs and symptoms of bleeding or hemorrhage  - Monitor labs  - Administer supportive blood products/factors as ordered and appropriate  Outcome: Progressing

## 2021-04-27 NOTE — ASSESSMENT & PLAN NOTE
Lab Results   Component Value Date    HGBA1C 7 0 (H) 03/24/2021     Recent Labs     04/26/21  1052 04/26/21  1629 04/26/21  1944 04/27/21  0540   POCGLU 187* 206* 110 81     · Increased 70/30 insulin to 15 units b i d   Continue sliding scale

## 2021-04-27 NOTE — CASE MANAGEMENT
PT is recommending outpt services  Spoke to pt about same who is agreeable  Will secure an order upon DC  Provided the list of outpt PT sites

## 2021-04-28 VITALS
TEMPERATURE: 98.7 F | WEIGHT: 129.63 LBS | HEART RATE: 87 BPM | BODY MASS INDEX: 24.47 KG/M2 | DIASTOLIC BLOOD PRESSURE: 86 MMHG | SYSTOLIC BLOOD PRESSURE: 151 MMHG | HEIGHT: 61 IN | RESPIRATION RATE: 18 BRPM | OXYGEN SATURATION: 94 %

## 2021-04-28 LAB
ALBUMIN SERPL BCP-MCNC: 3.2 G/DL (ref 3.5–5.7)
ALP SERPL-CCNC: 52 U/L (ref 55–165)
ALT SERPL W P-5'-P-CCNC: 9 U/L (ref 7–52)
ANION GAP SERPL CALCULATED.3IONS-SCNC: 9 MMOL/L (ref 4–13)
AST SERPL W P-5'-P-CCNC: 10 U/L (ref 13–39)
BASOPHILS # BLD AUTO: 0 THOUSANDS/ΜL (ref 0–0.1)
BASOPHILS NFR BLD AUTO: 0 % (ref 0–2)
BILIRUB SERPL-MCNC: 0.4 MG/DL (ref 0.2–1)
BUN SERPL-MCNC: 11 MG/DL (ref 7–25)
CALCIUM ALBUM COR SERPL-MCNC: 8.8 MG/DL (ref 8.3–10.1)
CALCIUM SERPL-MCNC: 8.2 MG/DL (ref 8.6–10.5)
CHLORIDE SERPL-SCNC: 106 MMOL/L (ref 98–107)
CO2 SERPL-SCNC: 26 MMOL/L (ref 21–31)
CREAT SERPL-MCNC: 1.04 MG/DL (ref 0.6–1.2)
EOSINOPHIL # BLD AUTO: 0.8 THOUSAND/ΜL (ref 0–0.61)
EOSINOPHIL NFR BLD AUTO: 10 % (ref 0–5)
ERYTHROCYTE [DISTWIDTH] IN BLOOD BY AUTOMATED COUNT: 13.3 % (ref 11.5–14.5)
GFR SERPL CREATININE-BSD FRML MDRD: 52 ML/MIN/1.73SQ M
GLUCOSE SERPL-MCNC: 175 MG/DL (ref 65–140)
GLUCOSE SERPL-MCNC: 181 MG/DL (ref 65–99)
GLUCOSE SERPL-MCNC: 185 MG/DL (ref 65–140)
HCT VFR BLD AUTO: 31.7 % (ref 42–47)
HGB BLD-MCNC: 10.7 G/DL (ref 12–16)
LYMPHOCYTES # BLD AUTO: 1.5 THOUSANDS/ΜL (ref 0.6–4.47)
LYMPHOCYTES NFR BLD AUTO: 21 % (ref 21–51)
MAGNESIUM SERPL-MCNC: 2 MG/DL (ref 1.9–2.7)
MCH RBC QN AUTO: 29.1 PG (ref 26–34)
MCHC RBC AUTO-ENTMCNC: 33.8 G/DL (ref 31–37)
MCV RBC AUTO: 86 FL (ref 81–99)
MONOCYTES # BLD AUTO: 0.7 THOUSAND/ΜL (ref 0.17–1.22)
MONOCYTES NFR BLD AUTO: 9 % (ref 2–12)
NEUTROPHILS # BLD AUTO: 4.3 THOUSANDS/ΜL (ref 1.4–6.5)
NEUTS SEG NFR BLD AUTO: 59 % (ref 42–75)
PLATELET # BLD AUTO: 205 THOUSANDS/UL (ref 149–390)
PMV BLD AUTO: 9.4 FL (ref 8.6–11.7)
POTASSIUM SERPL-SCNC: 3.8 MMOL/L (ref 3.5–5.5)
PROCALCITONIN SERPL-MCNC: 0.65 NG/ML
PROT SERPL-MCNC: 6 G/DL (ref 6.4–8.9)
RBC # BLD AUTO: 3.68 MILLION/UL (ref 3.9–5.2)
SODIUM SERPL-SCNC: 141 MMOL/L (ref 134–143)
WBC # BLD AUTO: 7.2 THOUSAND/UL (ref 4.8–10.8)

## 2021-04-28 PROCEDURE — 84145 PROCALCITONIN (PCT): CPT | Performed by: INTERNAL MEDICINE

## 2021-04-28 PROCEDURE — 99239 HOSP IP/OBS DSCHRG MGMT >30: CPT | Performed by: STUDENT IN AN ORGANIZED HEALTH CARE EDUCATION/TRAINING PROGRAM

## 2021-04-28 PROCEDURE — 82948 REAGENT STRIP/BLOOD GLUCOSE: CPT

## 2021-04-28 PROCEDURE — 83735 ASSAY OF MAGNESIUM: CPT | Performed by: STUDENT IN AN ORGANIZED HEALTH CARE EDUCATION/TRAINING PROGRAM

## 2021-04-28 PROCEDURE — 85025 COMPLETE CBC W/AUTO DIFF WBC: CPT | Performed by: STUDENT IN AN ORGANIZED HEALTH CARE EDUCATION/TRAINING PROGRAM

## 2021-04-28 PROCEDURE — 80053 COMPREHEN METABOLIC PANEL: CPT | Performed by: STUDENT IN AN ORGANIZED HEALTH CARE EDUCATION/TRAINING PROGRAM

## 2021-04-28 RX ORDER — AZITHROMYCIN 250 MG/1
250 TABLET, FILM COATED ORAL EVERY 24 HOURS
Qty: 2 TABLET | Refills: 0 | Status: SHIPPED | OUTPATIENT
Start: 2021-04-28 | End: 2021-04-30

## 2021-04-28 RX ORDER — INSULIN ASPART 100 [IU]/ML
12 INJECTION, SUSPENSION SUBCUTANEOUS
Qty: 7.2 ML | Refills: 1 | Status: SHIPPED | OUTPATIENT
Start: 2021-04-28 | End: 2021-07-26 | Stop reason: SDUPTHER

## 2021-04-28 RX ORDER — CEFDINIR 300 MG/1
300 CAPSULE ORAL EVERY 12 HOURS SCHEDULED
Qty: 12 CAPSULE | Refills: 0 | Status: SHIPPED | OUTPATIENT
Start: 2021-04-29 | End: 2021-05-05

## 2021-04-28 RX ORDER — GUAIFENESIN 600 MG
600 TABLET, EXTENDED RELEASE 12 HR ORAL 2 TIMES DAILY
Qty: 28 TABLET | Refills: 0 | Status: SHIPPED | OUTPATIENT
Start: 2021-04-28 | End: 2021-05-12

## 2021-04-28 RX ORDER — LANOLIN ALCOHOL/MO/W.PET/CERES
3 CREAM (GRAM) TOPICAL
Qty: 14 TABLET | Refills: 0 | Status: SHIPPED | OUTPATIENT
Start: 2021-04-28 | End: 2021-05-12

## 2021-04-28 RX ADMIN — INSULIN LISPRO 1 UNITS: 100 INJECTION, SOLUTION INTRAVENOUS; SUBCUTANEOUS at 08:46

## 2021-04-28 RX ADMIN — ACETAMINOPHEN 650 MG: 325 TABLET ORAL at 08:45

## 2021-04-28 RX ADMIN — INSULIN ASPART 15 UNITS: 100 INJECTION, SUSPENSION SUBCUTANEOUS at 08:45

## 2021-04-28 RX ADMIN — HEPARIN SODIUM 5000 UNITS: 5000 INJECTION INTRAVENOUS; SUBCUTANEOUS at 05:51

## 2021-04-28 RX ADMIN — CEFEPIME HYDROCHLORIDE 1000 MG: 1 INJECTION, SOLUTION INTRAVENOUS at 09:42

## 2021-04-28 RX ADMIN — Medication 2000 UNITS: at 08:46

## 2021-04-28 RX ADMIN — GUAIFENESIN 600 MG: 600 TABLET, EXTENDED RELEASE ORAL at 08:46

## 2021-04-28 RX ADMIN — LEVOTHYROXINE SODIUM 75 MCG: 75 TABLET ORAL at 05:51

## 2021-04-28 RX ADMIN — LISINOPRIL 20 MG: 20 TABLET ORAL at 08:46

## 2021-04-28 RX ADMIN — ASPIRIN 81 MG: 81 TABLET, CHEWABLE ORAL at 08:46

## 2021-04-28 RX ADMIN — INSULIN LISPRO 1 UNITS: 100 INJECTION, SOLUTION INTRAVENOUS; SUBCUTANEOUS at 11:51

## 2021-04-28 NOTE — PLAN OF CARE
Problem: INFECTION - ADULT  Goal: Absence or prevention of progression during hospitalization  Description: INTERVENTIONS:  - Assess and monitor for signs and symptoms of infection  - Monitor lab/diagnostic results  - Monitor all insertion sites, i e  indwelling lines, tubes, and drains  - Monitor endotracheal if appropriate and nasal secretions for changes in amount and color  - [place] appropriate cooling/warming therapies per order  - Administer medications as ordered  - Instruct and encourage patient and family to use good hand hygiene technique  - Identify and instruct in appropriate isolation precautions for identified infection/condition  Outcome: Progressing  Goal: Absence of fever/infection during neutropenic period  Description: INTERVENTIONS:  - Monitor WBC    Outcome: Progressing

## 2021-04-28 NOTE — ASSESSMENT & PLAN NOTE
Lab Results   Component Value Date    HGBA1C 7 0 (H) 03/24/2021     Recent Labs     04/27/21  1533 04/27/21 2021 04/27/21  2100 04/28/21  0532   POCGLU 113 60* 99 175*     · Increased 70/30 insulin to 15 units b i d  Decrease dose to 12 units on discharge due to episodes of hypoglycemia  Follow up with PCP   Will not prescribe sliding scale at discharge given further risk of hypoglycemia and minimal coverage requirements over last 48 hours

## 2021-04-28 NOTE — ASSESSMENT & PLAN NOTE
· Likely secondary to UTI/utereritis with possible superimposed right lower lobe pneumonia  On cefepime and azithromycin during hospital course  Blood cultures negative  Urine culture still in process but white count and fever curve showed significant improvement with current regimen     · Stable for discharge with remaining course of azithromycin and cefdinir   · Given ureteritis and severity of presentation, plan for total of 10 day course of antibiotics for ureteritis     Results from last 7 days   Lab Units 04/27/21  0544 04/26/21  0601 04/25/21  1804   PROCALCITONIN ng/ml 1 34* 1 80* 1 31*

## 2021-04-28 NOTE — ASSESSMENT & PLAN NOTE
Wt Readings from Last 3 Encounters:   04/25/21 58 8 kg (129 lb 10 1 oz)   01/13/21 59 9 kg (132 lb)   09/03/20 57 6 kg (127 lb)     Results from last 7 days   Lab Units 04/25/21  1804   BNP pg/mL 121*     · Xray showing possible cardiomegaly  ECHO completed, showing preserved EF with mildly increased wall thickness  Euvolemic on exam, stable for discharge with outpatient follow up with PCP

## 2021-04-28 NOTE — PLAN OF CARE
Problem: Potential for Falls  Goal: Patient will remain free of falls  Description: INTERVENTIONS:  - Assess patient frequently for physical needs  -  Identify cognitive and physical deficits and behaviors that affect risk of falls    -  [place] fall precautions as indicated by assessment   - Educate patient/family on patient safety including physical limitations  - Instruct patient to call for assistance with activity based on assessment  - Modify environment to reduce risk of injury  - Consider OT/PT consult to assist with strengthening/mobility  Outcome: Progressing     Problem: PAIN - ADULT  Goal: Verbalizes/displays adequate comfort level or baseline comfort level  Description: Interventions:  - Encourage patient to monitor pain and request assistance  - Assess pain using appropriate pain scale  - Administer analgesics based on type and severity of pain and evaluate response  - Implement non-pharmacological measures as appropriate and evaluate response  - Consider cultural and social influences on pain and pain management  - Notify physician/advanced practitioner if interventions unsuccessful or patient reports new pain  Outcome: Progressing     Problem: INFECTION - ADULT  Goal: Absence or prevention of progression during hospitalization  Description: INTERVENTIONS:  - Assess and monitor for signs and symptoms of infection  - Monitor lab/diagnostic results  - Monitor all insertion sites, i e  indwelling lines, tubes, and drains  - Monitor endotracheal if appropriate and nasal secretions for changes in amount and color  - [place] appropriate cooling/warming therapies per order  - Administer medications as ordered  - Instruct and encourage patient and family to use good hand hygiene technique  - Identify and instruct in appropriate isolation precautions for identified infection/condition  Outcome: Progressing  Goal: Absence of fever/infection during neutropenic period  Description: INTERVENTIONS:  - Monitor WBC    Outcome: Progressing     Problem: SAFETY ADULT  Goal: Patient will remain free of falls  Description: INTERVENTIONS:  - Assess patient frequently for physical needs  -  Identify cognitive and physical deficits and behaviors that affect risk of falls    -  [place] fall precautions as indicated by assessment   - Educate patient/family on patient safety including physical limitations  - Instruct patient to call for assistance with activity based on assessment  - Modify environment to reduce risk of injury  - Consider OT/PT consult to assist with strengthening/mobility  Outcome: Progressing  Goal: Maintain or return to baseline ADL function  Description: INTERVENTIONS:  -  Assess patient's ability to carry out ADLs; assess patient's baseline for ADL function and identify physical deficits which impact ability to perform ADLs (bathing, care of mouth/teeth, toileting, grooming, dressing, etc )  - Assess/evaluate cause of self-care deficits   - Assess range of motion  - Assess patient's mobility; develop plan if impaired  - Assess patient's need for assistive devices and provide as appropriate  - Encourage maximum independence but intervene and supervise when necessary  - Involve family in performance of ADLs  - Assess for home care needs following discharge   - Consider OT consult to assist with ADL evaluation and planning for discharge  - Provide patient education as appropriate  Outcome: Progressing  Goal: Maintain or return mobility status to optimal level  Description: INTERVENTIONS:  - Assess patient's baseline mobility status (ambulation, transfers, stairs, etc )    - Identify cognitive and physical deficits and behaviors that affect mobility  - Identify mobility aids required to assist with transfers and/or ambulation (gait belt, sit-to-stand, lift, walker, cane, etc )  - [place] fall precautions as indicated by assessment  - Record patient progress and toleration of activity level on Mobility SBAR; progress patient to next Phase/Stage  - Instruct patient to call for assistance with activity based on assessment  - Consider rehabilitation consult to assist with strengthening/weightbearing, etc   Outcome: Progressing     Problem: DISCHARGE PLANNING  Goal: Discharge to home or other facility with appropriate resources  Description: INTERVENTIONS:  - Identify barriers to discharge w/patient and caregiver  - Arrange for needed discharge resources and transportation as appropriate  - Identify discharge learning needs (meds, wound care, etc )  - Arrange for interpretive services to assist at discharge as needed  - Refer to Case Management Department for coordinating discharge planning if the patient needs post-hospital services based on physician/advanced practitioner order or complex needs related to functional status, cognitive ability, or social support system  Outcome: Progressing     Problem: Knowledge Deficit  Goal: Patient/family/caregiver demonstrates understanding of disease process, treatment plan, medications, and discharge instructions  Description: Complete learning assessment and assess knowledge base    Interventions:  - Provide teaching at level of understanding  - Provide teaching via preferred learning methods  Outcome: Progressing     Problem: CARDIOVASCULAR - ADULT  Goal: Maintains optimal cardiac output and hemodynamic stability  Description: INTERVENTIONS:  - Monitor I/O, vital signs and rhythm  - Monitor for S/S and trends of decreased cardiac output  - Administer and titrate ordered vasoactive medications to optimize hemodynamic stability  - Assess quality of pulses, skin color and temperature  - Assess for signs of decreased coronary artery perfusion  - Instruct patient to report change in severity of symptoms  Outcome: Progressing  Goal: Absence of cardiac dysrhythmias or at baseline rhythm  Description: INTERVENTIONS:  - Continuous cardiac monitoring, vital signs, obtain 12 lead EKG if ordered  - Administer antiarrhythmic and heart rate control medications as ordered  - Monitor electrolytes and administer replacement therapy as ordered  Outcome: Progressing     Problem: GENITOURINARY - ADULT  Goal: Maintains or returns to baseline urinary function  Description: INTERVENTIONS:  - Assess urinary function  - Encourage oral fluids to ensure adequate hydration if ordered  - Administer IV fluids as ordered to ensure adequate hydration  - Administer ordered medications as needed  - Offer frequent toileting  - Follow urinary retention protocol if ordered  Outcome: Progressing  Goal: Absence of urinary retention  Description: INTERVENTIONS:  - Assess patients ability to void and empty bladder  - Monitor I/O  - Bladder scan as needed  - Discuss with physician/AP medications to alleviate retention as needed  - Discuss catheterization for long term situations as appropriate  Outcome: Progressing  Goal: Urinary catheter remains patent  Description: INTERVENTIONS:  - Assess patency of urinary catheter  - If patient has a chronic griffin, consider changing catheter if non-functioning  - Follow guidelines for intermittent irrigation of non-functioning urinary catheter  Outcome: Progressing     Problem: METABOLIC, FLUID AND ELECTROLYTES - ADULT  Goal: Electrolytes maintained within normal limits  Description: INTERVENTIONS:  - Monitor labs and assess patient for signs and symptoms of electrolyte imbalances  - Administer electrolyte replacement as ordered  - Monitor response to electrolyte replacements, including repeat lab results as appropriate  - Instruct patient on fluid and nutrition as appropriate  Outcome: Progressing  Goal: Fluid balance maintained  Description: INTERVENTIONS:  - Monitor labs   - Monitor I/O and WT  - Instruct patient on fluid and nutrition as appropriate  - Assess for signs & symptoms of volume excess or deficit  Outcome: Progressing  Goal: Glucose maintained within target range  Description: INTERVENTIONS:  - Monitor Blood Glucose as ordered  - Assess for signs and symptoms of hyperglycemia and hypoglycemia  - Administer ordered medications to maintain glucose within target range  - Assess nutritional intake and initiate nutrition service referral as needed  Outcome: Progressing     Problem: HEMATOLOGIC - ADULT  Goal: Maintains hematologic stability  Description: INTERVENTIONS  - Assess for signs and symptoms of bleeding or hemorrhage  - Monitor labs  - Administer supportive blood products/factors as ordered and appropriate  Outcome: Progressing

## 2021-04-28 NOTE — ASSESSMENT & PLAN NOTE
· Likely due to complicated urinary tract infection and possible pneumonia  PT recommending home with outpatient services   Stable for discharge

## 2021-04-28 NOTE — DISCHARGE SUMMARY
[facility]  Discharge- Reford Labrador 1945, 66 y o  female MRN: [id number]  Unit/Bed#: -02 Encounter: [id number]  Primary Care Provider: Juan Burris MD   Date and time admitted to hospital: 4/25/2021 11:51 AM    * Sepsis [facility])  Assessment & Plan  · Likely secondary to UTI/utereritis with possible superimposed right lower lobe pneumonia  On cefepime and azithromycin during hospital course  Blood cultures negative  Urine culture still in process but white count and fever curve showed significant improvement with current regimen  · Stable for discharge with remaining course of azithromycin and cefdinir   · Given ureteritis and severity of presentation, plan for total of 10 day course of antibiotics for ureteritis     Results from last 7 days   Lab Units 04/27/21  0544 04/26/21  0601 04/25/21  1804   PROCALCITONIN ng/ml 1 34* 1 80* 1 31*       Essential hypertension  Assessment & Plan  · Adequately controlled  Continue lisinopril 20 mg daily    Hypothyroidism  Assessment & Plan  · Continue home dose levothyroxine 75 mcg qam    Stage 3a chronic kidney disease (HCC)  Assessment & Plan  · CKD 3a stable    Results from last 7 days   Lab Units 04/28/21  0533 04/27/21  0544 04/26/21  0601 04/25/21  1243   BUN mg/dL 11 14 17 21   CREATININE mg/dL 1 04 1 05 1 05 1 29*   EGFR ml/min/1 73sq m 52 51 51 40       Generalized anxiety disorder  Assessment & Plan  · Controlled with xanax while admitted  Patient would like to be discharged without any medication  Type 2 diabetes mellitus without complication, without long-term current use of insulin [facility])  Assessment & Plan  Lab Results   Component Value Date    HGBA1C 7 0 (H) 03/24/2021     Recent Labs     04/27/21  1533 04/27/21 2021 04/27/21  2100 04/28/21  0532   POCGLU 113 60* 99 175*     · Increased 70/30 insulin to 15 units b i d  Decrease dose to 12 units on discharge due to episodes of hypoglycemia   Follow up with PCP  Will not prescribe sliding scale at discharge given further risk of hypoglycemia and minimal coverage requirements over last 48 hours    Congestive heart disease (HCC)  Assessment & Plan  Wt Readings from Last 3 Encounters:   04/25/21 58 8 kg (129 lb 10 1 oz)   01/13/21 59 9 kg (132 lb)   09/03/20 57 6 kg (127 lb)     Results from last 7 days   Lab Units 04/25/21  1804   BNP pg/mL 121*     · Xray showing possible cardiomegaly  ECHO completed, showing preserved EF with mildly increased wall thickness  Euvolemic on exam, stable for discharge with outpatient follow up with PCP  Generalized weakness  Assessment & Plan  · Likely due to complicated urinary tract infection and possible pneumonia  PT recommending home with outpatient services  Stable for discharge        Discharging Physician / Practitioner: Seven Lacy MD  PCP: Krissy Hall MD  Admission Date:   Admission Orders (From admission, onward)     Ordered        04/25/21 1631  Inpatient Admission  Once                   Discharge Date: 04/28/21    Disposition:      Other: Home    For Discharges to [facility] SNF:   · Not Applicable to this Patient - Not Applicable to this Patient    Reason for Admission: Sepsis     Discharge Diagnoses:     Please see assessment and plan section above for further details regarding discharge diagnoses  Resolved Problems  Date Reviewed: 4/26/2021    None          Consultations During Hospital Stay:  · na    Procedures Performed:   · Imaging      Medication Adjustments and Discharge Medications:  · Summary of Medication Adjustments made as a result of this hospitalization: see med rec   · Medication Dosing Tapers - Please refer to Discharge Medication List for details on any medication dosing tapers (if applicable to patient)  · Medications being temporarily held (include recommended restart time): see med rec   · Discharge Medication List: See after visit summary for reconciled discharge medications  Wound Care Recommendations:  When applicable, please see wound care section of After Visit Summary  Diet Recommendations at Discharge:  Diet -        Diet Orders   (From admission, onward)             Start     Ordered    04/25/21 1750  Diet Myles/CHO Controlled; Consistent Carbohydrate Diet Level 2 (5 carb servings/75 grams CHO/meal)  Diet effective now     Question Answer Comment   Diet Type Myles/CHO Controlled    Myles/CHO Controlled Consistent Carbohydrate Diet Level 2 (5 carb servings/75 grams CHO/meal)    RD to adjust diet per protocol? Yes        04/25/21 1752                Instructions for any Catheters / Lines Present at Discharge (including removal date, if applicable): na    Significant Findings / Test Results:   Principal Problem:    Sepsis ([facility] [address] )  Active Problems:    Generalized weakness    Congestive heart disease ([place] [address] )    Type 2 diabetes mellitus without complication, without long-term current use of insulin ([facility])    Generalized anxiety disorder    Stage 3a chronic kidney disease ([facility] )    Hypothyroidism    Essential hypertension  Resolved Problems:    * No resolved hospital problems  *  ·   ·     Incidental Findings:   · See above      Test Results Pending at Discharge (will require follow up):   · na     Outpatient Tests Requested:  · Follow up with PCP     Complications:  See above     Hospital Course:     Elena Sigala is a 66 y o  female patient who originally presented to the hospital on 4/25/2021 due to sepsis secondary to UTI vs pneumonia  Possibly both superimposed as evidenced by imaging  Improved significantly with initiation of antibiotics  Now stable for discharge  Condition at Discharge: good     Discharge Day Visit / Exam:     Subjective:  Patient feels well today, no chest pain or chest palpitations  No shortness of breath  Appetite is good     Vitals: Blood Pressure: 151/86 (04/28/21 0700)  Pulse: 87 (04/28/21 0700)  Temperature: 98 7 °F (37 1 °C) (04/28/21 0700)  Temp Source: Tympanic (04/28/21 0700)  Respirations: 18 (04/28/21 0700)  Height: 5' 1" (154 9 cm) (04/25/21 1711)  Weight - Scale: 58 8 kg (129 lb 10 1 oz) (04/25/21 1711)  SpO2: 94 % (04/28/21 0700)  Exam:   Physical Exam  Vitals signs and nursing note reviewed  Constitutional:       Appearance: Normal appearance  HENT:      Head: Normocephalic and atraumatic  Nose: Nose normal  No congestion  Mouth/Throat:      Mouth: Mucous membranes are dry  Pharynx: Oropharynx is clear  Eyes:      General:         Right eye: No discharge  Left eye: No discharge  Neck:      Musculoskeletal: Normal range of motion and neck supple  Cardiovascular:      Rate and Rhythm: Normal rate and regular rhythm  Pulmonary:      Effort: Pulmonary effort is normal  No respiratory distress  Abdominal:      General: Abdomen is flat  Palpations: Abdomen is soft  Musculoskeletal:      Right lower leg: No edema  Left lower leg: No edema  Skin:     General: Skin is warm and dry  Neurological:      General: No focal deficit present  Mental Status: She is alert  Mental status is at baseline  Psychiatric:         Mood and Affect: Mood normal          Behavior: Behavior normal          Discussion with Family: Spoke to son Margarito Abbott over phone    Goals of Care Discussions:  · Code Status at Discharge: Level 1 - Full Code  · Were there any Goals of Care Discussions during Hospitalization?: No  · Results of any General Goals of Care Discussions: na   · POLST Completed: No   · If POLST Completed, Summary of POLST Agreement Provided Here: na   · OK to Rehospitalize if Needed? No    Discharge instructions/Information to patient and family:   See after visit summary section titled Discharge Instructions for information provided to patient and family  Planned Readmission: none      Discharge Statement:  I spent 30+ minutes discharging the patient  This time was spent on the day of discharge   I had direct contact with the patient on the day of discharge  Greater than 50% of the total time was spent examining patient, answering all patient questions, arranging and discussing plan of care with patient as well as directly providing post-discharge instructions  Additional time then spent on discharge activities      ** Please Note: This note has been constructed using a voice recognition system **

## 2021-04-28 NOTE — ASSESSMENT & PLAN NOTE
· CKD 3a stable    Results from last 7 days   Lab Units 04/28/21  0533 04/27/21  0544 04/26/21  0601 04/25/21  1243   BUN mg/dL 11 14 17 21   CREATININE mg/dL 1 04 1 05 1 05 1 29*   EGFR ml/min/1 73sq m 52 51 51 40

## 2021-04-29 LAB — BACTERIA UR CULT: ABNORMAL

## 2021-05-01 LAB
BACTERIA BLD CULT: NORMAL
BACTERIA BLD CULT: NORMAL

## 2021-05-02 ENCOUNTER — IMMUNIZATIONS (OUTPATIENT)
Dept: FAMILY MEDICINE CLINIC | Facility: HOSPITAL | Age: 76
End: 2021-05-02

## 2021-05-02 DIAGNOSIS — Z23 ENCOUNTER FOR IMMUNIZATION: Primary | ICD-10-CM

## 2021-05-02 PROCEDURE — 91300 SARS-COV-2 / COVID-19 MRNA VACCINE (PFIZER-BIONTECH) 30 MCG: CPT

## 2021-05-02 PROCEDURE — 0002A SARS-COV-2 / COVID-19 MRNA VACCINE (PFIZER-BIONTECH) 30 MCG: CPT

## 2021-05-07 ENCOUNTER — TRANSCRIBE ORDERS (OUTPATIENT)
Dept: URGENT CARE | Facility: CLINIC | Age: 76
End: 2021-05-07

## 2021-05-07 ENCOUNTER — APPOINTMENT (OUTPATIENT)
Dept: LAB | Facility: CLINIC | Age: 76
End: 2021-05-07
Payer: MEDICARE

## 2021-05-07 DIAGNOSIS — N39.0 URINARY TRACT INFECTION WITHOUT HEMATURIA, SITE UNSPECIFIED: Primary | ICD-10-CM

## 2021-05-07 DIAGNOSIS — N39.0 URINARY TRACT INFECTION WITHOUT HEMATURIA, SITE UNSPECIFIED: ICD-10-CM

## 2021-05-07 LAB
BACTERIA UR QL AUTO: ABNORMAL /HPF
BILIRUB UR QL STRIP: NEGATIVE
CLARITY UR: CLEAR
COLOR UR: YELLOW
GLUCOSE UR STRIP-MCNC: ABNORMAL MG/DL
HGB UR QL STRIP.AUTO: NEGATIVE
HYALINE CASTS #/AREA URNS LPF: ABNORMAL /LPF
KETONES UR STRIP-MCNC: NEGATIVE MG/DL
LEUKOCYTE ESTERASE UR QL STRIP: NEGATIVE
NITRITE UR QL STRIP: NEGATIVE
NON-SQ EPI CELLS URNS QL MICRO: ABNORMAL /HPF
PH UR STRIP.AUTO: 6.5 [PH]
PROT UR STRIP-MCNC: NEGATIVE MG/DL
RBC #/AREA URNS AUTO: ABNORMAL /HPF
SP GR UR STRIP.AUTO: 1.01 (ref 1–1.03)
UROBILINOGEN UR QL STRIP.AUTO: 0.2 E.U./DL
WBC #/AREA URNS AUTO: ABNORMAL /HPF

## 2021-05-07 PROCEDURE — 87086 URINE CULTURE/COLONY COUNT: CPT

## 2021-05-07 PROCEDURE — 87186 SC STD MICRODIL/AGAR DIL: CPT

## 2021-05-07 PROCEDURE — 81001 URINALYSIS AUTO W/SCOPE: CPT | Performed by: INTERNAL MEDICINE

## 2021-05-07 PROCEDURE — 87077 CULTURE AEROBIC IDENTIFY: CPT

## 2021-05-07 PROCEDURE — 87181 SC STD AGAR DILUTION PER AGT: CPT

## 2021-05-11 ENCOUNTER — APPOINTMENT (OUTPATIENT)
Dept: RADIOLOGY | Facility: CLINIC | Age: 76
End: 2021-05-11
Payer: MEDICARE

## 2021-05-11 ENCOUNTER — TRANSCRIBE ORDERS (OUTPATIENT)
Dept: URGENT CARE | Facility: CLINIC | Age: 76
End: 2021-05-11

## 2021-05-11 DIAGNOSIS — M54.50 LOW BACK PAIN, UNSPECIFIED BACK PAIN LATERALITY, UNSPECIFIED CHRONICITY, UNSPECIFIED WHETHER SCIATICA PRESENT: Primary | ICD-10-CM

## 2021-05-11 DIAGNOSIS — M54.50 LOW BACK PAIN, UNSPECIFIED BACK PAIN LATERALITY, UNSPECIFIED CHRONICITY, UNSPECIFIED WHETHER SCIATICA PRESENT: ICD-10-CM

## 2021-05-11 DIAGNOSIS — M54.2 CERVICALGIA: ICD-10-CM

## 2021-05-11 LAB — BACTERIA UR CULT: ABNORMAL

## 2021-05-11 PROCEDURE — 72110 X-RAY EXAM L-2 SPINE 4/>VWS: CPT

## 2021-05-11 PROCEDURE — 72050 X-RAY EXAM NECK SPINE 4/5VWS: CPT

## 2021-05-14 ENCOUNTER — APPOINTMENT (OUTPATIENT)
Dept: LAB | Facility: CLINIC | Age: 76
End: 2021-05-14
Payer: MEDICARE

## 2021-05-14 ENCOUNTER — TRANSCRIBE ORDERS (OUTPATIENT)
Dept: URGENT CARE | Facility: CLINIC | Age: 76
End: 2021-05-14

## 2021-05-14 DIAGNOSIS — N39.0 URINARY TRACT INFECTION WITHOUT HEMATURIA, SITE UNSPECIFIED: ICD-10-CM

## 2021-05-14 DIAGNOSIS — N39.0 URINARY TRACT INFECTION WITHOUT HEMATURIA, SITE UNSPECIFIED: Primary | ICD-10-CM

## 2021-05-14 LAB
BACTERIA UR QL AUTO: ABNORMAL /HPF
BILIRUB UR QL STRIP: NEGATIVE
CLARITY UR: ABNORMAL
COLOR UR: YELLOW
GLUCOSE UR STRIP-MCNC: NEGATIVE MG/DL
HGB UR QL STRIP.AUTO: ABNORMAL
KETONES UR STRIP-MCNC: NEGATIVE MG/DL
LEUKOCYTE ESTERASE UR QL STRIP: ABNORMAL
NITRITE UR QL STRIP: NEGATIVE
NON-SQ EPI CELLS URNS QL MICRO: ABNORMAL /HPF
PH UR STRIP.AUTO: 6.5 [PH]
PROT UR STRIP-MCNC: NEGATIVE MG/DL
RBC #/AREA URNS AUTO: ABNORMAL /HPF
SP GR UR STRIP.AUTO: 1 (ref 1–1.03)
UROBILINOGEN UR QL STRIP.AUTO: 0.2 E.U./DL
WBC #/AREA URNS AUTO: ABNORMAL /HPF

## 2021-05-14 PROCEDURE — 87181 SC STD AGAR DILUTION PER AGT: CPT

## 2021-05-14 PROCEDURE — 87077 CULTURE AEROBIC IDENTIFY: CPT

## 2021-05-14 PROCEDURE — 87086 URINE CULTURE/COLONY COUNT: CPT

## 2021-05-14 PROCEDURE — 87186 SC STD MICRODIL/AGAR DIL: CPT

## 2021-05-14 PROCEDURE — 81001 URINALYSIS AUTO W/SCOPE: CPT | Performed by: INTERNAL MEDICINE

## 2021-05-17 LAB
BACTERIA UR CULT: ABNORMAL
BACTERIA UR CULT: ABNORMAL

## 2021-05-19 ENCOUNTER — OFFICE VISIT (OUTPATIENT)
Dept: ENDOCRINOLOGY | Facility: CLINIC | Age: 76
End: 2021-05-19
Payer: MEDICARE

## 2021-05-19 VITALS
HEART RATE: 87 BPM | DIASTOLIC BLOOD PRESSURE: 60 MMHG | OXYGEN SATURATION: 99 % | BODY MASS INDEX: 23.98 KG/M2 | WEIGHT: 127 LBS | SYSTOLIC BLOOD PRESSURE: 144 MMHG | HEIGHT: 61 IN

## 2021-05-19 DIAGNOSIS — E03.9 HYPOTHYROIDISM, UNSPECIFIED TYPE: ICD-10-CM

## 2021-05-19 DIAGNOSIS — E10.649 TYPE 1 DIABETES MELLITUS WITH HYPOGLYCEMIA AND WITHOUT COMA (HCC): Primary | ICD-10-CM

## 2021-05-19 DIAGNOSIS — I10 ESSENTIAL HYPERTENSION: ICD-10-CM

## 2021-05-19 DIAGNOSIS — F41.9 ANXIETY: ICD-10-CM

## 2021-05-19 DIAGNOSIS — E78.2 MIXED HYPERLIPIDEMIA: ICD-10-CM

## 2021-05-19 PROBLEM — H35.9 RETINAL DISORDER: Status: ACTIVE | Noted: 2019-10-02

## 2021-05-19 PROBLEM — E11.9 DIABETES MELLITUS (HCC): Status: ACTIVE | Noted: 2020-03-20

## 2021-05-19 PROBLEM — R26.0 ATAXIC GAIT: Status: ACTIVE | Noted: 2021-01-13

## 2021-05-19 PROBLEM — E78.5 HYPERLIPIDEMIA: Status: ACTIVE | Noted: 2017-03-28

## 2021-05-19 PROBLEM — E11.9 TYPE 2 DIABETES MELLITUS WITHOUT COMPLICATION, WITHOUT LONG-TERM CURRENT USE OF INSULIN (HCC): Status: RESOLVED | Noted: 2021-04-25 | Resolved: 2021-05-19

## 2021-05-19 PROBLEM — R19.00 PELVIC MASS: Status: ACTIVE | Noted: 2018-01-15

## 2021-05-19 PROBLEM — H91.91 DEAFNESS IN RIGHT EAR: Status: ACTIVE | Noted: 2021-01-13

## 2021-05-19 PROCEDURE — 99204 OFFICE O/P NEW MOD 45 MIN: CPT | Performed by: NURSE PRACTITIONER

## 2021-05-19 RX ORDER — MELOXICAM 7.5 MG/1
TABLET ORAL DAILY
COMMUNITY
End: 2022-01-26

## 2021-05-19 RX ORDER — FLUCONAZOLE 100 MG/1
TABLET ORAL EVERY 12 HOURS
COMMUNITY
End: 2021-07-26

## 2021-05-19 NOTE — ASSESSMENT & PLAN NOTE
Patient is experiencing frequent and severe hypoglycemia in part due to taking too much insulin and in part due to insufficient nutrition throughout the day  Counseled on pathophysiology of diabetes and hypoglycemia  Decrease Novolog 70/30 to 12 units twice daily for the next 3 days  If hypoglycemia continues, decrease again to 10 units twice daily  No documentation/conflicting documentation about T1 vs T2 DM  Check c-peptide  Discussed transitioning to basal-bolus insulin for safety however, given patient's anxiety, I am not inclined to make multiple medication adjustments as she is quickly overwhelmed  Referral for diabetes education, specifically MNT, given  Patient will benefit from education on what to eat and how often to eat given her insulin regimen     Lab Results   Component Value Date    HGBA1C 7 0 (H) 03/24/2021

## 2021-05-19 NOTE — PATIENT INSTRUCTIONS
1  Reduce your insulin to 12 units twice daily  Do this from now until Saturday  If you continue to have low blood sugar, decrease again to 10 units twice daily  2  Test blood sugar three times daily and record on your glucose log  If your kids can take a picture of the log and email it to me, I will review it  3  Schedule appointment to meet with Via Samira Webster to talk about diet

## 2021-05-19 NOTE — ASSESSMENT & PLAN NOTE
Reassurance provided that her hypoglycemia is due to excess insulin and inadequate calorie intake throughout the day

## 2021-05-19 NOTE — PROGRESS NOTES
New Patient Progress Note      Chief Complaint   Patient presents with    Diabetes Type 2        History of Present Illness:   Cely Duran is a 66 y o  female with HTN, HLD, hypothyroidism, osteoporosis, and type 2 diabetes presenting to the office today to discuss her diabetes management  Reports complications of CKD stage 3a  Patient was hospitalized on 4/23/21 for sepsis likely s t UTI/utereritis  She states that since that time, she has been having multiple episodes of hypoglycemia  Home blood glucose readings:     5/12 72 628p          94 6:49p            5/13 207 12:15          32 10:08          63 10:26          64 10:40          52 8:19 pm          74 8:44 pm    5/14 61 2:49am    Current regimen: Novolog 70/30 15 units BID-   it was recommended by discharging physician that she reduce this to 12 units BID based on low insulin requirements and hypoglycemia during hospitalization  Patient does not recall this  Takes before breakfast around 8am and then again around 6pm; if she is out with family, closer to 7-7:30    Losing weight; her reported symptoms of hypoglycemia are fatigue and "burning pain" across her shoulders and back    Type: T2DM    Onset: 2006; + diabetic ketoacidosis    Medications: Only on 70/30    Meter: Freestyle yaritza    Diet  BF: banana and blueberry waffle with peanut butter  L: cracker snack, leftovers  D: marinated pork chop, potatoes, broccoli    Snack before bed- two gram crackers    Exercise: lift weights, multiple walks, training a puppy    Influenza vaccine: UTD    Pneumovax: UTD    COVID: UTD    Ophthalmology: 9/10/21 upcoming;     Podiatry/Foot care: UTD; every 3 months    Dentist: dentures    Family history of diabetes: + father, diet controlled, +T2DM brother    Diabetes education/nutrition: Referral given    For her HLD, she is taking 20 mg of atorvastatin daily  She denies myalgias  For HTN, she is taking 20 mg of lisinopril daily   She denies HA and cough  For hypothyroidism, she is taking 75 mcg of levothyroxine daily  She denies symptoms of hyper/hypothyroidism       Patient Active Problem List   Diagnosis    Sepsis ([place] [address] )    Generalized weakness    Congestive heart disease (HCC)    Generalized anxiety disorder    Stage 3a chronic kidney disease ([place] [address] )    Hypothyroidism    Hypertension    Anxiety    Ataxic gait    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 ear    Diabetes mellitus ([facility] )    Hyperlipidemia    Pelvic mass    Retinal disorder      Past Medical History:   Diagnosis Date    Anxiety     Colon polyp     Depression     Diabetes mellitus ([place] [address] )     Diverticulosis     Hyperlipidemia     Hypertension     Osteoporosis     Type 2 diabetes mellitus without complication, without long-term current use of insulin ([place] [address] ) 4/25/2021      Past Surgical History:   Procedure Laterality Date    APPENDECTOMY      CATARACT EXTRACTION Right     COLONOSCOPY      difficult exam    HYSTERECTOMY      TONSILLECTOMY      WISDOM TOOTH EXTRACTION        Family History   Problem Relation Age of Onset    Parkinsonism Mother     Alzheimer's disease Father     Breast cancer Sister         in her 42's   [facility] Lung cancer Sister     No Known Problems Daughter     No Known Problems Maternal Grandmother     No Known Problems Maternal Grandfather     No Known Problems Paternal Grandmother     No Known Problems Paternal Grandfather     No Known Problems Daughter     No Known Problems Maternal Aunt     No Known Problems Maternal Aunt     No Known Problems Maternal Aunt     No Known Problems Paternal Aunt      Social History     Tobacco Use    Smoking status: Never Smoker    Smokeless tobacco: Never Used   Substance Use Topics    Alcohol use: Never     Frequency: Never     Allergies   Allergen Reactions    Diphenhydramine Shortness Of Breath    Meperidine Hives and Shortness Of Breath    Prednisone Hives and Shortness Of Breath    Bee Pollen     Ciprofloxacin Hives    Sulfamethoxazole-Trimethoprim GI Intolerance         Current Outpatient Medications:     ASPIRIN 81 PO, Take 81 mg by mouth daily, Disp: , Rfl:     atorvastatin (LIPITOR) 20 mg tablet, Take 20 mg by mouth, Disp: , Rfl:     Cholecalciferol 2000 units CAPS, Take 1 capsule by mouth, Disp: , Rfl:     fluconazole (Diflucan) 100 mg tablet, Every 12 hours, Disp: , Rfl:     insulin aspart protamine-insulin aspart (NovoLOG Mix 70/30) 100 units/mL injection, Inject 12 Units under the skin 2 (two) times a day before meals, Disp: 7 2 mL, Rfl: 1    levothyroxine 75 mcg tablet, levothyroxine 75 mcg tablet  take 1 tablet by mouth once daily, Disp: , Rfl:     lisinopril (ZESTRIL) 20 mg tablet, lisinopril 20 mg tablet, Disp: , Rfl:     meloxicam (MOBIC) 7 5 mg tablet, Daily, Disp: , Rfl:     Review of Systems   Constitutional: Positive for appetite change (reduced) and unexpected weight change  Negative for activity change and fatigue  HENT: Negative for dental problem, sore throat, trouble swallowing and voice change  Eyes: Negative for visual disturbance  Respiratory: Negative for cough, chest tightness and shortness of breath  Cardiovascular: Negative for chest pain, palpitations and leg swelling  Gastrointestinal: Negative for constipation, diarrhea, nausea and vomiting  Endocrine: Negative for cold intolerance, heat intolerance, polydipsia, polyphagia and polyuria  Genitourinary: Negative for frequency  Musculoskeletal: Positive for arthralgias and back pain  Negative for gait problem, joint swelling and myalgias  Skin: Negative for wound  Allergic/Immunologic: Positive for environmental allergies  Negative for food allergies  Neurological: Positive for numbness  Negative for dizziness, weakness, light-headedness and headaches  Hematological: Does not bruise/bleed easily  Psychiatric/Behavioral: Positive for decreased concentration   Negative for confusion, dysphoric mood and sleep disturbance  The patient is nervous/anxious  Physical Exam:  Body mass index is 24 kg/m²  /60 (BP Location: Left arm, Cuff Size: Adult)   Pulse 87   Ht 5' 1" (1 549 m)   Wt 57 6 kg (127 lb)   SpO2 99%   BMI 24 00 kg/m²    Wt Readings from Last 3 Encounters:   05/19/21 57 6 kg (127 lb)   04/25/21 58 8 kg (129 lb 10 1 oz)   01/13/21 59 9 kg (132 lb)       Physical Exam  Vitals signs reviewed  Constitutional:       General: She is not in acute distress  Appearance: She is well-developed and normal weight  She is not ill-appearing  HENT:      Head: Normocephalic and atraumatic  Comments: Mask in place  Eyes:      Pupils: Pupils are equal, round, and reactive to light  Neck:      Musculoskeletal: Normal range of motion and neck supple  Thyroid: No thyromegaly  Cardiovascular:      Rate and Rhythm: Normal rate and regular rhythm  Pulses: Normal pulses  Heart sounds: Normal heart sounds  Pulmonary:      Effort: Pulmonary effort is normal       Breath sounds: Normal breath sounds  Abdominal:      General: Bowel sounds are normal  There is no distension  Palpations: Abdomen is soft  Tenderness: There is no abdominal tenderness  Musculoskeletal:      Left lower leg: No edema  Lymphadenopathy:      Cervical: No cervical adenopathy  Skin:     General: Skin is warm and dry  Capillary Refill: Capillary refill takes less than 2 seconds  Neurological:      Mental Status: She is alert and oriented to person, place, and time  Psychiatric:      Comments: Anxious           Labs:   Lab Results   Component Value Date    HGBA1C 7 0 (H) 03/24/2021    HGBA1C 6 4 (H) 11/23/2020    HGBA1C 7 5 (H) 07/15/2020     Lab Results   Component Value Date    CREATININE 1 04 04/28/2021    CREATININE 1 05 04/27/2021    CREATININE 1 05 04/26/2021    BUN 11 04/28/2021    K 3 8 04/28/2021     04/28/2021    CO2 26 04/28/2021     eGFR   Date Value Ref Range Status 04/28/2021 52 ml/min/1 73sq m Final     Lab Results   Component Value Date    HDL 83 11/23/2020    TRIG 129 11/23/2020     Lab Results   Component Value Date    ALT 9 04/28/2021    AST 10 (L) 04/28/2021    ALKPHOS 52 (L) 04/28/2021     Lab Results   Component Value Date    JQF0BWSCPHYG 3 730 03/31/2020    GNX9YEXTIVFH 2 290 11/25/2019    RQN5QVMSWWCS 1 850 04/18/2019     Lab Results   Component Value Date    FREET4 0 99 11/25/2019       Impression & Plan:    Problem List Items Addressed This Visit        Endocrine    Hypothyroidism     Continue levothyroxine  Diabetes mellitus ([facility] [address] ) - Primary     Patient is experiencing frequent and severe hypoglycemia in part due to taking too much insulin and in part due to insufficient nutrition throughout the day  Counseled on pathophysiology of diabetes and hypoglycemia  Decrease Novolog 70/30 to 12 units twice daily for the next 3 days  If hypoglycemia continues, decrease again to 10 units twice daily  No documentation/conflicting documentation about T1 vs T2 DM  Check c-peptide  Discussed transitioning to basal-bolus insulin for safety however, given patient's anxiety, I am not inclined to make multiple medication adjustments as she is quickly overwhelmed  Referral for diabetes education, specifically MNT, given  Patient will benefit from education on what to eat and how often to eat given her insulin regimen  Lab Results   Component Value Date    HGBA1C 7 0 (H) 03/24/2021            Relevant Orders    Ambulatory referral to Diabetic Education    C-peptide Lab Collect    HEMOGLOBIN A1C W/ EAG ESTIMATION Lab Collect       Cardiovascular and Mediastinum    Hypertension     /60  Continue current regimen  Other    Anxiety     Reassurance provided that her hypoglycemia is due to excess insulin and inadequate calorie intake throughout the day  Hyperlipidemia     Continue statin                  Orders Placed This Encounter Procedures    C-peptide Lab Collect     This is a patient instruction: This test requires patient fasting for 14-16 hours  Standing Status:   Future     Standing Expiration Date:   5/19/2022    HEMOGLOBIN A1C W/ EAG ESTIMATION Lab Collect     Standing Status:   Future     Standing Expiration Date:   5/19/2022    Ambulatory referral to Diabetic Education     Standing Status:   Future     Standing Expiration Date:   5/19/2022     Referral Priority:   Routine     Referral Type:   Consult - AMB     Referral Reason:   Specialty Services Required     Requested Specialty:   Diabetes Services     Number of Visits Requested:   1     Expiration Date:   5/19/2022       Patient Instructions   1  Reduce your insulin to 12 units twice daily  Do this from now until Saturday  If you continue to have low blood sugar, decrease again to 10 units twice daily  2  Test blood sugar three times daily and record on your glucose log  If your kids can take a picture of the log and email it to me, I will review it  3  Schedule appointment to meet with Yulia Bustillo to talk about diet  Discussed with the patient and all questioned fully answered  She will call me if any problems arise  Follow-up appointment in 3 months       Counseled patient on diagnostic results, prognosis, risk and benefit of treatment options, instruction for management, importance of treatment compliance, Risk  factor reduction and impressions    Cheryle Mount, CRNP

## 2021-05-26 ENCOUNTER — OFFICE VISIT (OUTPATIENT)
Dept: FAMILY MEDICINE CLINIC | Facility: CLINIC | Age: 76
End: 2021-05-26
Payer: MEDICARE

## 2021-05-26 VITALS
BODY MASS INDEX: 23.79 KG/M2 | OXYGEN SATURATION: 98 % | WEIGHT: 126 LBS | SYSTOLIC BLOOD PRESSURE: 144 MMHG | TEMPERATURE: 99 F | HEART RATE: 76 BPM | HEIGHT: 61 IN | DIASTOLIC BLOOD PRESSURE: 84 MMHG | RESPIRATION RATE: 14 BRPM

## 2021-05-26 DIAGNOSIS — R63.4 WEIGHT LOSS, UNINTENTIONAL: ICD-10-CM

## 2021-05-26 DIAGNOSIS — N18.31 STAGE 3A CHRONIC KIDNEY DISEASE (HCC): ICD-10-CM

## 2021-05-26 DIAGNOSIS — M54.42 CHRONIC MIDLINE LOW BACK PAIN WITH LEFT-SIDED SCIATICA: Primary | ICD-10-CM

## 2021-05-26 DIAGNOSIS — Z16.12 UTI DUE TO EXTENDED-SPECTRUM BETA LACTAMASE (ESBL) PRODUCING ESCHERICHIA COLI: ICD-10-CM

## 2021-05-26 DIAGNOSIS — E03.9 HYPOTHYROIDISM, UNSPECIFIED TYPE: ICD-10-CM

## 2021-05-26 DIAGNOSIS — N39.0 UTI DUE TO EXTENDED-SPECTRUM BETA LACTAMASE (ESBL) PRODUCING ESCHERICHIA COLI: ICD-10-CM

## 2021-05-26 DIAGNOSIS — G89.29 CHRONIC MIDLINE LOW BACK PAIN WITH LEFT-SIDED SCIATICA: Primary | ICD-10-CM

## 2021-05-26 DIAGNOSIS — D53.9 NUTRITIONAL ANEMIA, UNSPECIFIED: ICD-10-CM

## 2021-05-26 DIAGNOSIS — B96.29 UTI DUE TO EXTENDED-SPECTRUM BETA LACTAMASE (ESBL) PRODUCING ESCHERICHIA COLI: ICD-10-CM

## 2021-05-26 DIAGNOSIS — D64.9 ANEMIA, UNSPECIFIED TYPE: ICD-10-CM

## 2021-05-26 DIAGNOSIS — A41.9 SEPSIS, DUE TO UNSPECIFIED ORGANISM, UNSPECIFIED WHETHER ACUTE ORGAN DYSFUNCTION PRESENT (HCC): ICD-10-CM

## 2021-05-26 DIAGNOSIS — E78.2 MIXED HYPERLIPIDEMIA: ICD-10-CM

## 2021-05-26 DIAGNOSIS — I10 ESSENTIAL HYPERTENSION: ICD-10-CM

## 2021-05-26 DIAGNOSIS — R10.13 DYSPEPSIA: ICD-10-CM

## 2021-05-26 DIAGNOSIS — Z12.31 ENCOUNTER FOR SCREENING MAMMOGRAM FOR MALIGNANT NEOPLASM OF BREAST: ICD-10-CM

## 2021-05-26 PROCEDURE — 99204 OFFICE O/P NEW MOD 45 MIN: CPT | Performed by: INTERNAL MEDICINE

## 2021-05-26 RX ORDER — FAMOTIDINE 20 MG/1
20 TABLET, FILM COATED ORAL 2 TIMES DAILY
Qty: 60 TABLET | Refills: 1 | Status: SHIPPED | OUTPATIENT
Start: 2021-05-26 | End: 2022-01-18 | Stop reason: SDUPTHER

## 2021-05-26 NOTE — PROGRESS NOTES
[facility]        NAME: Elena Sigala is a 66 y o  female  : 1945    MRN: [id number]  DATE: May 26, 2021  TIME: 1:44 PM    Assessment and Plan   1  Chronic midline low back pain with left-sided sciatica  - her strength and gait is normal on exam, I do think her pain is related to degenerative spondylosis, facet hypertrophy  She is going to try PT, continue tylenol, stop Mobic due to CKD  2  Essential hypertension  - her BP has been elevated recently, she is taking lisinopril  Her echo showed normal LV EF but there was evidence of concentric remodeling  Her BP elevated could be pain related, will re-assess at follow up  3  Stage 3a chronic kidney disease ([facility] [address] )  - Her Cr was 1 29 on admission in April, improved to 1 04 on discharge  She did not have albuminuria last November  4  Mixed hyperlipidemia  - well-controlled, LDL 56 in November, HDL 83    5  Sepsis, due to unspecified organism, unspecified whether acute organ dysfunction present [facility])  - hospitalized - for complicated ESBL E coli UTI, chest CT also showed possible infiltrate at right lung apex but denies any symptoms of cough or SOB  She received cefdinir and azithromycin on discharge to complete 10-day course of antibiotics  Her dysuria has resolved, still having bladder pressure  6  Hypothyroidism, unspecified type  -last TSH 2020, will repeat at this time  -      TSH, 3rd generation with Free T4 reflex; Future    7  UTI due to extended-spectrum beta lactamase (ESBL) producing Escherichia coli  - right ureteritis on CT in April  Her follow up urine culture  and  still grew ESBL E coli, she received Macrobid , azithromycin   I do not see that she was treated after cultures  and   Her dysuria symptoms have resolved, she is afebrile, her back pain seems more musculoskeletal  Will not repeat urine studies unless she becomes symptomatic        8  Weight loss, unintentional  - she complains of pain with eating carbohydrates like bread, has slight anemia, 10 lb weight loss over past month  TSH ordered, will check for celiac  Her colonoscopy last year showed tubular adenoma    -  Celiac Disease Antibody Profile; Future    9  Anemia, unspecified type  -Hgb 10 7 recently, Previously normal, will repeat and check iron stores   -    CBC and differential; Future  -     Iron Panel (Includes Ferritin, Iron Sat%, Iron, and TIBC); Future  -     Celiac Disease Antibody Profile; Future  -     Vitamin B12; Future    10  Dyspepsia  -reports excess gas, bloating after meals  -      famotidine (PEPCID) 20 mg tablet; Take 1 tablet (20 mg total) by mouth 2 (two) times a day    11  Nutritional anemia, unspecified   -     Vitamin B12; Future    12  Encounter for screening mammogram for malignant neoplasm of breast  -     Mammo screening bilateral w 3d & cad; Future; Expected date: 05/26/2021             Chief Complaint     Chief Complaint   Patient presents with    2nd opinion     infection - UTI    Gas    Fatigue         History of Present Illness       Here to establish care  Previous PCP Dr Geoffrey Bridges  Hospitalized in April for sepsis due to UTI and possible RLL pneumonia  Discharged with cefdinir and azithromycin for 10 day course, which she completed about 1 week ago  Developed burning with urination again 05/14 and saw her PCP, urinalysis showed ESBL E coli  She was given diflucan for possible yeast infection  No history of frequent UTIs, has never seen Urology  Dysuria has resolved, but still having pressure in bladder  LBP: reports sciatic pain since her 35s  Current pain in left buttock and tailbone and radiates to upper back, between shoulders  Sometimes travels down her left leg  No falls but reports right leg gives out occasionally  Given meloxicam 7 5mg daily for pain  Tylenol also helping  Her previous PCP ordered xrays for her       Diabetes: recently diagnosed as type 1  She was having labile BG, frequent hypoglycemia  Follows with Endocrine here in [place]  Weight loss: currently 126 lbs, previously 130 lbs in March Reports abdominal distention, gas and upset stomach, worse with eating foods like bread and carbs  Had colonoscopy last year, July 2020  Review of Systems   Review of Systems   Constitutional: Positive for appetite change, fatigue and unexpected weight change  Negative for chills and fever  HENT: Negative for congestion, postnasal drip, rhinorrhea, sore throat and trouble swallowing  Eyes: Negative for pain, redness, itching and visual disturbance  Respiratory: Negative for cough, chest tightness, shortness of breath and wheezing  Cardiovascular: Negative for chest pain, palpitations and leg swelling  Gastrointestinal: Positive for abdominal pain  Negative for blood in stool, constipation, diarrhea, nausea and vomiting  Endocrine: Negative for cold intolerance, heat intolerance, polydipsia and polyuria  Genitourinary: Negative for dysuria, frequency, hematuria, urgency and vaginal discharge  Musculoskeletal: Positive for back pain and gait problem  Negative for arthralgias and joint swelling  Skin: Negative for rash  Neurological: Positive for weakness  Negative for dizziness, tremors, light-headedness, numbness and headaches  Psychiatric/Behavioral: Positive for sleep disturbance  Negative for confusion, dysphoric mood, hallucinations and suicidal ideas  The patient is not nervous/anxious            Current Medications       Current Outpatient Medications:     ASPIRIN 81 PO, Take 81 mg by mouth daily, Disp: , Rfl:     atorvastatin (LIPITOR) 20 mg tablet, Take 20 mg by mouth, Disp: , Rfl:     Cholecalciferol 2000 units CAPS, Take 1 capsule by mouth, Disp: , Rfl:     insulin aspart protamine-insulin aspart (NovoLOG Mix 70/30) 100 units/mL injection, Inject 12 Units under the skin 2 (two) times a day before meals, Disp: 7 2 mL, Rfl: 1    levothyroxine 75 mcg tablet, levothyroxine 75 mcg tablet  take 1 tablet by mouth once daily, Disp: , Rfl:     lisinopril (ZESTRIL) 20 mg tablet, lisinopril 20 mg tablet, Disp: , Rfl:     meloxicam (MOBIC) 7 5 mg tablet, Daily, Disp: , Rfl:     Multiple Vitamins-Minerals (MULTIVITAL-M PO), Take by mouth, Disp: , Rfl:     famotidine (PEPCID) 20 mg tablet, Take 1 tablet (20 mg total) by mouth 2 (two) times a day, Disp: 60 tablet, Rfl: 1    fluconazole (Diflucan) 100 mg tablet, Every 12 hours, Disp: , Rfl:     Current Allergies     Allergies as of 05/26/2021 - Reviewed 05/26/2021   Allergen Reaction Noted    Diphenhydramine Shortness Of Breath 04/21/2016    Meperidine Hives and Shortness Of Breath 04/21/2016    Prednisone Hives and Shortness Of Breath 01/19/2018    Bee pollen  04/21/2016    Ciprofloxacin Hives 04/21/2016    Sulfamethoxazole-trimethoprim GI Intolerance 07/03/2019            The following portions of the patient's history were reviewed and updated as appropriate: allergies, current medications, past family history, past medical history, past social history, past surgical history and problem list      Past Medical History:   Diagnosis Date    Anxiety     Colon polyp     Depression     Diabetes mellitus ([facility] [facility]     Diverticulosis     Hyperlipidemia     Hypertension     Osteoporosis     Type 2 diabetes mellitus without complication, without long-term current use of insulin ([facility] [address] ) 4/25/2021       Past Surgical History:   Procedure Laterality Date    APPENDECTOMY      CATARACT EXTRACTION Right     COLONOSCOPY      difficult exam    HYSTERECTOMY      TONSILLECTOMY      WISDOM TOOTH EXTRACTION         Family History   Problem Relation Age of Onset    Parkinsonism Mother     Alzheimer's disease Father     Breast cancer Sister         in her 42's   [facility] cancer Sister     No Known Problems Daughter     No Known Problems Maternal Grandmother     No Known Problems Maternal Grandfather     No Known Problems Paternal Grandmother     No Known Problems Paternal Grandfather     No Known Problems Daughter     No Known Problems Maternal Aunt     No Known Problems Maternal Aunt     No Known Problems Maternal Aunt     No Known Problems Paternal Aunt          Medications have been verified  Objective   /84   Pulse 76   Temp 99 °F (37 2 °C) (Tympanic Core)   Resp 14   Ht 5' 1" (1 549 m)   Wt 57 2 kg (126 lb)   SpO2 98%   BMI 23 81 kg/m²        Physical Exam     Physical Exam  Vitals signs reviewed  Constitutional:       General: She is not in acute distress  Appearance: She is well-developed  HENT:      Head: Normocephalic and atraumatic  Right Ear: Tympanic membrane, ear canal and external ear normal       Left Ear: Tympanic membrane, ear canal and external ear normal       Mouth/Throat:      Pharynx: No oropharyngeal exudate  Eyes:      General: No scleral icterus  Conjunctiva/sclera: Conjunctivae normal    Neck:      Musculoskeletal: Normal range of motion and neck supple  Thyroid: No thyromegaly  Vascular: No JVD  Cardiovascular:      Rate and Rhythm: Normal rate and regular rhythm  Heart sounds: Normal heart sounds  No murmur  No friction rub  No gallop  Pulmonary:      Effort: Pulmonary effort is normal  No respiratory distress  Breath sounds: Normal breath sounds  No wheezing, rhonchi or rales  Chest:      Chest wall: No tenderness  Abdominal:      General: Bowel sounds are normal  There is no distension  Palpations: Abdomen is soft  There is no mass  Tenderness: There is no abdominal tenderness  There is no right CVA tenderness, left CVA tenderness, guarding or rebound  Hernia: No hernia is present  Musculoskeletal: Normal range of motion  Lumbar back: She exhibits tenderness and deformity (scoliosis)  Lymphadenopathy:      Cervical: No cervical adenopathy     Skin:     General: Skin is warm and dry  Capillary Refill: Capillary refill takes less than 2 seconds  Neurological:      Mental Status: She is alert and oriented to person, place, and time  Motor: Motor function is intact  No weakness, tremor or abnormal muscle tone  Gait: Gait is intact  Gait and tandem walk normal    Psychiatric:         Mood and Affect: Mood and affect normal          Speech: Speech normal          Behavior: Behavior normal  Behavior is cooperative  Thought Content: Thought content normal          Judgment: Judgment normal              Results:  Lab Results   Component Value Date    SODIUM 141 04/28/2021    K 3 8 04/28/2021     04/28/2021    CO2 26 04/28/2021    BUN 11 04/28/2021    CREATININE 1 04 04/28/2021    GLUC 181 (H) 04/28/2021    CALCIUM 8 2 (L) 04/28/2021       Lab Results   Component Value Date    HGBA1C 7 0 (H) 03/24/2021       Lab Results   Component Value Date    WBC 7 20 04/28/2021    HGB 10 7 (L) 04/28/2021    HCT 31 7 (L) 04/28/2021    MCV 86 04/28/2021     04/28/2021       CT chest abdomen pelvis 04/25/21  FINDINGS:     CHEST     LUNGS:  Diffuse interlobar septal thickening  Scattered patchy groundglass airspace disease at the right lung apex  There is no tracheal or endobronchial lesion      PLEURA:  Trace bilateral pleural effusions        HEART/GREAT VESSELS:  Atherosclerotic changes are noted in thoracic aorta and coronary arteries      MEDIASTINUM AND SONJA:  Unremarkable      CHEST WALL AND LOWER NECK:   Unremarkable      ABDOMEN     LIVER/BILIARY TREE:  Unremarkable      GALLBLADDER:  No calcified gallstones  No pericholecystic inflammatory change      SPLEEN:  Unremarkable      PANCREAS:  Unremarkable      ADRENAL GLANDS:  Unremarkable      KIDNEYS/URETERS:  Minimal mucosal hyperenhancement at the right ureter and renal pelvis    No hydronephrosis      STOMACH AND BOWEL:  Unremarkable      APPENDIX:  No findings to suggest appendicitis      ABDOMINOPELVIC CAVITY:  No ascites  No pneumoperitoneum  No lymphadenopathy      VESSELS:  Unremarkable for patient's age      PELVIS     REPRODUCTIVE ORGANS:  Unremarkable for patient's age      URINARY BLADDER:  Unremarkable      ABDOMINAL WALL/INGUINAL REGIONS:  Unremarkable      OSSEOUS STRUCTURES:  No acute fracture or destructive osseous lesion      IMPRESSION:     Minimal patchy groundglass airspace disease at the right lung apex consistent with an acute infiltrate in appropriate clinical context      CHF      Findings consistent with ascending right ureteritis/pyelitis      TTE 04/27/21  SUMMARY     LEFT VENTRICLE:  Size was normal   Systolic function was normal  Ejection fraction was estimated to be 60 %  There were no regional wall motion abnormalities  Wall thickness was mildly increased  The changes were consistent with concentric remodeling (increased wall thickness with normal wall mass)      TRICUSPID VALVE:  There was trace regurgitation      AORTA:  The root exhibited normal size and mild fibrocalcific change    There was dilatation of the ascending aorta, measuring 3 8 cm

## 2021-05-26 NOTE — PATIENT INSTRUCTIONS
Continue tylenol 1,000mg every 8 hours as needed for back pain  Continue with PT  You can stop taking meloxicam      Get blood work before follow up to check thyroid, cause of weight loss  Start famotidine 20mg twice daily for stomach upset  Follow up in 2 months or sooner if needed

## 2021-06-28 ENCOUNTER — APPOINTMENT (OUTPATIENT)
Dept: LAB | Facility: CLINIC | Age: 76
End: 2021-06-28
Payer: MEDICARE

## 2021-06-28 DIAGNOSIS — D64.9 ANEMIA, UNSPECIFIED TYPE: ICD-10-CM

## 2021-06-28 DIAGNOSIS — E03.9 HYPOTHYROIDISM, UNSPECIFIED TYPE: ICD-10-CM

## 2021-06-28 DIAGNOSIS — R63.4 WEIGHT LOSS, UNINTENTIONAL: ICD-10-CM

## 2021-06-28 DIAGNOSIS — D53.9 NUTRITIONAL ANEMIA, UNSPECIFIED: ICD-10-CM

## 2021-06-28 LAB
BASOPHILS # BLD AUTO: 0.07 THOUSANDS/ΜL (ref 0–0.1)
BASOPHILS NFR BLD AUTO: 1 % (ref 0–1)
EOSINOPHIL # BLD AUTO: 0.31 THOUSAND/ΜL (ref 0–0.61)
EOSINOPHIL NFR BLD AUTO: 4 % (ref 0–6)
ERYTHROCYTE [DISTWIDTH] IN BLOOD BY AUTOMATED COUNT: 12.9 % (ref 11.6–15.1)
FERRITIN SERPL-MCNC: 110 NG/ML (ref 8–388)
HCT VFR BLD AUTO: 38.8 % (ref 34.8–46.1)
HGB BLD-MCNC: 12.1 G/DL (ref 11.5–15.4)
IMM GRANULOCYTES # BLD AUTO: 0.02 THOUSAND/UL (ref 0–0.2)
IMM GRANULOCYTES NFR BLD AUTO: 0 % (ref 0–2)
IRON SATN MFR SERPL: 18 %
IRON SERPL-MCNC: 54 UG/DL (ref 50–170)
LYMPHOCYTES # BLD AUTO: 2.21 THOUSANDS/ΜL (ref 0.6–4.47)
LYMPHOCYTES NFR BLD AUTO: 32 % (ref 14–44)
MCH RBC QN AUTO: 28.6 PG (ref 26.8–34.3)
MCHC RBC AUTO-ENTMCNC: 31.2 G/DL (ref 31.4–37.4)
MCV RBC AUTO: 92 FL (ref 82–98)
MONOCYTES # BLD AUTO: 0.46 THOUSAND/ΜL (ref 0.17–1.22)
MONOCYTES NFR BLD AUTO: 7 % (ref 4–12)
NEUTROPHILS # BLD AUTO: 3.9 THOUSANDS/ΜL (ref 1.85–7.62)
NEUTS SEG NFR BLD AUTO: 56 % (ref 43–75)
NRBC BLD AUTO-RTO: 0 /100 WBCS
PLATELET # BLD AUTO: 230 THOUSANDS/UL (ref 149–390)
PMV BLD AUTO: 12.1 FL (ref 8.9–12.7)
RBC # BLD AUTO: 4.23 MILLION/UL (ref 3.81–5.12)
TIBC SERPL-MCNC: 292 UG/DL (ref 250–450)
TSH SERPL DL<=0.05 MIU/L-ACNC: 2.14 UIU/ML (ref 0.36–3.74)
VIT B12 SERPL-MCNC: 368 PG/ML (ref 100–900)
WBC # BLD AUTO: 6.97 THOUSAND/UL (ref 4.31–10.16)

## 2021-06-28 PROCEDURE — 82728 ASSAY OF FERRITIN: CPT

## 2021-06-28 PROCEDURE — 83550 IRON BINDING TEST: CPT

## 2021-06-28 PROCEDURE — 82784 ASSAY IGA/IGD/IGG/IGM EACH: CPT

## 2021-06-28 PROCEDURE — 83516 IMMUNOASSAY NONANTIBODY: CPT

## 2021-06-28 PROCEDURE — 86255 FLUORESCENT ANTIBODY SCREEN: CPT

## 2021-06-28 PROCEDURE — 84443 ASSAY THYROID STIM HORMONE: CPT

## 2021-06-28 PROCEDURE — 82607 VITAMIN B-12: CPT

## 2021-06-28 PROCEDURE — 83540 ASSAY OF IRON: CPT

## 2021-06-28 PROCEDURE — 85025 COMPLETE CBC W/AUTO DIFF WBC: CPT

## 2021-06-28 PROCEDURE — 36415 COLL VENOUS BLD VENIPUNCTURE: CPT

## 2021-06-29 LAB
ENDOMYSIUM IGA SER QL: NEGATIVE
GLIADIN PEPTIDE IGA SER-ACNC: 3 UNITS (ref 0–19)
GLIADIN PEPTIDE IGG SER-ACNC: 1 UNITS (ref 0–19)
IGA SERPL-MCNC: 225 MG/DL (ref 64–422)
TTG IGA SER-ACNC: <2 U/ML (ref 0–3)
TTG IGG SER-ACNC: <2 U/ML (ref 0–5)

## 2021-07-20 ENCOUNTER — APPOINTMENT (OUTPATIENT)
Dept: LAB | Facility: CLINIC | Age: 76
End: 2021-07-20
Payer: MEDICARE

## 2021-07-20 DIAGNOSIS — E10.649 TYPE 1 DIABETES MELLITUS WITH HYPOGLYCEMIA AND WITHOUT COMA (HCC): ICD-10-CM

## 2021-07-20 LAB
EST. AVERAGE GLUCOSE BLD GHB EST-MCNC: 151 MG/DL
HBA1C MFR BLD: 6.9 %

## 2021-07-20 PROCEDURE — 83036 HEMOGLOBIN GLYCOSYLATED A1C: CPT

## 2021-07-20 PROCEDURE — 84681 ASSAY OF C-PEPTIDE: CPT

## 2021-07-20 PROCEDURE — 36415 COLL VENOUS BLD VENIPUNCTURE: CPT

## 2021-07-21 ENCOUNTER — TELEPHONE (OUTPATIENT)
Dept: ENDOCRINOLOGY | Facility: CLINIC | Age: 76
End: 2021-07-21

## 2021-07-21 LAB — C PEPTIDE SERPL-MCNC: 0.7 NG/ML (ref 1.1–4.4)

## 2021-07-21 NOTE — TELEPHONE ENCOUNTER
----- Message from 101Ajith Calero [place] sent at 7/21/2021  9:53 AM EDT -----    Labs reviewed  Hemoglobin A1c is stable at 6 9 percent  This is at goal   C-peptide is indeed quite low  This indicates that the patient is not making an adequate amount of her own insulin  Therefore, we will continue with insulin regimen

## 2021-07-26 ENCOUNTER — OFFICE VISIT (OUTPATIENT)
Dept: FAMILY MEDICINE CLINIC | Facility: CLINIC | Age: 76
End: 2021-07-26
Payer: MEDICARE

## 2021-07-26 VITALS
TEMPERATURE: 97.6 F | SYSTOLIC BLOOD PRESSURE: 132 MMHG | RESPIRATION RATE: 20 BRPM | HEIGHT: 61 IN | OXYGEN SATURATION: 98 % | HEART RATE: 66 BPM | BODY MASS INDEX: 23.41 KG/M2 | WEIGHT: 124 LBS | DIASTOLIC BLOOD PRESSURE: 74 MMHG

## 2021-07-26 DIAGNOSIS — M54.42 CHRONIC MIDLINE LOW BACK PAIN WITH LEFT-SIDED SCIATICA: ICD-10-CM

## 2021-07-26 DIAGNOSIS — M81.0 AGE-RELATED OSTEOPOROSIS WITHOUT CURRENT PATHOLOGICAL FRACTURE: Primary | ICD-10-CM

## 2021-07-26 DIAGNOSIS — Z79.4 TYPE 2 DIABETES MELLITUS WITH STAGE 3A CHRONIC KIDNEY DISEASE, WITH LONG-TERM CURRENT USE OF INSULIN (HCC): ICD-10-CM

## 2021-07-26 DIAGNOSIS — N18.31 STAGE 3A CHRONIC KIDNEY DISEASE (HCC): ICD-10-CM

## 2021-07-26 DIAGNOSIS — Z00.00 ENCOUNTER FOR ANNUAL WELLNESS VISIT (AWV) IN MEDICARE PATIENT: ICD-10-CM

## 2021-07-26 DIAGNOSIS — G89.29 CHRONIC MIDLINE LOW BACK PAIN WITH LEFT-SIDED SCIATICA: ICD-10-CM

## 2021-07-26 DIAGNOSIS — E11.22 TYPE 2 DIABETES MELLITUS WITH STAGE 3A CHRONIC KIDNEY DISEASE, WITH LONG-TERM CURRENT USE OF INSULIN (HCC): ICD-10-CM

## 2021-07-26 DIAGNOSIS — E11.9 ENCOUNTER FOR DIABETIC FOOT EXAM (HCC): ICD-10-CM

## 2021-07-26 DIAGNOSIS — N18.31 TYPE 2 DIABETES MELLITUS WITH STAGE 3A CHRONIC KIDNEY DISEASE, WITH LONG-TERM CURRENT USE OF INSULIN (HCC): ICD-10-CM

## 2021-07-26 DIAGNOSIS — I10 ESSENTIAL HYPERTENSION: ICD-10-CM

## 2021-07-26 PROCEDURE — 1123F ACP DISCUSS/DSCN MKR DOCD: CPT | Performed by: INTERNAL MEDICINE

## 2021-07-26 PROCEDURE — 99214 OFFICE O/P EST MOD 30 MIN: CPT | Performed by: INTERNAL MEDICINE

## 2021-07-26 PROCEDURE — G0439 PPPS, SUBSEQ VISIT: HCPCS | Performed by: INTERNAL MEDICINE

## 2021-07-26 RX ORDER — BLOOD SUGAR DIAGNOSTIC
STRIP MISCELLANEOUS DAILY
COMMUNITY
Start: 2021-05-24 | End: 2021-08-19

## 2021-07-26 RX ORDER — INSULIN ASPART 100 [IU]/ML
12 INJECTION, SUSPENSION SUBCUTANEOUS
Qty: 10 ML | Refills: 1 | Status: SHIPPED | OUTPATIENT
Start: 2021-07-26 | End: 2021-10-11 | Stop reason: SDUPTHER

## 2021-07-26 RX ORDER — BLOOD-GLUCOSE METER
EACH MISCELLANEOUS
COMMUNITY
End: 2021-11-17

## 2021-07-26 NOTE — PROGRESS NOTES
[facility]        NAME: Jorge Newman is a 64 y o  female  : 1945    MRN: [id number]  DATE: 2021  TIME: 12:34 PM    Assessment and Plan   1  Age-related osteoporosis without current pathological fracture  -     DXA bone density spine hip and pelvis; Future; Expected date: 2021    2  Type 2 diabetes mellitus with stage 3a chronic kidney disease, with long-term current use of insulin ([facility])  -     Lipid Panel with Direct LDL reflex; Future; Expected date: 10/26/2021  -     insulin aspart protamine-insulin aspart (NovoLOG Mix 70/30) 100 units/mL injection; Inject 12 Units under the skin 2 (two) times a day before meals    3  Stage 3a chronic kidney disease ([facility])  -     Comprehensive metabolic panel; Future; Expected date: 10/26/2021  -     Urinalysis with microscopic; Future; Expected date: 10/26/2021  -     Microalbumin / creatinine urine ratio; Future; Expected date: 10/26/2021  -     Lipid Panel with Direct LDL reflex; Future; Expected date: 10/26/2021    4  Essential hypertension    5  Chronic midline low back pain with left-sided sciatica    6  Encounter for annual wellness visit (AWV) in Medicare patient    7  Encounter for diabetic foot exam [facility])             Chief Complaint     Chief Complaint   Patient presents with    Follow-up     pt states Justin Garcia refilled her levothyroxine and lisinopril 2 weeks ago   [facility] Wellness Visit         History of Present Illness       Here for follow up and AWV, doing well  Back pain: completely resolved with PT  She continues to do home exercises  Diabetes: follows with Endocrine, using NPH 70-30, recent A1c 6 9, c-peptide low  Her hypoglycemic episodes have resolved with switching to lower dose of insulin  UTI: denies dysuria, hematuria, urgency or frequency  Her symptoms have resolved  She reports a history of possible osteoporosis of right hip, last DEXA scan 3 years ago           Review of Systems Review of Systems   Constitutional: Negative for chills, fatigue, fever and unexpected weight change  HENT: Positive for hearing loss (left ear, has seen ENT for this in the past)  Negative for congestion, postnasal drip, rhinorrhea, sore throat and trouble swallowing  Eyes: Negative for pain, redness, itching and visual disturbance  Respiratory: Negative for cough, chest tightness, shortness of breath and wheezing  Cardiovascular: Negative for chest pain, palpitations and leg swelling  Gastrointestinal: Negative for abdominal pain, blood in stool, constipation, diarrhea, nausea and vomiting  Endocrine: Negative for cold intolerance, heat intolerance, polydipsia and polyuria  Genitourinary: Negative for dysuria, frequency, hematuria and urgency  Musculoskeletal: Negative for arthralgias, back pain and joint swelling  Skin: Negative for rash  Neurological: Negative for dizziness, tremors, weakness, light-headedness, numbness and headaches  Psychiatric/Behavioral: Negative for confusion, dysphoric mood, hallucinations and suicidal ideas  The patient is not nervous/anxious            Current Medications       Current Outpatient Medications:     ASPIRIN 81 PO, Take 81 mg by mouth daily, Disp: , Rfl:     atorvastatin (LIPITOR) 20 mg tablet, Take 20 mg by mouth, Disp: , Rfl:     Blood Glucose Monitoring Suppl (FreeStyle Precision Eliezer System) w/Device KIT, Use, Disp: , Rfl:     Cholecalciferol 2000 units CAPS, Take 1 capsule by mouth, Disp: , Rfl:     famotidine (PEPCID) 20 mg tablet, Take 1 tablet (20 mg total) by mouth 2 (two) times a day, Disp: 60 tablet, Rfl: 1    insulin aspart protamine-insulin aspart (NovoLOG Mix 70/30) 100 units/mL injection, Inject 12 Units under the skin 2 (two) times a day before meals, Disp: 10 mL, Rfl: 1    levothyroxine 75 mcg tablet, levothyroxine 75 mcg tablet  take 1 tablet by mouth once daily, Disp: , Rfl:     lisinopril (ZESTRIL) 20 mg tablet, lisinopril 20 mg tablet, Disp: , Rfl:     meloxicam (MOBIC) 7 5 mg tablet, Daily, Disp: , Rfl:     Multiple Vitamins-Minerals (MULTIVITAL-M PO), Take by mouth, Disp: , Rfl:     Accu-Chek Johana Plus test strip, daily Test blood sugar (Patient not taking: Reported on 7/26/2021), Disp: , Rfl:     Blood Glucose Monitoring Suppl (D-Care Glucometer) w/Device KIT, accucheck johana (Patient not taking: Reported on 7/26/2021), Disp: , Rfl:     Current Allergies     Allergies as of 07/26/2021 - Reviewed 07/26/2021   Allergen Reaction Noted    Diphenhydramine Shortness Of Breath 04/21/2016    Meperidine Hives and Shortness Of Breath 04/21/2016    Prednisone Hives and Shortness Of Breath 01/19/2018    Bee pollen  04/21/2016    Ciprofloxacin Hives 04/21/2016    Sulfamethoxazole-trimethoprim GI Intolerance 07/03/2019            The following portions of the patient's history were reviewed and updated as appropriate: allergies, current medications, past family history, past medical history, past social history, past surgical history and problem list      Past Medical History:   Diagnosis Date    Anxiety     Colon polyp     Depression     Diabetes mellitus ([address] )     Diverticulosis     Hyperlipidemia     Hypertension     Osteoporosis     Type 2 diabetes mellitus without complication, without long-term current use of insulin ([address] ) 4/25/2021       Past Surgical History:   Procedure Laterality Date    APPENDECTOMY      CATARACT EXTRACTION Right     COLONOSCOPY      difficult exam    HYSTERECTOMY      TONSILLECTOMY      WISDOM TOOTH EXTRACTION         Family History   Problem Relation Age of Onset    Parkinsonism Mother     Alzheimer's disease Father     Breast cancer Sister         in her 42's   Christi Birch Lung cancer Sister     No Known Problems Daughter     No Known Problems Maternal Grandmother     No Known Problems Maternal Grandfather     No Known Problems Paternal Grandmother     No Known Problems Paternal Grandfather  No Known Problems Daughter     No Known Problems Maternal Aunt     No Known Problems Maternal Aunt     No Known Problems Maternal Aunt     No Known Problems Paternal Aunt          Medications have been verified  Objective   /74   Pulse 66   Temp 97 6 °F (36 4 °C)   Resp 20   Ht 5' 1" (1 549 m)   Wt 56 2 kg (124 lb)   SpO2 98%   BMI 23 43 kg/m²        Physical Exam     Physical Exam  Vitals reviewed  Constitutional:       General: She is not in acute distress  Appearance: She is well-developed and normal weight  HENT:      Head: Normocephalic and atraumatic  Right Ear: Tympanic membrane, ear canal and external ear normal       Left Ear: Tympanic membrane, ear canal and external ear normal       Mouth/Throat:      Pharynx: No oropharyngeal exudate  Eyes:      General: No scleral icterus  Conjunctiva/sclera: Conjunctivae normal    Neck:      Thyroid: No thyromegaly  Vascular: No carotid bruit or JVD  Cardiovascular:      Rate and Rhythm: Normal rate and regular rhythm  Pulses: Normal pulses  no weak pulses          Dorsalis pedis pulses are 2+ on the right side and 2+ on the left side  Posterior tibial pulses are 2+ on the right side and 2+ on the left side  Heart sounds: Normal heart sounds  No murmur heard  No friction rub  No gallop  Pulmonary:      Effort: Pulmonary effort is normal  No respiratory distress  Breath sounds: Normal breath sounds  No wheezing or rales  Chest:      Chest wall: No tenderness  Abdominal:      General: Abdomen is flat  Bowel sounds are normal  There is no distension  Palpations: Abdomen is soft  There is no mass  Tenderness: There is no abdominal tenderness  There is no guarding or rebound  Hernia: No hernia is present  Musculoskeletal:         General: No deformity  Normal range of motion  Cervical back: Normal range of motion and neck supple     Feet:      Right foot:      Skin integrity: No ulcer, skin breakdown, erythema, warmth, callus or dry skin  Left foot:      Skin integrity: No ulcer, skin breakdown, erythema, warmth, callus or dry skin  Lymphadenopathy:      Cervical: No cervical adenopathy  Skin:     General: Skin is warm and dry  Capillary Refill: Capillary refill takes less than 2 seconds  Neurological:      General: No focal deficit present  Mental Status: She is alert and oriented to person, place, and time  Psychiatric:         Mood and Affect: Mood normal          Behavior: Behavior normal          Thought Content: Thought content normal          Judgment: Judgment normal      Diabetic Foot Exam    Patient's shoes and socks removed  Right Foot/Ankle   Right Foot Inspection  Skin Exam: skin normal and skin intact no dry skin, no warmth, no callus, no erythema, no maceration, no abnormal color, no pre-ulcer, no ulcer and no callus                          Toe Exam: ROM and strength within normal limits  Sensory     Proprioception: intact   Monofilament testing: intact  Vascular  Capillary refills: < 3 seconds  The right DP pulse is 2+  The right PT pulse is 2+  Left Foot/Ankle  Left Foot Inspection  Skin Exam: skin normal and skin intactno dry skin, no warmth, no erythema, no maceration, normal color, no pre-ulcer, no ulcer and no callus                         Toe Exam: ROM and strength within normal limits                   Sensory     Proprioception: intact  Monofilament: intact  Vascular  Capillary refills: < 3 seconds  The left DP pulse is 2+  The left PT pulse is 2+  Assign Risk Category:  No deformity present; No loss of protective sensation;  No weak pulses       Risk: 0        Results:  Lab Results   Component Value Date    SODIUM 141 04/28/2021    K 3 8 04/28/2021     04/28/2021    CO2 26 04/28/2021    BUN 11 04/28/2021    CREATININE 1 04 04/28/2021    GLUC 181 (H) 04/28/2021    CALCIUM 8 2 (L) 04/28/2021       Lab Results Component Value Date    HGBA1C 6 9 (H) 07/20/2021       Lab Results   Component Value Date    WBC 6 97 06/28/2021    HGB 12 1 06/28/2021    HCT 38 8 06/28/2021    MCV 92 06/28/2021     06/28/2021

## 2021-07-26 NOTE — PATIENT INSTRUCTIONS
Medicare Preventive Visit Patient Instructions  Thank you for completing your Welcome to Medicare Visit or Medicare Annual Wellness Visit today  Your next wellness visit will be due in one year (7/27/2022)  The screening/preventive services that you may require over the next 5-10 years are detailed below  Some tests may not apply to you based off risk factors and/or age  Screening tests ordered at today's visit but not completed yet may show as past due  Also, please note that scanned in results may not display below  Preventive Screenings:  Service Recommendations Previous Testing/Comments   Colorectal Cancer Screening  * Colonoscopy    * Fecal Occult Blood Test (FOBT)/Fecal Immunochemical Test (FIT)  * Fecal DNA/Cologuard Test  * Flexible Sigmoidoscopy Age: 54-65 years old   Colonoscopy: every 10 years (may be performed more frequently if at higher risk)  OR  FOBT/FIT: every 1 year  OR  Cologuard: every 3 years  OR  Sigmoidoscopy: every 5 years  Screening may be recommended earlier than age 48 if at higher risk for colorectal cancer  Also, an individualized decision between you and your healthcare provider will decide whether screening between the ages of 74-80 would be appropriate  Colonoscopy: 07/06/2020  FOBT/FIT: Not on file  Cologuard: Not on file  Sigmoidoscopy: Not on file          Breast Cancer Screening Age: 36 years old  Frequency: every 1-2 years  Not required if history of left and right mastectomy Mammogram: 09/03/2020    Screening Current   Cervical Cancer Screening Between the ages of 21-29, pap smear recommended once every 3 years  Between the ages of 33-67, can perform pap smear with HPV co-testing every 5 years     Recommendations may differ for women with a history of total hysterectomy, cervical cancer, or abnormal pap smears in past  Pap Smear: Not on file    Screening Not Indicated   Hepatitis C Screening Once for adults born between 1945 and 1965  More frequently in patients at high risk for Hepatitis C Hep C Antibody: Not on file        Diabetes Screening 1-2 times per year if you're at risk for diabetes or have pre-diabetes Fasting glucose: 154 mg/dL   A1C: 6 9 %    Screening Not Indicated  History Diabetes   Cholesterol Screening Once every 5 years if you don't have a lipid disorder  May order more often based on risk factors  Lipid panel: 11/23/2020    Screening Not Indicated  History Lipid Disorder     Other Preventive Screenings Covered by Medicare:  1  Abdominal Aortic Aneurysm (AAA) Screening: covered once if your at risk  You're considered to be at risk if you have a family history of AAA  2  Lung Cancer Screening: covers low dose CT scan once per year if you meet all of the following conditions: (1) Age 50-69; (2) No signs or symptoms of lung cancer; (3) Current smoker or have quit smoking within the last 15 years; (4) You have a tobacco smoking history of at least 30 pack years (packs per day multiplied by number of years you smoked); (5) You get a written order from a healthcare provider  3  Glaucoma Screening: covered annually if you're considered high risk: (1) You have diabetes OR (2) Family history of glaucoma OR (3)  aged 48 and older OR (3)  American aged 72 and older  3  Osteoporosis Screening: covered every 2 years if you meet one of the following conditions: (1) You're estrogen deficient and at risk for osteoporosis based off medical history and other findings; (2) Have a vertebral abnormality; (3) On glucocorticoid therapy for more than 3 months; (4) Have primary hyperparathyroidism; (5) On osteoporosis medications and need to assess response to drug therapy  · Last bone density test (DXA Scan): Not on file  5  HIV Screening: covered annually if you're between the age of 12-76  Also covered annually if you are younger than 13 and older than 72 with risk factors for HIV infection   For pregnant patients, it is covered up to 3 times per pregnancy  Immunizations:  Immunization Recommendations   Influenza Vaccine Annual influenza vaccination during flu season is recommended for all persons aged >= 6 months who do not have contraindications   Pneumococcal Vaccine (Prevnar and Pneumovax)  * Prevnar = PCV13  * Pneumovax = PPSV23   Adults 25-60 years old: 1-3 doses may be recommended based on certain risk factors  Adults 72 years old: Prevnar (PCV13) vaccine recommended followed by Pneumovax (PPSV23) vaccine  If already received PPSV23 since turning 65, then PCV13 recommended at least one year after PPSV23 dose  Hepatitis B Vaccine 3 dose series if at intermediate or high risk (ex: diabetes, end stage renal disease, liver disease)   Tetanus (Td) Vaccine - COST NOT COVERED BY MEDICARE PART B Following completion of primary series, a booster dose should be given every 10 years to maintain immunity against tetanus  Td may also be given as tetanus wound prophylaxis  Tdap Vaccine - COST NOT COVERED BY MEDICARE PART B Recommended at least once for all adults  For pregnant patients, recommended with each pregnancy  Shingles Vaccine (Shingrix) - COST NOT COVERED BY MEDICARE PART B  2 shot series recommended in those aged 48 and above     Health Maintenance Due:      Topic Date Due    Hepatitis C Screening  Never done     Immunizations Due:      Topic Date Due    DTaP,Tdap,and Td Vaccines (1 - Tdap) Never done    Influenza Vaccine (1) 09/01/2021     Advance Directives   What are advance directives? Advance directives are legal documents that state your wishes and plans for medical care  These plans are made ahead of time in case you lose your ability to make decisions for yourself  Advance directives can apply to any medical decision, such as the treatments you want, and if you want to donate organs  What are the types of advance directives? There are many types of advance directives, and each state has rules about how to use them   You may choose a combination of any of the following:  · Living will: This is a written record of the treatment you want  You can also choose which treatments you do not want, which to limit, and which to stop at a certain time  This includes surgery, medicine, IV fluid, and tube feedings  · Durable power of  for healthcare [facility]): This is a written record that states who you want to make healthcare choices for you when you are unable to make them for yourself  This person, called a proxy, is usually a family member or a friend  You may choose more than 1 proxy  · Do not resuscitate (DNR) order:  A DNR order is used in case your heart stops beating or you stop breathing  It is a request not to have certain forms of treatment, such as CPR  A DNR order may be included in other types of advance directives  · Medical directive: This covers the care that you want if you are in a coma, near death, or unable to make decisions for yourself  You can list the treatments you want for each condition  Treatment may include pain medicine, surgery, blood transfusions, dialysis, IV or tube feedings, and a ventilator (breathing machine)  · Values history: This document has questions about your views, beliefs, and how you feel and think about life  This information can help others choose the care that you would choose  Why are advance directives important? An advance directive helps you control your care  Although spoken wishes may be used, it is better to have your wishes written down  Spoken wishes can be misunderstood, or not followed  Treatments may be given even if you do not want them  An advance directive may make it easier for your family to make difficult choices about your care  © Copyright [employer] 2018 Information is for End User's use only and may not be sold, redistributed or otherwise used for commercial purposes   All illustrations and images included in CareNotes® are the copyrighted property of [employer]  or [facility]

## 2021-07-26 NOTE — PROGRESS NOTES
Assessment and Plan:     Problem List Items Addressed This Visit        Endocrine    Diabetes mellitus ([facility] )       Cardiovascular and Mediastinum    Hypertension       Genitourinary    Stage 3a chronic kidney disease ([facility] )      Other Visit Diagnoses     Encounter for annual wellness visit (AWV) in Medicare patient    -  Primary    Chronic midline low back pain with left-sided sciatica        Encounter for diabetic foot exam [facility])               Preventive health issues were discussed with patient, and age appropriate screening tests were ordered as noted in patient's After Visit Summary  Personalized health advice and appropriate referrals for health education or preventive services given if needed, as noted in patient's After Visit Summary       History of Present Illness:     Patient presents for Medicare Annual Wellness visit    Patient Care Team:  Dariela Antoine MD as PCP - General (Internal Medicine)     Problem List:     Patient Active Problem List   Diagnosis    Sepsis ([facility] )    Generalized weakness    Congestive heart disease ([facility] )    Generalized anxiety disorder    Stage 3a chronic kidney disease ([facility] )    Hypothyroidism    Hypertension    Anxiety    Ataxic gait    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 ear    Diabetes mellitus ([facility] )    Hyperlipidemia    Pelvic mass    Retinal disorder      Past Medical and Surgical History:     Past Medical History:   Diagnosis Date    Anxiety     Colon polyp     Depression     Diabetes mellitus ([address] )     Diverticulosis     Hyperlipidemia     Hypertension     Osteoporosis     Type 2 diabetes mellitus without complication, without long-term current use of insulin ([facility] ) 4/25/2021     Past Surgical History:   Procedure Laterality Date    APPENDECTOMY      CATARACT EXTRACTION Right     COLONOSCOPY      difficult exam    HYSTERECTOMY      TONSILLECTOMY      WISDOM TOOTH EXTRACTION        Family History:     Family History   Problem Relation Age of Onset    Parkinsonism Mother     Alzheimer's disease Father     Breast cancer Sister         in her 42's   Learta January Lung cancer Sister     No Known Problems Daughter     No Known Problems Maternal Grandmother     No Known Problems Maternal Grandfather     No Known Problems Paternal Grandmother     No Known Problems Paternal Grandfather     No Known Problems Daughter     No Known Problems Maternal Aunt     No Known Problems Maternal Aunt     No Known Problems Maternal Aunt     No Known Problems Paternal Aunt       Social History:     Social History     Socioeconomic History    Marital status:      Spouse name: None    Number of children: None    Years of education: None    Highest education level: None   Occupational History    None   Tobacco Use    Smoking status: Never Smoker    Smokeless tobacco: Never Used   Vaping Use    Vaping Use: Never used   Substance and Sexual Activity    Alcohol use: Never    Drug use: Never    Sexual activity: None   Other Topics Concern    None   Social History Narrative    None     Social Determinants of Health     Financial Resource Strain:     Difficulty of Paying Living Expenses:    Food Insecurity:     Worried About Running Out of Food in the Last Year:     Ran Out of Food in the Last Year:    Transportation Needs:     Lack of Transportation (Medical):      Lack of Transportation (Non-Medical):    Physical Activity:     Days of Exercise per Week:     Minutes of Exercise per Session:    Stress:     Feeling of Stress :    Social Connections:     Frequency of Communication with Friends and Family:     Frequency of Social Gatherings with Friends and Family:     Attends [employer] Services:     Active Member of Clubs or Organizations:     Attends Club or Organization Meetings:     Marital Status:    Intimate Partner Violence:     Fear of Current or Ex-Partner:     Emotionally Abused:     Physically Abused:     Sexually Abused:       Medications and Allergies: Current Outpatient Medications   Medication Sig Dispense Refill    ASPIRIN 81 PO Take 81 mg by mouth daily      atorvastatin (LIPITOR) 20 mg tablet Take 20 mg by mouth      Cholecalciferol 2000 units CAPS Take 1 capsule by mouth      famotidine (PEPCID) 20 mg tablet Take 1 tablet (20 mg total) by mouth 2 (two) times a day 60 tablet 1    insulin aspart protamine-insulin aspart (NovoLOG Mix 70/30) 100 units/mL injection Inject 12 Units under the skin 2 (two) times a day before meals 7 2 mL 1    levothyroxine 75 mcg tablet levothyroxine 75 mcg tablet   take 1 tablet by mouth once daily      lisinopril (ZESTRIL) 20 mg tablet lisinopril 20 mg tablet      meloxicam (MOBIC) 7 5 mg tablet Daily      Multiple Vitamins-Minerals (MULTIVITAL-M PO) Take by mouth      Accu-Chek Johana Plus test strip daily Test blood sugar      Blood Glucose Monitoring Suppl (D-Care Glucometer) w/Device KIT accucheck johana      fluconazole (Diflucan) 100 mg tablet Every 12 hours (Patient not taking: Reported on 7/26/2021)       No current facility-administered medications for this visit       Allergies   Allergen Reactions    Diphenhydramine Shortness Of Breath    Meperidine Hives and Shortness Of Breath    Prednisone Hives and Shortness Of Breath    Bee Pollen     Ciprofloxacin Hives    Sulfamethoxazole-Trimethoprim GI Intolerance      Immunizations:     Immunization History   Administered Date(s) Administered    Influenza Quadrivalent 3 years and older 12/11/2019, 09/25/2020    Pneumococcal Polysaccharide PPV23 01/01/2013, 01/13/2021    SARS-CoV-2 / COVID-19 mRNA IM ([facility]) 04/11/2021, 05/02/2021    Zoster 01/01/2012      Health Maintenance:         Topic Date Due    Hepatitis C Screening  Never done         Topic Date Due    DTaP,Tdap,and Td Vaccines (1 - Tdap) Never done    Influenza Vaccine (1) 09/01/2021      Medicare Health Risk Assessment:     /74   Pulse 66   Temp 97 6 °F (36 4 °C)   Resp 20 Ht 5' 1" (1 549 m)   Wt 56 2 kg (124 lb)   SpO2 98%   BMI 23 43 kg/m²      Marie Trevino is here for her Subsequent Wellness visit  Last Medicare Wellness visit information reviewed, patient interviewed and updates made to the record today  Health Risk Assessment:   Patient rates overall health as very good  Patient feels that their physical health rating is much better  Patient is very satisfied with their life  Eyesight was rated as slightly worse  Hearing was rated as same  Patient feels that their emotional and mental health rating is same  Patients states they are never, rarely angry  Patient states they are sometimes unusually tired/fatigued  Pain experienced in the last 7 days has been none  Patient states that she has experienced weight loss or gain in last 6 months  Depression Screening:   PHQ-2 Score: 0      Fall Risk Screening: In the past year, patient has experienced: no history of falling in past year      Urinary Incontinence Screening:   Patient has not leaked urine accidently in the last six months  Home Safety:  Patient does not have trouble with stairs inside or outside of their home  Patient has working smoke alarms and has working carbon monoxide detector  Home safety hazards include: none  Nutrition:   Current diet is Regular and Diabetic  Medications:   Patient is currently taking over-the-counter supplements  OTC medications include: see medication list  Patient is able to manage medications  Activities of Daily Living (ADLs)/Instrumental Activities of Daily Living (IADLs):   Walk and transfer into and out of bed and chair?: Yes  Dress and groom yourself?: Yes    Bathe or shower yourself?: Yes    Feed yourself?  Yes  Do your laundry/housekeeping?: Yes  Manage your money, pay your bills and track your expenses?: Yes  Make your own meals?: Yes    Do your own shopping?: Yes    Previous Hospitalizations:   Any hospitalizations or ED visits within the last 12 months?: No Advance Care Planning:   Living will: No    Durable POA for healthcare: No    Advanced directive: No    Advanced directive counseling given: Yes    Five wishes given: Yes    Patient declined ACP directive: No      Cognitive Screening:   Provider or family/friend/caregiver concerned regarding cognition?: No    PREVENTIVE SCREENINGS      Cardiovascular Screening:    General: Screening Not Indicated and History Lipid Disorder      Diabetes Screening:     General: Screening Not Indicated and History Diabetes      Colorectal Cancer Screening:     General: Screening Current      Breast Cancer Screening:     General: Screening Current      Cervical Cancer Screening:    General: Screening Not Indicated      Osteoporosis Screening:    General: Risks and Benefits Discussed    Due for: DXA Axial and DXA Appendicular      Abdominal Aortic Aneurysm (AAA) Screening:        General: Screening Not Indicated      Lung Cancer Screening:     General: Screening Not Indicated    Screening, Brief Intervention, and Referral to Treatment (SBIRT)    Screening  Typical number of drinks in a day: 0  Typical number of drinks in a week: 0  Interpretation: Low risk drinking behavior  Single Item Drug Screening:  How often have you used an illegal drug (including marijuana) or a prescription medication for non-medical reasons in the past year? never    Single Item Drug Screen Score: 0  Interpretation: Negative screen for possible drug use disorder    Other Counseling Topics:   Car/seat belt/driving safety, skin self-exam, sunscreen and calcium and vitamin D intake and regular weightbearing exercise         Lexi Nelson MD

## 2021-07-28 ENCOUNTER — TELEPHONE (OUTPATIENT)
Dept: ENDOCRINOLOGY | Facility: CLINIC | Age: 76
End: 2021-07-28

## 2021-08-18 NOTE — PROGRESS NOTES
Established Patient Progress Note      Chief Complaint   Patient presents with    Diabetes Type 1        History of Present Illness:   Viktoria Oviedo is a 64 y o  female with HTN, HLD, hypothyroidism, osteoporosis, and type 1 diabetes with long term use of insulin since 2006  Reports complications of CKD stage 3a  Denies recent illness or hospitalizations  Denies recent severe hypoglycemic or severe hyperglycemic episodes  Denies any issues with her current regimen  home glucose monitoring: are performed regularly; 3x daily    At last appointment on 5/19/2021, she was experiencing frequent hypoglycemia d/t too much insulin and insufficient nutrition  She has yet to meet with diabetes educator for MNT  Denies episodes of hypoglycemia  Reports feeling well  She has completed physical therapy for back pain, which has resolved  She reports eating well with occasional dietary excursions and resultant hyperglycemia  She has been very active "babysitting" her daughter's three young dogs  Patient did not bring glucose logs to today's appointment but reports that her sugars have been ranging between  with occasional postprandial highs >200 mg/dL  Component      Latest Ref Rng & Units 3/24/2021 7/20/2021   Hemoglobin A1C      Normal 3 8-5 6%; PreDiabetic 5 7-6 4%; Diabetic >=6 5%; Glycemic control for adults with diabetes <7 0% % 7 0 (H) 6 9 (H)   eAG, EST AVG Glucose      mg/dl 154 151        Component      Latest Ref Rng & Units 7/20/2021   C-PEPTIDE      1 1 - 4 4 ng/mL 0 7 (L)     Current regimen:   Novolog 70/30 12 units BID before meals    Last Eye Exam: UTD; next appointment in September  Last Foot Exam: UTD    For hypothyroidism, she is taking 75 mcg daily  Last TFT from June 2021 are stable  Component      Latest Ref Rng & Units 3/31/2020 6/28/2021   TSH 3RD GENERATON      0 358 - 3 740 uIU/mL 3 730 2 140   She is taking her medication consistently and correctly   She denies symptoms of hyper/hypoglycemia       Patient Active Problem List   Diagnosis    Sepsis ([facility] )    Generalized weakness    Congestive heart disease (HCC)    Generalized anxiety disorder    Stage 3a chronic kidney disease ([facility] )    Hypothyroidism    Hypertension    Anxiety    Ataxic gait    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 ear    Diabetes mellitus ([facility] )    Hyperlipidemia    Pelvic mass    Retinal disorder      Past Medical History:   Diagnosis Date    Anxiety     Colon polyp     Depression     Diabetes mellitus ([facility] )     Diverticulosis     Hyperlipidemia     Hypertension     Osteoporosis     Type 2 diabetes mellitus without complication, without long-term current use of insulin ([place] [id number] ) 4/25/2021      Past Surgical History:   Procedure Laterality Date    APPENDECTOMY      CATARACT EXTRACTION Right     COLONOSCOPY      difficult exam    HYSTERECTOMY      TONSILLECTOMY      WISDOM TOOTH EXTRACTION        Family History   Problem Relation Age of Onset    Parkinsonism Mother     Alzheimer's disease Father     Breast cancer Sister         in her 42's   [facility] Lung cancer Sister     No Known Problems Daughter     No Known Problems Maternal Grandmother     No Known Problems Maternal Grandfather     No Known Problems Paternal Grandmother     No Known Problems Paternal Grandfather     No Known Problems Daughter     No Known Problems Maternal Aunt     No Known Problems Maternal Aunt     No Known Problems Maternal Aunt     No Known Problems Paternal Aunt      Social History     Tobacco Use    Smoking status: Never Smoker    Smokeless tobacco: Never Used   Substance Use Topics    Alcohol use: Never     Allergies   Allergen Reactions    Diphenhydramine Shortness Of Breath    Meperidine Hives and Shortness Of Breath    Prednisone Hives and Shortness Of Breath    Bee Pollen     Ciprofloxacin Hives    Sulfamethoxazole-Trimethoprim GI Intolerance         Current Outpatient Medications:     ASPIRIN 81 PO, Take 81 mg by mouth daily, Disp: , Rfl:     atorvastatin (LIPITOR) 20 mg tablet, Take 20 mg by mouth, Disp: , Rfl:     Blood Glucose Monitoring Suppl (FreeStyle Precision Eliezer System) w/Device KIT, Use, Disp: , Rfl:     Cholecalciferol 2000 units CAPS, Take 1 capsule by mouth, Disp: , Rfl:     insulin aspart protamine-insulin aspart (NovoLOG Mix 70/30) 100 units/mL injection, Inject 12 Units under the skin 2 (two) times a day before meals, Disp: 10 mL, Rfl: 1    levothyroxine 75 mcg tablet, levothyroxine 75 mcg tablet  take 1 tablet by mouth once daily, Disp: , Rfl:     lisinopril (ZESTRIL) 20 mg tablet, lisinopril 20 mg tablet, Disp: , Rfl:     meloxicam (MOBIC) 7 5 mg tablet, Daily, Disp: , Rfl:     Multiple Vitamins-Minerals (MULTIVITAL-M PO), Take by mouth, Disp: , Rfl:     famotidine (PEPCID) 20 mg tablet, Take 1 tablet (20 mg total) by mouth 2 (two) times a day (Patient not taking: Reported on 8/19/2021), Disp: 60 tablet, Rfl: 1    glucose blood (FREESTYLE LITE) test strip, Use to test blood sugar 4x daily  , Disp: 200 strip, Rfl: 1    Review of Systems   Constitutional: Negative for activity change, appetite change, fatigue and unexpected weight change  HENT: Negative for sore throat, trouble swallowing and voice change  Eyes: Negative for visual disturbance  Respiratory: Negative for cough, chest tightness and shortness of breath  Cardiovascular: Negative for chest pain, palpitations and leg swelling  Gastrointestinal: Negative for constipation, diarrhea, nausea and vomiting  Endocrine: Negative for polydipsia, polyphagia and polyuria  Genitourinary: Negative for frequency  Musculoskeletal: Negative for arthralgias, back pain and myalgias  Skin: Negative for wound  Allergic/Immunologic: Positive for environmental allergies  Negative for food allergies  Neurological: Negative for dizziness, weakness, light-headedness, numbness and headaches  Hematological: Does not bruise/bleed easily  Psychiatric/Behavioral: Negative for decreased concentration, dysphoric mood and sleep disturbance  The patient is not nervous/anxious  Physical Exam:  Body mass index is 23 35 kg/m²  /68 (BP Location: Left arm, Cuff Size: Adult)   Pulse 72   Ht 5' 1" (1 549 m)   Wt 56 1 kg (123 lb 9 6 oz)   SpO2 99%   BMI 23 35 kg/m²    Wt Readings from Last 3 Encounters:   08/19/21 56 1 kg (123 lb 9 6 oz)   07/26/21 56 2 kg (124 lb)   05/26/21 57 2 kg (126 lb)       Physical Exam  Vitals reviewed  Constitutional:       General: She is not in acute distress  Appearance: She is well-developed and normal weight  She is not ill-appearing  HENT:      Head: Normocephalic and atraumatic  Comments: Mask in place  Eyes:      Pupils: Pupils are equal, round, and reactive to light  Neck:      Thyroid: No thyromegaly  Cardiovascular:      Rate and Rhythm: Normal rate and regular rhythm  Pulses: Normal pulses  Heart sounds: Normal heart sounds  Pulmonary:      Effort: Pulmonary effort is normal       Breath sounds: Normal breath sounds  Abdominal:      General: Bowel sounds are normal  There is no distension  Palpations: Abdomen is soft  Tenderness: There is no abdominal tenderness  Musculoskeletal:      Cervical back: Normal range of motion and neck supple  Right lower leg: No edema  Left lower leg: No edema  Lymphadenopathy:      Cervical: No cervical adenopathy  Skin:     General: Skin is warm and dry  Capillary Refill: Capillary refill takes less than 2 seconds  Neurological:      Mental Status: She is alert and oriented to person, place, and time        Gait: Gait normal    Psychiatric:         Mood and Affect: Mood normal          Behavior: Behavior normal            Labs:   Lab Results   Component Value Date    HGBA1C 6 9 (H) 07/20/2021    HGBA1C 7 0 (H) 03/24/2021    HGBA1C 6 4 (H) 11/23/2020     Lab Results   Component Value Date    CREATININE 1 04 04/28/2021    CREATININE 1 05 04/27/2021    CREATININE 1 05 04/26/2021    BUN 11 04/28/2021    K 3 8 04/28/2021     04/28/2021    CO2 26 04/28/2021     eGFR   Date Value Ref Range Status   04/28/2021 52 ml/min/1 73sq m Final     Lab Results   Component Value Date    HDL 83 11/23/2020    TRIG 129 11/23/2020     Lab Results   Component Value Date    ALT 9 04/28/2021    AST 10 (L) 04/28/2021    ALKPHOS 52 (L) 04/28/2021     Lab Results   Component Value Date    UEA5DEMIIYHL 2 140 06/28/2021    [id number] 3 730 03/31/2020    PRX8KICGWTGO 2 290 11/25/2019     Lab Results   Component Value Date    FREET4 0 99 11/25/2019       Impression & Plan:    Problem List Items Addressed This Visit        Endocrine    Hypothyroidism     Patient's thyroid function is stable  Continue 75 mcg of levothyroxine daily  Check TFT prior to next appointment  Relevant Orders    TSH, 3rd generation Lab Collect    T4, free Lab Collect    Diabetes mellitus ([facility] ) - Primary     Patient is well controlled with minimal episodes of hypoglycemia  Continue current regimen  Continue to focus on healthy diet and regular physical activity  Reviewed the importance of eating at a consistent time in the middle of the day to avoid hypoglycemia  Reviewed appropriate treatment methods of hypoglycemia  Patient knows to notify me if she should experience hypoglycemia  Lab Results   Component Value Date    HGBA1C 6 9 (H) 07/20/2021            Relevant Medications    glucose blood (FREESTYLE LITE) test strip    Other Relevant Orders    Comprehensive metabolic panel Lab Collect    HEMOGLOBIN A1C W/ EAG ESTIMATION Lab Collect       Cardiovascular and Mediastinum    Hypertension     BP stable at 130/68  Continue current regimen  Other    Hyperlipidemia     Check fasting lipid panel prior to next appointment  Continue statin                  Orders Placed This Encounter   Procedures    Comprehensive metabolic panel Lab Collect     This is a patient instruction: Patient fasting for 8 hours or longer recommended  Standing Status:   Future     Standing Expiration Date:   8/19/2022    HEMOGLOBIN A1C W/ EAG ESTIMATION Lab Collect     Standing Status:   Future     Standing Expiration Date:   8/19/2022    TSH, 3rd generation Lab Collect     This is a patient instruction: This test is non-fasting  Please drink two glasses of water morning of bloodwork  Standing Status:   Future     Standing Expiration Date:   8/19/2022    T4, free Lab Collect     Standing Status:   Future     Standing Expiration Date:   8/19/2022       There are no Patient Instructions on file for this visit  Discussed with the patient and all questioned fully answered  She will call me if any problems arise  Follow-up appointment in 4 months       Counseled patient on diagnostic results, prognosis, risk and benefit of treatment options, instruction for management, importance of treatment compliance, Risk  factor reduction and impressions    EUGENIA Gomez

## 2021-08-19 ENCOUNTER — OFFICE VISIT (OUTPATIENT)
Dept: ENDOCRINOLOGY | Facility: CLINIC | Age: 76
End: 2021-08-19
Payer: MEDICARE

## 2021-08-19 VITALS
OXYGEN SATURATION: 99 % | SYSTOLIC BLOOD PRESSURE: 130 MMHG | DIASTOLIC BLOOD PRESSURE: 68 MMHG | BODY MASS INDEX: 23.33 KG/M2 | HEART RATE: 72 BPM | HEIGHT: 61 IN | WEIGHT: 123.6 LBS

## 2021-08-19 DIAGNOSIS — E78.2 MIXED HYPERLIPIDEMIA: ICD-10-CM

## 2021-08-19 DIAGNOSIS — N18.31 TYPE 1 DIABETES MELLITUS WITH STAGE 3A CHRONIC KIDNEY DISEASE (HCC): Primary | ICD-10-CM

## 2021-08-19 DIAGNOSIS — I10 ESSENTIAL HYPERTENSION: ICD-10-CM

## 2021-08-19 DIAGNOSIS — E03.9 HYPOTHYROIDISM, UNSPECIFIED TYPE: ICD-10-CM

## 2021-08-19 DIAGNOSIS — E10.21 TYPE 1 DIABETES MELLITUS WITH STAGE 3A CHRONIC KIDNEY DISEASE (HCC): Primary | ICD-10-CM

## 2021-08-19 PROCEDURE — 99214 OFFICE O/P EST MOD 30 MIN: CPT | Performed by: NURSE PRACTITIONER

## 2021-08-19 RX ORDER — BLOOD-GLUCOSE METER
KIT MISCELLANEOUS
Qty: 200 STRIP | Refills: 1 | Status: SHIPPED | OUTPATIENT
Start: 2021-08-19 | End: 2021-09-07

## 2021-08-19 NOTE — ASSESSMENT & PLAN NOTE
Patient is well controlled with minimal episodes of hypoglycemia  Continue current regimen  Continue to focus on healthy diet and regular physical activity  Reviewed the importance of eating at a consistent time in the middle of the day to avoid hypoglycemia  Reviewed appropriate treatment methods of hypoglycemia  Patient knows to notify me if she should experience hypoglycemia     Lab Results   Component Value Date    HGBA1C 6 9 (H) 07/20/2021

## 2021-08-19 NOTE — ASSESSMENT & PLAN NOTE
Patient's thyroid function is stable  Continue 75 mcg of levothyroxine daily  Check TFT prior to next appointment

## 2021-09-01 ENCOUNTER — TELEPHONE (OUTPATIENT)
Dept: ENDOCRINOLOGY | Facility: CLINIC | Age: 76
End: 2021-09-01

## 2021-09-07 ENCOUNTER — HOSPITAL ENCOUNTER (OUTPATIENT)
Dept: BONE DENSITY | Facility: HOSPITAL | Age: 76
Discharge: HOME | End: 2021-09-07
Attending: INTERNAL MEDICINE
Payer: MEDICARE

## 2021-09-07 ENCOUNTER — HOSPITAL ENCOUNTER (OUTPATIENT)
Dept: MAMMOGRAPHY | Facility: HOSPITAL | Age: 76
Discharge: HOME | End: 2021-09-07
Attending: INTERNAL MEDICINE
Payer: MEDICARE

## 2021-09-07 VITALS — WEIGHT: 123 LBS | BODY MASS INDEX: 23.22 KG/M2 | HEIGHT: 61 IN

## 2021-09-07 DIAGNOSIS — M81.0 AGE-RELATED OSTEOPOROSIS WITHOUT CURRENT PATHOLOGICAL FRACTURE: ICD-10-CM

## 2021-09-07 DIAGNOSIS — N18.31 TYPE 1 DIABETES MELLITUS WITH STAGE 3A CHRONIC KIDNEY DISEASE (HCC): Primary | ICD-10-CM

## 2021-09-07 DIAGNOSIS — Z12.31 ENCOUNTER FOR SCREENING MAMMOGRAM FOR MALIGNANT NEOPLASM OF BREAST: ICD-10-CM

## 2021-09-07 DIAGNOSIS — E10.21 TYPE 1 DIABETES MELLITUS WITH STAGE 3A CHRONIC KIDNEY DISEASE (HCC): Primary | ICD-10-CM

## 2021-09-07 PROCEDURE — 77067 SCR MAMMO BI INCL CAD: CPT

## 2021-09-07 PROCEDURE — 77063 BREAST TOMOSYNTHESIS BI: CPT

## 2021-09-07 PROCEDURE — 77080 DXA BONE DENSITY AXIAL: CPT

## 2021-09-07 RX ORDER — BLOOD SUGAR DIAGNOSTIC
STRIP MISCELLANEOUS
Qty: 400 STRIP | Refills: 2 | Status: SHIPPED | OUTPATIENT
Start: 2021-09-07 | End: 2021-11-17

## 2021-09-07 NOTE — TELEPHONE ENCOUNTER
Pt needs freestyle precision chel strips   [facility] [place]    States the ones that were sent last time were the wrong ones  Pt had some Blood sugar questions, states her sugar was off yesterday, requested that she send in a blood sugar log to review

## 2021-10-06 ENCOUNTER — APPOINTMENT (OUTPATIENT)
Dept: LAB | Facility: CLINIC | Age: 76
End: 2021-10-06
Payer: MEDICARE

## 2021-10-06 DIAGNOSIS — N18.31 STAGE 3A CHRONIC KIDNEY DISEASE (HCC): ICD-10-CM

## 2021-10-06 DIAGNOSIS — E11.22 TYPE 2 DIABETES MELLITUS WITH STAGE 3A CHRONIC KIDNEY DISEASE, WITH LONG-TERM CURRENT USE OF INSULIN (HCC): ICD-10-CM

## 2021-10-06 DIAGNOSIS — Z79.4 TYPE 2 DIABETES MELLITUS WITH STAGE 3A CHRONIC KIDNEY DISEASE, WITH LONG-TERM CURRENT USE OF INSULIN (HCC): ICD-10-CM

## 2021-10-06 DIAGNOSIS — N18.31 TYPE 2 DIABETES MELLITUS WITH STAGE 3A CHRONIC KIDNEY DISEASE, WITH LONG-TERM CURRENT USE OF INSULIN (HCC): ICD-10-CM

## 2021-10-06 LAB
ALBUMIN SERPL BCP-MCNC: 3.6 G/DL (ref 3.5–5)
ALP SERPL-CCNC: 97 U/L (ref 46–116)
ALT SERPL W P-5'-P-CCNC: 18 U/L (ref 12–78)
ANION GAP SERPL CALCULATED.3IONS-SCNC: 3 MMOL/L (ref 4–13)
AST SERPL W P-5'-P-CCNC: 12 U/L (ref 5–45)
BACTERIA UR QL AUTO: ABNORMAL /HPF
BILIRUB SERPL-MCNC: 0.41 MG/DL (ref 0.2–1)
BILIRUB UR QL STRIP: NEGATIVE
BUN SERPL-MCNC: 23 MG/DL (ref 5–25)
CALCIUM SERPL-MCNC: 9.3 MG/DL (ref 8.3–10.1)
CHLORIDE SERPL-SCNC: 106 MMOL/L (ref 100–108)
CHOLEST SERPL-MCNC: 186 MG/DL (ref 50–200)
CLARITY UR: CLEAR
CO2 SERPL-SCNC: 29 MMOL/L (ref 21–32)
COLOR UR: YELLOW
CREAT SERPL-MCNC: 0.99 MG/DL (ref 0.6–1.3)
CREAT UR-MCNC: 54.4 MG/DL
GFR SERPL CREATININE-BSD FRML MDRD: 55 ML/MIN/1.73SQ M
GLUCOSE P FAST SERPL-MCNC: 245 MG/DL (ref 65–99)
GLUCOSE UR STRIP-MCNC: NEGATIVE MG/DL
HDLC SERPL-MCNC: 69 MG/DL
HGB UR QL STRIP.AUTO: NEGATIVE
HYALINE CASTS #/AREA URNS LPF: ABNORMAL /LPF
KETONES UR STRIP-MCNC: NEGATIVE MG/DL
LDLC SERPL CALC-MCNC: 83 MG/DL (ref 0–100)
LEUKOCYTE ESTERASE UR QL STRIP: ABNORMAL
MICROALBUMIN UR-MCNC: <5 MG/L (ref 0–20)
MICROALBUMIN/CREAT 24H UR: <9 MG/G CREATININE (ref 0–30)
NITRITE UR QL STRIP: NEGATIVE
NON-SQ EPI CELLS URNS QL MICRO: ABNORMAL /HPF
PH UR STRIP.AUTO: 6 [PH]
POTASSIUM SERPL-SCNC: 4.1 MMOL/L (ref 3.5–5.3)
PROT SERPL-MCNC: 7.3 G/DL (ref 6.4–8.2)
PROT UR STRIP-MCNC: NEGATIVE MG/DL
RBC #/AREA URNS AUTO: ABNORMAL /HPF
SODIUM SERPL-SCNC: 138 MMOL/L (ref 136–145)
SP GR UR STRIP.AUTO: 1.01 (ref 1–1.03)
TRIGL SERPL-MCNC: 172 MG/DL
UROBILINOGEN UR QL STRIP.AUTO: 0.2 E.U./DL
WBC #/AREA URNS AUTO: ABNORMAL /HPF

## 2021-10-06 PROCEDURE — 36415 COLL VENOUS BLD VENIPUNCTURE: CPT

## 2021-10-06 PROCEDURE — 80061 LIPID PANEL: CPT

## 2021-10-06 PROCEDURE — 82043 UR ALBUMIN QUANTITATIVE: CPT

## 2021-10-06 PROCEDURE — 82570 ASSAY OF URINE CREATININE: CPT

## 2021-10-06 PROCEDURE — 80053 COMPREHEN METABOLIC PANEL: CPT

## 2021-10-06 PROCEDURE — 81001 URINALYSIS AUTO W/SCOPE: CPT

## 2021-10-07 ENCOUNTER — TELEPHONE (OUTPATIENT)
Dept: FAMILY MEDICINE CLINIC | Facility: CLINIC | Age: 76
End: 2021-10-07

## 2021-10-08 ENCOUNTER — TELEPHONE (OUTPATIENT)
Dept: ENDOCRINOLOGY | Facility: CLINIC | Age: 76
End: 2021-10-08

## 2021-10-11 DIAGNOSIS — E11.22 TYPE 2 DIABETES MELLITUS WITH STAGE 3A CHRONIC KIDNEY DISEASE, WITH LONG-TERM CURRENT USE OF INSULIN (HCC): ICD-10-CM

## 2021-10-11 DIAGNOSIS — Z79.4 TYPE 2 DIABETES MELLITUS WITH STAGE 3A CHRONIC KIDNEY DISEASE, WITH LONG-TERM CURRENT USE OF INSULIN (HCC): ICD-10-CM

## 2021-10-11 DIAGNOSIS — N18.31 TYPE 2 DIABETES MELLITUS WITH STAGE 3A CHRONIC KIDNEY DISEASE, WITH LONG-TERM CURRENT USE OF INSULIN (HCC): ICD-10-CM

## 2021-10-11 RX ORDER — INSULIN ASPART 100 [IU]/ML
12 INJECTION, SUSPENSION SUBCUTANEOUS
Qty: 30 ML | Refills: 1 | Status: SHIPPED | OUTPATIENT
Start: 2021-10-11 | End: 2021-10-19 | Stop reason: SDUPTHER

## 2021-10-12 ENCOUNTER — TELEPHONE (OUTPATIENT)
Dept: ENDOCRINOLOGY | Facility: CLINIC | Age: 76
End: 2021-10-12

## 2021-10-19 DIAGNOSIS — E11.22 TYPE 2 DIABETES MELLITUS WITH STAGE 3A CHRONIC KIDNEY DISEASE, WITH LONG-TERM CURRENT USE OF INSULIN (HCC): ICD-10-CM

## 2021-10-19 DIAGNOSIS — N18.31 TYPE 2 DIABETES MELLITUS WITH STAGE 3A CHRONIC KIDNEY DISEASE, WITH LONG-TERM CURRENT USE OF INSULIN (HCC): ICD-10-CM

## 2021-10-19 DIAGNOSIS — Z79.4 TYPE 2 DIABETES MELLITUS WITH STAGE 3A CHRONIC KIDNEY DISEASE, WITH LONG-TERM CURRENT USE OF INSULIN (HCC): ICD-10-CM

## 2021-10-19 RX ORDER — INSULIN ASPART 100 [IU]/ML
12 INJECTION, SUSPENSION SUBCUTANEOUS
Qty: 30 ML | Refills: 3 | Status: SHIPPED | OUTPATIENT
Start: 2021-10-19 | End: 2022-01-18

## 2021-11-16 ENCOUNTER — OFFICE VISIT (OUTPATIENT)
Dept: DIABETES SERVICES | Facility: CLINIC | Age: 76
End: 2021-11-16
Payer: MEDICARE

## 2021-11-16 VITALS — WEIGHT: 124 LBS | BODY MASS INDEX: 23.41 KG/M2 | HEIGHT: 61 IN

## 2021-11-16 DIAGNOSIS — E10.649 TYPE 1 DIABETES MELLITUS WITH HYPOGLYCEMIA AND WITHOUT COMA (HCC): Primary | ICD-10-CM

## 2021-11-16 PROCEDURE — 97802 MEDICAL NUTRITION INDIV IN: CPT | Performed by: DIETITIAN, REGISTERED

## 2021-11-17 ENCOUNTER — TELEPHONE (OUTPATIENT)
Dept: DIABETES SERVICES | Facility: CLINIC | Age: 76
End: 2021-11-17

## 2021-11-17 DIAGNOSIS — N18.31 TYPE 1 DIABETES MELLITUS WITH STAGE 3A CHRONIC KIDNEY DISEASE (HCC): Primary | ICD-10-CM

## 2021-11-17 DIAGNOSIS — E10.21 TYPE 1 DIABETES MELLITUS WITH STAGE 3A CHRONIC KIDNEY DISEASE (HCC): Primary | ICD-10-CM

## 2021-11-17 RX ORDER — BLOOD-GLUCOSE METER
EACH MISCELLANEOUS
Qty: 1 KIT | Refills: 0 | Status: SHIPPED | COMMUNITY
Start: 2021-11-17

## 2021-11-17 RX ORDER — LANCETS 33 GAUGE
EACH MISCELLANEOUS
Qty: 300 EACH | Refills: 2 | Status: SHIPPED | OUTPATIENT
Start: 2021-11-17

## 2021-11-17 RX ORDER — BLOOD SUGAR DIAGNOSTIC
STRIP MISCELLANEOUS
Qty: 400 EACH | Refills: 2 | Status: SHIPPED | OUTPATIENT
Start: 2021-11-17 | End: 2022-01-17 | Stop reason: SDUPTHER

## 2021-11-29 ENCOUNTER — TELEPHONE (OUTPATIENT)
Dept: SURGERY | Facility: CLINIC | Age: 76
End: 2021-11-29

## 2021-12-22 ENCOUNTER — TELEPHONE (OUTPATIENT)
Dept: FAMILY MEDICINE CLINIC | Facility: CLINIC | Age: 76
End: 2021-12-22

## 2021-12-22 DIAGNOSIS — H92.09 EARACHE: ICD-10-CM

## 2021-12-22 DIAGNOSIS — R09.81 HEAD CONGESTION: Primary | ICD-10-CM

## 2021-12-22 DIAGNOSIS — R52 BODY ACHES: ICD-10-CM

## 2021-12-22 DIAGNOSIS — R05.9 COUGH: ICD-10-CM

## 2021-12-22 DIAGNOSIS — R68.83 CHILLS: ICD-10-CM

## 2021-12-22 DIAGNOSIS — R09.89 RUNNY NOSE: ICD-10-CM

## 2021-12-22 DIAGNOSIS — R53.83 OTHER FATIGUE: ICD-10-CM

## 2021-12-22 PROCEDURE — 87636 SARSCOV2 & INF A&B AMP PRB: CPT | Performed by: NURSE PRACTITIONER

## 2021-12-24 ENCOUNTER — TELEPHONE (OUTPATIENT)
Dept: OTHER | Facility: OTHER | Age: 76
End: 2021-12-24

## 2021-12-24 LAB
FLUAV RNA RESP QL NAA+PROBE: NEGATIVE
FLUBV RNA RESP QL NAA+PROBE: NEGATIVE
SARS-COV-2 RNA RESP QL NAA+PROBE: POSITIVE

## 2021-12-25 ENCOUNTER — APPOINTMENT (EMERGENCY)
Dept: CT IMAGING | Facility: HOSPITAL | Age: 76
End: 2021-12-25
Payer: MEDICARE

## 2021-12-25 ENCOUNTER — HOSPITAL ENCOUNTER (EMERGENCY)
Facility: HOSPITAL | Age: 76
Discharge: HOME | End: 2021-12-25
Attending: EMERGENCY MEDICINE | Admitting: FAMILY MEDICINE
Payer: MEDICARE

## 2021-12-25 VITALS
RESPIRATION RATE: 18 BRPM | HEART RATE: 75 BPM | BODY MASS INDEX: 23.6 KG/M2 | WEIGHT: 125 LBS | DIASTOLIC BLOOD PRESSURE: 65 MMHG | OXYGEN SATURATION: 94 % | SYSTOLIC BLOOD PRESSURE: 140 MMHG | TEMPERATURE: 97.9 F | HEIGHT: 61 IN

## 2021-12-25 DIAGNOSIS — U07.1 LAB TEST POSITIVE FOR DETECTION OF COVID-19 VIRUS: Primary | ICD-10-CM

## 2021-12-25 DIAGNOSIS — R53.1 WEAKNESS: ICD-10-CM

## 2021-12-25 LAB
ALBUMIN SERPL BCP-MCNC: 3.8 G/DL (ref 3.5–5)
ALP SERPL-CCNC: 55 U/L (ref 34–104)
ALT SERPL W P-5'-P-CCNC: 8 U/L (ref 7–52)
ANION GAP SERPL CALCULATED.3IONS-SCNC: 11 MMOL/L (ref 4–13)
APTT PPP: 26 SECONDS (ref 23–37)
AST SERPL W P-5'-P-CCNC: 15 U/L (ref 13–39)
BASOPHILS # BLD AUTO: 0.02 THOUSANDS/ΜL (ref 0–0.1)
BASOPHILS NFR BLD AUTO: 0 % (ref 0–1)
BILIRUB SERPL-MCNC: 0.77 MG/DL (ref 0.2–1)
BNP SERPL-MCNC: 36 PG/ML (ref 1–100)
BUN SERPL-MCNC: 25 MG/DL (ref 5–25)
CALCIUM SERPL-MCNC: 8.8 MG/DL (ref 8.4–10.2)
CARDIAC TROPONIN I PNL SERPL HS: 7 NG/L
CHLORIDE SERPL-SCNC: 99 MMOL/L (ref 96–108)
CK SERPL-CCNC: 41 U/L (ref 26–192)
CO2 SERPL-SCNC: 25 MMOL/L (ref 21–32)
CREAT SERPL-MCNC: 1.2 MG/DL (ref 0.6–1.3)
CRP SERPL QL: 55.1 MG/L
D DIMER PPP FEU-MCNC: 1.99 UG/ML FEU
EOSINOPHIL # BLD AUTO: 0.02 THOUSAND/ΜL (ref 0–0.61)
EOSINOPHIL NFR BLD AUTO: 0 % (ref 0–6)
ERYTHROCYTE [DISTWIDTH] IN BLOOD BY AUTOMATED COUNT: 12.7 % (ref 11.6–15.1)
FERRITIN SERPL-MCNC: 401 NG/ML (ref 8–388)
GFR SERPL CREATININE-BSD FRML MDRD: 43 ML/MIN/1.73SQ M
GLUCOSE SERPL-MCNC: 126 MG/DL (ref 65–140)
HCT VFR BLD AUTO: 36.7 % (ref 34.8–46.1)
HGB BLD-MCNC: 11.8 G/DL (ref 11.5–15.4)
IMM GRANULOCYTES # BLD AUTO: 0.04 THOUSAND/UL (ref 0–0.2)
IMM GRANULOCYTES NFR BLD AUTO: 1 % (ref 0–2)
INR PPP: 0.98 (ref 0.84–1.19)
LACTATE SERPL-SCNC: 1.6 MMOL/L (ref 0.5–2)
LYMPHOCYTES # BLD AUTO: 1.66 THOUSANDS/ΜL (ref 0.6–4.47)
LYMPHOCYTES NFR BLD AUTO: 25 % (ref 14–44)
MAGNESIUM SERPL-MCNC: 1.9 MG/DL (ref 1.9–2.7)
MCH RBC QN AUTO: 28 PG (ref 26.8–34.3)
MCHC RBC AUTO-ENTMCNC: 32.2 G/DL (ref 31.4–37.4)
MCV RBC AUTO: 87 FL (ref 82–98)
MONOCYTES # BLD AUTO: 0.51 THOUSAND/ΜL (ref 0.17–1.22)
MONOCYTES NFR BLD AUTO: 8 % (ref 4–12)
NEUTROPHILS # BLD AUTO: 4.46 THOUSANDS/ΜL (ref 1.85–7.62)
NEUTS SEG NFR BLD AUTO: 66 % (ref 43–75)
NRBC BLD AUTO-RTO: 0 /100 WBCS
PLATELET # BLD AUTO: 178 THOUSANDS/UL (ref 149–390)
PMV BLD AUTO: 12 FL (ref 8.9–12.7)
POTASSIUM SERPL-SCNC: 3.8 MMOL/L (ref 3.5–5.3)
PROCALCITONIN SERPL-MCNC: 0.09 NG/ML
PROT SERPL-MCNC: 6.9 G/DL (ref 6.4–8.4)
PROTHROMBIN TIME: 12.9 SECONDS (ref 11.6–14.5)
RBC # BLD AUTO: 4.21 MILLION/UL (ref 3.81–5.12)
SODIUM SERPL-SCNC: 135 MMOL/L (ref 135–147)
WBC # BLD AUTO: 6.71 THOUSAND/UL (ref 4.31–10.16)

## 2021-12-25 PROCEDURE — 87040 BLOOD CULTURE FOR BACTERIA: CPT | Performed by: EMERGENCY MEDICINE

## 2021-12-25 PROCEDURE — 86140 C-REACTIVE PROTEIN: CPT | Performed by: EMERGENCY MEDICINE

## 2021-12-25 PROCEDURE — 82550 ASSAY OF CK (CPK): CPT | Performed by: EMERGENCY MEDICINE

## 2021-12-25 PROCEDURE — 83880 ASSAY OF NATRIURETIC PEPTIDE: CPT | Performed by: EMERGENCY MEDICINE

## 2021-12-25 PROCEDURE — 82728 ASSAY OF FERRITIN: CPT | Performed by: EMERGENCY MEDICINE

## 2021-12-25 PROCEDURE — 85730 THROMBOPLASTIN TIME PARTIAL: CPT | Performed by: EMERGENCY MEDICINE

## 2021-12-25 PROCEDURE — 83605 ASSAY OF LACTIC ACID: CPT | Performed by: EMERGENCY MEDICINE

## 2021-12-25 PROCEDURE — 85379 FIBRIN DEGRADATION QUANT: CPT | Performed by: EMERGENCY MEDICINE

## 2021-12-25 PROCEDURE — G1004 CDSM NDSC: HCPCS

## 2021-12-25 PROCEDURE — 85610 PROTHROMBIN TIME: CPT | Performed by: EMERGENCY MEDICINE

## 2021-12-25 PROCEDURE — 71275 CT ANGIOGRAPHY CHEST: CPT

## 2021-12-25 PROCEDURE — 84145 PROCALCITONIN (PCT): CPT | Performed by: EMERGENCY MEDICINE

## 2021-12-25 PROCEDURE — 84484 ASSAY OF TROPONIN QUANT: CPT | Performed by: EMERGENCY MEDICINE

## 2021-12-25 PROCEDURE — 99285 EMERGENCY DEPT VISIT HI MDM: CPT

## 2021-12-25 PROCEDURE — 85025 COMPLETE CBC W/AUTO DIFF WBC: CPT | Performed by: EMERGENCY MEDICINE

## 2021-12-25 PROCEDURE — 99284 EMERGENCY DEPT VISIT MOD MDM: CPT | Performed by: EMERGENCY MEDICINE

## 2021-12-25 PROCEDURE — 83735 ASSAY OF MAGNESIUM: CPT | Performed by: EMERGENCY MEDICINE

## 2021-12-25 PROCEDURE — 80053 COMPREHEN METABOLIC PANEL: CPT | Performed by: EMERGENCY MEDICINE

## 2021-12-25 PROCEDURE — 36415 COLL VENOUS BLD VENIPUNCTURE: CPT | Performed by: EMERGENCY MEDICINE

## 2021-12-25 RX ADMIN — IOHEXOL 85 ML: 350 INJECTION, SOLUTION INTRAVENOUS at 07:00

## 2021-12-27 ENCOUNTER — HOSPITAL ENCOUNTER (EMERGENCY)
Facility: HOSPITAL | Age: 76
Discharge: HOME | End: 2021-12-27
Attending: EMERGENCY MEDICINE
Payer: MEDICARE

## 2021-12-27 ENCOUNTER — TELEPHONE (OUTPATIENT)
Dept: FAMILY MEDICINE CLINIC | Facility: CLINIC | Age: 76
End: 2021-12-27

## 2021-12-27 VITALS
RESPIRATION RATE: 18 BRPM | SYSTOLIC BLOOD PRESSURE: 153 MMHG | DIASTOLIC BLOOD PRESSURE: 78 MMHG | OXYGEN SATURATION: 98 % | HEART RATE: 72 BPM | TEMPERATURE: 97.3 F

## 2021-12-27 DIAGNOSIS — U07.1 COVID-19: ICD-10-CM

## 2021-12-27 DIAGNOSIS — R53.83 FATIGUE: Primary | ICD-10-CM

## 2021-12-27 DIAGNOSIS — M54.9 BACK PAIN: ICD-10-CM

## 2021-12-27 LAB
ANION GAP SERPL CALCULATED.3IONS-SCNC: 8 MMOL/L (ref 4–13)
BASOPHILS # BLD AUTO: 0.03 THOUSANDS/ΜL (ref 0–0.1)
BASOPHILS NFR BLD AUTO: 1 % (ref 0–1)
BUN SERPL-MCNC: 24 MG/DL (ref 5–25)
CALCIUM SERPL-MCNC: 9.2 MG/DL (ref 8.4–10.2)
CHLORIDE SERPL-SCNC: 100 MMOL/L (ref 96–108)
CO2 SERPL-SCNC: 28 MMOL/L (ref 21–32)
CREAT SERPL-MCNC: 1.24 MG/DL (ref 0.6–1.3)
EOSINOPHIL # BLD AUTO: 0.1 THOUSAND/ΜL (ref 0–0.61)
EOSINOPHIL NFR BLD AUTO: 2 % (ref 0–6)
ERYTHROCYTE [DISTWIDTH] IN BLOOD BY AUTOMATED COUNT: 12.5 % (ref 11.6–15.1)
GFR SERPL CREATININE-BSD FRML MDRD: 41 ML/MIN/1.73SQ M
GLUCOSE SERPL-MCNC: 264 MG/DL (ref 65–140)
HCT VFR BLD AUTO: 37.9 % (ref 34.8–46.1)
HGB BLD-MCNC: 12 G/DL (ref 11.5–15.4)
IMM GRANULOCYTES # BLD AUTO: 0.09 THOUSAND/UL (ref 0–0.2)
IMM GRANULOCYTES NFR BLD AUTO: 1 % (ref 0–2)
LYMPHOCYTES # BLD AUTO: 1.25 THOUSANDS/ΜL (ref 0.6–4.47)
LYMPHOCYTES NFR BLD AUTO: 19 % (ref 14–44)
MCH RBC QN AUTO: 28.3 PG (ref 26.8–34.3)
MCHC RBC AUTO-ENTMCNC: 31.7 G/DL (ref 31.4–37.4)
MCV RBC AUTO: 89 FL (ref 82–98)
MONOCYTES # BLD AUTO: 0.66 THOUSAND/ΜL (ref 0.17–1.22)
MONOCYTES NFR BLD AUTO: 10 % (ref 4–12)
NEUTROPHILS # BLD AUTO: 4.3 THOUSANDS/ΜL (ref 1.85–7.62)
NEUTS SEG NFR BLD AUTO: 67 % (ref 43–75)
NRBC BLD AUTO-RTO: 0 /100 WBCS
PLATELET # BLD AUTO: 226 THOUSANDS/UL (ref 149–390)
PMV BLD AUTO: 12.3 FL (ref 8.9–12.7)
POTASSIUM SERPL-SCNC: 4.4 MMOL/L (ref 3.5–5.3)
RBC # BLD AUTO: 4.24 MILLION/UL (ref 3.81–5.12)
SODIUM SERPL-SCNC: 136 MMOL/L (ref 135–147)
WBC # BLD AUTO: 6.43 THOUSAND/UL (ref 4.31–10.16)

## 2021-12-27 PROCEDURE — 96360 HYDRATION IV INFUSION INIT: CPT

## 2021-12-27 PROCEDURE — 85025 COMPLETE CBC W/AUTO DIFF WBC: CPT | Performed by: PHYSICIAN ASSISTANT

## 2021-12-27 PROCEDURE — 80048 BASIC METABOLIC PNL TOTAL CA: CPT | Performed by: PHYSICIAN ASSISTANT

## 2021-12-27 PROCEDURE — 99284 EMERGENCY DEPT VISIT MOD MDM: CPT | Performed by: PHYSICIAN ASSISTANT

## 2021-12-27 PROCEDURE — 99284 EMERGENCY DEPT VISIT MOD MDM: CPT

## 2021-12-27 PROCEDURE — 36415 COLL VENOUS BLD VENIPUNCTURE: CPT | Performed by: PHYSICIAN ASSISTANT

## 2021-12-27 RX ORDER — METHOCARBAMOL 500 MG/1
500 TABLET, FILM COATED ORAL 2 TIMES DAILY
Qty: 20 TABLET | Refills: 0 | Status: SHIPPED | OUTPATIENT
Start: 2021-12-27 | End: 2022-01-26

## 2021-12-27 RX ORDER — METHOCARBAMOL 500 MG/1
500 TABLET, FILM COATED ORAL ONCE
Status: COMPLETED | OUTPATIENT
Start: 2021-12-27 | End: 2021-12-27

## 2021-12-27 RX ORDER — LIDOCAINE 4 G/G
2 PATCH TOPICAL 2 TIMES DAILY
Qty: 10 PATCH | Refills: 0 | Status: SHIPPED | OUTPATIENT
Start: 2021-12-27

## 2021-12-27 RX ORDER — LIDOCAINE 50 MG/G
2 PATCH TOPICAL ONCE
Status: DISCONTINUED | OUTPATIENT
Start: 2021-12-27 | End: 2021-12-27 | Stop reason: HOSPADM

## 2021-12-27 RX ADMIN — METHOCARBAMOL 500 MG: 500 TABLET ORAL at 10:56

## 2021-12-27 RX ADMIN — SODIUM CHLORIDE 500 ML: 0.9 INJECTION, SOLUTION INTRAVENOUS at 11:04

## 2021-12-27 RX ADMIN — LIDOCAINE 2 PATCH: 50 PATCH TOPICAL at 10:56

## 2021-12-27 NOTE — ED PROVIDER NOTES
History  Chief Complaint   Patient presents with   Amanda Bones - 7 years or greater     Dx'd covid 12/22  Increased back pain  Pt unable to ambulate     61-year-old female presents to the emergency department seeking evaluation for back pain has been ongoing since last evening  Patient was recently diagnosed with COVID reports that she has been doing generally well from a respiratory standpoint however states that last night and this morning she noted that she was having increasing muscular back pain  She states that she has continue to do ADLs and is able to ambulate however she now feels though she needs to hang onto something to maintain her balance  She denies urinary bowel incontinence saddle anesthesia numbness or weakness in the legs  She denies any other specific complaints or concerns  She complains of tenderness between the shoulder blades extending down to the pelvis  No reported trauma or injury  Allergies reviewed          Prior to Admission Medications   Prescriptions Last Dose Informant Patient Reported? Taking? ASPIRIN 81 PO 12/27/2021 at Unknown time  Yes Yes   Sig: Take 81 mg by mouth daily   Blood Glucose Monitoring Suppl (OneTouch Verio) w/Device KIT 12/27/2021 at Unknown time  No Yes   Sig: Use to test blood sugar 4x daily  Cholecalciferol 2000 units CAPS 12/27/2021 at Unknown time  Yes Yes   Sig: Take 1 capsule by mouth   Multiple Vitamins-Minerals (MULTIVITAL-M PO) 12/27/2021 at Unknown time  Yes Yes   Sig: Take by mouth   OneTouch Delica Lancets 34Y MISC 12/27/2021 at Unknown time  No Yes   Sig: Use to test blood sugar 4x daily     atorvastatin (LIPITOR) 20 mg tablet 12/27/2021 at Unknown time  Yes Yes   Sig: Take 20 mg by mouth   famotidine (PEPCID) 20 mg tablet Not Taking at Unknown time  No No   Sig: Take 1 tablet (20 mg total) by mouth 2 (two) times a day   Patient not taking: Reported on 12/27/2021    glucose blood (OneTouch Verio) test strip 12/27/2021 at Unknown time  No Yes   Sig: Use to test blood sugar 4x daily  insulin aspart protamine-insulin aspart (NovoLOG Mix 70/30) 100 units/mL injection 12/27/2021 at Unknown time  No Yes   Sig: Inject 12 Units under the skin 2 (two) times a day before meals   levothyroxine 75 mcg tablet 12/27/2021 at Unknown time  Yes Yes   Sig: levothyroxine 75 mcg tablet   take 1 tablet by mouth once daily   lisinopril (ZESTRIL) 20 mg tablet 12/27/2021 at Unknown time  Yes Yes   Sig: lisinopril 20 mg tablet   meloxicam (MOBIC) 7 5 mg tablet 12/27/2021 at Unknown time  Yes Yes   Sig: Daily      Facility-Administered Medications: None       Past Medical History:   Diagnosis Date    Anxiety     Colon polyp     Depression     Diabetes mellitus ([facility] )     Diverticulosis     Hyperlipidemia     Hypertension     Osteoporosis     Type 2 diabetes mellitus without complication, without long-term current use of insulin ([facility] ) 4/25/2021       Past Surgical History:   Procedure Laterality Date    APPENDECTOMY      CATARACT EXTRACTION Right     COLONOSCOPY      difficult exam    HYSTERECTOMY      TONSILLECTOMY      WISDOM TOOTH EXTRACTION         Family History   Problem Relation Age of Onset    Parkinsonism Mother     Alzheimer's disease Father     Breast cancer Sister         in her 42's   Edgar Lung cancer Sister     No Known Problems Daughter     No Known Problems Maternal Grandmother     No Known Problems Maternal Grandfather     No Known Problems Paternal Grandmother     No Known Problems Paternal Grandfather     No Known Problems Daughter     No Known Problems Maternal Aunt     No Known Problems Maternal Aunt     No Known Problems Maternal Aunt     No Known Problems Paternal Aunt      I have reviewed and agree with the history as documented      E-Cigarette/Vaping    E-Cigarette Use Never User      E-Cigarette/Vaping Substances     Social History     Tobacco Use    Smoking status: Never Smoker    Smokeless tobacco: Never Used   Vaping Use    Vaping Use: Never used   Substance Use Topics    Alcohol use: Not Currently     Comment: rare    Drug use: Never       Review of Systems   Constitutional: Positive for fatigue  Negative for chills and fever  HENT: Negative for ear pain and sore throat  Eyes: Negative for pain and visual disturbance  Respiratory: Negative for cough and shortness of breath  Cardiovascular: Negative for chest pain and palpitations  Gastrointestinal: Negative for abdominal pain and vomiting  Genitourinary: Negative for dysuria and hematuria  Musculoskeletal: Positive for back pain  Negative for arthralgias  Skin: Negative for color change and rash  Neurological: Negative for seizures and syncope  All other systems reviewed and are negative  Physical Exam  Physical Exam  Vitals and nursing note reviewed  Constitutional:       General: She is not in acute distress  Appearance: She is well-developed  She is not diaphoretic  HENT:      Head: Normocephalic and atraumatic  Right Ear: External ear normal       Left Ear: External ear normal       Nose: Nose normal    Eyes:      Conjunctiva/sclera: Conjunctivae normal       Pupils: Pupils are equal, round, and reactive to light  Cardiovascular:      Rate and Rhythm: Normal rate and regular rhythm  Heart sounds: Normal heart sounds  No murmur heard  No friction rub  No gallop  Pulmonary:      Effort: Pulmonary effort is normal  No respiratory distress  Breath sounds: Normal breath sounds  No stridor  No wheezing or rales  Abdominal:      General: Bowel sounds are normal  There is no distension  Palpations: Abdomen is soft  Tenderness: There is no abdominal tenderness  There is no guarding  Musculoskeletal:         General: No tenderness  Normal range of motion  Cervical back: Normal range of motion  Comments: Generalized muscular tenderness throughout the back  Skin:     General: Skin is warm  Capillary Refill: Capillary refill takes less than 2 seconds  Neurological:      Mental Status: She is alert and oriented to person, place, and time           Vital Signs  ED Triage Vitals   Temperature Pulse Respirations Blood Pressure SpO2   12/27/21 0938 12/27/21 0938 12/27/21 0938 12/27/21 1003 12/27/21 1003   (!) 97 3 °F (36 3 °C) 72 18 153/78 98 %      Temp src Heart Rate Source Patient Position - Orthostatic VS BP Location FiO2 (%)   -- -- -- -- --             Pain Score       12/27/21 1003       10 - Worst Possible Pain           Vitals:    12/27/21 0938 12/27/21 1003   BP:  153/78   Pulse: 72          Visual Acuity      ED Medications  Medications   lidocaine (LIDODERM) 5 % patch 2 patch (2 patches Topical Medication Applied 12/27/21 1056)   sodium chloride 0 9 % bolus 500 mL (0 mL Intravenous Stopped 12/27/21 1209)   methocarbamol (ROBAXIN) tablet 500 mg (500 mg Oral Given 12/27/21 1056)       Diagnostic Studies  Results Reviewed     Procedure Component Value Units Date/Time    Basic metabolic panel [[id number]]  (Abnormal) Collected: 12/27/21 1043    Lab Status: Final result Specimen: Blood from Arm, Left Updated: 12/27/21 1110     Sodium 136 mmol/L      Potassium 4 4 mmol/L      Chloride 100 mmol/L      CO2 28 mmol/L      ANION GAP 8 mmol/L      BUN 24 mg/dL      Creatinine 1 24 mg/dL      Glucose 264 mg/dL      Calcium 9 2 mg/dL      eGFR 41 ml/min/1 73sq m     Narrative:      [place] guidelines for Chronic Kidney Disease (CKD):     Stage 1 with normal or high GFR (GFR > 90 mL/min/1 73 square meters)    Stage 2 Mild CKD (GFR = 60-89 mL/min/1 73 square meters)    Stage 3A Moderate CKD (GFR = 45-59 mL/min/1 73 square meters)    Stage 3B Moderate CKD (GFR = 30-44 mL/min/1 73 square meters)    Stage 4 Severe CKD (GFR = 15-29 mL/min/1 73 square meters)    Stage 5 End Stage CKD (GFR <15 mL/min/1 73 square meters)  Note: GFR calculation is accurate only with a steady state creatinine    CBC and differential [[id number]] Collected: 12/27/21 1043    Lab Status: Final result Specimen: Blood from Arm, Left Updated: 12/27/21 1053     WBC 6 43 Thousand/uL      RBC 4 24 Million/uL      Hemoglobin 12 0 g/dL      Hematocrit 37 9 %      MCV 89 fL      MCH 28 3 pg      MCHC 31 7 g/dL      RDW 12 5 %      MPV 12 3 fL      Platelets 947 Thousands/uL      nRBC 0 /100 WBCs      Neutrophils Relative 67 %      Immat GRANS % 1 %      Lymphocytes Relative 19 %      Monocytes Relative 10 %      Eosinophils Relative 2 %      Basophils Relative 1 %      Neutrophils Absolute 4 30 Thousands/µL      Immature Grans Absolute 0 09 Thousand/uL      Lymphocytes Absolute 1 25 Thousands/µL      Monocytes Absolute 0 66 Thousand/µL      Eosinophils Absolute 0 10 Thousand/µL      Basophils Absolute 0 03 Thousands/µL                  No orders to display              Procedures  Procedures         ED Course  ED Course as of 12/27/21 1308   Mon Dec 27, 2021   1110 Pt expresses concern for "pimple" near vagina  Punctured with needle and expressed  Well tolerated  MDM  Number of Diagnoses or Management Options  Back pain  COVID-19  Fatigue  Diagnosis management comments: Patient reports feeling significantly improved with treatment emergency department  Patient to be sent home with Robaxin  Recommend heating pad  Patient educated regarding their diagnosis and given return and follow-up instructions  Patient was advised to returned to the ED with worsening symptoms or concerns  Patient is understanding of and in agreement with the treatment plan  There are no questions at the time of discharge      Risk of Complications, Morbidity, and/or Mortality  Presenting problems: low  Diagnostic procedures: low  Management options: low        Disposition  Final diagnoses:   Fatigue   COVID-19   Back pain     Time reflects when diagnosis was documented in both MDM as applicable and the Disposition within this note     Time User Action Codes Description Comment    12/27/2021 11:30 AM Virginia Mckeon Add [R53 83] Fatigue     12/27/2021 11:30 AM Virginia Mckeon Add [U07 1] [id number]     12/27/2021 11:30 AM Virginia Mckeon Add [M54 9] Back pain       ED Disposition     ED Disposition Condition Date/Time Comment    Discharge Stable Mon Dec 27, 2021 11:30 AM Marcelo Cason discharge to home/self care  Follow-up Information    None         Discharge Medication List as of 12/27/2021 12:14 PM      START taking these medications    Details   Lidocaine (Salonpas Pain Relieving) 4 % PTCH Apply 2 patches topically 2 (two) times a day, Starting Mon 12/27/2021, Normal      methocarbamol (ROBAXIN) 500 mg tablet Take 1 tablet (500 mg total) by mouth 2 (two) times a day, Starting Mon 12/27/2021, Normal         CONTINUE these medications which have NOT CHANGED    Details   ASPIRIN 81 PO Take 81 mg by mouth daily, Starting Fri 1/17/2014, Historical Med      atorvastatin (LIPITOR) 20 mg tablet Take 20 mg by mouth, Starting Fri 1/17/2014, Historical Med      Blood Glucose Monitoring Suppl (OneTouch Verio) w/Device KIT Use to test blood sugar 4x daily  , Sample      Cholecalciferol 2000 units CAPS Take 1 capsule by mouth, Historical Med      glucose blood (OneTouch Verio) test strip Use to test blood sugar 4x daily  , Normal      insulin aspart protamine-insulin aspart (NovoLOG Mix 70/30) 100 units/mL injection Inject 12 Units under the skin 2 (two) times a day before meals, Starting Tue 10/19/2021, Until Mon 1/17/2022, Normal      levothyroxine 75 mcg tablet levothyroxine 75 mcg tablet   take 1 tablet by mouth once daily, Historical Med      lisinopril (ZESTRIL) 20 mg tablet lisinopril 20 mg tablet, Historical Med      meloxicam (MOBIC) 7 5 mg tablet Daily, Historical Med      Multiple Vitamins-Minerals (MULTIVITAL-M PO) Take by mouth, Historical Med      OneTouch Delica Lancets 46F MISC Use to test blood sugar 4x daily  , Normal      famotidine (PEPCID) 20 mg tablet Take 1 tablet (20 mg total) by mouth 2 (two) times a day, Starting Wed 5/26/2021, Normal             No discharge procedures on file      PDMP Review     None          ED Provider  Electronically Signed by           Mason Marques PA-C  12/27/21 4541

## 2021-12-30 LAB
BACTERIA BLD CULT: NORMAL
BACTERIA BLD CULT: NORMAL

## 2022-01-03 ENCOUNTER — OFFICE VISIT (OUTPATIENT)
Dept: FAMILY MEDICINE CLINIC | Facility: CLINIC | Age: 77
End: 2022-01-03
Payer: MEDICARE

## 2022-01-03 VITALS
WEIGHT: 118.2 LBS | SYSTOLIC BLOOD PRESSURE: 122 MMHG | OXYGEN SATURATION: 98 % | RESPIRATION RATE: 20 BRPM | HEIGHT: 61 IN | BODY MASS INDEX: 22.31 KG/M2 | DIASTOLIC BLOOD PRESSURE: 64 MMHG | TEMPERATURE: 97.5 F | HEART RATE: 84 BPM

## 2022-01-03 DIAGNOSIS — K59.00 CONSTIPATION, UNSPECIFIED CONSTIPATION TYPE: ICD-10-CM

## 2022-01-03 DIAGNOSIS — R30.0 DYSURIA: ICD-10-CM

## 2022-01-03 DIAGNOSIS — R20.8 BURNING SENSATION: Primary | ICD-10-CM

## 2022-01-03 PROCEDURE — 81001 URINALYSIS AUTO W/SCOPE: CPT | Performed by: FAMILY MEDICINE

## 2022-01-03 PROCEDURE — 87186 SC STD MICRODIL/AGAR DIL: CPT | Performed by: FAMILY MEDICINE

## 2022-01-03 PROCEDURE — 87077 CULTURE AEROBIC IDENTIFY: CPT | Performed by: FAMILY MEDICINE

## 2022-01-03 PROCEDURE — 99214 OFFICE O/P EST MOD 30 MIN: CPT | Performed by: FAMILY MEDICINE

## 2022-01-03 PROCEDURE — 87086 URINE CULTURE/COLONY COUNT: CPT | Performed by: FAMILY MEDICINE

## 2022-01-03 RX ORDER — ATORVASTATIN CALCIUM 40 MG/1
20 TABLET, FILM COATED ORAL DAILY
COMMUNITY
Start: 2021-11-28 | End: 2022-02-14

## 2022-01-03 NOTE — ASSESSMENT & PLAN NOTE
- Continue current regimen, advised Miralax to stay ahead of constipation   - No abdominal discomfort currently

## 2022-01-03 NOTE — PROGRESS NOTES
Assessment/Plan:       Problem List Items Addressed This Visit        Other    Burning sensation - Primary     - Seems to have resolved, likely due to muscle spasms in setting of recent COVID infection and lumbar arthritis   - Advised to call if recurs  - Monitor symptoms and call with any concerns  - Could use lidocaine patch or cream on back for symptom relief   - Continue back exercises          Constipation     - Continue current regimen, advised Miralax to stay ahead of constipation   - No abdominal discomfort currently          Dysuria     - Will call with results, if evidence of UTI will treat  - No CVA tenderness          Relevant Orders    Urinalysis with microscopic    Urine culture            Subjective:      Patient ID: Torres Painter is a 64 y o  female  HPI     Recent COVID infection noted  12/27 ED visit for fatigue and back pain reviewed- Creatinine noted to be slightly increased 1 24, baseline appears to be 1 0-1 2 glucose high 264, known diabetes, otherwise blood work normal  Given robaxin and lidocaine and discharged home  12/25 ED visit for COVID and weakness- D-dimer was high and CTA showed no PE, did show findings consistent with COVID "No pulmonary embolus  Breathing motion artifacts and poor opacification limit evaluation of lower lobe subsegmental branches  Measured RV/LV ratio is within normal limits at less than 0 9  Bilateral predominantly posterior groundglass parenchymal opacities may correlate with Covid positive status of "pneumonia "    Interval history- She reports prior to the hospital she has severe burning in her back, was taking muscle relaxants but this caused bad nightmares  Tylenol seems to relieve it  Had pulling in her lower back and around into her abdomen  Burning pain went up the back  She noted weight loss as well  This morning her pain seems a bit better, ate a waffle this morning and this went went   History of chronic lower back pain, back to 2014, prior pelvic mass  Admitted in April for sepsis secondary to UTI/uteritis  Burning pain seems to be on and off for many years  Over anxious today, thinks this might be all in her head  She feels itching of her skin  She feels very anxious  Started burning in lower back, then moves up thoracic back and up into shoulder blades  Burning better with going bowel movement  Relieved with Tylenol and heat, now today this feels much better and feels a cool sensation of back now  Soreness in the abdomen, no pain  She has had constipation, no black tarry stools or bloody stool  Eating normally now  Taking Senakot and Miralax, but hasn't taken it in awhile  Not constipated now  All symptoms actually better now, burning gone today  Feels weakness in her legs, been she has been very sedentary recently  Prior urinary frequency, no burning  No symptoms right now  Diabetes- She follows with endocrinology for this, glucose this morning 153, some recent hypoglycemic episodes due to not eating as much  Better  The following portions of the patient's history were reviewed and updated as appropriate: allergies, current medications, past family history, past medical history, past social history, past surgical history, and problem list     Review of Systems   All other systems reviewed and are negative  Objective:      /64 (BP Location: Left arm, Patient Position: Sitting, Cuff Size: Standard)   Pulse 84   Temp 97 5 °F (36 4 °C)   Resp 20   Ht 5' 1" (1 549 m)   Wt 53 6 kg (118 lb 3 2 oz)   SpO2 98%   BMI 22 33 kg/m²          Physical Exam  Vitals reviewed  Constitutional:       General: She is not in acute distress  Appearance: Normal appearance  She is not ill-appearing, toxic-appearing or diaphoretic  HENT:      Head: Normocephalic and atraumatic  Eyes:      General:         Right eye: No discharge  Left eye: No discharge  Extraocular Movements: Extraocular movements intact  Conjunctiva/sclera: Conjunctivae normal    Cardiovascular:      Rate and Rhythm: Normal rate and regular rhythm  Heart sounds: Normal heart sounds  No murmur heard  No friction rub  No gallop  Pulmonary:      Effort: Pulmonary effort is normal  No respiratory distress  Breath sounds: Normal breath sounds  No stridor  No wheezing or rhonchi  Abdominal:      General: Bowel sounds are normal  There is no distension  Palpations: Abdomen is soft  There is no mass  Tenderness: There is no abdominal tenderness  There is no guarding or rebound  Musculoskeletal:         General: No swelling, tenderness or signs of injury  Right lower leg: No edema  Left lower leg: No edema  Skin:     General: Skin is warm  Coloration: Skin is not pale  Findings: No erythema or rash  Comments: Dry skin   Neurological:      Mental Status: She is alert and oriented to person, place, and time  Motor: No weakness     Psychiatric:         Mood and Affect: Mood normal          Behavior: Behavior normal              DO [facility]

## 2022-01-04 LAB
BACTERIA UR QL AUTO: ABNORMAL /HPF
BILIRUB UR QL STRIP: NEGATIVE
CAOX CRY URNS QL MICRO: ABNORMAL /HPF
CLARITY UR: ABNORMAL
COLOR UR: YELLOW
GLUCOSE UR STRIP-MCNC: NEGATIVE MG/DL
HGB UR QL STRIP.AUTO: NEGATIVE
KETONES UR STRIP-MCNC: NEGATIVE MG/DL
LEUKOCYTE ESTERASE UR QL STRIP: ABNORMAL
NITRITE UR QL STRIP: POSITIVE
NON-SQ EPI CELLS URNS QL MICRO: ABNORMAL /HPF
PH UR STRIP.AUTO: 6 [PH]
PROT UR STRIP-MCNC: ABNORMAL MG/DL
RBC #/AREA URNS AUTO: ABNORMAL /HPF
SP GR UR STRIP.AUTO: >=1.03 (ref 1–1.03)
UROBILINOGEN UR QL STRIP.AUTO: 0.2 E.U./DL
WBC #/AREA URNS AUTO: ABNORMAL /HPF

## 2022-01-05 ENCOUNTER — TELEPHONE (OUTPATIENT)
Dept: FAMILY MEDICINE CLINIC | Facility: CLINIC | Age: 77
End: 2022-01-05

## 2022-01-05 DIAGNOSIS — N30.00 ACUTE CYSTITIS WITHOUT HEMATURIA: Primary | ICD-10-CM

## 2022-01-05 NOTE — TELEPHONE ENCOUNTER
Patient called to follow up from visit 1/3  States the burning/pulling sensation did recur  Asking what can be done, using Rx'd medications from ED

## 2022-01-05 NOTE — TELEPHONE ENCOUNTER
Called and spoke with pt  States she feels full of gas  Reports no burning, but a pulling/pinch along with mild left abdominal pain  Denies fever, N/V/D  States she is not taking muscle relaxant as it gives her nightmares, but using the patch  Feels pain at her gluteal crease and feels that is related to her sciatic nerve  Uses hot and cold therapy along with stretches PT gave her  Reports she feels better when she passes gas  please advise

## 2022-01-05 NOTE — TELEPHONE ENCOUNTER
Around her anus as in lower back/buttock or her actual anus? This is new from our visit if it is actual anus pulling sensation  Do the muscle relaxants work?

## 2022-01-06 LAB
BACTERIA UR CULT: ABNORMAL
BACTERIA UR CULT: ABNORMAL

## 2022-01-06 RX ORDER — CEPHALEXIN 500 MG/1
500 CAPSULE ORAL EVERY 12 HOURS SCHEDULED
Qty: 10 CAPSULE | Refills: 0 | Status: SHIPPED | OUTPATIENT
Start: 2022-01-06 | End: 2022-01-11

## 2022-01-06 NOTE — TELEPHONE ENCOUNTER
Called and spoke with patient  She verbalized understanding  States right now her BG is 91, asking how many IU of insulin she should take today   States she usually takes 12 IU

## 2022-01-06 NOTE — TELEPHONE ENCOUNTER
Reviewed thanks, let's see how her symptoms are without the Senakot, would favor Miralax for constipation for now  Would continue the lidocaine patch and keep an eye out for any new symptoms, let me know if she develops a rash in the area of the burning sensation, any concerns call  She has an appointment with Dr Jordan Roper later this month also

## 2022-01-06 NOTE — TELEPHONE ENCOUNTER
Called and spoke with patient  States laying on left side triggers pinching in abdomen  Laying on right side makes it feel better  Reports she is not feeling constipated and making BM's daily  States she is taking Senokot  Informed of recommendations per provider  Reports burning sensation at gluteal crease is sometimes relieved with the lidocaine patch

## 2022-01-06 NOTE — TELEPHONE ENCOUNTER
Per MA patient also called about UTI concern/requesting antibiotic  Urine culture reviewed and it does show >100,000 E coli, susceptible to everything  Will send in Keflex 500 mg BID for 5 days  Please call with any concerns  91 is not hypoglycemic, it depends on what the rest of her blood sugars have been looking like  She follows with endocrinology for her diabetes, Fro[facility], I would ask her to call them for further guidance  Thanks!

## 2022-01-06 NOTE — TELEPHONE ENCOUNTER
Reviewed thanks, it sounds like this is the same as what we discussed during her visit- so just to be clear intermittent left lower quadrant abdominal pain, feels like pulling/pinching sensation, that is relieved with passing gas? Has she had constipation? Also ask if she's still taking Senakot? If so, I would stop and just do Miralax daily, Senakot can cause abdominal cramping  And then a burning in her gluteal crease as well?  Is this relieved with hot/cold and lidocaine patch or no

## 2022-01-07 NOTE — TELEPHONE ENCOUNTER
Advised patient as per provider, Patient verbally understood, she stated that she is unable to recive the meds for now but will try to get them when she can,

## 2022-01-11 ENCOUNTER — NURSE TRIAGE (OUTPATIENT)
Dept: OTHER | Facility: OTHER | Age: 77
End: 2022-01-11

## 2022-01-11 NOTE — TELEPHONE ENCOUNTER
Regarding: pinched nerve back  ----- Message from [facility] sent at 1/11/2022  7:00 AM EST -----  "I have a pinched nervie in my back and butt cheek and it's moving to my left side  I feel pulling    I have been loosing weight "

## 2022-01-11 NOTE — TELEPHONE ENCOUNTER
Reason for Disposition   Patient sounds very upset or troubled to the triager    Answer Assessment - Initial Assessment Questions  1  CONCERN: "What happened that made you call today?"      She is admittedly anxious about her health  She has had a burning pain in the left side of her body since 19  She was evaluated in the ER at that time and was told everything is fine  Her children have told her her symptoms are in her mind, which makes her tearful as she "knows something is wrong " The pain radiates from her butt cheek (at times around her anus) and radiates up to her ear  The pain is intermittent  Rates pain/pinching/burning at worst 9/10  Now 0/10  Pain/burning/pinching relieved last night with warm and cold packs and praying  She is currently being treated with antibiotic for a UTI with no significant improvement in symptoms  She is nervous about her recent weight loss of 8 lb over an unknown period of time  Her sister  (cancer related) this past weekend so she fears she has an undiagnosed health condition that could be fatal  States all her symptoms were relieved by a "tiny, white pill" prescribed by her PCP  2  ANXIETY SYMPTOM SCREENING: "Can you describe how you have been feeling?"  (e g , tense, restless, panicky, anxious, keyed up, trouble sleeping, trouble concentrating)      Intermittent since 19     3  ONSET: "How long have you been feeling this way?"      Since 21    4   RECURRENT: "Have you felt this way before?"  If Yes, ask: "What happened that time?" "What helped these feelings go away in the past?"       See #1     5  RISK OF HARM - SUICIDAL IDEATION:  "Do you ever have thoughts of hurting or killing yourself?"  (e g , yes, no, no but preoccupation with thoughts about death)    - INTENT:  "Do you have thoughts of hurting or killing yourself right NOW?" (e g , yes, no, N/A)    - PLAN: "Do you have a specific plan for how you would do this?" (e g , gun, knife, overdose, no plan, N/A)      Denies  No thoughts of harm but does feel like she wants to run away  She lives alone  6  RISK OF HARM - HOMICIDAL IDEATION:  "Do you ever have thoughts of hurting or killing someone else?"  (e g , yes, no, no but preoccupation with thoughts about death)    - INTENT:  "Do you have thoughts of hurting or killing someone right NOW?" (e g , yes, no, N/A)    - PLAN: "Do you have a specific plan for how you would do this?" (e g , gun, knife, no plan, N/A)       Denies  7  FUNCTIONAL IMPAIRMENT: "How have things been going for you overall? Have you had more difficulty than usual doing your normal daily activities?"  (e g , better, same, worse; self-care, school, work, interactions)      See #1    8  SUPPORT: "Who is with you now?" "Who do you live with?" "Do you have family or friends who you can talk to?"       She lives alone and calls her daughter for support  9  THERAPIST: "Do you have a counselor or therapist? Name?"      She has a history of taking anti anxiety medication  Not currently taking because it made me funny  I do deep breathing  Does not follow with a therapist     10  STRESSORS: "Has there been any new stress or recent changes in your life?"        Her sister recently   11  CAFFEINE USE: "Do you drink caffeinated beverages, and how much each day?" (e g , coffee, tea, jae)        Denies  12  ALCOHOL USE OR SUBSTANCE USE (DRUG USE): "Do you drink alcohol or use any illegal drugs?"        Denies  13  OTHER SYMPTOMS: "Do you have any other physical symptoms right now?" (e g , chest pain, palpitations, difficulty breathing, fever)        Denies  Protocols used: ANXIETY AND PANIC ATTACK-ADULT-AH      SEE PCP WITHIN 24 HOURS:   * IF OFFICE WILL BE OPEN: You need to be examined within the next 24 hours  Call your doctor (or NP/PA) when the office opens and make an appointment    She plans to call PCP at 8 am

## 2022-01-13 ENCOUNTER — OFFICE VISIT (OUTPATIENT)
Dept: ENDOCRINOLOGY | Facility: CLINIC | Age: 77
End: 2022-01-13
Payer: MEDICARE

## 2022-01-13 ENCOUNTER — TELEPHONE (OUTPATIENT)
Dept: BARIATRICS | Facility: CLINIC | Age: 77
End: 2022-01-13

## 2022-01-13 ENCOUNTER — TELEPHONE (OUTPATIENT)
Dept: FAMILY MEDICINE CLINIC | Facility: CLINIC | Age: 77
End: 2022-01-13

## 2022-01-13 VITALS
WEIGHT: 117 LBS | SYSTOLIC BLOOD PRESSURE: 120 MMHG | DIASTOLIC BLOOD PRESSURE: 60 MMHG | HEART RATE: 72 BPM | TEMPERATURE: 96.4 F | BODY MASS INDEX: 22.09 KG/M2 | HEIGHT: 61 IN

## 2022-01-13 DIAGNOSIS — N18.31 TYPE 1 DIABETES MELLITUS WITH STAGE 3A CHRONIC KIDNEY DISEASE (HCC): Primary | ICD-10-CM

## 2022-01-13 DIAGNOSIS — E78.2 MIXED HYPERLIPIDEMIA: ICD-10-CM

## 2022-01-13 DIAGNOSIS — E10.21 TYPE 1 DIABETES MELLITUS WITH STAGE 3A CHRONIC KIDNEY DISEASE (HCC): Primary | ICD-10-CM

## 2022-01-13 DIAGNOSIS — I10 PRIMARY HYPERTENSION: ICD-10-CM

## 2022-01-13 DIAGNOSIS — E03.9 HYPOTHYROIDISM, UNSPECIFIED TYPE: ICD-10-CM

## 2022-01-13 DIAGNOSIS — R20.8 BURNING SENSATION: Primary | ICD-10-CM

## 2022-01-13 PROCEDURE — 99214 OFFICE O/P EST MOD 30 MIN: CPT | Performed by: NURSE PRACTITIONER

## 2022-01-13 NOTE — TELEPHONE ENCOUNTER
Finished antibiotics and is scared again because what ever is happening its back again, travels her butt cheek and around to her side, then  has gas and not sure whats going on  Did not sleep good at all last night   Patient is on her way to an appointment with Dr Lauren Booker please call patient after 11:00 [phone] she'll be home then

## 2022-01-13 NOTE — PROGRESS NOTES
Established Patient Progress Note      Chief Complaint   Patient presents with    Diabetes Type 1     pt is concerned with weight loss,        History of Present Illness:   Mindy Thomas is a 64 y o  female with HTN, HLD, hypothyroidism, osteoporosis, and type 1 diabetes with long term use of insulin since 2006  Reports complications of CKD stage 3a  Denies recent illness or hospitalizations  Denies recent severe hypoglycemic or severe hyperglycemic episodes  Denies any issues with her current regimen  home glucose monitoring: are performed regularly; 3x daily  At patient's last appointment on 08/19/2021, no changes were made to her regimen  In October, she blood work completed that showed a fasting blood sugar of over 200 mg/dL  She did provide glucose logs that showed significant fluctuations  At that time, it was recommended that she meet with the diabetes educator for medical nutrition therapy  She did this on 11/16/2021  Patient did not complete labs ordered from her last visit  Unfortunately, she has not been feeling well  She reports a burning sensation in her back that travels down her legs  She is following with her PCP for this  Patient tested positive for COVID in December  Her sister also recently passed away  Patient was unable to visit as her sister lived in [place]  She has also been dealing with physical discomfort through her shoulders and neck along with an exacerbation of her sciatica  She did recently complete a course of antibiotics for urinary tract infection  All of these variables will cause more frequent hyperglycemia    Component      Latest Ref Rng & Units 4/28/2021 10/6/2021 12/25/2021 12/27/2021   Glucose, Random      65 - 140 mg/dL 181 (H)  126 264 (H)     Component      Latest Ref Rng & Units 10/6/2021 12/25/2021 12/27/2021   eGFR      ml/min/1 73sq m 55 43 41     Home blood glucose readings:   Before breakfast:  238, 161, 237, 299, 205, 198, 136, 278, 91, 228, 128, 138  Before lunch:  62, 53, 176, 109, 56  Before dinner:  174, 87, 329, 228, 278, 51, 149, 108, 135, 220  Bedtime: , 75, 71     Current regimen:   Novolog 70/30 12 units BID    Last Eye Exam: UTD  Last Foot Exam: UTD    For hypothyroidism, she is taking 75 mcg of levothyroxine daily  She is taking this consistently and correctly  She reports fatigue and occasional dysphoric mood  Denies weight gain  She is concerned that she has lost approximately 8 lb in the last month and a half  She denies tremor and palpitations  She denies heat or cold intolerance      Patient Active Problem List   Diagnosis    Sepsis ([place] [id number] )    Generalized weakness    Congestive heart disease (HCC)    Generalized anxiety disorder    Stage 3a chronic kidney disease ([place] [id number] )    Hypothyroidism    Hypertension    Anxiety    Ataxic gait    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 ear    Diabetes mellitus ([place] [address] )    Hyperlipidemia    Pelvic mass    Retinal disorder    Burning sensation    Constipation    Dysuria      Past Medical History:   Diagnosis Date    Anxiety     Colon polyp     Depression     Diabetes mellitus ([place] [address] )     Diverticulosis     Hyperlipidemia     Hypertension     Osteoporosis     Type 2 diabetes mellitus without complication, without long-term current use of insulin ([place] [address] ) 4/25/2021      Past Surgical History:   Procedure Laterality Date    APPENDECTOMY      CATARACT EXTRACTION Right     COLONOSCOPY      difficult exam    HYSTERECTOMY      TONSILLECTOMY      WISDOM TOOTH EXTRACTION        Family History   Problem Relation Age of Onset    Parkinsonism Mother     Alzheimer's disease Father     Breast cancer Sister         in her 42's   Exie Edwar Lung cancer Sister     No Known Problems Daughter     No Known Problems Maternal Grandmother     No Known Problems Maternal Grandfather     No Known Problems Paternal Grandmother     No Known Problems Paternal Grandfather     No Known Problems Daughter     No Known Problems Maternal Aunt     No Known Problems Maternal Aunt     No Known Problems Maternal Aunt     No Known Problems Paternal Aunt      Social History     Tobacco Use    Smoking status: Never Smoker    Smokeless tobacco: Never Used   Substance Use Topics    Alcohol use: Not Currently     Comment: rare     Allergies   Allergen Reactions    Diphenhydramine Shortness Of Breath    Meperidine Hives and Shortness Of Breath    Prednisone Hives and Shortness Of Breath    Bee Pollen     Ciprofloxacin Hives    Sulfamethoxazole-Trimethoprim GI Intolerance         Current Outpatient Medications:     ASPIRIN 81 PO, Take 81 mg by mouth daily, Disp: , Rfl:     atorvastatin (LIPITOR) 20 mg tablet, Take 20 mg by mouth, Disp: , Rfl:     atorvastatin (LIPITOR) 40 mg tablet, Take 20 mg by mouth daily, Disp: , Rfl:     Blood Glucose Monitoring Suppl (OneTouch Verio) w/Device KIT, Use to test blood sugar 4x daily  , Disp: 1 kit, Rfl: 0    Cholecalciferol 2000 units CAPS, Take 1 capsule by mouth, Disp: , Rfl:     glucose blood (OneTouch Verio) test strip, Use to test blood sugar 4x daily  , Disp: 400 each, Rfl: 2    insulin aspart protamine-insulin aspart (NovoLOG Mix 70/30) 100 units/mL injection, Inject 12 Units under the skin 2 (two) times a day before meals, Disp: 30 mL, Rfl: 3    levothyroxine 75 mcg tablet, levothyroxine 75 mcg tablet  take 1 tablet by mouth once daily, Disp: , Rfl:     lisinopril (ZESTRIL) 20 mg tablet, lisinopril 20 mg tablet, Disp: , Rfl:     Multiple Vitamins-Minerals (MULTIVITAL-M PO), Take by mouth, Disp: , Rfl:     OneTouch Delica Lancets 96Y MISC, Use to test blood sugar 4x daily  , Disp: 300 each, Rfl: 2    famotidine (PEPCID) 20 mg tablet, Take 1 tablet (20 mg total) by mouth 2 (two) times a day (Patient not taking: Reported on 12/27/2021 ), Disp: 60 tablet, Rfl: 1    Lidocaine (Salonpas Pain Relieving) 4 % PTCH, Apply 2 patches topically 2 (two) times a day (Patient not taking: Reported on 1/3/2022 ), Disp: 10 patch, Rfl: 0    meloxicam (MOBIC) 7 5 mg tablet, Daily (Patient not taking: Reported on 1/3/2022 ), Disp: , Rfl:     methocarbamol (ROBAXIN) 500 mg tablet, Take 1 tablet (500 mg total) by mouth 2 (two) times a day (Patient not taking: Reported on 1/3/2022 ), Disp: 20 tablet, Rfl: 0    Review of Systems   Constitutional: Positive for fatigue and unexpected weight change  Negative for activity change and appetite change  HENT: Negative for dental problem, sore throat, trouble swallowing and voice change  Eyes: Negative for visual disturbance  Respiratory: Negative for cough, chest tightness and shortness of breath  Cardiovascular: Negative for chest pain, palpitations and leg swelling  Gastrointestinal: Positive for abdominal distention and abdominal pain  Negative for constipation, diarrhea, nausea and vomiting  Endocrine: Negative for cold intolerance, heat intolerance, polydipsia, polyphagia and polyuria  Genitourinary: Negative for dysuria, flank pain and frequency  Musculoskeletal: Positive for arthralgias, back pain, myalgias and neck pain  Negative for gait problem, joint swelling and neck stiffness  Skin: Negative for wound  Allergic/Immunologic: Positive for environmental allergies  Negative for food allergies  Neurological: Negative for dizziness, tremors, weakness, light-headedness, numbness and headaches  Hematological: Does not bruise/bleed easily  Psychiatric/Behavioral: Positive for dysphoric mood and sleep disturbance  Negative for decreased concentration  The patient is nervous/anxious  Physical Exam:  Body mass index is 22 11 kg/m²  /60   Pulse 72   Temp (!) 96 4 °F (35 8 °C)   Ht 5' 1" (1 549 m)   Wt 53 1 kg (117 lb)   BMI 22 11 kg/m²    Wt Readings from Last 3 Encounters:   01/13/22 53 1 kg (117 lb)   01/03/22 53 6 kg (118 lb 3 2 oz)   12/25/21 56 7 kg (125 lb)       Physical Exam  Vitals reviewed     Constitutional: General: She is not in acute distress  Appearance: She is well-developed and normal weight  She is not ill-appearing  HENT:      Head: Normocephalic and atraumatic  Comments: Mask in place  Eyes:      Pupils: Pupils are equal, round, and reactive to light  Neck:      Thyroid: No thyromegaly  Cardiovascular:      Rate and Rhythm: Normal rate and regular rhythm  Pulses: Normal pulses  Heart sounds: Normal heart sounds  Pulmonary:      Effort: Pulmonary effort is normal       Breath sounds: Normal breath sounds  Abdominal:      General: Bowel sounds are normal  There is no distension  Palpations: Abdomen is soft  Tenderness: There is no abdominal tenderness  Musculoskeletal:      Cervical back: Normal range of motion and neck supple  Right lower leg: No edema  Left lower leg: No edema  Comments: B/l trapezius are very tight/tender to palpation   Lymphadenopathy:      Cervical: No cervical adenopathy  Skin:     General: Skin is warm and dry  Capillary Refill: Capillary refill takes less than 2 seconds  Findings: Erythema (R posterior neck/shoulder likely from scratching) present  No rash  Neurological:      Mental Status: She is alert and oriented to person, place, and time        Gait: Gait normal    Psychiatric:         Mood and Affect: Mood normal          Behavior: Behavior normal            Labs:   Lab Results   Component Value Date    HGBA1C 6 9 (H) 07/20/2021    HGBA1C 7 0 (H) 03/24/2021    HGBA1C 6 4 (H) 11/23/2020     Lab Results   Component Value Date    CREATININE 1 24 12/27/2021    CREATININE 1 20 12/25/2021    CREATININE 0 99 10/06/2021    BUN 24 12/27/2021    K 4 4 12/27/2021     12/27/2021    CO2 28 12/27/2021     eGFR   Date Value Ref Range Status   12/27/2021 41 ml/min/1 73sq m Final     Lab Results   Component Value Date    HDL 69 10/06/2021    TRIG 172 (H) 10/06/2021     Lab Results   Component Value Date    ALT 8 12/25/2021    AST 15 12/25/2021    ALKPHOS 55 12/25/2021     Lab Results   Component Value Date    NUR6NMTLIEMB 2 140 06/28/2021    HPP0NNEULTVV 3 730 03/31/2020    COQ4KZMHVGOO 2 290 11/25/2019     Lab Results   Component Value Date    FREET4 0 99 11/25/2019       Impression & Plan:    Problem List Items Addressed This Visit        Endocrine    Hypothyroidism     Check TSH and free T4  Continue 75 mcg of levothyroxine  Will make adjustments if necessary after labs are complete  Relevant Orders    TSH, 3rd generation Lab Collect    T4, free Lab Collect    Diabetes mellitus ([facility] [address] ) - Primary     Patient is having frequent morning hyperglycemia followed by hypoglycemia during the day  This is in part due to under eating throughout the day  Recommend increasing NovoLog mix 70 30-14 units prior to bed  Continue with 12 units in the morning for now  Patient will provide me with glucose logs over the next 1-2 weeks  Should she continue to struggle with episodes of low blood sugar, will recommend decreasing morning dose  Encouraged her to continue to eat regularly  Counseled on stress reduction  Counseled on appropriate surveillance and treatment of hypoglycemia  Patient knows to notify me with persistent hyperglycemia episodes of hypoglycemia  Lab Results   Component Value Date    HGBA1C 6 9 (H) 07/20/2021            Relevant Orders    Microalbumin / creatinine urine ratio Lab Collect    Lipid panel Lab Collect Lab Collect    HEMOGLOBIN A1C W/ EAG ESTIMATION Lab Collect    Comprehensive metabolic panel Lab Collect       Cardiovascular and Mediastinum    Hypertension     BP stable 120/60  Continue current regimen  Other    Hyperlipidemia     Check fasting lipid panel  Continue statin  Orders Placed This Encounter   Procedures    TSH, 3rd generation Lab Collect     This is a patient instruction: This test is non-fasting  Please drink two glasses of water morning of bloodwork  Standing Status:   Future     Standing Expiration Date:   1/13/2023    T4, free Lab Collect     Standing Status:   Future     Standing Expiration Date:   1/13/2023    Microalbumin / creatinine urine ratio Lab Collect     Standing Status:   Future     Standing Expiration Date:   1/13/2023    Lipid panel Lab Collect Lab Collect     This is a patient instruction: This test requires patient fasting for 10-12 hours or longer  Drinking of black coffee or black tea is acceptable  Standing Status:   Future     Standing Expiration Date:   1/13/2023    HEMOGLOBIN A1C W/ EAG ESTIMATION Lab Collect     Standing Status:   Future     Standing Expiration Date:   1/13/2023    Comprehensive metabolic panel Lab Collect     This is a patient instruction: Patient fasting for 8 hours or longer recommended  Standing Status:   Future     Standing Expiration Date:   1/13/2023       Patient Instructions   1  Continue with 12 units before breakfast  Increase to 14 units before dinner  2  Please complete you labs at your convenience  Discussed with the patient and all questioned fully answered  She will call me if any problems arise  Follow-up appointment in 4 months       Counseled patient on diagnostic results, prognosis, risk and benefit of treatment options, instruction for management, importance of treatment compliance, Risk  factor reduction and impressions    EUGENIA Rangel

## 2022-01-13 NOTE — ASSESSMENT & PLAN NOTE
Check TSH and free T4  Continue 75 mcg of levothyroxine  Will make adjustments if necessary after labs are complete

## 2022-01-13 NOTE — PATIENT INSTRUCTIONS
1  Continue with 12 units before breakfast  Increase to 14 units before dinner  2  Please complete you labs at your convenience

## 2022-01-13 NOTE — TELEPHONE ENCOUNTER
Patient called stating that she had a turkey sandwich for lunch and her sugar was 325  She also tested her sugar after a snack (small snickerdoodle) and it was 416  Patient wanted to make you aware

## 2022-01-13 NOTE — ASSESSMENT & PLAN NOTE
Patient is having frequent morning hyperglycemia followed by hypoglycemia during the day  This is in part due to under eating throughout the day  Recommend increasing NovoLog mix 70 30-14 units prior to bed  Continue with 12 units in the morning for now  Patient will provide me with glucose logs over the next 1-2 weeks  Should she continue to struggle with episodes of low blood sugar, will recommend decreasing morning dose  Encouraged her to continue to eat regularly  Counseled on stress reduction  Counseled on appropriate surveillance and treatment of hypoglycemia  Patient knows to notify me with persistent hyperglycemia episodes of hypoglycemia    Lab Results   Component Value Date    HGBA1C 6 9 (H) 07/20/2021

## 2022-01-14 ENCOUNTER — TELEPHONE (OUTPATIENT)
Dept: FAMILY MEDICINE CLINIC | Facility: CLINIC | Age: 77
End: 2022-01-14

## 2022-01-14 ENCOUNTER — APPOINTMENT (OUTPATIENT)
Dept: RADIOLOGY | Facility: CLINIC | Age: 77
End: 2022-01-14
Payer: MEDICARE

## 2022-01-14 DIAGNOSIS — R20.8 BURNING SENSATION: ICD-10-CM

## 2022-01-14 PROCEDURE — 72110 X-RAY EXAM L-2 SPINE 4/>VWS: CPT

## 2022-01-14 NOTE — TELEPHONE ENCOUNTER
Patient called back and stated that she is mainly concerned for her weight and sugars, she stated that she lost 8 lbs and her sugars were in the 400s after her last apt,informed patient of the 330 apt , and informed her that we would inform Dr Freida Rosario and get back to her with any recommendations in the meantime,

## 2022-01-14 NOTE — TELEPHONE ENCOUNTER
You 3 minutes ago (12:31 PM)     MB       Called Phone picked up and hung up         [address] routed conversation to Ailyn 5 hours ago (7:25 AM)     Afsaneh Rowe 5 hours ago (7:25 AM)     [place] stated that she had pain at her waist line last night and she did take tums and that seemed to relieve it, but this morning it is tender in that area  I asked if she had any diarrhea, she stated that she is constipated and did take her medicine for that   She stated she is shaking and has the chills     Should she be concerned is what she asked     Please advise     Phone: [phone]

## 2022-01-14 NOTE — TELEPHONE ENCOUNTER
Called patient and she stated she is perfectly fine with getting her xray done and will wait for a call back for the result, patient will call if anything worsens or she needs anything,

## 2022-01-14 NOTE — TELEPHONE ENCOUNTER
Let's give the changes the weekend to see if we need to increase any further  I worry about changing too much too quickly as she is prone to hypoglycemia

## 2022-01-14 NOTE — TELEPHONE ENCOUNTER
Patient called worried about her sugars, still having left flank pain, bedtime sugar last night was 178 with 14 units at 6pm  Fasting sugar was 278 this morning

## 2022-01-14 NOTE — TELEPHONE ENCOUNTER
Spoke to patient she will kepp everything the same  She is ordered to have a xray ordered by pcp for her flank pain today

## 2022-01-14 NOTE — TELEPHONE ENCOUNTER
Would recommend a visit to discuss further, if she has any worsening symptoms prior to then please recommend urgent care  It looks like I'm full tomorrow and unless Trevin Jeong has an opening tomorrow we'll have to wait for a visit next week  In the meantime, please get some more details about what she's feeling, and I will order an XR of her back

## 2022-01-14 NOTE — TELEPHONE ENCOUNTER
We made some adjustments to her regimen yesterday  Please ask her to keep testing regularly so that I have an idea of where her sugars are  As I mentioned to her, she may be better served by using two different kinds of insulin rather than mixed insulin  It will help me to know both the highs and the lows

## 2022-01-15 NOTE — TELEPHONE ENCOUNTER
She is already in communication with her endocrinologist about hyperglycemia, changes were just made on the 13th  We will call with XR result  Please  her constipation can cause many of the symptoms she is having as well, and she should be treating this as we discussed adequate water intake, high fiber diet, daily Miralax, no Senakot as this can cause cramping  As you said any worsening symptoms call or urgent care in the meantime

## 2022-01-17 DIAGNOSIS — E10.21 TYPE 1 DIABETES MELLITUS WITH STAGE 3A CHRONIC KIDNEY DISEASE (HCC): ICD-10-CM

## 2022-01-17 DIAGNOSIS — N18.31 TYPE 1 DIABETES MELLITUS WITH STAGE 3A CHRONIC KIDNEY DISEASE (HCC): ICD-10-CM

## 2022-01-18 ENCOUNTER — HOSPITAL ENCOUNTER (OUTPATIENT)
Dept: CT IMAGING | Facility: HOSPITAL | Age: 77
Discharge: HOME | End: 2022-01-18
Payer: MEDICARE

## 2022-01-18 ENCOUNTER — TELEPHONE (OUTPATIENT)
Dept: ENDOCRINOLOGY | Facility: CLINIC | Age: 77
End: 2022-01-18

## 2022-01-18 ENCOUNTER — OFFICE VISIT (OUTPATIENT)
Dept: FAMILY MEDICINE CLINIC | Facility: CLINIC | Age: 77
End: 2022-01-18
Payer: MEDICARE

## 2022-01-18 VITALS
OXYGEN SATURATION: 98 % | HEART RATE: 74 BPM | HEIGHT: 61 IN | TEMPERATURE: 98.7 F | WEIGHT: 118 LBS | SYSTOLIC BLOOD PRESSURE: 128 MMHG | BODY MASS INDEX: 22.28 KG/M2 | RESPIRATION RATE: 20 BRPM | DIASTOLIC BLOOD PRESSURE: 64 MMHG

## 2022-01-18 DIAGNOSIS — H35.9 RETINAL DISORDER: ICD-10-CM

## 2022-01-18 DIAGNOSIS — N18.31 TYPE 1 DIABETES MELLITUS WITH STAGE 3A CHRONIC KIDNEY DISEASE (HCC): ICD-10-CM

## 2022-01-18 DIAGNOSIS — R10.32 LLQ ABDOMINAL PAIN: Primary | ICD-10-CM

## 2022-01-18 DIAGNOSIS — R10.32 LLQ ABDOMINAL PAIN: ICD-10-CM

## 2022-01-18 DIAGNOSIS — R10.13 DYSPEPSIA: ICD-10-CM

## 2022-01-18 DIAGNOSIS — H04.129 DRY EYE: ICD-10-CM

## 2022-01-18 DIAGNOSIS — E10.21 TYPE 1 DIABETES MELLITUS WITH STAGE 3A CHRONIC KIDNEY DISEASE (HCC): ICD-10-CM

## 2022-01-18 PROCEDURE — 99214 OFFICE O/P EST MOD 30 MIN: CPT | Performed by: FAMILY MEDICINE

## 2022-01-18 PROCEDURE — G1004 CDSM NDSC: HCPCS

## 2022-01-18 PROCEDURE — 74176 CT ABD & PELVIS W/O CONTRAST: CPT

## 2022-01-18 RX ORDER — FAMOTIDINE 20 MG/1
20 TABLET, FILM COATED ORAL 2 TIMES DAILY
Qty: 60 TABLET | Refills: 1 | Status: SHIPPED | OUTPATIENT
Start: 2022-01-18 | End: 2022-01-20 | Stop reason: SDUPTHER

## 2022-01-18 RX ORDER — BLOOD SUGAR DIAGNOSTIC
STRIP MISCELLANEOUS
Qty: 400 EACH | Refills: 0 | Status: SHIPPED | OUTPATIENT
Start: 2022-01-18 | End: 2022-04-11 | Stop reason: SDUPTHER

## 2022-01-18 NOTE — PROGRESS NOTES
Assessment/Plan:       Problem List Items Addressed This Visit        Endocrine    Diabetes mellitus ([facility] [address] )       Other    Retinal disorder    LLQ abdominal pain - Primary     - Tender LLQ, suspicious for diverticulitis, also possibly gastroenteritis, GERD/PUD  - Start Pepcid 20 mg BID   - Will obtain CT abdomen/pelvis, consider antibiotics pending result         Relevant Orders    CT abdomen pelvis wo contrast    Dry eye     - Normal exam today  - Advised if returns call her ophthalmologist            Other Visit Diagnoses     Dyspepsia        Relevant Medications    famotidine (PEPCID) 20 mg tablet            Subjective:      Patient ID: Jack Milian is a 64 y o  female  HPI     Abdominal discomfort- Left lower abdominal cramping, radiates around to right side, burping as well  Burning pain that goes up into the chest and down, feels like reflux  Prior Pepcid helped with this  She gained 2 pounds since last visit  Miralax every morning, no [place]  She has regular bowel movements once daily, no blood, sometimes has to push, no black or tarry  No diarrhea  No fever  Appetite coming back  No shortness of breath, no chest pain, lightheadedness with standing up quickly sometimes but nothing right now, had pressure in her head prior but this has now resolved  This has all been occurring for over 1 week  She has dry eye and cataract surgery, it has been over 5 years  This morning she had blurring and itching of right side, gone away now  Feeling back to normal      Recent XR lumbar spine reviewed, shows mild degenerative changes, no acute changes  She felt burning pain across shoulder and lower back  No further burning pain, with certain movements feels like the start of it but not so much now  Doing stretching exercises  Feeling better  Working with endocrinology with blood sugars, high recently 300-400  They are managing her diabetes       The following portions of the patient's history were reviewed and updated as appropriate: allergies, current medications, past family history, past medical history, past social history, past surgical history, and problem list     Review of Systems   All other systems reviewed and are negative  Objective:      /64 (BP Location: Left arm, Patient Position: Sitting, Cuff Size: Standard)   Pulse 74   Temp 98 7 °F (37 1 °C)   Resp 20   Ht 5' 1" (1 549 m)   Wt 53 5 kg (118 lb)   SpO2 98%   BMI 22 30 kg/m²          Physical Exam  Vitals reviewed  Constitutional:       General: She is not in acute distress  Appearance: Normal appearance  She is not ill-appearing, toxic-appearing or diaphoretic  HENT:      Head: Normocephalic and atraumatic  Eyes:      General:         Right eye: No discharge  Left eye: No discharge  Extraocular Movements: Extraocular movements intact  Conjunctiva/sclera: Conjunctivae normal       Pupils: Pupils are equal, round, and reactive to light  Cardiovascular:      Rate and Rhythm: Normal rate and regular rhythm  Heart sounds: Normal heart sounds  No murmur heard  No friction rub  No gallop  Pulmonary:      Effort: Pulmonary effort is normal  No respiratory distress  Breath sounds: Normal breath sounds  No stridor  No wheezing or rhonchi  Abdominal:      General: Bowel sounds are normal  There is no distension  Palpations: Abdomen is soft  There is no mass  Tenderness: There is abdominal tenderness  There is no guarding or rebound  Comments: LLQ pain   Musculoskeletal:         General: No swelling, tenderness or signs of injury  Skin:     General: Skin is warm  Coloration: Skin is not pale  Findings: No erythema or rash  Neurological:      Mental Status: She is alert and oriented to person, place, and time  Motor: No weakness     Psychiatric:         Mood and Affect: Mood normal          Behavior: Behavior normal              [place] Patient, DO [address] [facility]

## 2022-01-18 NOTE — ASSESSMENT & PLAN NOTE
- Tender LLQ, suspicious for diverticulitis, also possibly gastroenteritis, GERD/PUD  - Start Pepcid 20 mg BID   - Will obtain CT abdomen/pelvis, consider antibiotics pending result

## 2022-01-20 DIAGNOSIS — R10.13 DYSPEPSIA: ICD-10-CM

## 2022-01-20 DIAGNOSIS — R10.32 LLQ ABDOMINAL PAIN: Primary | ICD-10-CM

## 2022-01-20 RX ORDER — FAMOTIDINE 20 MG/1
20 TABLET, FILM COATED ORAL DAILY
Qty: 60 TABLET | Refills: 1
Start: 2022-01-20 | End: 2022-03-14 | Stop reason: SDUPTHER

## 2022-01-25 ENCOUNTER — APPOINTMENT (OUTPATIENT)
Dept: LAB | Facility: CLINIC | Age: 77
End: 2022-01-25
Payer: MEDICARE

## 2022-01-25 ENCOUNTER — TELEPHONE (OUTPATIENT)
Dept: ENDOCRINOLOGY | Facility: CLINIC | Age: 77
End: 2022-01-25

## 2022-01-25 DIAGNOSIS — E03.9 HYPOTHYROIDISM, UNSPECIFIED TYPE: ICD-10-CM

## 2022-01-25 DIAGNOSIS — N18.31 TYPE 1 DIABETES MELLITUS WITH STAGE 3A CHRONIC KIDNEY DISEASE (HCC): ICD-10-CM

## 2022-01-25 DIAGNOSIS — E10.21 TYPE 1 DIABETES MELLITUS WITH STAGE 3A CHRONIC KIDNEY DISEASE (HCC): ICD-10-CM

## 2022-01-25 LAB
ALBUMIN SERPL BCP-MCNC: 3.9 G/DL (ref 3.5–5)
ALP SERPL-CCNC: 86 U/L (ref 46–116)
ALT SERPL W P-5'-P-CCNC: 15 U/L (ref 12–78)
ANION GAP SERPL CALCULATED.3IONS-SCNC: 5 MMOL/L (ref 4–13)
AST SERPL W P-5'-P-CCNC: 13 U/L (ref 5–45)
BILIRUB SERPL-MCNC: 0.86 MG/DL (ref 0.2–1)
BUN SERPL-MCNC: 15 MG/DL (ref 5–25)
CALCIUM SERPL-MCNC: 9.1 MG/DL (ref 8.3–10.1)
CHLORIDE SERPL-SCNC: 102 MMOL/L (ref 100–108)
CHOLEST SERPL-MCNC: 172 MG/DL
CO2 SERPL-SCNC: 28 MMOL/L (ref 21–32)
CREAT SERPL-MCNC: 1.1 MG/DL (ref 0.6–1.3)
CREAT UR-MCNC: 133 MG/DL
EST. AVERAGE GLUCOSE BLD GHB EST-MCNC: 166 MG/DL
GFR SERPL CREATININE-BSD FRML MDRD: 48 ML/MIN/1.73SQ M
GLUCOSE P FAST SERPL-MCNC: 213 MG/DL (ref 65–99)
HBA1C MFR BLD: 7.4 %
HDLC SERPL-MCNC: 74 MG/DL
LDLC SERPL CALC-MCNC: 63 MG/DL (ref 0–100)
MICROALBUMIN UR-MCNC: 15.6 MG/L (ref 0–20)
MICROALBUMIN/CREAT 24H UR: 12 MG/G CREATININE (ref 0–30)
NONHDLC SERPL-MCNC: 98 MG/DL
POTASSIUM SERPL-SCNC: 3.9 MMOL/L (ref 3.5–5.3)
PROT SERPL-MCNC: 7.5 G/DL (ref 6.4–8.2)
SODIUM SERPL-SCNC: 135 MMOL/L (ref 136–145)
T4 FREE SERPL-MCNC: 1.23 NG/DL (ref 0.76–1.46)
TRIGL SERPL-MCNC: 173 MG/DL
TSH SERPL DL<=0.05 MIU/L-ACNC: 2.6 UIU/ML (ref 0.36–3.74)

## 2022-01-25 PROCEDURE — 82570 ASSAY OF URINE CREATININE: CPT

## 2022-01-25 PROCEDURE — 80061 LIPID PANEL: CPT

## 2022-01-25 PROCEDURE — 36415 COLL VENOUS BLD VENIPUNCTURE: CPT

## 2022-01-25 PROCEDURE — 84439 ASSAY OF FREE THYROXINE: CPT

## 2022-01-25 PROCEDURE — 83036 HEMOGLOBIN GLYCOSYLATED A1C: CPT

## 2022-01-25 PROCEDURE — 80053 COMPREHEN METABOLIC PANEL: CPT

## 2022-01-25 PROCEDURE — 84443 ASSAY THYROID STIM HORMONE: CPT

## 2022-01-25 PROCEDURE — 82043 UR ALBUMIN QUANTITATIVE: CPT

## 2022-01-25 NOTE — TELEPHONE ENCOUNTER
----- Message from [address] [place] sent at 1/25/2022  2:51 PM EST -----  Labs reviewed  Thyroid function is stable  Continue current regimen  Blood sugars are running higher  Hemoglobin A1c is increased to 7 4%  Elevated sugars are also increasing her triglycerides  Please remind patient to document her blood sugars and let me know how they are looking so that I can appropriately adjust her insulin

## 2022-01-25 NOTE — TELEPHONE ENCOUNTER
Spoke to patient and made her aware of results  Patient states her sugars were 184 before breakfast and 242 after lunch today  Patient will continue to log

## 2022-01-26 ENCOUNTER — OFFICE VISIT (OUTPATIENT)
Dept: FAMILY MEDICINE CLINIC | Facility: CLINIC | Age: 77
End: 2022-01-26
Payer: MEDICARE

## 2022-01-26 VITALS
HEIGHT: 61 IN | OXYGEN SATURATION: 98 % | BODY MASS INDEX: 21.9 KG/M2 | HEART RATE: 74 BPM | SYSTOLIC BLOOD PRESSURE: 126 MMHG | DIASTOLIC BLOOD PRESSURE: 70 MMHG | RESPIRATION RATE: 18 BRPM | WEIGHT: 116 LBS | TEMPERATURE: 97 F

## 2022-01-26 DIAGNOSIS — M54.2 NECK PAIN: ICD-10-CM

## 2022-01-26 DIAGNOSIS — I10 PRIMARY HYPERTENSION: ICD-10-CM

## 2022-01-26 DIAGNOSIS — R63.4 WEIGHT LOSS, UNINTENTIONAL: ICD-10-CM

## 2022-01-26 DIAGNOSIS — R10.30 LOWER ABDOMINAL PAIN: Primary | ICD-10-CM

## 2022-01-26 DIAGNOSIS — N18.31 STAGE 3A CHRONIC KIDNEY DISEASE (HCC): ICD-10-CM

## 2022-01-26 DIAGNOSIS — E03.9 HYPOTHYROIDISM, UNSPECIFIED TYPE: ICD-10-CM

## 2022-01-26 DIAGNOSIS — F41.1 GENERALIZED ANXIETY DISORDER: ICD-10-CM

## 2022-01-26 PROCEDURE — 99214 OFFICE O/P EST MOD 30 MIN: CPT | Performed by: INTERNAL MEDICINE

## 2022-01-26 NOTE — PATIENT INSTRUCTIONS
Neck Exercises   AMBULATORY CARE:   Neck exercises  help reduce neck pain, and improve neck movement and strength  Neck exercises also help prevent long-term neck problems  What you need to know about neck exercises:   Do the exercises every day,  or as often as directed by your healthcare provider  Move slowly, gently, and smoothly  Avoid fast or jerky motions  Stand and sit the way your healthcare provider shows you  Good posture may reduce your neck pain  Check your posture often, even when you are not doing your neck exercises  How to perform neck exercises safely:   Exercise position:  You may sit or stand while you do neck exercises  Face forward  Your shoulders should be straight and relaxed, with a good posture  Head tilts, forward and back:  Gently bow your head and try to touch your chin to your chest  Your healthcare provider may tell you to push on the back of your neck to help bow your head  Raise your chin back to the starting position  Tilt your head back as far as possible so you are looking up at the ceiling  Your healthcare provider may tell you to lift your chin to help tilt your head back  Return your head to the starting position  Head tilts, side to side:  Tilt your head, bringing your ear toward your shoulder  Then tilt your head toward the other shoulder  Head turns:  Turn your head to look over your shoulder  Tilt your chin down and try to touch it to your shoulder  Do not raise your shoulder to your chin  Face forward again  Do the same on the other side  Head rolls:  Slowly bring your chin toward your chest  Next, roll your head to the right  Your ear should be positioned over your shoulder  Hold this position for 5 seconds  Roll your head back toward your chest and to the left into the same position  Hold for 5 seconds  Gently roll your head back and around in a clockwise [place] 3 times   Next, move your head in the reverse direction (counterclockwise) in a [place] 3 times  Do not shrug your shoulders upwards while you do this exercise  Contact your healthcare provider if:   Your pain does not get better, or gets worse  You have questions or concerns about your condition, care, or exercise program     © Copyright [employer] 2021 Information is for End User's use only and may not be sold, redistributed or otherwise used for commercial purposes  All illustrations and images included in CareNotes® are the copyrighted property of A D A M , Inc  or Gladis Macdonald   The above information is an  only  It is not intended as medical advice for individual conditions or treatments  Talk to your doctor, nurse or pharmacist before following any medical regimen to see if it is safe and effective for you  Neck Pain   AMBULATORY CARE:   Neck pain  may be sudden and increase quickly  You may instead feel pain build slowly over time  Neck pain may go away in a few days or weeks, or it may continue for months  The pain may come and go, or be worse with certain movements  The pain may be only in your neck, or it may move to your arms, back, or shoulders  You may also have pain that starts in another body area and moves to your neck  Some types of neck pain are permanent  Seek care immediately if:   · You have an injury that causes neck pain and shooting pain down your arms or legs  · Your neck pain suddenly becomes severe  · You have neck pain along with numbness, tingling, or weakness in your arms or legs  · You have a stiff neck, a headache, and a fever  Contact your healthcare provider if:   · You have new or worsening symptoms  · Your symptoms continue even after treatment  · You have questions or concerns about your condition or care  Treatment  may include any of the following, depending on what is causing your pain:  · Medicines  may be prescribed or recommended by your healthcare provider for pain  You may need medicine to treat nerve pain or to stop muscle spasms  Medicines may also be given to reduce inflammation  Your healthcare provider may inject medicine into a nerve to block pain  Over-the-counter NSAID medicine or acetaminophen may be recommended to help treat minor pain or inflammation  · Traction  is used to relieve pressure from nerves  Your head is gently pulled up and away from your neck  This stretches muscles and ligaments and makes more room for the spine  Your healthcare provider will tell you the kind of traction that will help your neck pain  Do not use traction devices at home unless directed by your healthcare provider  · Surgery  may be needed if the pain is severe or other treatments do not work  Surgery will not help every kind of neck pain  You may need surgery to stabilize a fractured bone or to remove a tumor  Surgery may also be used to widen a narrowed spinal column or to remove a disc from between neck bones  Manage or prevent neck pain:   · Rest your neck as directed  Do not make sudden movements, such as turning your head quickly  Your healthcare provider may recommend you wear a cervical collar for a short time  The collar will prevent you from moving your head  This will give your neck time to heal if an injury is causing your neck pain  Ask your healthcare provider when you can return to sports or other normal daily activities  · Apply heat as directed  Heat helps relieve pain and swelling  Use a heat wrap, or soak a small towel in warm water  Wring out the extra water  Apply the heat wrap or towel for 20 minutes every hour, or as directed  · Apply ice as directed  Ice helps relieve pain and swelling, and can help prevent tissue damage  Use an ice pack, or put ice in a bag  Cover the ice pack or back with a towel before you apply it to your neck  Apply the ice pack or ice for 15 minutes every hour, or as directed   Your healthcare provider can tell you how often to apply ice  · Do neck exercises as directed  Neck exercises help strengthen the muscles and increase range of motion  Your healthcare provider will tell you which exercises are right for you  He may give you instructions, or he may recommend that you work with a physical therapist  Your healthcare provider or therapist can make sure you are doing the exercises correctly  · Maintain good posture  Try to keep your head and shoulders lifted when you sit  If you work in front of a computer, make sure the monitor is at the right level  You should not need to look up down to see the screen  You should also not have to lean forward to be able to read what is on the screen  Make sure your keyboard, mouse, and other computer items are placed where you do not have to extend your shoulder to reach them  Get up often if you work in front of a computer or sit for long periods of time  Stretch or walk around to keep your neck muscles loose  Follow up with your healthcare provider as directed: Your healthcare provider may refer you to a specialist if your pain does not get better with treatment  Write down your questions so you remember to ask them during your visits  © Copyright [employer] 2021 Information is for End User's use only and may not be sold, redistributed or otherwise used for commercial purposes  All illustrations and images included in CareNotes® are the copyrighted property of A D A M , Inc  or Gladis Estes  The above information is an  only  It is not intended as medical advice for individual conditions or treatments  Talk to your doctor, nurse or pharmacist before following any medical regimen to see if it is safe and effective for you

## 2022-01-26 NOTE — PROGRESS NOTES
[facility]        NAME: Tejinder Cruz is a 64 y o  female  : 1945    MRN: [id number]  DATE: 2022  TIME: 12:10 PM    Assessment and Plan   1  Lower abdominal pain  - her pain is supra-pubic and LLQ on exam, abdomen is soft, no rebound or guarding  CT unremarkable but limited by lack of contrast  Unclear whether GI issue or urinary, as she has history of UTIs and pyelitis  She is seeing GI tomorrow  I am concerned about unintentional weight loss  Recent CMP showed normal liver enzymes and stable kidney function  She had elevated CRP in December, likely from COVID-19 infection  We discussed further evaluation with bladder US, possible Urology consult if GI causes ruled out  2  Hypothyroidism, unspecified type  - recent TSH/FT4 normal, she's had fatigue and unintentional weight loss (10 lbs)  Follows with Endocrine  3  Primary hypertension  - well-controlled    4  Stage 3a chronic kidney disease (HCC)  - stable on recent labs, no albuminuria    5  Generalized anxiety disorder  - she is very anxious today, we did discuss treatments like SSRIs and/or counseling, she prefers to hold off for now    6  Weight loss, unintentional  - ~10-15 lbs over past year  Recent TSH normal, she is up to date for colon cancer screening and mammography  Denies dysphagia, but is having abdominal pain, possible early satiety  Seeing GI tomorrow  Follow up again in 1 month for weight check, further evaluation    7  Neck pain  - chronic problem but worsened since she had covid last month, xrays May 2021 showed foraminal narrowing, degenerative changes  Discussed PT referral, home exercises given, continue lidocaine 4% patch prn              Chief Complaint     Chief Complaint   Patient presents with    Follow-up     pt reports pressure at her ear on and off    Abdominal Pain     made GI appointment at [place], would like to schedule at [facility] GERD         History of Present Illness       Here for 6 month follow up  Sister  of cancer earlier this month  Unknown primary but did have prior history of breast cancer and was a smoker  She is worried she may have cancer as well  LLQ pain: described as soreness/achiness, started about 1 week ago  Intermittent, worsened by drinking water yesterday and had nausea  Not affected by food but has noticed some early satiety and about 10 lb weight loss since July  Associated symptoms include excessive flatus, bloating  Has one BM per day, soft, denies blood or mucus  Takes metamucil daily  Saw Dr Slade Whittaker here last week, CT abdomen/pelvis without contrast unremarkable  Her symptoms have improved with pepcid and tums  Denies reflux or heartburn  Neck and upper back pain: started about a month ago, neck pain mostly on left side and worse with movement  She tried robaxin but it made her drowsy and caused night bernal  Did have some relief with lidocaine patches  Depression/anxiety: this worsened around Xmas when she learned her sister has cancer and  earlier this month  She lives alone, but children live near by  Review of Systems   Review of Systems   Constitutional: Positive for appetite change, fatigue and unexpected weight change  Negative for chills and fever  HENT: Positive for ear pain  Negative for ear discharge, sore throat, trouble swallowing and voice change  Eyes: Positive for pain  Negative for photophobia  Respiratory: Negative for cough, chest tightness and shortness of breath  Cardiovascular: Negative for chest pain, palpitations and leg swelling  Gastrointestinal: Positive for abdominal pain and nausea  Negative for abdominal distention, blood in stool, constipation, diarrhea and vomiting  Genitourinary: Positive for pelvic pain and urgency  Negative for difficulty urinating, dysuria, hematuria, vaginal bleeding, vaginal discharge and vaginal pain     Musculoskeletal: Positive for back pain and neck pain  Neurological: Negative for dizziness, weakness, light-headedness, numbness and headaches  Psychiatric/Behavioral: Positive for dysphoric mood  The patient is nervous/anxious  Current Medications       Current Outpatient Medications:     ASPIRIN 81 PO, Take 81 mg by mouth daily, Disp: , Rfl:     atorvastatin (LIPITOR) 20 mg tablet, Take 20 mg by mouth, Disp: , Rfl:     Blood Glucose Monitoring Suppl (OneTouch Verio) w/Device KIT, Use to test blood sugar 4x daily  , Disp: 1 kit, Rfl: 0    Cholecalciferol 2000 units CAPS, Take 1 capsule by mouth, Disp: , Rfl:     famotidine (PEPCID) 20 mg tablet, Take 1 tablet (20 mg total) by mouth daily, Disp: 60 tablet, Rfl: 1    glucose blood (OneTouch Verio) test strip, Use to test blood sugar 4x daily  , Disp: 400 each, Rfl: 0    levothyroxine 75 mcg tablet, levothyroxine 75 mcg tablet  take 1 tablet by mouth once daily, Disp: , Rfl:     lisinopril (ZESTRIL) 20 mg tablet, lisinopril 20 mg tablet, Disp: , Rfl:     Multiple Vitamins-Minerals (MULTIVITAL-M PO), Take by mouth, Disp: , Rfl:     OneTouch Delica Lancets 90Y MISC, Use to test blood sugar 4x daily  , Disp: 300 each, Rfl: 2    atorvastatin (LIPITOR) 40 mg tablet, Take 20 mg by mouth daily (Patient not taking: Reported on 1/18/2022 ), Disp: , Rfl:     insulin aspart protamine-insulin aspart (NovoLOG Mix 70/30) 100 units/mL injection, Inject 12 Units under the skin 2 (two) times a day before meals (Patient taking differently: Inject 12 Units under the skin 2 (two) times a day before meals 12 units in am and 14 units in pm ), Disp: 30 mL, Rfl: 3    Lidocaine (Salonpas Pain Relieving) 4 % PTCH, Apply 2 patches topically 2 (two) times a day (Patient not taking: Reported on 1/3/2022 ), Disp: 10 patch, Rfl: 0    Current Allergies     Allergies as of 01/26/2022 - Reviewed 01/26/2022   Allergen Reaction Noted    Diphenhydramine Shortness Of Breath 04/21/2016    Meperidine Hives and Shortness Of Breath 04/21/2016    Prednisone Hives and Shortness Of Breath 01/19/2018    Bee pollen  04/21/2016    Ciprofloxacin Hives 04/21/2016    Sulfamethoxazole-trimethoprim GI Intolerance 07/03/2019            The following portions of the patient's history were reviewed and updated as appropriate: allergies, current medications, past family history, past medical history, past social history, past surgical history and problem list      Past Medical History:   Diagnosis Date    Anxiety     Colon polyp     Depression     Diabetes mellitus ([facility] [address] )     Diverticulosis     Hyperlipidemia     Hypertension     Osteoporosis     Type 2 diabetes mellitus without complication, without long-term current use of insulin ([facility] ) 4/25/2021       Past Surgical History:   Procedure Laterality Date    APPENDECTOMY      CATARACT EXTRACTION Right     COLONOSCOPY      difficult exam    HYSTERECTOMY      TONSILLECTOMY      WISDOM TOOTH EXTRACTION         Family History   Problem Relation Age of Onset    Parkinsonism Mother     Alzheimer's disease Father     Breast cancer Sister         in her 42's   Darleen Mare Lung cancer Sister     No Known Problems Daughter     No Known Problems Maternal Grandmother     No Known Problems Maternal Grandfather     No Known Problems Paternal Grandmother     No Known Problems Paternal Grandfather     No Known Problems Daughter     No Known Problems Maternal Aunt     No Known Problems Maternal Aunt     No Known Problems Maternal Aunt     No Known Problems Paternal Aunt          Medications have been verified  Objective   /70   Pulse 74   Temp (!) 97 °F (36 1 °C)   Resp 18   Ht 5' 1" (1 549 m)   Wt 52 6 kg (116 lb)   SpO2 98%   BMI 21 92 kg/m²        Physical Exam     Physical Exam  Vitals reviewed  Constitutional:       General: She is not in acute distress  Appearance: She is normal weight     HENT:      Right Ear: Tympanic membrane, ear canal and external ear normal  Left Ear: Hearing, tympanic membrane, ear canal and external ear normal       Mouth/Throat:      Pharynx: No pharyngeal swelling or oropharyngeal exudate  Neck:      Thyroid: No thyromegaly  Cardiovascular:      Rate and Rhythm: Normal rate and regular rhythm  Heart sounds: No murmur heard  No friction rub  No gallop  Pulmonary:      Effort: Pulmonary effort is normal  No respiratory distress  Breath sounds: Normal breath sounds  No wheezing, rhonchi or rales  Abdominal:      General: Abdomen is flat  Bowel sounds are increased  Palpations: Abdomen is soft  There is no hepatomegaly or mass  Tenderness: There is abdominal tenderness in the suprapubic area  There is no guarding or rebound  Hernia: No hernia is present  Musculoskeletal:      Cervical back: No rigidity  Pain with movement and muscular tenderness present  No spinous process tenderness  Lymphadenopathy:      Cervical: No cervical adenopathy  Neurological:      General: No focal deficit present  Mental Status: She is alert  Psychiatric:         Mood and Affect: Mood is anxious  Behavior: Behavior normal              Results:  Lab Results   Component Value Date    SODIUM 135 (L) 01/25/2022    K 3 9 01/25/2022     01/25/2022    CO2 28 01/25/2022    BUN 15 01/25/2022    CREATININE 1 10 01/25/2022    GLUC 264 (H) 12/27/2021    CALCIUM 9 1 01/25/2022       Lab Results   Component Value Date    HGBA1C 7 4 (H) 01/25/2022       Lab Results   Component Value Date    WBC 6 43 12/27/2021    HGB 12 0 12/27/2021    HCT 37 9 12/27/2021    MCV 89 12/27/2021     12/27/2021     CT A/P without contrast 01/18/22     FINDINGS:     ABDOMEN     LOWER CHEST:  No clinically significant abnormality identified in the visualized lower chest      LIVER/BILIARY TREE:  Unremarkable      GALLBLADDER:  No calcified gallstones   No pericholecystic inflammatory change      SPLEEN:  Unremarkable      PANCREAS: Unremarkable      ADRENAL GLANDS:  Unremarkable      KIDNEYS/URETERS:  Unremarkable  No hydronephrosis      STOMACH AND BOWEL:  Unremarkable      APPENDIX:  Surgically absent      ABDOMINOPELVIC CAVITY:  No ascites  No pneumoperitoneum  No lymphadenopathy      VESSELS:  Atherosclerotic changes are present    No evidence of aneurysm      PELVIS     REPRODUCTIVE ORGANS:  Surgical changes of prior hysterectomy      URINARY BLADDER:  Unremarkable      ABDOMINAL WALL/INGUINAL REGIONS:  Unremarkable      OSSEOUS STRUCTURES:  No acute fracture or destructive osseous lesion      IMPRESSION:     No acute abnormality in the abdomen or pelvis

## 2022-01-27 ENCOUNTER — CONSULT (OUTPATIENT)
Dept: GASTROENTEROLOGY | Facility: CLINIC | Age: 77
End: 2022-01-27
Payer: MEDICARE

## 2022-01-27 VITALS
HEIGHT: 61 IN | BODY MASS INDEX: 21.34 KG/M2 | DIASTOLIC BLOOD PRESSURE: 66 MMHG | RESPIRATION RATE: 16 BRPM | TEMPERATURE: 98.1 F | HEART RATE: 74 BPM | SYSTOLIC BLOOD PRESSURE: 124 MMHG | WEIGHT: 113 LBS

## 2022-01-27 DIAGNOSIS — R10.32 LLQ ABDOMINAL PAIN: ICD-10-CM

## 2022-01-27 DIAGNOSIS — K21.9 GASTROESOPHAGEAL REFLUX DISEASE, UNSPECIFIED WHETHER ESOPHAGITIS PRESENT: ICD-10-CM

## 2022-01-27 DIAGNOSIS — R68.81 EARLY SATIETY: ICD-10-CM

## 2022-01-27 DIAGNOSIS — R63.4 UNINTENTIONAL WEIGHT LOSS: Primary | ICD-10-CM

## 2022-01-27 PROCEDURE — 99214 OFFICE O/P EST MOD 30 MIN: CPT | Performed by: FAMILY MEDICINE

## 2022-01-27 NOTE — PROGRESS NOTES
[facility] - Outpatient Consultation  Koffi Perez 64 y o  female MRN: [id number]  Encounter: [id number]          ASSESSMENT AND PLAN:      1  Gastroesophageal reflux disease, unspecified whether esophagitis present  2  Early satiety  Patient with new onset belching, burning sensation, and early satiety since the passing of her sister within the last month due to breast cancer  Symptoms have greatly improved with the addition of Pepcid 20 mg twice daily PRN for reflux  Patient is extremely concerned that her symptoms may be related to cancer  Discussed the symptoms are likely related to new onset GERD, especially given improvement with Pepcid, although it is not unreasonable to pursue an EGD given her description of early satiety  Patient wishes to proceed with endoscopic evaluation  Discussed risks of procedure with patient including, but not limited to, bleeding, infection, and perforation; Patient gave verbal understanding and is agreeable to proceed  Discussed and given instructions for prep  - EGD; Future    3  Unintentional weight loss  4  LLQ abdominal pain  Patient with approximately 11 lb weight loss within the last 6 months and persistent left lower quadrant abdominal pain with unremarkable CTA/P on 01/18/2022 - Although study was limited due to lack of contrast 2/2 CKD3  Patient states her left lower quadrant abdominal pain has improved with the use of Pepcid, although she is exceptionally concerned regarding this unintentional weight loss  Again, she is concerned that her symptoms may be related to cancer  Discussed the patient appears to have significantly increased stress/anxiety, given the recent passing of her sister within the last month due to breast cancer  Patient agrees    Discussed that these symptoms can be related to increased stress and anxiety, but it is not unreasonable to pursue a colonoscopy given her unintentional weight loss - Although extremely unlikely her sxs are related to malignancy given recent colonoscopy on 7/6/2020 with one tubular adenoma, removed  Patient again wishes to proceed with endoscopic evaluation  Discussed the process and risks of procedure with patient as previously mentioned  Discussed the given instructions for bowel prep as ordered - MiraLax/Dulcolax  Strongly encouraged patient to discuss options for managing her anxiety with her PCP  Patient agreed  - Ambulatory Referral to Gastroenterology  - Colonoscopy; Future  - EGD; Future    Patient is diabetic  Follow-up two weeks after procedure to discuss results  ______________________________________________________________________    HPI: Patient is a 64 y o  female with PMH significant for hypothyroidism, DM2, HTN, HLD, congestive heart disease, CKD3, and RAMILA who presents today for a consultation regarding LLQ abdominal pain and unintentional weight loss  Patient was kindly referred by her PCP  Patient previously saw her PCP on 1/18/2022 for left lower abdominal soreness, belching, and a burning sensation in her chest she atrributed to reflux  Patient's belching and burning sensation improved with the use of twice daily Pepcid - Although she noted worsening LLQ soreness with twice daily Pepcid so reduced to once daily Pepcid, still with good effect  Patient's PCP ordered a CT A/P w/o contrast, given patient's history of CKD3, without evidence of an acute abnormality in the abdomen or pelvis - Study was limited 2/2 lack of contrast  Patient also admits to unintentional weight loss, approx  11 lb weight loss in 6 months, and early satiety  Denies difficulty swallowing, heartburn, nausea/vomiting, diarrhea, or noticing blood in her stools or black and tarry stools  Patient did bring a stool sample which appears formed and brown  Admits to constipation, but is able to produce a daily BM without straining with once daily Metamucil       Of note, patient recently lost a sister to breast cancer and is very concerned she has cancer due to her current weight loss  She is tearful during interview and admits to increased anxiety  Admits to having a son diagnosed with colon CA at 52years old  Most recent colonoscopy 7/6/2020 with one sessile adenomatous-appearing polyp mesauring 5-10 mm in the transverse colon removed with hot snare  Biopsies indicate tubular adenoma, negative for high-grade dysplasia  Recommended repeat colonoscopy x5 years  REVIEW OF SYSTEMS:    CONSTITUTIONAL: Denies any fever, chills, rigors, and weight loss  HEENT: No earache or tinnitus  Denies hearing loss or visual disturbances  CARDIOVASCULAR: No chest pain or palpitations  RESPIRATORY: Denies any cough, hemoptysis, shortness of breath or dyspnea on exertion  GASTROINTESTINAL: As noted in the History of Present Illness  GENITOURINARY: No problems with urination  Denies any hematuria or dysuria  NEUROLOGIC: No dizziness or vertigo, denies headaches  MUSCULOSKELETAL: Denies any muscle or joint pain  SKIN: Denies skin rashes or itching  ENDOCRINE: Denies excessive thirst  Denies intolerance to heat or cold  PSYCHOSOCIAL: Denies depression or anxiety  Denies any recent memory loss         Historical Information   Past Medical History:   Diagnosis Date    Anxiety     Colon polyp     Depression     [facility] [address] [facility]     Diverticulosis     Hyperlipidemia     Hypertension     Osteoporosis     Type 2 diabetes mellitus without complication, without long-term current use of insulin ([facility] [address] ) 4/25/2021     Past Surgical History:   Procedure Laterality Date    APPENDECTOMY      CATARACT EXTRACTION Right     COLONOSCOPY      difficult exam    HYSTERECTOMY      TONSILLECTOMY      WISDOM TOOTH EXTRACTION       Social History   Social History     Substance and Sexual Activity   Alcohol Use Not Currently    Comment: rare     Social History     Substance and Sexual Activity   Drug Use Never     Social History     Tobacco Use   Smoking Status Never Smoker   Smokeless Tobacco Never Used     Family History   Problem Relation Age of Onset    Parkinsonism Mother     Alzheimer's disease Father     Breast cancer Sister         in her 42's   [place] Lung cancer Sister     No Known Problems Daughter     No Known Problems Maternal Grandmother     No Known Problems Maternal Grandfather     No Known Problems Paternal Grandmother     No Known Problems Paternal Grandfather     No Known Problems Daughter     No Known Problems Maternal Aunt     No Known Problems Maternal Aunt     No Known Problems Maternal Aunt     No Known Problems Paternal Aunt        Meds/Allergies       Current Outpatient Medications:     ASPIRIN 81 PO    atorvastatin (LIPITOR) 20 mg tablet    atorvastatin (LIPITOR) 40 mg tablet    Blood Glucose Monitoring Suppl (OneTouch Verio) w/Device KIT    Cholecalciferol 2000 units CAPS    famotidine (PEPCID) 20 mg tablet    glucose blood (OneTouch Verio) test strip    insulin aspart protamine-insulin aspart (NovoLOG Mix 70/30) 100 units/mL injection    levothyroxine 75 mcg tablet    Lidocaine (Salonpas Pain Relieving) 4 % PTCH    lisinopril (ZESTRIL) 20 mg tablet    Multiple Vitamins-Minerals (MULTIVITAL-M PO)    OneTouch Delica Lancets 09I MISC    Allergies   Allergen Reactions    Diphenhydramine Shortness Of Breath    Meperidine Hives and Shortness Of Breath    Prednisone Hives and Shortness Of Breath    Bee Pollen     Ciprofloxacin Hives    Sulfamethoxazole-Trimethoprim GI Intolerance           Objective     not currently breastfeeding  There is no height or weight on file to calculate BMI  PHYSICAL EXAM:      General Appearance:   Patient appears tearful and anxious; Alert, cooperative, no distress   HEENT:   Normocephalic, atraumatic, anicteric       Neck:  Supple, symmetrical, trachea midline   Lungs:   Clear to auscultation bilaterally; no rales, rhonchi or wheezing; respirations unlabored    Heart[de-identified]   Regular rate and rhythm; no murmur, rub, or gallop  Abdomen:   Soft, +mild LLQ abd TTP, non-distended; normal bowel sounds; no masses, no organomegaly    Genitalia:   Deferred    Rectal:   Deferred    Extremities:  No cyanosis, clubbing or edema    Pulses:  2+ and symmetric    Skin:  No jaundice, rashes, or lesions    Lymph nodes:  No palpable cervical lymphadenopathy        Lab Results:   No visits with results within 1 Day(s) from this visit  Latest known visit with results is:   Appointment on 01/25/2022   Component Date Value    TSH 3RD GENERATON 01/25/2022 2 600     Free T4 01/25/2022 1 23     Creatinine, Ur 01/25/2022 133 0     Microalbum  ,U,Random 01/25/2022 15 6     Microalb Creat Ratio 01/25/2022 12     Cholesterol 01/25/2022 172     Triglycerides 01/25/2022 173*    HDL, Direct 01/25/2022 74     LDL Calculated 01/25/2022 63     Non-HDL-Chol (CHOL-HDL) 01/25/2022 98     Hemoglobin A1C 01/25/2022 7 4*    EAG 01/25/2022 166     Sodium 01/25/2022 135*    Potassium 01/25/2022 3 9     Chloride 01/25/2022 102     CO2 01/25/2022 28     ANION GAP 01/25/2022 5     BUN 01/25/2022 15     Creatinine 01/25/2022 1 10     Glucose, Fasting 01/25/2022 213*    Calcium 01/25/2022 9 1     AST 01/25/2022 13     ALT 01/25/2022 15     Alkaline Phosphatase 01/25/2022 86     Total Protein 01/25/2022 7 5     Albumin 01/25/2022 3 9     Total Bilirubin 01/25/2022 0 86     eGFR 01/25/2022 48          Radiology Results:   CT abdomen pelvis wo contrast    Result Date: 1/18/2022  Narrative: CT ABDOMEN AND PELVIS WITHOUT IV CONTRAST INDICATION:   R10 32: Left lower quadrant pain  COMPARISON:  4/25/2021 TECHNIQUE:  CT examination of the abdomen and pelvis was performed without intravenous contrast   Axial, sagittal, and coronal 2D reformatted images were created from the source data and submitted for interpretation   Radiation dose length product (DLP) for this visit:  569 16 mGy-cm   This examination, like all CT scans performed in the [facility], was performed utilizing techniques to minimize radiation dose exposure, including the use of iterative  reconstruction and automated exposure control  Enteric contrast was not administered  FINDINGS: ABDOMEN LOWER CHEST:  No clinically significant abnormality identified in the visualized lower chest  LIVER/BILIARY TREE:  Unremarkable  GALLBLADDER:  No calcified gallstones  No pericholecystic inflammatory change  SPLEEN:  Unremarkable  PANCREAS:  Unremarkable  ADRENAL GLANDS:  Unremarkable  KIDNEYS/URETERS:  Unremarkable  No hydronephrosis  STOMACH AND BOWEL:  Unremarkable  APPENDIX:  Surgically absent  ABDOMINOPELVIC CAVITY:  No ascites  No pneumoperitoneum  No lymphadenopathy  VESSELS:  Atherosclerotic changes are present  No evidence of aneurysm  PELVIS REPRODUCTIVE ORGANS:  Surgical changes of prior hysterectomy  URINARY BLADDER:  Unremarkable  ABDOMINAL WALL/INGUINAL REGIONS:  Unremarkable  OSSEOUS STRUCTURES:  No acute fracture or destructive osseous lesion  Impression: No acute abnormality in the abdomen or pelvis  Workstation performed: [id number]     XR spine lumbar minimum 4 views non injury    Result Date: 1/14/2022  Narrative: LUMBAR SPINE INDICATION:   R20 8: Other disturbances of skin sensation  COMPARISON: L-spine radiographs 5/11/2021  CT abdomen pelvis 4/25/2021  VIEWS:  XR SPINE LUMBAR MINIMUM 4 VIEWS NON INJURY FINDINGS: There are 5 non rib bearing lumbar vertebral bodies  There is no evidence of acute fracture or destructive osseous lesion  L2-L3 through L4-L5 slight to mild spondylolisthesis  Stable anatomic alignment otherwise  Slight levoscoliosis again noted  L4-L5, L5-S1 mild bilateral posterior facet arthrosis L1-L2 mild, L2-L3 moderate disc space narrowing with osteophytes and L2-L3 vacuum phenomena  No significant lumbar degenerative change noted  The pedicles appear intact  Soft tissues are unremarkable  Impression: Mild degenerative changes   Workstation performed: [id number]

## 2022-01-31 NOTE — PATIENT INSTRUCTIONS
Scheduled date of EGD/colonoscopy (as of today): 2/10/22  Physician performing EGD/colonoscopy:Nathaniel  Location of EGD/colonoscopy:[place]  Desired bowel prep reviewed with patient:Miralax  Instructions reviewed with patient by: Meghan  Clearances:  Diabetic

## 2022-02-01 ENCOUNTER — TELEPHONE (OUTPATIENT)
Dept: FAMILY MEDICINE CLINIC | Facility: CLINIC | Age: 77
End: 2022-02-01

## 2022-02-01 NOTE — TELEPHONE ENCOUNTER
Okay, recommend she weighs weekly, rather than daily as it is normal for weight to fluctuate 1-2 lbs daily

## 2022-02-01 NOTE — TELEPHONE ENCOUNTER
Called and spoke with patient  Informed as per provider  She verbalized understanding  Pt did report she is feeling better overall   Just feeling some anxiety due to having a colonoscopy scheduled for 2/10

## 2022-02-10 ENCOUNTER — HOSPITAL ENCOUNTER (OUTPATIENT)
Dept: GASTROENTEROLOGY | Facility: HOSPITAL | Age: 77
Setting detail: OUTPATIENT SURGERY
Discharge: HOME | End: 2022-02-10
Payer: MEDICARE

## 2022-02-10 ENCOUNTER — ANESTHESIA (OUTPATIENT)
Dept: GASTROENTEROLOGY | Facility: HOSPITAL | Age: 77
End: 2022-02-10

## 2022-02-10 ENCOUNTER — ANESTHESIA EVENT (OUTPATIENT)
Dept: GASTROENTEROLOGY | Facility: HOSPITAL | Age: 77
End: 2022-02-10

## 2022-02-10 VITALS
OXYGEN SATURATION: 100 % | RESPIRATION RATE: 16 BRPM | HEART RATE: 77 BPM | TEMPERATURE: 97.2 F | DIASTOLIC BLOOD PRESSURE: 60 MMHG | SYSTOLIC BLOOD PRESSURE: 124 MMHG

## 2022-02-10 DIAGNOSIS — R63.4 UNINTENTIONAL WEIGHT LOSS: ICD-10-CM

## 2022-02-10 DIAGNOSIS — R10.32 LLQ ABDOMINAL PAIN: ICD-10-CM

## 2022-02-10 DIAGNOSIS — R68.81 EARLY SATIETY: ICD-10-CM

## 2022-02-10 LAB — GLUCOSE SERPL-MCNC: 214 MG/DL (ref 65–140)

## 2022-02-10 PROCEDURE — 82948 REAGENT STRIP/BLOOD GLUCOSE: CPT

## 2022-02-10 PROCEDURE — 88305 TISSUE EXAM BY PATHOLOGIST: CPT | Performed by: PATHOLOGY

## 2022-02-10 PROCEDURE — 45380 COLONOSCOPY AND BIOPSY: CPT | Performed by: INTERNAL MEDICINE

## 2022-02-10 PROCEDURE — 43239 EGD BIOPSY SINGLE/MULTIPLE: CPT | Performed by: INTERNAL MEDICINE

## 2022-02-10 RX ORDER — LIDOCAINE HYDROCHLORIDE 20 MG/ML
INJECTION, SOLUTION EPIDURAL; INFILTRATION; INTRACAUDAL; PERINEURAL AS NEEDED
Status: DISCONTINUED | OUTPATIENT
Start: 2022-02-10 | End: 2022-02-10

## 2022-02-10 RX ORDER — PROPOFOL 10 MG/ML
INJECTION, EMULSION INTRAVENOUS AS NEEDED
Status: DISCONTINUED | OUTPATIENT
Start: 2022-02-10 | End: 2022-02-10

## 2022-02-10 RX ORDER — SODIUM CHLORIDE, SODIUM LACTATE, POTASSIUM CHLORIDE, CALCIUM CHLORIDE 600; 310; 30; 20 MG/100ML; MG/100ML; MG/100ML; MG/100ML
125 INJECTION, SOLUTION INTRAVENOUS CONTINUOUS
Status: DISCONTINUED | OUTPATIENT
Start: 2022-02-10 | End: 2022-02-14 | Stop reason: HOSPADM

## 2022-02-10 RX ORDER — PROPOFOL 10 MG/ML
INJECTION, EMULSION INTRAVENOUS CONTINUOUS PRN
Status: DISCONTINUED | OUTPATIENT
Start: 2022-02-10 | End: 2022-02-10

## 2022-02-10 RX ADMIN — PROPOFOL 130 MCG/KG/MIN: 10 INJECTION, EMULSION INTRAVENOUS at 09:52

## 2022-02-10 RX ADMIN — SODIUM CHLORIDE, SODIUM LACTATE, POTASSIUM CHLORIDE, AND CALCIUM CHLORIDE 125 ML/HR: .6; .31; .03; .02 INJECTION, SOLUTION INTRAVENOUS at 09:30

## 2022-02-10 RX ADMIN — LIDOCAINE HYDROCHLORIDE 50 MG: 20 INJECTION, SOLUTION EPIDURAL; INFILTRATION; INTRACAUDAL; PERINEURAL at 09:52

## 2022-02-10 RX ADMIN — PROPOFOL 60 MG: 10 INJECTION, EMULSION INTRAVENOUS at 09:52

## 2022-02-10 NOTE — H&P
History and Physical - [facility]  Terry Trinidad 64 y o  female MRN: [id number]                  HPI: Terry Trinidad is a 64y o  year old female who presents for early satiety and unexplained weight loss  REVIEW OF SYSTEMS: Per the HPI, and otherwise unremarkable      Historical Information   Past Medical History:   Diagnosis Date    Anxiety     Colon polyp     Depression     [facility]     Diverticulosis     Hyperlipidemia     Hypertension     Osteoporosis     Type 2 diabetes mellitus without complication, without long-term current use of insulin ([facility] ) 4/25/2021     Past Surgical History:   Procedure Laterality Date    APPENDECTOMY      CATARACT EXTRACTION Right     COLONOSCOPY      difficult exam    HYSTERECTOMY      TONSILLECTOMY      WISDOM TOOTH EXTRACTION       Social History   Social History     Substance and Sexual Activity   Alcohol Use Not Currently    Comment: rare     Social History     Substance and Sexual Activity   Drug Use Never     Social History     Tobacco Use   Smoking Status Never Smoker   Smokeless Tobacco Never Used     Family History   Problem Relation Age of Onset    Parkinsonism Mother     Alzheimer's disease Father     Breast cancer Sister         in her 42's    Lung cancer Sister     No Known Problems Daughter     No Known Problems Maternal Grandmother     No Known Problems Maternal Grandfather     No Known Problems Paternal Grandmother     No Known Problems Paternal Grandfather     No Known Problems Daughter     No Known Problems Maternal Aunt     No Known Problems Maternal Aunt     No Known Problems Maternal Aunt     No Known Problems Paternal Aunt        Meds/Allergies       Current Outpatient Medications:     atorvastatin (LIPITOR) 20 mg tablet    famotidine (PEPCID) 20 mg tablet    levothyroxine 75 mcg tablet    lisinopril (ZESTRIL) 20 mg tablet    ASPIRIN 81 PO    atorvastatin (LIPITOR) 40 mg tablet    Blood Glucose Monitoring Suppl (OneTouch Verio) w/Device KIT    Cholecalciferol 2000 units CAPS    glucose blood (OneTouch Verio) test strip    insulin aspart protamine-insulin aspart (NovoLOG Mix 70/30) 100 units/mL injection    Lidocaine (Salonpas Pain Relieving) 4 % PTCH    Multiple Vitamins-Minerals (MULTIVITAL-M PO)    OneTouch Delica Lancets [facility]    Current Facility-Administered Medications:     lactated ringers infusion, 125 mL/hr, Intravenous, Continuous, Continue from Pre-op at 02/10/22 0941    Allergies   Allergen Reactions    Diphenhydramine Shortness Of Breath    Meperidine Hives and Shortness Of Breath    Prednisone Hives and Shortness Of Breath    Bee Pollen     Ciprofloxacin Hives    Sulfamethoxazole-Trimethoprim GI Intolerance       Objective     BP (!) 180/82   Pulse 84   Temp (!) 97 3 °F (36 3 °C) (Temporal)   Resp 18   SpO2 99%       PHYSICAL EXAM    Gen: NAD  Head: NCAT  CV: RRR  CHEST: Clear  ABD: soft, NT/ND  EXT: no edema      ASSESSMENT/PLAN:  This is a 64y o  year old female here for colonoscopy and endoscopy, and she is stable and optimized for her procedure

## 2022-02-10 NOTE — ANESTHESIA POSTPROCEDURE EVALUATION
Post-Op Assessment Note    CV Status:  Stable  Pain Score: 0    Pain management: adequate     Mental Status:  Arousable   Hydration Status:  Stable   PONV Controlled:  None   Airway Patency:  Patent      Post Op Vitals Reviewed: Yes      Staff: CRNA         No complications documented      BP  104/55   Temp     Pulse  62   Resp   16   SpO2   99

## 2022-02-10 NOTE — ANESTHESIA PREPROCEDURE EVALUATION
Procedure:  COLONOSCOPY  EGD    Relevant Problems   CARDIO   (+) Hyperlipidemia   (+) Hypertension      ENDO   (+) Hypothyroidism      /RENAL   (+) Stage 3a chronic kidney disease (HCC)      NEURO/PSYCH   (+) Anxiety   (+) Generalized anxiety disorder      Other   (+) Diabetes mellitus (HCC)        Physical Exam    Airway    Mallampati score: II  TM Distance: >3 FB  Neck ROM: full     Dental   Comment: edentulous,     Cardiovascular      Pulmonary      Other Findings        4/27/21 TTE:  LEFT VENTRICLE:  Size was normal   Systolic function was normal  Ejection fraction was estimated to be 60 %  There were no regional wall motion abnormalities  Wall thickness was mildly increased  The changes were consistent with concentric remodeling (increased wall thickness with normal wall mass)      TRICUSPID VALVE:  There was trace regurgitation      AORTA:  The root exhibited normal size and mild fibrocalcific change  There was dilatation of the ascending aorta, measuring 3 8 cm  Anesthesia Plan  ASA Score- 2     Anesthesia Type- IV sedation with anesthesia with ASA Monitors  Additional Monitors:   Airway Plan:     Comment: I discussed the risks and benefits of IV sedation anesthesia including the possibility of the need to convert to general anesthesia and the potential risk of awareness  Plan Factors-Exercise tolerance (METS): >4 METS  Chart reviewed  EKG reviewed  Existing labs reviewed  Patient summary reviewed  Patient is not a current smoker  Patient did not smoke on day of surgery  Induction- intravenous  Postoperative Plan-     Informed Consent- Anesthetic plan and risks discussed with patient

## 2022-02-10 NOTE — DISCHARGE INSTRUCTIONS
Upper Endoscopy and Colonoscopy   WHAT YOU NEED TO KNOW:   An upper endoscopy is also called an upper gastrointestinal (GI) endoscopy, or an esophagogastroduodenoscopy (EGD)  It is a procedure to examine the inside of your esophagus, stomach, and duodenum (first part of the small intestine) with a scope  You may feel bloated, gassy, or have some abdominal discomfort after your procedure  Your throat may be sore for 24 to 36 hours  You may burp or pass gas from air that is still inside your body  A colonoscopy is a procedure to examine the inside of your colon (intestine) with a scope  Polyps or tissue growths may have been removed during your colonoscopy  It is normal to feel bloated and to have some abdominal discomfort  You should be passing gas  If you have hemorrhoids or you had polyps removed, you may have a small amount of bleeding  DISCHARGE INSTRUCTIONS:   Seek care immediately if:   · You have sudden, severe abdominal pain  · You have problems swallowing  · You have a large amount of black, sticky bowel movements or blood in your bowel movements  · You have sudden trouble breathing  · You feel weak, lightheaded, or faint or your heart beats faster than normal for you  Contact your healthcare provider if:   · You have a fever and chills  · You have nausea or are vomiting  · Your abdomen is bloated or feels full and hard  · You have abdominal pain  · You have a large amount of black, sticky bowel movements or blood in your bowel movements  · You have not had a bowel movement for 3 days after your procedure  · You have rash or hives  · You have questions or concerns about your procedure  Activity:   ·       Do not lift, strain, or run for 24 hours after your procedure  ·       Rest after your procedure  You have been given medicine to relax you  Do not drive or make important decisions until the day after your procedure   Return to your normal activity as directed  ·       Relieve gas and discomfort from bloating by lying on your right side with a heating pad on your abdomen  You may need to take short walks to help the gas move out  Eat small meals until bloating is relieved  Follow up with your healthcare provider as directed: Write down your questions so you remember to ask them during your visits  If you take a blood thinner, please review the specific instructions from your endoscopist about when you should resume it  These can be found in the Recommendation and Your Medication list sections of this After Visit Summary

## 2022-02-11 ENCOUNTER — TELEPHONE (OUTPATIENT)
Dept: GASTROENTEROLOGY | Facility: CLINIC | Age: 77
End: 2022-02-11

## 2022-02-11 NOTE — TELEPHONE ENCOUNTER
patient called stating her heart is beating faster then normal  States she was having some anxiety so that may be from her anxiety  But wants advice about if it is something to be concerned about? She had a colon/egd yesterday and it just checking

## 2022-02-14 ENCOUNTER — APPOINTMENT (OUTPATIENT)
Dept: RADIOLOGY | Facility: CLINIC | Age: 77
End: 2022-02-14
Payer: MEDICARE

## 2022-02-14 ENCOUNTER — OFFICE VISIT (OUTPATIENT)
Dept: FAMILY MEDICINE CLINIC | Facility: CLINIC | Age: 77
End: 2022-02-14
Payer: MEDICARE

## 2022-02-14 VITALS
WEIGHT: 116.8 LBS | SYSTOLIC BLOOD PRESSURE: 120 MMHG | HEART RATE: 67 BPM | HEIGHT: 61 IN | OXYGEN SATURATION: 98 % | TEMPERATURE: 97.4 F | RESPIRATION RATE: 18 BRPM | BODY MASS INDEX: 22.05 KG/M2 | DIASTOLIC BLOOD PRESSURE: 68 MMHG

## 2022-02-14 DIAGNOSIS — M81.0 AGE-RELATED OSTEOPOROSIS WITHOUT CURRENT PATHOLOGICAL FRACTURE: ICD-10-CM

## 2022-02-14 DIAGNOSIS — M54.6 CHRONIC MIDLINE THORACIC BACK PAIN: ICD-10-CM

## 2022-02-14 DIAGNOSIS — F41.1 GENERALIZED ANXIETY DISORDER: ICD-10-CM

## 2022-02-14 DIAGNOSIS — R63.4 WEIGHT LOSS, UNINTENTIONAL: Primary | ICD-10-CM

## 2022-02-14 DIAGNOSIS — G89.29 CHRONIC MIDLINE THORACIC BACK PAIN: ICD-10-CM

## 2022-02-14 PROBLEM — I50.9 CONGESTIVE HEART DISEASE (HCC): Status: RESOLVED | Noted: 2021-04-25 | Resolved: 2022-02-14

## 2022-02-14 PROCEDURE — 99214 OFFICE O/P EST MOD 30 MIN: CPT | Performed by: INTERNAL MEDICINE

## 2022-02-14 PROCEDURE — 72072 X-RAY EXAM THORAC SPINE 3VWS: CPT

## 2022-02-14 NOTE — PATIENT INSTRUCTIONS
It is okay to get your covid 3rd dose/booster any time  Please get xrays of back today to rule out fracture from osteoporosis  Let me know if you want something for pain/muscle relaxer or something for anxiety

## 2022-02-14 NOTE — PROGRESS NOTES
[facility]        NAME: Dany Hilliard is a 64 y o  female  : 1945    MRN: [id number]  DATE: 2022  TIME: 8:45 AM    Assessment and Plan   1  Weight loss, unintentional  - weight today 116 8 lbs, she was 125 lbs in December  She recently underwent EGD/colonoscopy which appeared normal but still awaiting biopsies  Her TSH is normal  There may be a component of anxiety due to recent death of sister from metastatic cancer  2  Generalized anxiety disorder  - we discussed trial of SSRI vs prn vistaril or buspar  She prefers to hold off for now, also discussed counseling  Seems to be contributing to tension-type headaches and possibly weight loss  3  Age-related osteoporosis without current pathological fracture  - DEXA in September showed osteoporosis of bilateral femoral necks, we did discuss bisphosphonates and prolia but she declines due to concerns about side effects  She has point-tenderness along thoracic spine, will check xrays to evaluate for compression fracture  Advised her to also discuss osteoporosis treatment with her endocrinologist     -   XR spine thoracic 3 vw; Future; Expected date: 2022    4  Chronic midline thoracic back pain  -     XR spine thoracic 3 vw; Future; Expected date: 2022             Chief Complaint     Chief Complaint   Patient presents with    Follow-up     aches around neck and back, having gas pain  Wants to know if she can get her covid booster         History of Present Illness       Here for follow up weight loss, anxiety  Weight loss: had EGD/Colonoscopy last week  Still awaiting pathology, but appetite is improved and she's even gained a few lbs  Lowest weight at home 112 lbs  Still having occasional LLQ discomfort but it is improved  She reports a lot of flatus since colonoscopy and constipation  Neck pain: upper neck and shoulders/upper back   Started several months ago, had improved with PT and heat, then worse in December when she had covid  She was given robaxin by ER but it caused hallucinations and made her too sleepy  She is getting relief with 1 tablet of extra-strength tylenol  Also reports slight headache in back of neck and forehead, she does report photophobia as well but this is not new and she is seeing ophthalmology next month  Anxiety: a little better since her colonoscopy, feels like she's coping better with sister's death and grieving  Declines any medication for anxiety right now, tylenol seems to help  Review of Systems   Review of Systems   Constitutional: Positive for fatigue  Negative for appetite change, fever and unexpected weight change  Eyes: Positive for photophobia  Negative for pain and visual disturbance  Respiratory: Negative for shortness of breath  Cardiovascular: Negative for chest pain and palpitations  Gastrointestinal: Positive for constipation  Negative for abdominal pain, diarrhea, nausea and vomiting  Genitourinary: Negative for difficulty urinating and dysuria  Musculoskeletal: Positive for back pain and neck pain  Neurological: Positive for headaches  Negative for dizziness and light-headedness  Psychiatric/Behavioral: Positive for sleep disturbance  Negative for dysphoric mood, self-injury and suicidal ideas  The patient is nervous/anxious  Current Medications       Current Outpatient Medications:     ASPIRIN 81 PO, Take 81 mg by mouth daily, Disp: , Rfl:     atorvastatin (LIPITOR) 20 mg tablet, Take 20 mg by mouth, Disp: , Rfl:     Blood Glucose Monitoring Suppl (OneTouch Verio) w/Device KIT, Use to test blood sugar 4x daily  , Disp: 1 kit, Rfl: 0    Cholecalciferol 2000 units CAPS, Take 1 capsule by mouth, Disp: , Rfl:     famotidine (PEPCID) 20 mg tablet, Take 1 tablet (20 mg total) by mouth daily, Disp: 60 tablet, Rfl: 1    glucose blood (OneTouch Verio) test strip, Use to test blood sugar 4x daily  , Disp: 400 each, Rfl: 0   levothyroxine 75 mcg tablet, levothyroxine 75 mcg tablet  take 1 tablet by mouth once daily, Disp: , Rfl:     lisinopril (ZESTRIL) 20 mg tablet, lisinopril 20 mg tablet, Disp: , Rfl:     Multiple Vitamins-Minerals (MULTIVITAL-M PO), Take by mouth, Disp: , Rfl:     OneTouch Delica Lancets 93W MISC, Use to test blood sugar 4x daily  , Disp: 300 each, Rfl: 2    insulin aspart protamine-insulin aspart (NovoLOG Mix 70/30) 100 units/mL injection, Inject 12 Units under the skin 2 (two) times a day before meals (Patient taking differently: Inject 12 Units under the skin 2 (two) times a day before meals 12 units in am and 14 units in pm ), Disp: 30 mL, Rfl: 3    Lidocaine (Salonpas Pain Relieving) 4 % PTCH, Apply 2 patches topically 2 (two) times a day (Patient not taking: Reported on 2/14/2022 ), Disp: 10 patch, Rfl: 0  No current facility-administered medications for this visit      Current Allergies     Allergies as of 02/14/2022 - Reviewed 02/14/2022   Allergen Reaction Noted    Diphenhydramine Shortness Of Breath 04/21/2016    Meperidine Hives and Shortness Of Breath 04/21/2016    Prednisone Hives and Shortness Of Breath 01/19/2018    Bee pollen  04/21/2016    Ciprofloxacin Hives 04/21/2016    Sulfamethoxazole-trimethoprim GI Intolerance 07/03/2019            The following portions of the patient's history were reviewed and updated as appropriate: allergies, current medications, past family history, past medical history, past social history, past surgical history and problem list      Past Medical History:   Diagnosis Date    Anxiety     Colon polyp     Depression     Diabetes mellitus [facility] [address] )     Diverticulosis     Hyperlipidemia     Hypertension     Osteoporosis     Type 2 diabetes mellitus without complication, without long-term current [facility] 4/25/2021       Past Surgical History:   Procedure Laterality Date    APPENDECTOMY      CATARACT EXTRACTION Right     COLONOSCOPY difficult exam    HYSTERECTOMY      TONSILLECTOMY      WISDOM TOOTH EXTRACTION         Family History   Problem Relation Age of Onset   Antonella Figueroa Parkinsonism Mother     Alzheimer's disease Father     Breast cancer Sister         in her 42's   Antonella Figueroa Lung cancer Sister     No Known Problems Daughter     No Known Problems Maternal Grandmother     No Known Problems Maternal Grandfather     No Known Problems Paternal Grandmother     No Known Problems Paternal Grandfather     No Known Problems Daughter     No Known Problems Maternal Aunt     No Known Problems Maternal Aunt     No Known Problems Maternal Aunt     No Known Problems Paternal Aunt          Medications have been verified  Objective   /68   Pulse 67   Temp (!) 97 4 °F (36 3 °C) (Tympanic)   Resp 18   Ht 5' 1" (1 549 m)   Wt 53 kg (116 lb 12 8 oz)   SpO2 98%   BMI 22 07 kg/m²        Physical Exam     Physical Exam  Vitals reviewed  Constitutional:       General: She is not in acute distress  Appearance: She is normal weight  HENT:      Head: Normocephalic and atraumatic  Comments: No temporal artery tenderness  Eyes:      Pupils: Pupils are equal, round, and reactive to light  Cardiovascular:      Rate and Rhythm: Normal rate and regular rhythm  Pulses: Normal pulses  Heart sounds: Normal heart sounds  No murmur heard  No friction rub  No gallop  Pulmonary:      Effort: Pulmonary effort is normal  No respiratory distress  Breath sounds: Normal breath sounds  No wheezing, rhonchi or rales  Abdominal:      General: Abdomen is flat  Bowel sounds are normal  There is no distension  Palpations: Abdomen is soft  There is no mass  Tenderness: There is no abdominal tenderness  There is no guarding or rebound  Hernia: No hernia is present  Musculoskeletal:      Cervical back: Normal       Thoracic back: Bony tenderness present  Scoliosis present  Lumbar back: Tenderness present  Lymphadenopathy:      Cervical: No cervical adenopathy  Neurological:      General: No focal deficit present  Mental Status: She is alert  Motor: Motor function is intact  Gait: Gait is intact  Psychiatric:         Mood and Affect: Mood and affect normal          Speech: Speech normal          Behavior: Behavior normal  Behavior is cooperative  Thought Content:  Thought content normal          Judgment: Judgment normal              Results:  Lab Results   Component Value Date    SODIUM 135 (L) 01/25/2022    K 3 9 01/25/2022     01/25/2022    CO2 28 01/25/2022    BUN 15 01/25/2022    CREATININE 1 10 01/25/2022    GLUC 264 (H) 12/27/2021    CALCIUM 9 1 01/25/2022       Lab Results   Component Value Date    HGBA1C 7 4 (H) 01/25/2022       Lab Results   Component Value Date    WBC 6 43 12/27/2021    HGB 12 0 12/27/2021    HCT 37 9 12/27/2021    MCV 89 12/27/2021     12/27/2021

## 2022-02-24 ENCOUNTER — TELEPHONE (OUTPATIENT)
Dept: ENDOCRINOLOGY | Facility: CLINIC | Age: 77
End: 2022-02-24

## 2022-02-24 NOTE — TELEPHONE ENCOUNTER
Left message with [facility] [address] asking for the medicare form inorder for us to send her test strips refill in for her  Stated they can either fax us the form directly or call us to clarify

## 2022-02-28 DIAGNOSIS — E78.2 MIXED HYPERLIPIDEMIA: Primary | ICD-10-CM

## 2022-02-28 RX ORDER — ATORVASTATIN CALCIUM 20 MG/1
20 TABLET, FILM COATED ORAL DAILY
Qty: 30 TABLET | Refills: 5 | Status: SHIPPED | OUTPATIENT
Start: 2022-02-28 | End: 2022-06-28

## 2022-02-28 NOTE — TELEPHONE ENCOUNTER
Patient requesting refill(s) of: atorvastatin 20 mg     Last filled: Historical Provider  Last appt:2/14/2022  Next appt:3/2/2022  Pharmacy: [facility]

## 2022-03-14 ENCOUNTER — OFFICE VISIT (OUTPATIENT)
Dept: FAMILY MEDICINE CLINIC | Facility: CLINIC | Age: 77
End: 2022-03-14
Payer: MEDICARE

## 2022-03-14 VITALS
RESPIRATION RATE: 18 BRPM | HEART RATE: 72 BPM | OXYGEN SATURATION: 98 % | BODY MASS INDEX: 22.88 KG/M2 | WEIGHT: 121.2 LBS | TEMPERATURE: 97.5 F | SYSTOLIC BLOOD PRESSURE: 126 MMHG | DIASTOLIC BLOOD PRESSURE: 70 MMHG | HEIGHT: 61 IN

## 2022-03-14 DIAGNOSIS — R10.13 DYSPEPSIA: ICD-10-CM

## 2022-03-14 DIAGNOSIS — K59.00 CONSTIPATION, UNSPECIFIED CONSTIPATION TYPE: ICD-10-CM

## 2022-03-14 DIAGNOSIS — R10.32 LLQ ABDOMINAL PAIN: ICD-10-CM

## 2022-03-14 DIAGNOSIS — F41.1 GENERALIZED ANXIETY DISORDER: ICD-10-CM

## 2022-03-14 DIAGNOSIS — R63.4 WEIGHT LOSS, UNINTENTIONAL: Primary | ICD-10-CM

## 2022-03-14 PROCEDURE — 99214 OFFICE O/P EST MOD 30 MIN: CPT | Performed by: INTERNAL MEDICINE

## 2022-03-14 RX ORDER — FAMOTIDINE 20 MG/1
20 TABLET, FILM COATED ORAL DAILY
Qty: 60 TABLET | Refills: 3 | Status: SHIPPED | OUTPATIENT
Start: 2022-03-14

## 2022-03-14 NOTE — PROGRESS NOTES
[facility]        NAME: Becca Ashby is a 64 y o  female  : 1945    MRN: [id number]  DATE: 2022  TIME: 7:49 AM    Assessment and Plan   1  Weight loss, unintentional  - gained about 8 lbs since January, suspect anxiety was contributing to decreased appetite  Her EGD/Colonoscopy showed mild gastritis and tubular adenomas of descending colon, her TSH was normal, she is up to date with mammograms  2  Generalized anxiety disorder  - she has declined treatment with SSRIs and counseling, seems improved    3  LLQ abdominal pain  - her non-contrast CT was unremarkable in , she has seen GI, pain is improved, possibly IBS or anxiety related, her colonoscopy showed tubular adenomas, her pain and dyspeptic symptoms have improved with pepcid prn    4  Constipation, unspecified constipation type  - discussed adequate fiber intake, hydration, continue miralax/docusate prn    5  Dyspepsia  -     famotidine (PEPCID) 20 mg tablet; Take 1 tablet (20 mg total) by mouth daily             Chief Complaint     Chief Complaint   Patient presents with    Follow-up     requesting mammogram script, last done 21  states since colonoscopy she has had a hard time going to the bathroom  taking  miralax and stool softener         History of Present Illness       Here for 1 month follow up anxiety, weight loss and lower abdominal pain  Doing better, gained a few pounds, appetite improved, anxiety level also improved  Lower abdominal pain improved as well, feels only slight discomfort on occasion  Having constipation since her colonoscopy, stools soft but still has to push to have BM  Using metamucil daily, miralax and stool softener as well  Diabetes: FBG 78 today, 180s yesterday, she felt fine this morning  Sees Endocrine again in May, had issue with her test strips recently, called  and got more         Review of Systems   Review of Systems   Constitutional: Negative for appetite change, chills, fatigue, fever and unexpected weight change  Eyes: Negative for photophobia, pain and visual disturbance  Respiratory: Negative for cough, chest tightness and shortness of breath  Cardiovascular: Negative for chest pain, palpitations and leg swelling  Gastrointestinal: Positive for constipation  Negative for abdominal distention, abdominal pain, blood in stool, diarrhea, nausea and vomiting  Genitourinary: Negative for dysuria  Musculoskeletal: Negative for back pain  Neurological: Negative for dizziness, light-headedness, numbness and headaches  Psychiatric/Behavioral: Negative for dysphoric mood and sleep disturbance  The patient is not nervous/anxious  Current Medications       Current Outpatient Medications:     Ascorbic Acid (VITAMIN C PO), Take by mouth, Disp: , Rfl:     ASPIRIN 81 PO, Take 81 mg by mouth daily, Disp: , Rfl:     atorvastatin (LIPITOR) 20 mg tablet, Take 1 tablet (20 mg total) by mouth daily, Disp: 30 tablet, Rfl: 5    Blood Glucose Monitoring Suppl (OneTouch Verio) w/Device KIT, Use to test blood sugar 4x daily  , Disp: 1 kit, Rfl: 0    Cholecalciferol 2000 units CAPS, Take 1 capsule by mouth, Disp: , Rfl:     famotidine (PEPCID) 20 mg tablet, Take 1 tablet (20 mg total) by mouth daily, Disp: 60 tablet, Rfl: 3    glucose blood (OneTouch Verio) test strip, Use to test blood sugar 4x daily  , Disp: 400 each, Rfl: 0    levothyroxine 75 mcg tablet, levothyroxine 75 mcg tablet  take 1 tablet by mouth once daily, Disp: , Rfl:     lisinopril (ZESTRIL) 20 mg tablet, lisinopril 20 mg tablet, Disp: , Rfl:     Multiple Vitamins-Minerals (MULTIVITAL-M PO), Take by mouth, Disp: , Rfl:     OneTouch Delica Lancets 07H MISC, Use to test blood sugar 4x daily  , Disp: 300 each, Rfl: 2    insulin aspart protamine-insulin aspart (NovoLOG Mix 70/30) 100 units/mL injection, Inject 12 Units under the skin 2 (two) times a day before meals (Patient taking differently: Inject 12 Units under the skin 2 (two) times a day before meals 12 units in am and 14 units in pm ), Disp: 30 mL, Rfl: 3    Lidocaine (Salonpas Pain Relieving) 4 % PTCH, Apply 2 patches topically 2 (two) times a day (Patient not taking: Reported on 2/14/2022 ), Disp: 10 patch, Rfl: 0    Current Allergies     Allergies as of 03/14/2022 - Reviewed 03/14/2022   Allergen Reaction Noted    Diphenhydramine Shortness Of Breath 04/21/2016    Meperidine Hives and Shortness Of Breath 04/21/2016    Prednisone Hives and Shortness Of Breath 01/19/2018    Bee pollen  04/21/2016    Ciprofloxacin Hives 04/21/2016    Sulfamethoxazole-trimethoprim GI Intolerance 07/03/2019            The following portions of the patient's history were reviewed and updated as appropriate: allergies, current medications, past family history, past medical history, past social history, past surgical history and problem list      Past Medical History:   Diagnosis Date    Anxiety     Colon polyp     Depression     Diabetes mellitus ([facility] [address] )     Diverticulosis     Hyperlipidemia     Hypertension     Osteoporosis     Type 2 diabetes mellitus without complication, without long-term current use of insulin ([facility] [address] ) 4/25/2021       Past Surgical History:   Procedure Laterality Date    APPENDECTOMY      CATARACT EXTRACTION Right     COLONOSCOPY      difficult exam    HYSTERECTOMY      TONSILLECTOMY      WISDOM TOOTH EXTRACTION         Family History   Problem Relation Age of Onset    Parkinsonism Mother     Alzheimer's disease Father     Breast cancer Sister         in her 42's   [facility] Lung [facility] Sister     No Known Problems Daughter     No Known Problems Maternal Grandmother     No Known Problems Maternal Grandfather     No Known Problems Paternal Grandmother     No Known Problems Paternal Grandfather     No Known Problems Daughter     No Known Problems Maternal Aunt     No Known Problems Maternal Aunt     No Known Problems Maternal Aunt     No Known Problems Paternal Aunt          Medications have been verified  Objective   /70   Pulse 72   Temp 97 5 °F (36 4 °C)   Resp 18   Ht 5' 1" (1 549 m)   Wt 55 kg (121 lb 3 2 oz)   SpO2 98%   BMI 22 90 kg/m²        Physical Exam     Physical Exam  Vitals reviewed  Constitutional:       General: She is not in acute distress  Appearance: She is normal weight  Cardiovascular:      Rate and Rhythm: Normal rate and regular rhythm  Heart sounds: S1 normal and S2 normal  No murmur heard  No friction rub  No gallop  Pulmonary:      Effort: Pulmonary effort is normal  No accessory muscle usage  Breath sounds: Normal breath sounds  No wheezing, rhonchi or rales  Abdominal:      General: Abdomen is flat and scaphoid  Bowel sounds are normal       Palpations: Abdomen is soft  Tenderness: There is no abdominal tenderness  There is no guarding or rebound  Neurological:      General: No focal deficit present  Mental Status: She is alert  Psychiatric:         Mood and Affect: Mood and affect normal          Speech: Speech normal          Behavior: Behavior normal  Behavior is cooperative               Results:  Lab Results   Component Value Date    SODIUM 135 (L) 01/25/2022    K 3 9 01/25/2022     01/25/2022    CO2 28 01/25/2022    BUN 15 01/25/2022    CREATININE 1 10 01/25/2022    GLUC 264 (H) 12/27/2021    CALCIUM 9 1 01/25/2022       Lab Results   Component Value Date    HGBA1C 7 4 (H) 01/25/2022       Lab Results   Component Value Date    WBC 6 43 12/27/2021    HGB 12 0 12/27/2021    HCT 37 9 12/27/2021    MCV 89 12/27/2021     12/27/2021

## 2022-03-14 NOTE — PATIENT INSTRUCTIONS
Constipation   AMBULATORY CARE:   Constipation  means you have hard, dry bowel movements, or you go longer than usual between bowel movements  Constipation may be caused by a lack of water or high-fiber foods  Certain medicines, or a lack of fiber or physical activity may also cause constipation  Common symptoms include the following:   · Trouble pushing out your bowel movement    · Pain or bleeding during your bowel movement    · A feeling that you did not finish having your bowel movement    · Nausea    · Bloating    · Headache    Call your doctor if:   · You have blood in your bowel movements  · You have a fever and abdominal pain with the constipation  · Your constipation gets worse  · You start to vomit  · You have questions or concerns about your condition or care  Relieve constipation:  Medicine can keep you have a bowel movement more easily  Medicines may increase moisture in your bowel movement or increase the motion of your intestines  · A suppository  may be used to help soften your bowel movements  This may make them easier to pass  A suppository is guided into your rectum through your anus  · Laxatives  can help stimulate your bowels to have a bowel movement  Your provider may recommend you only use laxatives for a short time  Long-term use may make your bowels dependent on the medicine  · An enema  is liquid medicine used to clear bowel movement from your rectum  The medicine is put into your rectum through your anus  Prevent constipation:   · Drink liquids as directed  You may need to drink extra liquids to help soften and move your bowels  Ask how much liquid to drink each day and which liquids are best for you  · Eat high-fiber foods  This may help decrease constipation by adding bulk to your bowel movements  High-fiber foods include fruit, vegetables, whole-grain breads and cereals, and beans   Your healthcare provider or dietitian can help you create a high-fiber meal plan  Your provider may also recommend a fiber supplement if you cannot get enough fiber from food  · Exercise regularly  Regular physical activity can help stimulate your intestines  Walking is a good exercise to prevent or relieve constipation  Ask which exercises are best for you  · Schedule a time each day to have a bowel movement  This may help train your body to have regular bowel movements  Bend forward while you are on the toilet to help move the bowel movement out  Sit on the toilet for at least 10 minutes, even if you do not have a bowel movement  · Talk to your healthcare provider about your medicines  Certain medicines, such as opioids, can cause constipation  Your provider may be able to make medicine changes  For example, he or she may change the kind of medicine, or change when you take it  Follow up with your doctor as directed:  Write down your questions so you remember to ask them during your visits  © [employer] 2022 Information is for End User's use only and may not be sold, redistributed or otherwise used for commercial purposes  All illustrations and images included in CareNotes® are the copyrighted property of [employer]  or [facility] Seema Macdonald   The above information is an  only  It is not intended as medical advice for individual conditions or treatments  Talk to your doctor, nurse or pharmacist before following any medical regimen to see if it is safe and effective for you

## 2022-04-11 DIAGNOSIS — N18.31 TYPE 1 DIABETES MELLITUS WITH STAGE 3A CHRONIC KIDNEY DISEASE (HCC): ICD-10-CM

## 2022-04-11 DIAGNOSIS — E10.21 TYPE 1 DIABETES MELLITUS WITH STAGE 3A CHRONIC KIDNEY DISEASE (HCC): ICD-10-CM

## 2022-04-11 RX ORDER — BLOOD SUGAR DIAGNOSTIC
STRIP MISCELLANEOUS
Qty: 400 EACH | Refills: 0 | Status: SHIPPED | OUTPATIENT
Start: 2022-04-11

## 2022-04-19 NOTE — TELEPHONE ENCOUNTER
Patient called stating [phone] form needed to be filled out but we did not receive it  Called and spoke to pharmacy and verified that patient does not need [facility]O form filled out - prescription will be filled today for her  Called and left voicemail for patient to make her aware

## 2022-04-22 DIAGNOSIS — I10 PRIMARY HYPERTENSION: Primary | ICD-10-CM

## 2022-04-22 RX ORDER — LISINOPRIL 20 MG/1
20 TABLET ORAL DAILY
Qty: 90 TABLET | Refills: 1 | Status: SHIPPED | OUTPATIENT
Start: 2022-04-22

## 2022-04-22 NOTE — TELEPHONE ENCOUNTER
Patient requesting refill(s) of: Lisinopril    Last filled: 1/17/14  Last appt: 3/14/22  Next appt: 7/28/22  Pharmacy: [facility]

## 2022-05-02 DIAGNOSIS — E03.9 HYPOTHYROIDISM, UNSPECIFIED TYPE: Primary | ICD-10-CM

## 2022-05-02 RX ORDER — LEVOTHYROXINE SODIUM 0.07 MG/1
75 TABLET ORAL DAILY
Qty: 90 TABLET | Refills: 3 | Status: SHIPPED | OUTPATIENT
Start: 2022-05-02

## 2022-05-02 NOTE — TELEPHONE ENCOUNTER
Patient requesting refill(s) of: Levothyroxine     Last filled: Historical Provider   Last appt: 3/14/22  Next appt: 7/28/22  Pharmacy: [facility]

## 2022-05-16 NOTE — PROGRESS NOTES
Established Patient Progress Note      Chief Complaint   Patient presents with    Diabetes Type 1        History of Present Illness:   Brittany Perez is a 64 y o  female with HTN, HLD, hypothyroidism, osteoporosis, and type 1 diabetes with long term use of insulin since 2006  Reports complications of CKD stage 3a  Denies recent illness or hospitalizations  Denies recent severe hypoglycemic or severe hyperglycemic episodes  Denies any issues with her current regimen  home glucose monitoring: are performed regularly; 3x daily  POC HgA1C is 6 8%  Tries not to eat after 8pm  Has been walking more frequently  Overall, she reports feeling well  She reports a significant decrease in her anxiety  She has been eating fairly consistently with occasional cheating  She does continue to struggle with episodes of hypoglycemia  Unfortunately, patient does not have symptoms of hypoglycemia until her blood sugars are in the low 60s or high 50s  She does treat appropriately  Home blood glucose readings:   Before breakfast: 168, 137, 168, 191 ,183, 254, 164, 155, 118, 213, 153, 142  Before dinner: 108, 118, 183, 230, 196, 210, 90, 119, 192, 60, 108  Bedtime: 215, 66, 144, 72, 123, 135, 45, 138, 129, 183, 233, 95, 99, 92, 81, 216     Current regimen:   NovoLog mix 70/30 12 units in the morning and 14 units in the evening    Last Eye Exam:  Up-to-date  Last Foot Exam:  Up-to-date    For hypothyroidism, she is taking 75 mcg of levothyroxine daily  She reports taking this consistently and correctly  Thyroid function tests from February are stable  She denies symptoms of hypo or hyperthyroidism  For vitamin-D deficiency, she is supplementing with 2000 units daily  For hyperlipidemia, she is taking 20 mg of atorvastatin nightly  She denies myalgias  For hypertension, she is taking 20 mg of lisinopril daily  She denies headache and cough      Patient Active Problem List   Diagnosis    Sepsis ([facility] [address] )    Generalized weakness    Generalized anxiety disorder    Stage 3a chronic kidney disease (HCC)    Hypothyroidism    Hypertension    Anxiety    Ataxic gait    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 ear    Diabetes mellitus ([facility] [address] )    Hyperlipidemia    Pelvic mass    Retinal disorder    Burning sensation    Constipation    Dysuria    LLQ abdominal pain    Dry eye    Age-related osteoporosis without current pathological fracture    Hypoglycemia unawareness associated with type 1 diabetes mellitus ([facility] [address] )      Past Medical History:   Diagnosis Date    Anxiety     Colon polyp     Depression     [facility] mellitus ([facility])     Diverticulosis     Hyperlipidemia     Hypertension     Type 2 diabetes mellitus without complication, without long-term current use of insulin ([facility]) 4/25/2021      Past Surgical History:   Procedure Laterality Date    APPENDECTOMY      CATARACT EXTRACTION Right     COLONOSCOPY      difficult exam    HYSTERECTOMY      TONSILLECTOMY      WISDOM TOOTH EXTRACTION        Family History   Problem Relation Age of Onset    Parkinsonism Mother     Alzheimer's disease Father     Breast cancer Sister         in her 42's   Ledy [place] Lung cancer Sister     No Known Problems Daughter     No Known Problems Maternal Grandmother     No Known Problems Maternal Grandfather     No Known Problems Paternal Grandmother     No Known Problems Paternal Grandfather     No Known Problems Daughter     No Known Problems Maternal Aunt     No Known Problems Maternal Aunt     No Known Problems Maternal Aunt     No Known Problems Paternal Aunt      Social History     Tobacco Use    Smoking status: Never Smoker    Smokeless tobacco: Never Used   Substance Use Topics    Alcohol use: Not Currently     Comment: rare     Allergies   Allergen Reactions    Diphenhydramine Shortness Of Breath    Meperidine Hives and Shortness Of Breath    Prednisone Hives and Shortness Of Breath    Bee Pollen     Ciprofloxacin Hives    Sulfamethoxazole-Trimethoprim GI Intolerance         Current Outpatient Medications:     Ascorbic Acid (VITAMIN C PO), Take by mouth, Disp: , Rfl:     ASPIRIN 81 PO, Take 81 mg by mouth daily, Disp: , Rfl:     atorvastatin (LIPITOR) 20 mg tablet, Take 1 tablet (20 mg total) by mouth daily, Disp: 30 tablet, Rfl: 5    Blood Glucose Monitoring Suppl (OneTouch Verio) w/Device KIT, Use to test blood sugar 4x daily  , Disp: 1 kit, Rfl: 0    Cholecalciferol 2000 units CAPS, Take 1 capsule by mouth, Disp: , Rfl:     famotidine (PEPCID) 20 mg tablet, Take 1 tablet (20 mg total) by mouth daily, Disp: 60 tablet, Rfl: 3    glucose blood (OneTouch Verio) test strip, Use to test blood sugar 4x daily  , Disp: 400 each, Rfl: 0    levothyroxine 75 mcg tablet, Take 1 tablet (75 mcg total) by mouth daily, Disp: 90 tablet, Rfl: 3    lisinopril (ZESTRIL) 20 mg tablet, Take 1 tablet (20 mg total) by mouth daily, Disp: 90 tablet, Rfl: 1    Multiple Vitamins-Minerals (MULTIVITAL-M PO), Take by mouth, Disp: , Rfl:     OneTouch Delica Lancets 79D MISC, Use to test blood sugar 4x daily  , Disp: 300 each, Rfl: 2    insulin aspart protamine-insulin aspart (NovoLOG Mix 70/30) 100 units/mL injection, Inject 12 Units under the skin 2 (two) times a day before meals (Patient taking differently: Inject 12 Units under the skin 2 (two) times a day before meals 12 units in am and 14 units in pm ), Disp: 30 mL, Rfl: 3    Lidocaine (Salonpas Pain Relieving) 4 % PTCH, Apply 2 patches topically 2 (two) times a day (Patient not taking: No sig reported), Disp: 10 patch, Rfl: 0    Review of Systems   Constitutional: Negative for activity change, appetite change, fatigue and unexpected weight change  HENT: Negative for dental problem, sore throat, trouble swallowing and voice change  Eyes: Positive for visual disturbance  Respiratory: Negative for cough, chest tightness and shortness of breath      Cardiovascular: Negative for chest pain, palpitations and leg swelling  Gastrointestinal: Positive for constipation (chronic)  Negative for diarrhea, nausea and vomiting  Endocrine: Negative for cold intolerance, heat intolerance, polydipsia, polyphagia and polyuria  Genitourinary: Negative for frequency  Musculoskeletal: Positive for arthralgias  Negative for back pain, gait problem and myalgias  Skin: Negative for wound  Allergic/Immunologic: Positive for environmental allergies  Negative for food allergies  Neurological: Negative for dizziness, tremors, weakness, light-headedness, numbness and headaches  Hematological: Does not bruise/bleed easily  Psychiatric/Behavioral: Negative for decreased concentration, dysphoric mood and sleep disturbance  The patient is not nervous/anxious  Physical Exam:  Body mass index is 23 05 kg/m²  /72   Ht 5' 1" (1 549 m)   Wt 55 3 kg (122 lb)   BMI 23 05 kg/m²    Wt Readings from Last 3 Encounters:   05/17/22 55 3 kg (122 lb)   03/14/22 55 kg (121 lb 3 2 oz)   02/14/22 53 kg (116 lb 12 8 oz)       Physical Exam  Vitals reviewed  Constitutional:       General: She is not in acute distress  Appearance: She is well-developed  She is not ill-appearing  HENT:      Head: Normocephalic and atraumatic  Eyes:      Pupils: Pupils are equal, round, and reactive to light  Neck:      Thyroid: No thyromegaly  Cardiovascular:      Rate and Rhythm: Normal rate and regular rhythm  Pulses: Normal pulses  Heart sounds: Normal heart sounds  Pulmonary:      Effort: Pulmonary effort is normal       Breath sounds: Normal breath sounds  Abdominal:      General: Bowel sounds are normal  There is no distension  Palpations: Abdomen is soft  Tenderness: There is no abdominal tenderness  Musculoskeletal:      Cervical back: Normal range of motion and neck supple  Right lower leg: No edema  Left lower leg: No edema     Lymphadenopathy:      Cervical: No cervical adenopathy  Skin:     General: Skin is warm and dry  Capillary Refill: Capillary refill takes less than 2 seconds  Neurological:      Mental Status: She is alert and oriented to person, place, and time  Gait: Gait normal    Psychiatric:         Mood and Affect: Mood normal          Behavior: Behavior normal            Labs:   Lab Results   Component Value Date    HGBA1C 6 4 05/17/2022    HGBA1C 7 4 (H) 01/25/2022    HGBA1C 6 9 (H) 07/20/2021     Lab Results   Component Value Date    CREATININE 1 10 01/25/2022    CREATININE 1 24 12/27/2021    CREATININE 1 20 12/25/2021    BUN 15 01/25/2022    K 3 9 01/25/2022     01/25/2022    CO2 28 01/25/2022     eGFR   Date Value Ref Range Status   01/25/2022 48 ml/min/1 73sq m Final     Lab Results   Component Value Date    HDL 74 01/25/2022    TRIG 173 (H) 01/25/2022     Lab Results   Component Value Date    ALT 15 01/25/2022    AST 13 01/25/2022    ALKPHOS 86 01/25/2022     Lab Results   Component Value Date    XGJ6UHWVJZKI 2 600 01/25/2022    HKF2QNDGQKEW 2 140 06/28/2021    WYU3WXUKNHEE 3 730 03/31/2020     Lab Results   Component Value Date    FREET4 1 23 01/25/2022       Impression & Plan:    Problem List Items Addressed This Visit        Endocrine    Hypothyroidism     Complete thyroid function tests  Continue 75 mcg of levothyroxine daily  Relevant Orders    TSH, 3rd generation    T4, free    T4, free    TSH, 3rd generation    Diabetes mellitus ([facility] [address] ) - Primary     Patient is well controlled overall  She does continue to have episodes of hypoglycemia which she is unaware until she is less than 65 mg/dL  Encouraged her to continue to eat regularly  I do recommend a continuous glucose monitor for her that will alert her when lows are occurring  This will prevent dangerously low episodes of hypoglycemia  Continue current regimen  Continue with regular physical activity    Patient is to notify me of persistent hyperglycemia episodes of hypoglycemia  Complete labs prior to next appointment in 4 months  Lab Results   Component Value Date    HGBA1C 6 4 05/17/2022              Relevant Orders    CBC and differential    Comprehensive metabolic panel    Lipid panel    Microalbumin / creatinine urine ratio    Hemoglobin A1C    Comprehensive metabolic panel    POCT hemoglobin A1c (Completed)    Hypoglycemia unawareness associated with type 1 diabetes mellitus ([address] )     Patient is not symptomatic of her low blood sugars until she is in the low 60s/high 50s  Lab Results   Component Value Date    HGBA1C 6 4 05/17/2022                 Cardiovascular and Mediastinum    Hypertension     /72  Continue current regimen  Other    Hyperlipidemia     Check fasting lipid panel  Continue statin  Orders Placed This Encounter   Procedures    CBC and differential     This is a patient instruction: This test is non-fasting  Please drink two glasses of water morning of bloodwork  Standing Status:   Future     Standing Expiration Date:   5/16/2023    Comprehensive metabolic panel     This is a patient instruction: Patient fasting for 8 hours or longer recommended  Standing Status:   Future     Standing Expiration Date:   5/16/2023    Lipid panel     This is a patient instruction: This test requires patient fasting for 10-12 hours or longer  Drinking of black coffee or black tea is acceptable  Standing Status:   Future     Standing Expiration Date:   5/16/2023    Microalbumin / creatinine urine ratio     Standing Status:   Future     Standing Expiration Date:   5/16/2023    Hemoglobin A1C     Standing Status:   Future     Standing Expiration Date:   5/16/2023    Comprehensive metabolic panel     This is a patient instruction: Patient fasting for 8 hours or longer recommended  Standing Status:   Future     Standing Expiration Date:   5/16/2023    TSH, 3rd generation     This is a patient instruction:  This test is non-fasting  Please drink two glasses of water morning of bloodwork  Standing Status:   Future     Standing Expiration Date:   5/16/2023    T4, free     Standing Status:   Future     Standing Expiration Date:   5/16/2023    T4, free     Standing Status:   Future     Standing Expiration Date:   5/16/2023    TSH, 3rd generation     This is a patient instruction: This test is non-fasting  Please drink two glasses of water morning of bloodwork  Standing Status:   Future     Standing Expiration Date:   5/16/2023    POCT hemoglobin A1c       Patient Instructions   I have given you labs to complete at your convenience as well as labs to complete prior to your next appointment in 6 months  We will work on getting you a continuous glucose monitor  Discussed with the patient and all questioned fully answered  She will call me if any problems arise  Follow-up appointment in 4 months       Counseled patient on diagnostic results, prognosis, risk and benefit of treatment options, instruction for management, importance of treatment compliance, Risk  factor reduction and impressions    EUGENIA Bhatia

## 2022-05-17 ENCOUNTER — OFFICE VISIT (OUTPATIENT)
Dept: ENDOCRINOLOGY | Facility: CLINIC | Age: 77
End: 2022-05-17
Payer: MEDICARE

## 2022-05-17 VITALS
HEIGHT: 61 IN | WEIGHT: 122 LBS | BODY MASS INDEX: 23.03 KG/M2 | SYSTOLIC BLOOD PRESSURE: 120 MMHG | DIASTOLIC BLOOD PRESSURE: 72 MMHG

## 2022-05-17 DIAGNOSIS — N18.31 TYPE 1 DIABETES MELLITUS WITH STAGE 3A CHRONIC KIDNEY DISEASE (HCC): Primary | ICD-10-CM

## 2022-05-17 DIAGNOSIS — E03.9 HYPOTHYROIDISM, UNSPECIFIED TYPE: ICD-10-CM

## 2022-05-17 DIAGNOSIS — I10 PRIMARY HYPERTENSION: ICD-10-CM

## 2022-05-17 DIAGNOSIS — E10.21 TYPE 1 DIABETES MELLITUS WITH STAGE 3A CHRONIC KIDNEY DISEASE (HCC): Primary | ICD-10-CM

## 2022-05-17 DIAGNOSIS — E78.2 MIXED HYPERLIPIDEMIA: ICD-10-CM

## 2022-05-17 DIAGNOSIS — E10.649 HYPOGLYCEMIA UNAWARENESS ASSOCIATED WITH TYPE 1 DIABETES MELLITUS (HCC): ICD-10-CM

## 2022-05-17 LAB — SL AMB POCT HEMOGLOBIN AIC: 6.4 (ref ?–6.5)

## 2022-05-17 PROCEDURE — 99214 OFFICE O/P EST MOD 30 MIN: CPT | Performed by: NURSE PRACTITIONER

## 2022-05-17 PROCEDURE — 83036 HEMOGLOBIN GLYCOSYLATED A1C: CPT | Performed by: NURSE PRACTITIONER

## 2022-05-17 NOTE — ASSESSMENT & PLAN NOTE
Patient is not symptomatic of her low blood sugars until she is in the low 60s/high 50s     Lab Results   Component Value Date    HGBA1C 6 4 05/17/2022

## 2022-05-17 NOTE — PATIENT INSTRUCTIONS
I have given you labs to complete at your convenience as well as labs to complete prior to your next appointment in 6 months  We will work on getting you a continuous glucose monitor

## 2022-05-17 NOTE — ASSESSMENT & PLAN NOTE
Patient is well controlled overall  She does continue to have episodes of hypoglycemia which she is unaware until she is less than 65 mg/dL  Encouraged her to continue to eat regularly  I do recommend a continuous glucose monitor for her that will alert her when lows are occurring  This will prevent dangerously low episodes of hypoglycemia  Continue current regimen  Continue with regular physical activity  Patient is to notify me of persistent hyperglycemia episodes of hypoglycemia  Complete labs prior to next appointment in 4 months    Lab Results   Component Value Date    HGBA1C 6 4 05/17/2022

## 2022-05-23 LAB
DME PARACHUTE DELIVERY DATE ACTUAL: NORMAL
DME PARACHUTE DELIVERY DATE REQUESTED: NORMAL
DME PARACHUTE ITEM DESCRIPTION: NORMAL
DME PARACHUTE ITEM DESCRIPTION: NORMAL
DME PARACHUTE ORDER STATUS: NORMAL
DME PARACHUTE SUPPLIER NAME: NORMAL
DME PARACHUTE SUPPLIER PHONE: NORMAL

## 2022-05-27 ENCOUNTER — OFFICE VISIT (OUTPATIENT)
Dept: DIABETES SERVICES | Facility: CLINIC | Age: 77
End: 2022-05-27
Payer: MEDICARE

## 2022-05-27 ENCOUNTER — TELEPHONE (OUTPATIENT)
Dept: BARIATRICS | Facility: CLINIC | Age: 77
End: 2022-05-27

## 2022-05-27 DIAGNOSIS — N18.31 TYPE 1 DIABETES MELLITUS WITH STAGE 3A CHRONIC KIDNEY DISEASE (HCC): Primary | ICD-10-CM

## 2022-05-27 DIAGNOSIS — E10.21 TYPE 1 DIABETES MELLITUS WITH STAGE 3A CHRONIC KIDNEY DISEASE (HCC): Primary | ICD-10-CM

## 2022-05-27 PROCEDURE — 95249 CONT GLUC MNTR PT PROV EQP: CPT

## 2022-05-27 NOTE — PATIENT INSTRUCTIONS
Change the sensor every 14 days  Must remove for CAT scan and MRI     Contact endo office for any glucose issues  Download either the elo to your phone or your home computer accept the invite form Emi Waterman to connect you with the office

## 2022-05-27 NOTE — TELEPHONE ENCOUNTER
Patient called stating she received her Fernando Bonds in the mail and was wondering if someone could show her how to put it on  I was not sure if this was a nurse visit or if this is something she needs an appointment for      Please advise

## 2022-05-27 NOTE — PROGRESS NOTES
[facility] Training     Met with Karl Sarkar for [employer]  Pt was instructed on use of device, location, cleaning the area, and insertion  Noland  was programmed with customized high and low settings as appropriate  Svetlana Patel left my office with the Noland on and in warm up mode  Pt  will download the  when in office  Svetlana Patel will call with questions or for more education as needed  Ht Readings from Last 1 Encounters:   05/17/22 5' 1" (1 549 m)     Wt Readings from Last 3 Encounters:   05/17/22 55 3 kg (122 lb)   03/14/22 55 kg (121 lb 3 2 oz)   02/14/22 53 kg (116 lb 12 8 oz)        There is no height or weight on file to calculate BMI  Lab Results   Component Value Date    HGBA1C 6 4 05/17/2022    HGBA1C 7 4 (H) 01/25/2022    HGBA1C 6 9 (H) 07/20/2021       No results found for: CHOL  Lab Results   Component Value Date    HDL 74 01/25/2022    HDL 69 10/06/2021    HDL 83 11/23/2020     Lab Results   Component Value Date    LDLCALC 63 01/25/2022    LDLCALC 83 10/06/2021    LDLCALC 56 11/23/2020     Lab Results   Component Value Date    TRIG 173 (H) 01/25/2022    TRIG 172 (H) 10/06/2021    TRIG 129 11/23/2020     No results found for: CHOLHDL  No results found for: Trevor Vargas    Diabetes Education Record  Svetlana Patel received the following handouts: provided by [employer]      Patient response to instruction    Comprehensiongood  Motivationgood  Expected Compliancegood    Thank you for referring your patient to [facility], it was a pleasure working with them today  Please feel free to call with any questions or concerns      Eunice Nielsen RN  [address] [id number] [phone]

## 2022-05-27 NOTE — TELEPHONE ENCOUNTER
I will forward to pump educator for nurse visit for [facility] teach if she can not do it due to schedule conflicts I will schedule her for sometime next week one my schedule as a nurse visit

## 2022-06-06 NOTE — PATIENT INSTRUCTIONS
Change the sensor every 14 days  Must remove for CAT scan and MRI     Contact endo office for any glucose issues

## 2022-06-06 NOTE — PROGRESS NOTES
Pt came to the office today for assist with sensor change, download and brief review of her report  Aware Her report will be given to the provider for review and I will call her with any medication changes needed  She did well changing the sensor on her own   Notes she read the information over the past 2 weeks so she would fully understand

## 2022-06-09 ENCOUNTER — TELEPHONE (OUTPATIENT)
Dept: DIABETES SERVICES | Facility: CLINIC | Age: 77
End: 2022-06-09

## 2022-06-09 DIAGNOSIS — N18.31 TYPE 1 DIABETES MELLITUS WITH STAGE 3A CHRONIC KIDNEY DISEASE (HCC): Primary | ICD-10-CM

## 2022-06-09 DIAGNOSIS — E10.22 TYPE 1 DIABETES MELLITUS WITH STAGE 3A CHRONIC KIDNEY DISEASE (HCC): Primary | ICD-10-CM

## 2022-06-09 NOTE — TELEPHONE ENCOUNTER
Pt had personal [employer] training 5/27/22 and then also has appt tomorrow  There was never a referral ordered  Could you please issue one   Thanks

## 2022-06-10 ENCOUNTER — OFFICE VISIT (OUTPATIENT)
Dept: DIABETES SERVICES | Facility: CLINIC | Age: 77
End: 2022-06-10
Payer: MEDICARE

## 2022-06-10 DIAGNOSIS — E10.21 TYPE 1 DIABETES MELLITUS WITH STAGE 3A CHRONIC KIDNEY DISEASE (HCC): Primary | ICD-10-CM

## 2022-06-10 DIAGNOSIS — N18.31 TYPE 1 DIABETES MELLITUS WITH STAGE 3A CHRONIC KIDNEY DISEASE (HCC): Primary | ICD-10-CM

## 2022-06-10 PROCEDURE — G0108 DIAB MANAGE TRN  PER INDIV: HCPCS

## 2022-06-13 ENCOUNTER — TELEPHONE (OUTPATIENT)
Dept: DIABETES SERVICES | Facility: CLINIC | Age: 77
End: 2022-06-13

## 2022-06-13 NOTE — TELEPHONE ENCOUNTER
[facility] used freestyle yaritza   Professional Use- Physician Provided Equipment    Indication   Type 1 Diabetes    More than 72 hours of data was reviewed  Report to be scanned to chart  Date Range: may 28th-June 10th     Analysis of data:   Average Glucose: 164 mg/dl  Coefficient of Variation: 31 4%  Time in Target Range: 62%  Time Above Range: 36%  Time Below Range: 2%    Interpretation of data:  fairly decent control - some highs mid days and after dinner and sometimes lowish numbers after dinner  but no significant lows    For now continue current regimen and watch diet

## 2022-06-28 DIAGNOSIS — E78.2 MIXED HYPERLIPIDEMIA: ICD-10-CM

## 2022-06-28 RX ORDER — ATORVASTATIN CALCIUM 20 MG/1
TABLET, FILM COATED ORAL
Qty: 90 TABLET | Refills: 5 | Status: SHIPPED | OUTPATIENT
Start: 2022-06-28

## 2022-06-30 ENCOUNTER — RA CDI HCC (OUTPATIENT)
Dept: OTHER | Facility: HOSPITAL | Age: 77
End: 2022-06-30

## 2022-06-30 NOTE — PROGRESS NOTES
I13 0, I50 9  [facility]  coding opportunities          Chart Reviewed number of suggestions sent to Provider: 2     Patients Insurance     Medicare Insurance: [employer]

## 2022-07-15 ENCOUNTER — APPOINTMENT (OUTPATIENT)
Dept: LAB | Facility: CLINIC | Age: 77
End: 2022-07-15
Payer: MEDICARE

## 2022-07-15 DIAGNOSIS — N18.31 TYPE 1 DIABETES MELLITUS WITH STAGE 3A CHRONIC KIDNEY DISEASE (HCC): ICD-10-CM

## 2022-07-15 DIAGNOSIS — E10.22 TYPE 1 DIABETES MELLITUS WITH STAGE 3A CHRONIC KIDNEY DISEASE (HCC): ICD-10-CM

## 2022-07-15 DIAGNOSIS — E03.9 HYPOTHYROIDISM, UNSPECIFIED TYPE: ICD-10-CM

## 2022-07-15 LAB
ALBUMIN SERPL BCP-MCNC: 3.7 G/DL (ref 3.5–5)
ALP SERPL-CCNC: 74 U/L (ref 46–116)
ALT SERPL W P-5'-P-CCNC: 18 U/L (ref 12–78)
ANION GAP SERPL CALCULATED.3IONS-SCNC: 3 MMOL/L (ref 4–13)
AST SERPL W P-5'-P-CCNC: 14 U/L (ref 5–45)
BASOPHILS # BLD AUTO: 0.07 THOUSANDS/ΜL (ref 0–0.1)
BASOPHILS NFR BLD AUTO: 1 % (ref 0–1)
BILIRUB SERPL-MCNC: 0.63 MG/DL (ref 0.2–1)
BUN SERPL-MCNC: 23 MG/DL (ref 5–25)
CALCIUM SERPL-MCNC: 9.1 MG/DL (ref 8.3–10.1)
CHLORIDE SERPL-SCNC: 106 MMOL/L (ref 100–108)
CHOLEST SERPL-MCNC: 195 MG/DL
CO2 SERPL-SCNC: 26 MMOL/L (ref 21–32)
CREAT SERPL-MCNC: 1.23 MG/DL (ref 0.6–1.3)
CREAT UR-MCNC: 56.4 MG/DL
EOSINOPHIL # BLD AUTO: 0.27 THOUSAND/ΜL (ref 0–0.61)
EOSINOPHIL NFR BLD AUTO: 4 % (ref 0–6)
ERYTHROCYTE [DISTWIDTH] IN BLOOD BY AUTOMATED COUNT: 12.4 % (ref 11.6–15.1)
GFR SERPL CREATININE-BSD FRML MDRD: 41 ML/MIN/1.73SQ M
GLUCOSE P FAST SERPL-MCNC: 307 MG/DL (ref 65–99)
HCT VFR BLD AUTO: 38.9 % (ref 34.8–46.1)
HDLC SERPL-MCNC: 78 MG/DL
HGB BLD-MCNC: 12.1 G/DL (ref 11.5–15.4)
IMM GRANULOCYTES # BLD AUTO: 0.02 THOUSAND/UL (ref 0–0.2)
IMM GRANULOCYTES NFR BLD AUTO: 0 % (ref 0–2)
LDLC SERPL CALC-MCNC: 94 MG/DL (ref 0–100)
LYMPHOCYTES # BLD AUTO: 1.71 THOUSANDS/ΜL (ref 0.6–4.47)
LYMPHOCYTES NFR BLD AUTO: 28 % (ref 14–44)
MCH RBC QN AUTO: 28.4 PG (ref 26.8–34.3)
MCHC RBC AUTO-ENTMCNC: 31.1 G/DL (ref 31.4–37.4)
MCV RBC AUTO: 91 FL (ref 82–98)
MICROALBUMIN UR-MCNC: 6.1 MG/L (ref 0–20)
MICROALBUMIN/CREAT 24H UR: 11 MG/G CREATININE (ref 0–30)
MONOCYTES # BLD AUTO: 0.51 THOUSAND/ΜL (ref 0.17–1.22)
MONOCYTES NFR BLD AUTO: 8 % (ref 4–12)
NEUTROPHILS # BLD AUTO: 3.52 THOUSANDS/ΜL (ref 1.85–7.62)
NEUTS SEG NFR BLD AUTO: 59 % (ref 43–75)
NONHDLC SERPL-MCNC: 117 MG/DL
NRBC BLD AUTO-RTO: 0 /100 WBCS
PLATELET # BLD AUTO: 219 THOUSANDS/UL (ref 149–390)
PMV BLD AUTO: 12.3 FL (ref 8.9–12.7)
POTASSIUM SERPL-SCNC: 4.1 MMOL/L (ref 3.5–5.3)
PROT SERPL-MCNC: 7.3 G/DL (ref 6.4–8.2)
RBC # BLD AUTO: 4.26 MILLION/UL (ref 3.81–5.12)
SODIUM SERPL-SCNC: 135 MMOL/L (ref 136–145)
T4 FREE SERPL-MCNC: 0.88 NG/DL (ref 0.76–1.46)
TRIGL SERPL-MCNC: 117 MG/DL
TSH SERPL DL<=0.05 MIU/L-ACNC: 1.76 UIU/ML (ref 0.45–4.5)
WBC # BLD AUTO: 6.1 THOUSAND/UL (ref 4.31–10.16)

## 2022-07-15 PROCEDURE — 82570 ASSAY OF URINE CREATININE: CPT

## 2022-07-15 PROCEDURE — 80053 COMPREHEN METABOLIC PANEL: CPT

## 2022-07-15 PROCEDURE — 84443 ASSAY THYROID STIM HORMONE: CPT

## 2022-07-15 PROCEDURE — 82043 UR ALBUMIN QUANTITATIVE: CPT

## 2022-07-15 PROCEDURE — 80061 LIPID PANEL: CPT

## 2022-07-15 PROCEDURE — 85025 COMPLETE CBC W/AUTO DIFF WBC: CPT

## 2022-07-15 PROCEDURE — 36415 COLL VENOUS BLD VENIPUNCTURE: CPT

## 2022-07-15 PROCEDURE — 84439 ASSAY OF FREE THYROXINE: CPT

## 2022-07-27 PROBLEM — R53.1 GENERALIZED WEAKNESS: Status: RESOLVED | Noted: 2021-04-25 | Resolved: 2022-07-27

## 2022-07-27 PROBLEM — F41.9 ANXIETY: Status: RESOLVED | Noted: 2018-04-24 | Resolved: 2022-07-27

## 2022-07-27 PROBLEM — A41.9 SEPSIS (HCC): Status: RESOLVED | Noted: 2021-04-25 | Resolved: 2022-07-27

## 2022-07-27 PROBLEM — R30.0 DYSURIA: Status: RESOLVED | Noted: 2022-01-03 | Resolved: 2022-07-27

## 2022-07-28 ENCOUNTER — OFFICE VISIT (OUTPATIENT)
Dept: FAMILY MEDICINE CLINIC | Facility: CLINIC | Age: 77
End: 2022-07-28
Payer: MEDICARE

## 2022-07-28 VITALS
WEIGHT: 127.4 LBS | TEMPERATURE: 98.3 F | OXYGEN SATURATION: 99 % | BODY MASS INDEX: 24.07 KG/M2 | HEART RATE: 66 BPM | RESPIRATION RATE: 18 BRPM | DIASTOLIC BLOOD PRESSURE: 68 MMHG | SYSTOLIC BLOOD PRESSURE: 124 MMHG

## 2022-07-28 DIAGNOSIS — Z00.00 MEDICARE ANNUAL WELLNESS VISIT, SUBSEQUENT: Primary | ICD-10-CM

## 2022-07-28 DIAGNOSIS — Z12.31 ENCOUNTER FOR SCREENING MAMMOGRAM FOR BREAST CANCER: ICD-10-CM

## 2022-07-28 DIAGNOSIS — F41.1 GENERALIZED ANXIETY DISORDER: ICD-10-CM

## 2022-07-28 DIAGNOSIS — E10.22 TYPE 1 DIABETES MELLITUS WITH STAGE 3A CHRONIC KIDNEY DISEASE (HCC): ICD-10-CM

## 2022-07-28 DIAGNOSIS — N18.31 STAGE 3A CHRONIC KIDNEY DISEASE (HCC): ICD-10-CM

## 2022-07-28 DIAGNOSIS — N18.31 TYPE 1 DIABETES MELLITUS WITH STAGE 3A CHRONIC KIDNEY DISEASE (HCC): ICD-10-CM

## 2022-07-28 DIAGNOSIS — I10 PRIMARY HYPERTENSION: ICD-10-CM

## 2022-07-28 PROBLEM — R20.8 BURNING SENSATION: Status: RESOLVED | Noted: 2022-01-03 | Resolved: 2022-07-28

## 2022-07-28 PROCEDURE — 99214 OFFICE O/P EST MOD 30 MIN: CPT | Performed by: INTERNAL MEDICINE

## 2022-07-28 PROCEDURE — G0439 PPPS, SUBSEQ VISIT: HCPCS | Performed by: INTERNAL MEDICINE

## 2022-07-28 NOTE — PATIENT INSTRUCTIONS
Medicare Preventive Visit Patient Instructions  Thank you for completing your Welcome to Medicare Visit or Medicare Annual Wellness Visit today  Your next wellness visit will be due in one year (7/29/2023)  The screening/preventive services that you may require over the next 5-10 years are detailed below  Some tests may not apply to you based off risk factors and/or age  Screening tests ordered at today's visit but not completed yet may show as past due  Also, please note that scanned in results may not display below  Preventive Screenings:  Service Recommendations Previous Testing/Comments   Colorectal Cancer Screening  * Colonoscopy    * Fecal Occult Blood Test (FOBT)/Fecal Immunochemical Test (FIT)  * Fecal DNA/Cologuard Test  * Flexible Sigmoidoscopy Age: 54-65 years old   Colonoscopy: every 10 years (may be performed more frequently if at higher risk)  OR  FOBT/FIT: every 1 year  OR  Cologuard: every 3 years  OR  Sigmoidoscopy: every 5 years  Screening may be recommended earlier than age 48 if at higher risk for colorectal cancer  Also, an individualized decision between you and your healthcare provider will decide whether screening between the ages of 74-80 would be appropriate  Colonoscopy: 02/10/2022  FOBT/FIT: Not on file  Cologuard: Not on file  Sigmoidoscopy: Not on file          Breast Cancer Screening Age: 36 years old  Frequency: every 1-2 years  Not required if history of left and right mastectomy Mammogram: 09/07/2021    Screening Current   Cervical Cancer Screening Between the ages of 21-29, pap smear recommended once every 3 years  Between the ages of 33-67, can perform pap smear with HPV co-testing every 5 years     Recommendations may differ for women with a history of total hysterectomy, cervical cancer, or abnormal pap smears in past  Pap Smear: Not on file    Screening Not Indicated   Hepatitis C Screening Once for adults born between 1945 and 1965  More frequently in patients at high risk for Hepatitis C Hep C Antibody: Not on file        Diabetes Screening 1-2 times per year if you're at risk for diabetes or have pre-diabetes Fasting glucose: 307 mg/dL   A1C: 6 4    Screening Not Indicated  History Diabetes   Cholesterol Screening Once every 5 years if you don't have a lipid disorder  May order more often based on risk factors  Lipid panel: 07/15/2022    Screening Not Indicated  History Lipid Disorder     Other Preventive Screenings Covered by Medicare:  1  Abdominal Aortic Aneurysm (AAA) Screening: covered once if your at risk  You're considered to be at risk if you have a family history of AAA  2  Lung Cancer Screening: covers low dose CT scan once per year if you meet all of the following conditions: (1) Age 50-69; (2) No signs or symptoms of lung cancer; (3) Current smoker or have quit smoking within the last 15 years; (4) You have a tobacco smoking history of at least 30 pack years (packs per day multiplied by number of years you smoked); (5) You get a written order from a healthcare provider  3  Glaucoma Screening: covered annually if you're considered high risk: (1) You have diabetes OR (2) Family history of glaucoma OR (3)  aged 48 and older OR (3)  American aged 72 and older  3  Osteoporosis Screening: covered every 2 years if you meet one of the following conditions: (1) You're estrogen deficient and at risk for osteoporosis based off medical history and other findings; (2) Have a vertebral abnormality; (3) On glucocorticoid therapy for more than 3 months; (4) Have primary hyperparathyroidism; (5) On osteoporosis medications and need to assess response to drug therapy  · Last bone density test (DXA Scan): 09/07/2021   5  HIV Screening: covered annually if you're between the age of 15-65  Also covered annually if you are younger than 13 and older than 72 with risk factors for HIV infection   For pregnant patients, it is covered up to 3 times per pregnancy  Immunizations:  Immunization Recommendations   Influenza Vaccine Annual influenza vaccination during flu season is recommended for all persons aged >= 6 months who do not have contraindications   Pneumococcal Vaccine (Prevnar and Pneumovax)  * Prevnar = PCV13  * Pneumovax = PPSV23   Adults 25-60 years old: 1-3 doses may be recommended based on certain risk factors  Adults 72 years old: Prevnar (PCV13) vaccine recommended followed by Pneumovax (PPSV23) vaccine  If already received PPSV23 since turning 65, then PCV13 recommended at least one year after PPSV23 dose  Hepatitis B Vaccine 3 dose series if at intermediate or high risk (ex: diabetes, end stage renal disease, liver disease)   Tetanus (Td) Vaccine - COST NOT COVERED BY MEDICARE PART B Following completion of primary series, a booster dose should be given every 10 years to maintain immunity against tetanus  Td may also be given as tetanus wound prophylaxis  Tdap Vaccine - COST NOT COVERED BY MEDICARE PART B Recommended at least once for all adults  For pregnant patients, recommended with each pregnancy  Shingles Vaccine (Shingrix) - COST NOT COVERED BY MEDICARE PART B  2 shot series recommended in those aged 48 and above     Health Maintenance Due:      Topic Date Due    Hepatitis C Screening  Never done     Immunizations Due:      Topic Date Due    COVID-19 Vaccine (3 - Booster for Pfizer series) 10/02/2021    Pneumococcal Vaccine: 65+ Years (2 - PCV) 01/13/2022    Influenza Vaccine (1) 09/01/2022     Advance Directives   What are advance directives? Advance directives are legal documents that state your wishes and plans for medical care  These plans are made ahead of time in case you lose your ability to make decisions for yourself  Advance directives can apply to any medical decision, such as the treatments you want, and if you want to donate organs  What are the types of advance directives?   There are many types of advance directives, and each state has rules about how to use them  You may choose a combination of any of the following:  · Living will: This is a written record of the treatment you want  You can also choose which treatments you do not want, which to limit, and which to stop at a certain time  This includes surgery, medicine, IV fluid, and tube feedings  · Durable power of  for healthcare [facility]): This is a written record that states who you want to make healthcare choices for you when you are unable to make them for yourself  This person, called a proxy, is usually a family member or a friend  You may choose more than 1 proxy  · Do not resuscitate (DNR) order:  A DNR order is used in case your heart stops beating or you stop breathing  It is a request not to have certain forms of treatment, such as CPR  A DNR order may be included in other types of advance directives  · Medical directive: This covers the care that you want if you are in a coma, near death, or unable to make decisions for yourself  You can list the treatments you want for each condition  Treatment may include pain medicine, surgery, blood transfusions, dialysis, IV or tube feedings, and a ventilator (breathing machine)  · Values history: This document has questions about your views, beliefs, and how you feel and think about life  This information can help others choose the care that you would choose  Why are advance directives important? An advance directive helps you control your care  Although spoken wishes may be used, it is better to have your wishes written down  Spoken wishes can be misunderstood, or not followed  Treatments may be given even if you do not want them  An advance directive may make it easier for your family to make difficult choices about your care  © Copyright [employer] 2018 Information is for End User's use only and may not be sold, redistributed or otherwise used for commercial purposes   All illustrations and images included in CareNotes® are the copyrighted property of [employer]  or [facility] Preventive Visit Patient Instructions  Thank you for completing your Welcome to Medicare Visit or Medicare Annual Wellness Visit today  Your next wellness visit will be due in one year (7/29/2023)  The screening/preventive services that you may require over the next 5-10 years are detailed below  Some tests may not apply to you based off risk factors and/or age  Screening tests ordered at today's visit but not completed yet may show as past due  Also, please note that scanned in results may not display below  Preventive Screenings:  Service Recommendations Previous Testing/Comments   Colorectal Cancer Screening  * Colonoscopy    * Fecal Occult Blood Test (FOBT)/Fecal Immunochemical Test (FIT)  * Fecal DNA/Cologuard Test  * Flexible Sigmoidoscopy Age: 54-65 years old   Colonoscopy: every 10 years (may be performed more frequently if at higher risk)  OR  FOBT/FIT: every 1 year  OR  Cologuard: every 3 years  OR  Sigmoidoscopy: every 5 years  Screening may be recommended earlier than age 48 if at higher risk for colorectal cancer  Also, an individualized decision between you and your healthcare provider will decide whether screening between the ages of 74-80 would be appropriate  Colonoscopy: 02/10/2022  FOBT/FIT: Not on file  Cologuard: Not on file  Sigmoidoscopy: Not on file          Breast Cancer Screening Age: 36 years old  Frequency: every 1-2 years  Not required if history of left and right mastectomy Mammogram: 09/07/2021    Screening Current   Cervical Cancer Screening Between the ages of 21-29, pap smear recommended once every 3 years  Between the ages of 33-67, can perform pap smear with HPV co-testing every 5 years     Recommendations may differ for women with a history of total hysterectomy, cervical cancer, or abnormal pap smears in past  Pap Smear: Not on file    Screening Not Indicated   Hepatitis C Screening Once for adults born between 1945 and 1965  More frequently in patients at high risk for Hepatitis C Hep C Antibody: Not on file        Diabetes Screening 1-2 times per year if you're at risk for diabetes or have pre-diabetes Fasting glucose: 307 mg/dL   A1C: 6 4    Screening Not Indicated  History Diabetes   Cholesterol Screening Once every 5 years if you don't have a lipid disorder  May order more often based on risk factors  Lipid panel: 07/15/2022    Screening Not Indicated  History Lipid Disorder     Other Preventive Screenings Covered by Medicare:  6  Abdominal Aortic Aneurysm (AAA) Screening: covered once if your at risk  You're considered to be at risk if you have a family history of AAA  7  Lung Cancer Screening: covers low dose CT scan once per year if you meet all of the following conditions: (1) Age 50-69; (2) No signs or symptoms of lung cancer; (3) Current smoker or have quit smoking within the last 15 years; (4) You have a tobacco smoking history of at least 30 pack years (packs per day multiplied by number of years you smoked); (5) You get a written order from a healthcare provider  8  Glaucoma Screening: covered annually if you're considered high risk: (1) You have diabetes OR (2) Family history of glaucoma OR (3)  aged 48 and older OR (3)  American aged 72 and older  5  Osteoporosis Screening: covered every 2 years if you meet one of the following conditions: (1) You're estrogen deficient and at risk for osteoporosis based off medical history and other findings; (2) Have a vertebral abnormality; (3) On glucocorticoid therapy for more than 3 months; (4) Have primary hyperparathyroidism; (5) On osteoporosis medications and need to assess response to drug therapy  · Last bone density test (DXA Scan): 09/07/2021  10  HIV Screening: covered annually if you're between the age of 12-76   Also covered annually if you are younger than 13 and older than 72 with risk factors for HIV infection  For pregnant patients, it is covered up to 3 times per pregnancy  Immunizations:  Immunization Recommendations   Influenza Vaccine Annual influenza vaccination during flu season is recommended for all persons aged >= 6 months who do not have contraindications   Pneumococcal Vaccine (Prevnar and Pneumovax)  * Prevnar = PCV13  * Pneumovax = PPSV23   Adults 25-60 years old: 1-3 doses may be recommended based on certain risk factors  Adults 72 years old: Prevnar (PCV13) vaccine recommended followed by Pneumovax (PPSV23) vaccine  If already received PPSV23 since turning 65, then PCV13 recommended at least one year after PPSV23 dose  Hepatitis B Vaccine 3 dose series if at intermediate or high risk (ex: diabetes, end stage renal disease, liver disease)   Tetanus (Td) Vaccine - COST NOT COVERED BY MEDICARE PART B Following completion of primary series, a booster dose should be given every 10 years to maintain immunity against tetanus  Td may also be given as tetanus wound prophylaxis  Tdap Vaccine - COST NOT COVERED BY MEDICARE PART B Recommended at least once for all adults  For pregnant patients, recommended with each pregnancy  Shingles Vaccine (Shingrix) - COST NOT COVERED BY MEDICARE PART B  2 shot series recommended in those aged 48 and above     Health Maintenance Due:      Topic Date Due    Hepatitis C Screening  Never done     Immunizations Due:      Topic Date Due    COVID-19 Vaccine (3 - Booster for Pfizer series) 10/02/2021    Pneumococcal Vaccine: 65+ Years (2 - PCV) 01/13/2022    Influenza Vaccine (1) 09/01/2022     Advance Directives   What are advance directives? Advance directives are legal documents that state your wishes and plans for medical care  These plans are made ahead of time in case you lose your ability to make decisions for yourself   Advance directives can apply to any medical decision, such as the treatments you want, and if you want to donate organs  What are the types of advance directives? There are many types of advance directives, and each state has rules about how to use them  You may choose a combination of any of the following:  · Living will: This is a written record of the treatment you want  You can also choose which treatments you do not want, which to limit, and which to stop at a certain time  This includes surgery, medicine, IV fluid, and tube feedings  · Durable power of  for healthcare [facility]): This is a written record that states who you want to make healthcare choices for you when you are unable to make them for yourself  This person, called a proxy, is usually a family member or a friend  You may choose more than 1 proxy  · Do not resuscitate (DNR) order:  A DNR order is used in case your heart stops beating or you stop breathing  It is a request not to have certain forms of treatment, such as CPR  A DNR order may be included in other types of advance directives  · Medical directive: This covers the care that you want if you are in a coma, near death, or unable to make decisions for yourself  You can list the treatments you want for each condition  Treatment may include pain medicine, surgery, blood transfusions, dialysis, IV or tube feedings, and a ventilator (breathing machine)  · Values history: This document has questions about your views, beliefs, and how you feel and think about life  This information can help others choose the care that you would choose  Why are advance directives important? An advance directive helps you control your care  Although spoken wishes may be used, it is better to have your wishes written down  Spoken wishes can be misunderstood, or not followed  Treatments may be given even if you do not want them  An advance directive may make it easier for your family to make difficult choices about your care        © Copyright [employer] 2018 Information is for End User's use only and may not be sold, redistributed or otherwise used for commercial purposes   All illustrations and images included in CareNotes® are the copyrighted property of [employer]  or [facility] [employer]

## 2022-07-28 NOTE — PROGRESS NOTES
Assessment and Plan:     Problem List Items Addressed This Visit        Endocrine    Diabetes mellitus ([facility] )       Cardiovascular and Mediastinum    Hypertension       Genitourinary    Stage 3a chronic kidney disease ([facility] )       Other    Generalized anxiety disorder      Other Visit Diagnoses     Medicare annual wellness visit, subsequent    -  Primary    Encounter for screening mammogram for breast cancer        Relevant Orders    Mammo screening bilateral w 3d & cad            Depression Screening and Follow-up Plan: Patient was screened for depression during today's encounter  They screened negative with a PHQ-2 score of 0  Preventive health issues were discussed with patient, and age appropriate screening tests were ordered as noted in patient's After Visit Summary  Personalized health advice and appropriate referrals for health education or preventive services given if needed, as noted in patient's After Visit Summary  History of Present Illness:     Patient presents for a Medicare Wellness Visit    67yo female with stage 3 CKD, type 1 diabetes, hypothyroidism here for follow up/annual physical  Doing well, anxiety is better  Appetite also better, weight stable  Denies abdominal pain, constipation or diarrhea  Diabetes: follows with Endocrine, still having occasional lows 60-70s and doesn't always feel it  Today, FBG 52, ate crackers and drank OJ and came up to 90s  Patient Care Team:  Chiquis Colbert MD as PCP - General (Internal Medicine)  Nanda Rasheed as Nurse Practitioner (Endocrinology)  Marianne Ferrari, RN as Registered Nurse (Diabetes Services)     Review of Systems:     Review of Systems   Constitutional: Negative for chills, fatigue, fever and unexpected weight change  HENT: Positive for postnasal drip  Negative for congestion, rhinorrhea, sore throat and trouble swallowing  Eyes: Positive for visual disturbance (dry eyes)   Negative for pain, redness and itching  Respiratory: Negative for cough, chest tightness, shortness of breath and wheezing  Cardiovascular: Negative for chest pain, palpitations and leg swelling  Gastrointestinal: Negative for abdominal pain, blood in stool, constipation, diarrhea, nausea and vomiting  Endocrine: Negative for cold intolerance, heat intolerance, polydipsia and polyuria  Genitourinary: Negative for dysuria, frequency, hematuria and urgency  Musculoskeletal: Negative for arthralgias, back pain and joint swelling  Skin: Negative for rash  Neurological: Negative for dizziness, tremors, weakness, light-headedness, numbness and headaches  Psychiatric/Behavioral: Positive for sleep disturbance  Negative for confusion, dysphoric mood, hallucinations and suicidal ideas  The patient is not nervous/anxious           Problem List:     Patient Active Problem List   Diagnosis    Generalized anxiety disorder    Stage 3a chronic kidney disease ([facility] )    Hypothyroidism    Hypertension    Ataxic gait    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 ear    [facility] mellitus [facility]    Hyperlipidemia    Pelvic mass    Retinal disorder    Constipation    LLQ abdominal pain    Dry eye    Age-related osteoporosis without current pathological fracture    Hypoglycemia unawareness associated with type 1 diabetes mellitus ([facility] )      Past Medical and Surgical History:     Past Medical History:   Diagnosis Date    Anxiety     Colon polyp     Depression     Diabetes mellitus [facility]     Diverticulosis     Hyperlipidemia     Hypertension     Type 2 diabetes mellitus without complication, without long-term current use of insulin ([facility] ) 4/25/2021     Past Surgical History:   Procedure Laterality Date    APPENDECTOMY      CATARACT EXTRACTION Right     COLONOSCOPY      difficult exam    HYSTERECTOMY      TONSILLECTOMY      WISDOM TOOTH EXTRACTION        Family History:     Family History   Problem Relation Age of Onset    Parkinsonism Mother    [employer] Alzheimer's disease Father     Breast cancer Sister         in her 42's    Lung cancer Sister     No Known Problems Daughter     No Known Problems Maternal Grandmother     No Known Problems Maternal Grandfather     No Known Problems Paternal Grandmother     No Known Problems Paternal Grandfather     No Known Problems Daughter     No Known Problems Maternal Aunt     No Known Problems Maternal Aunt     No Known Problems Maternal Aunt     No Known Problems Paternal Aunt       Social History:     Social History     Socioeconomic History    Marital status:      Spouse name: None    Number of children: None    Years of education: None    Highest education level: None   Occupational History    None   Tobacco Use    Smoking status: Never Smoker    Smokeless tobacco: Never Used   Vaping Use    Vaping Use: Never used   Substance and Sexual Activity    Alcohol use: Not Currently     Comment: rare    Drug use: Never    Sexual activity: None   Other Topics Concern    None   Social History Narrative    None     Social Determinants of Health     Financial Resource Strain: Not on file   Food Insecurity: Not on file   Transportation Needs: Not on file   Physical Activity: Not on file   Stress: Not on file   Social Connections: Not on file   Intimate Partner Violence: Not on file   Housing Stability: Not on file      Medications and Allergies:     Current Outpatient Medications   Medication Sig Dispense Refill    Ascorbic Acid (VITAMIN C PO) Take by mouth      ASPIRIN 81 PO Take 81 mg by mouth daily      atorvastatin (LIPITOR) 20 mg tablet take 1 tablet by mouth once daily 90 tablet 5    Blood Glucose Monitoring Suppl (OneTouch Verio) w/Device KIT Use to test blood sugar 4x daily   1 kit 0    Cholecalciferol 2000 units CAPS Take 1 capsule by mouth      famotidine (PEPCID) 20 mg tablet Take 1 tablet (20 mg total) by mouth daily 60 tablet 3    glucose blood (OneTouch Verio) test strip Use to test blood sugar 4x daily  400 each 0    levothyroxine 75 mcg tablet Take 1 tablet (75 mcg total) by mouth daily 90 tablet 3    lisinopril (ZESTRIL) 20 mg tablet Take 1 tablet (20 mg total) by mouth daily 90 tablet 1    Multiple Vitamins-Minerals (MULTIVITAL-M PO) Take by mouth      OneTouch Delica Lancets 51P MISC Use to test blood sugar 4x daily  300 each 2    insulin aspart protamine-insulin aspart (NovoLOG Mix 70/30) 100 units/mL injection Inject 12 Units under the skin 2 (two) times a day before meals (Patient taking differently: Inject 12 Units under the skin 2 (two) times a day before meals 12 units in am and 14 units in pm ) 30 mL 3    Lidocaine (Salonpas Pain Relieving) 4 % PTCH Apply 2 patches topically 2 (two) times a day (Patient not taking: No sig reported) 10 patch 0     No current facility-administered medications for this visit  Allergies   Allergen Reactions    Diphenhydramine Shortness Of Breath    Meperidine Hives and Shortness Of Breath    Prednisone Hives and Shortness Of Breath    Bee Pollen     Ciprofloxacin Hives    Sulfamethoxazole-Trimethoprim GI Intolerance      Immunizations:     Immunization History   Administered Date(s) Administered    COVID-19 PFIZER VACCINE 0 3 ML IM 04/11/2021, 05/02/2021    Influenza Quadrivalent 3 years and older 12/11/2019, 09/25/2020    Pneumococcal Polysaccharide PPV23 01/01/2013, 01/13/2021    Zoster 01/01/2012      Health Maintenance:         Topic Date Due    Hepatitis C Screening  Never done         Topic Date Due    COVID-19 Vaccine (3 - Booster for Pfizer series) 10/02/2021    Pneumococcal Vaccine: 65+ Years (2 - PCV) 01/13/2022    Influenza Vaccine (1) 09/01/2022      Medicare Screening Tests and Risk Assessments:     Delicia James is here for her Subsequent Wellness visit  Last Medicare Wellness visit information reviewed, patient interviewed and updates made to the record today        Health Risk Assessment:   Patient rates overall health as good  Patient feels that their physical health rating is slightly better  Patient is satisfied with their life  Eyesight was rated as same  Hearing was rated as slightly worse  Patient feels that their emotional and mental health rating is same  Patients states they are never, rarely angry  Patient states they are sometimes unusually tired/fatigued  Pain experienced in the last 7 days has been none  Patient states that she has experienced no weight loss or gain in last 6 months  Depression Screening:   PHQ-2 Score: 0      Fall Risk Screening: In the past year, patient has experienced: no history of falling in past year      Urinary Incontinence Screening:   Patient has not leaked urine accidently in the last six months  Home Safety:  Patient does not have trouble with stairs inside or outside of their home  Patient has working smoke alarms and has working carbon monoxide detector  Home safety hazards include: none  Nutrition:   Current diet is Limited junk food and Regular  Medications:   Patient is currently taking over-the-counter supplements  OTC medications include: see medication list  Patient is able to manage medications  Activities of Daily Living (ADLs)/Instrumental Activities of Daily Living (IADLs):   Walk and transfer into and out of bed and chair?: Yes  Dress and groom yourself?: Yes    Bathe or shower yourself?: Yes    Feed yourself?  Yes  Do your laundry/housekeeping?: Yes  Manage your money, pay your bills and track your expenses?: Yes  Make your own meals?: Yes    Do your own shopping?: Yes    Previous Hospitalizations:   Any hospitalizations or ED visits within the last 12 months?: Yes    How many hospitalizations have you had in the last year?: 1-2    Hospitalization Comments: December 2021    Advance Care Planning:   Living will: No    Durable POA for healthcare: No    Advanced directive: No    Advanced directive counseling given: Yes    Five wishes given: Yes    Patient declined ACP directive: No    End of Life Decisions reviewed with patient: Yes    Provider agrees with end of life decisions: Yes      Comments: Children would be POA    Cognitive Screening:   Provider or family/friend/caregiver concerned regarding cognition?: No    PREVENTIVE SCREENINGS      Cardiovascular Screening:    General: Screening Not Indicated and History Lipid Disorder      Diabetes Screening:     General: Screening Not Indicated and History Diabetes      Colorectal Cancer Screening:     General: Screening Current      Breast Cancer Screening:     General: Screening Current    Due for: Mammogram        Cervical Cancer Screening:    General: Screening Not Indicated      Osteoporosis Screening:    General: Screening Not Indicated and History Osteoporosis      Abdominal Aortic Aneurysm (AAA) Screening:        General: Screening Not Indicated      Lung Cancer Screening:     General: Screening Not Indicated      Hepatitis C Screening:    General: Screening Not Indicated    Screening, Brief Intervention, and Referral to Treatment (SBIRT)    Screening  Typical number of drinks in a day: 0  Typical number of drinks in a week: 0  Interpretation: Low risk drinking behavior  Brief Intervention  Alcohol & drug use screenings were reviewed  No concerns regarding substance use disorder identified  Other Counseling Topics:   Car/seat belt/driving safety, skin self-exam, sunscreen and calcium and vitamin D intake and regular weightbearing exercise  No exam data present     Physical Exam:     /68   Pulse 66   Temp 98 3 °F (36 8 °C)   Resp 18   Wt 57 8 kg (127 lb 6 4 oz)   SpO2 99%   BMI 24 07 kg/m²     Physical Exam  Vitals reviewed  Constitutional:       General: She is not in acute distress  Appearance: She is normal weight  HENT:      Head: Normocephalic and atraumatic  Mouth/Throat:      Pharynx: No oropharyngeal exudate or posterior oropharyngeal erythema     Neck:      Vascular: No carotid bruit  Cardiovascular:      Rate and Rhythm: Normal rate and regular rhythm  Heart sounds: Normal heart sounds  No murmur heard  No friction rub  No gallop  Pulmonary:      Effort: Pulmonary effort is normal  No respiratory distress  Breath sounds: Normal breath sounds  No wheezing, rhonchi or rales  Abdominal:      General: Abdomen is flat  Bowel sounds are normal  There is no distension  Palpations: Abdomen is soft  There is no mass  Tenderness: There is no abdominal tenderness  There is no guarding or rebound  Lymphadenopathy:      Cervical: No cervical adenopathy  Skin:     General: Skin is warm and dry  Neurological:      General: No focal deficit present  Mental Status: She is alert  Psychiatric:         Mood and Affect: Mood normal          Behavior: Behavior normal          Thought Content:  Thought content normal          Judgment: Judgment normal           Girtha MD Kyra

## 2022-08-01 ENCOUNTER — APPOINTMENT (OUTPATIENT)
Dept: LAB | Facility: CLINIC | Age: 77
End: 2022-08-01
Payer: MEDICARE

## 2022-08-01 ENCOUNTER — EPISODE CHANGES (OUTPATIENT)
Dept: CASE MANAGEMENT | Facility: OTHER | Age: 77
End: 2022-08-01

## 2022-08-01 DIAGNOSIS — N18.31 TYPE 1 DIABETES MELLITUS WITH STAGE 3A CHRONIC KIDNEY DISEASE (HCC): ICD-10-CM

## 2022-08-01 DIAGNOSIS — E10.22 TYPE 1 DIABETES MELLITUS WITH STAGE 3A CHRONIC KIDNEY DISEASE (HCC): ICD-10-CM

## 2022-08-01 LAB
ALBUMIN SERPL BCP-MCNC: 3.5 G/DL (ref 3.5–5)
ALP SERPL-CCNC: 68 U/L (ref 46–116)
ALT SERPL W P-5'-P-CCNC: 16 U/L (ref 12–78)
ANION GAP SERPL CALCULATED.3IONS-SCNC: 1 MMOL/L (ref 4–13)
AST SERPL W P-5'-P-CCNC: 14 U/L (ref 5–45)
BILIRUB SERPL-MCNC: 0.53 MG/DL (ref 0.2–1)
BUN SERPL-MCNC: 23 MG/DL (ref 5–25)
CALCIUM SERPL-MCNC: 9.2 MG/DL (ref 8.3–10.1)
CHLORIDE SERPL-SCNC: 105 MMOL/L (ref 96–108)
CO2 SERPL-SCNC: 32 MMOL/L (ref 21–32)
CREAT SERPL-MCNC: 1.11 MG/DL (ref 0.6–1.3)
EST. AVERAGE GLUCOSE BLD GHB EST-MCNC: 154 MG/DL
GFR SERPL CREATININE-BSD FRML MDRD: 47 ML/MIN/1.73SQ M
GLUCOSE P FAST SERPL-MCNC: 158 MG/DL (ref 65–99)
HBA1C MFR BLD: 7 %
POTASSIUM SERPL-SCNC: 4.5 MMOL/L (ref 3.5–5.3)
PROT SERPL-MCNC: 6.8 G/DL (ref 6.4–8.4)
SODIUM SERPL-SCNC: 138 MMOL/L (ref 135–147)
T4 FREE SERPL-MCNC: 0.9 NG/DL (ref 0.76–1.46)
TSH SERPL DL<=0.05 MIU/L-ACNC: 3.46 UIU/ML (ref 0.45–4.5)

## 2022-08-01 PROCEDURE — 83036 HEMOGLOBIN GLYCOSYLATED A1C: CPT

## 2022-08-01 PROCEDURE — 80053 COMPREHEN METABOLIC PANEL: CPT

## 2022-08-01 PROCEDURE — 84439 ASSAY OF FREE THYROXINE: CPT

## 2022-08-01 PROCEDURE — 36415 COLL VENOUS BLD VENIPUNCTURE: CPT

## 2022-08-01 PROCEDURE — 84443 ASSAY THYROID STIM HORMONE: CPT

## 2022-08-02 ENCOUNTER — PATIENT OUTREACH (OUTPATIENT)
Dept: FAMILY MEDICINE CLINIC | Facility: CLINIC | Age: 77
End: 2022-08-02

## 2022-08-02 NOTE — PROGRESS NOTES
Outpatient Care Management Note:  In basket referral for [facility] received  Chart review completed       Chart review completed for the following sections:   Recent Vital Signs   Allergies/Contradictions   Medication Review    History    [facility]    Problem List   Immunizations   Past hospitalizations and major procedures, including surgery   Significant past illnesses and treatment history including: History and Physical, Other provider notes, Medications/Infusions/Transfusions, Surgery and lab and imaging results   Relevant past medications related to the patient's condition

## 2022-08-04 ENCOUNTER — PATIENT OUTREACH (OUTPATIENT)
Dept: FAMILY MEDICINE CLINIC | Facility: CLINIC | Age: 77
End: 2022-08-04

## 2022-08-04 NOTE — PROGRESS NOTES
Outpatient Care Management Note:  Outreach call attempted to [place] regarding [id number]  Message left for patient to please return call  Contact information left on message

## 2022-08-04 NOTE — PROGRESS NOTES
Established Patient Progress Note      Chief Complaint   Patient presents with    Diabetes Type 1        History of Present Illness:   Barbar Barthel is a 76 y o  female with HTN, HLD, hypothyroidism, osteoporosis, and type 1 diabetes with long term use of insulin since 2006  Reports complications of CKD stage 3a and hypoglycemia unawareness  Denies recent illness or hospitalizations  Denies recent severe hypoglycemic or severe hyperglycemic episodes  Denies any issues with her current regimen  home glucose monitoring: are performed regularly; 3x daily       As a patient's last appointment on 05/17/2022, no changes were made to her insulin regimen as she was well controlled  It was strongly recommended that she have a continuous glucose monitor due to hypoglycemia unawareness  She was educated on how to use the freestyle [place] on 06/10/2022  Component      Latest Ref Rng & Units 1/25/2022 5/17/2022 8/1/2022           8:17 AM  9:21 AM  8:10 AM   Hemoglobin A1C      Normal 3 8-5 6%; PreDiabetic 5 7-6 4%; Diabetic >=6 5%; Glycemic control for adults with diabetes <7 0% % 7 4 (H) 6 4 7 0 (H)   eAG, EST AVG Glucose      mg/dl 166  154     Component      Latest Ref Rng & Units 7/15/2022 8/1/2022           9:22 AM  8:10 AM   eGFR      ml/min/1 73sq m 41 47       Current regimen:   NovoLog 70/30 12 units in the morning and 14 units in the evening      [facility] used freeyle [facility] use     Indication   Type 1 Diabetes      More than 72 hours of data was reviewed  Report to be scanned to chart       Date Range:  July 23, 2022-August 5, 2022    Analysis of data:   Average Glucose:  137 mg/dL  Coefficient of Variation:  30 3%   Glucose management indicator:  6 6%   Time in Target Range:  83%   Time Above Range:  14% 181-250 mg/dL; 1% greater than 250 mg/dL   Time Below Range:  2% 5469 mg/dL; 0% less than 54 mg/dL     Interpretation of data:  Patient has some days where she is very well controlled and blood sugars are consistent throughout the day  However, she also has other days where she is fluctuating significantly between hyperglycemia and hypoglycemia  Patient recently switched syringes and, it turns out, she has been administering 16 in 18 units of her insulin respectively  Last Eye Exam:  Due in September  Last Foot Exam:  Overdue; deferred at today's appointment due to time constraints    For hypothyroidism, she is taking 75 mcg of LT4 daily  She reports taking this consistently and correctly  TFT from 8/1/2022 are stable  Component      Latest Ref Rng & Units 1/25/2022 7/15/2022 8/1/2022           8:17 AM  9:22 AM  8:10 AM   TSH 3RD GENERATON      0 450 - 4 500 uIU/mL 2 600 1 760 3 460       For hypertension she is taking of lisinopril daily  She denies headache and cough  For hyperlipidemia, she is taking 20 mg of atorvastatin nightly  She denies myalgias  Vitamin-D deficiency, she is taking 2000 units vitamin-D daily  For osteoporosis, PCP initially prescribed Fosamax  Patient reported side effects  See telephone notes associated with DEXA scan on 09/07/2021  She denies any recent falls or fractures  She is supplementing her calcium and vitamin-D    Patient Active Problem List   Diagnosis    Generalized anxiety disorder    Stage 3a chronic kidney disease ([address] )    Hypothyroidism    Hypertension    Ataxic gait    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 ear    Diabetes mellitus ([address] )    Hyperlipidemia    Pelvic mass    Retinal disorder    Constipation    LLQ abdominal pain    Dry eye    Age-related osteoporosis without current pathological fracture    Hypoglycemia unawareness associated with type 1 diabetes mellitus ([address] )      Past Medical History:   Diagnosis Date    Anxiety     Colon polyp     Depression     Diabetes mellitus ([address] )     Diverticulosis     Hyperlipidemia     Hypertension       Past Surgical History:   Procedure Laterality Date    APPENDECTOMY      CATARACT EXTRACTION Right     COLONOSCOPY      difficult exam    HYSTERECTOMY      TONSILLECTOMY      WISDOM TOOTH EXTRACTION        Family History   Problem Relation Age of Onset    Parkinsonism Mother     Alzheimer's disease Father     Breast cancer Sister         in her 42's   [facility] Sister     No Known Problems Daughter     No Known Problems Maternal Grandmother     No Known Problems Maternal Grandfather     No Known Problems Paternal Grandmother     No Known Problems Paternal Grandfather     No Known Problems Daughter     No Known Problems Maternal Aunt     No Known Problems Maternal Aunt     No Known Problems Maternal Aunt     No Known Problems Paternal Aunt      Social History     Tobacco Use    Smoking status: Never Smoker    Smokeless tobacco: Never Used   Substance Use Topics    Alcohol use: Not Currently     Comment: rare     Allergies   Allergen Reactions    Diphenhydramine Shortness Of Breath    Meperidine Hives and Shortness Of Breath    Prednisone Hives and Shortness Of Breath    Bee Pollen     Ciprofloxacin Hives    Sulfamethoxazole-Trimethoprim GI Intolerance         Current Outpatient Medications:     Ascorbic Acid (VITAMIN C PO), Take by mouth, Disp: , Rfl:     ASPIRIN 81 PO, Take 81 mg by mouth daily, Disp: , Rfl:     atorvastatin (LIPITOR) 20 mg tablet, take 1 tablet by mouth once daily, Disp: 90 tablet, Rfl: 5    Blood Glucose Monitoring Suppl (OneTouch Verio) w/Device KIT, Use to test blood sugar 4x daily  , Disp: 1 kit, Rfl: 0    Cholecalciferol 2000 units CAPS, Take 1 capsule by mouth, Disp: , Rfl:     famotidine (PEPCID) 20 mg tablet, Take 1 tablet (20 mg total) by mouth daily, Disp: 60 tablet, Rfl: 3    glucose blood (OneTouch Verio) test strip, Use to test blood sugar 4x daily  , Disp: 400 each, Rfl: 0    levothyroxine 75 mcg tablet, Take 1 tablet (75 mcg total) by mouth daily, Disp: 90 tablet, Rfl: 3    lisinopril (ZESTRIL) 20 mg tablet, Take 1 tablet (20 mg total) by mouth daily, Disp: 90 tablet, Rfl: 1    Multiple Vitamins-Minerals (MULTIVITAL-M PO), Take by mouth, Disp: , Rfl:     OneTouch Delica Lancets 47M MISC, Use to test blood sugar 4x daily  , Disp: 300 each, Rfl: 2    insulin aspart protamine-insulin aspart (NovoLOG Mix 70/30) 100 units/mL injection, Inject 12 Units under the skin 2 (two) times a day before meals (Patient taking differently: Inject 12 Units under the skin 2 (two) times a day before meals 12 units in am and 14 units in pm ), Disp: 30 mL, Rfl: 3    Lidocaine (Salonpas Pain Relieving) 4 % PTCH, Apply 2 patches topically 2 (two) times a day (Patient not taking: No sig reported), Disp: 10 patch, Rfl: 0    Review of Systems   Constitutional: Negative for activity change, appetite change, fatigue and unexpected weight change  HENT: Negative for dental problem, sore throat, trouble swallowing and voice change  Eyes: Negative for visual disturbance  Respiratory: Negative for cough, chest tightness and shortness of breath  Cardiovascular: Negative for chest pain, palpitations and leg swelling  Gastrointestinal: Negative for constipation, diarrhea, nausea and vomiting  Endocrine: Negative for cold intolerance, heat intolerance, polydipsia, polyphagia and polyuria  Genitourinary: Negative for frequency  Musculoskeletal: Positive for arthralgias  Negative for back pain, gait problem and myalgias  Skin: Negative for wound  Allergic/Immunologic: Positive for environmental allergies  Negative for food allergies  Neurological: Negative for dizziness, weakness, light-headedness, numbness and headaches  Psychiatric/Behavioral: Negative for decreased concentration, dysphoric mood and sleep disturbance  The patient is not nervous/anxious  Physical Exam:  Body mass index is 23 43 kg/m²    /68   Pulse 78   Ht 5' 1" (1 549 m)   Wt 56 2 kg (124 lb)   SpO2 97%   BMI 23 43 kg/m²    Wt Readings from Last 3 Encounters:   08/05/22 56 2 kg (124 lb)   07/28/22 57 8 kg (127 lb 6 4 oz)   05/17/22 55 3 kg (122 lb)       Physical Exam  Vitals reviewed  Constitutional:       General: She is not in acute distress  Appearance: She is well-developed  She is not ill-appearing  HENT:      Head: Normocephalic and atraumatic  Eyes:      Pupils: Pupils are equal, round, and reactive to light  Neck:      Thyroid: No thyromegaly  Cardiovascular:      Rate and Rhythm: Normal rate and regular rhythm  Pulses: Normal pulses  Heart sounds: Normal heart sounds  Pulmonary:      Effort: Pulmonary effort is normal       Breath sounds: Normal breath sounds  Abdominal:      General: Bowel sounds are normal  There is no distension  Palpations: Abdomen is soft  Tenderness: There is no abdominal tenderness  Musculoskeletal:      Cervical back: Normal range of motion and neck supple  Right lower leg: No edema  Left lower leg: No edema  Lymphadenopathy:      Cervical: No cervical adenopathy  Skin:     General: Skin is warm and dry  Capillary Refill: Capillary refill takes less than 2 seconds  Neurological:      Mental Status: She is alert and oriented to person, place, and time        Gait: Gait normal    Psychiatric:         Mood and Affect: Mood normal          Behavior: Behavior normal            Labs:   Lab Results   Component Value Date    HGBA1C 7 0 (H) 08/01/2022    HGBA1C 6 4 05/17/2022    HGBA1C 7 4 (H) 01/25/2022     Lab Results   Component Value Date    CREATININE 1 11 08/01/2022    CREATININE 1 23 07/15/2022    CREATININE 1 10 01/25/2022    BUN 23 08/01/2022    K 4 5 08/01/2022     08/01/2022    CO2 32 08/01/2022     eGFR   Date Value Ref Range Status   08/01/2022 47 ml/min/1 73sq m Final     Lab Results   Component Value Date    HDL 78 07/15/2022    TRIG 117 07/15/2022     Lab Results   Component Value Date    ALT 16 08/01/2022    AST 14 08/01/2022    ALKPHOS 68 08/01/2022 Lab Results   Component Value Date    QXO4TRNVEDWB 3 460 08/01/2022    OFP9BJKUVCUM 1 760 07/15/2022    FYF9PKWUDSLD 2 600 01/25/2022     Lab Results   Component Value Date    FREET4 0 90 08/01/2022       Impression & Plan:    Problem List Items Addressed This Visit        Endocrine    Hypothyroidism     Thyroid function is stable  Continue levothyroxine 75 mcg daily  Relevant Orders    TSH, 3rd generation    T4, free    Diabetes mellitus ([facility] ) - Primary     After patient changed her syringes, she was unsure of how to inject her insulin  I reviewed how many units was contained in each hash lisa  Resume 12 units in the morning and 14 units in the evening  This adjustment should reduce episodes of hypoglycemia  Patient will notify me of persistent hyperglycemia or episodes of hypoglycemia  Continue to follow a healthy diet  Continue with regular physical activity  Check labs prior to next appointment in 4 months  Lab Results   Component Value Date    HGBA1C 7 0 (H) 08/01/2022            Relevant Orders    Hemoglobin E1V    Basic metabolic panel    Lipid Panel with Direct LDL reflex    Microalbumin / creatinine urine ratio    Hypoglycemia unawareness associated with type 1 diabetes mellitus ([facility] [address] )     Continue use of continuous glucose monitor  Lab Results   Component Value Date    HGBA1C 7 0 (H) 08/01/2022               Cardiovascular and Mediastinum    Hypertension     BP stable 124/68  Continue current regimen  Other    Hyperlipidemia     Check fasting lipid panel prior to next appointment  Orders Placed This Encounter   Procedures    Hemoglobin A1C     Standing Status:   Future     Standing Expiration Date:   8/5/2023    Basic metabolic panel     This is a patient instruction: Patient fasting for 8 hours or longer recommended       Standing Status:   Future     Standing Expiration Date:   8/5/2023    Lipid Panel with Direct LDL reflex     This is a patient instruction: This test requires patient fasting for 10-12 hours or longer  Drinking of black coffee or black tea is acceptable  Standing Status:   Future     Standing Expiration Date:   8/5/2023    Microalbumin / creatinine urine ratio     Standing Status:   Future     Standing Expiration Date:   8/5/2023    TSH, 3rd generation     This is a patient instruction: This test is non-fasting  Please drink two glasses of water morning of bloodwork  Standing Status:   Future     Standing Expiration Date:   8/5/2023    T4, free     Standing Status:   Future     Standing Expiration Date:   8/5/2023       There are no Patient Instructions on file for this visit  Discussed with the patient and all questioned fully answered  She will call me if any problems arise  Follow-up appointment in 4 months       Counseled patient on diagnostic results, prognosis, risk and benefit of treatment options, instruction for management, importance of treatment compliance, Risk  factor reduction and impressions    EUGENIA Hendrix

## 2022-08-05 ENCOUNTER — OFFICE VISIT (OUTPATIENT)
Dept: ENDOCRINOLOGY | Facility: CLINIC | Age: 77
End: 2022-08-05
Payer: MEDICARE

## 2022-08-05 ENCOUNTER — PATIENT OUTREACH (OUTPATIENT)
Dept: FAMILY MEDICINE CLINIC | Facility: CLINIC | Age: 77
End: 2022-08-05

## 2022-08-05 VITALS
WEIGHT: 124 LBS | SYSTOLIC BLOOD PRESSURE: 124 MMHG | HEIGHT: 61 IN | DIASTOLIC BLOOD PRESSURE: 68 MMHG | BODY MASS INDEX: 23.41 KG/M2 | OXYGEN SATURATION: 97 % | HEART RATE: 78 BPM

## 2022-08-05 DIAGNOSIS — N18.31 TYPE 1 DIABETES MELLITUS WITH STAGE 3A CHRONIC KIDNEY DISEASE (HCC): Primary | ICD-10-CM

## 2022-08-05 DIAGNOSIS — E78.2 MIXED HYPERLIPIDEMIA: ICD-10-CM

## 2022-08-05 DIAGNOSIS — E10.649 HYPOGLYCEMIA UNAWARENESS ASSOCIATED WITH TYPE 1 DIABETES MELLITUS (HCC): ICD-10-CM

## 2022-08-05 DIAGNOSIS — E10.22 TYPE 1 DIABETES MELLITUS WITH STAGE 3A CHRONIC KIDNEY DISEASE (HCC): Primary | ICD-10-CM

## 2022-08-05 DIAGNOSIS — I10 PRIMARY HYPERTENSION: ICD-10-CM

## 2022-08-05 DIAGNOSIS — E03.9 HYPOTHYROIDISM, UNSPECIFIED TYPE: ICD-10-CM

## 2022-08-05 PROCEDURE — 99214 OFFICE O/P EST MOD 30 MIN: CPT | Performed by: NURSE PRACTITIONER

## 2022-08-05 NOTE — ASSESSMENT & PLAN NOTE
After patient changed her syringes, she was unsure of how to inject her insulin  I reviewed how many units was contained in each hash lisa  Resume 12 units in the morning and 14 units in the evening  This adjustment should reduce episodes of hypoglycemia  Patient will notify me of persistent hyperglycemia or episodes of hypoglycemia  Continue to follow a healthy diet  Continue with regular physical activity  Check labs prior to next appointment in 4 months    Lab Results   Component Value Date    HGBA1C 7 0 (H) 08/01/2022

## 2022-08-05 NOTE — PROGRESS NOTES
Outpatient Care Management Note:  Received a return call from Kait Whitman  Introduced myself and [address]  Kait Whitman does not feel she has need for outreach at this time  She is much more comfortable managing her DM since receiving a CGM as she was unaware of her hypoglycemic events prior  Kait Whitman will contact me via her PCP should she feel she has needs

## 2022-08-05 NOTE — ASSESSMENT & PLAN NOTE
Continue use of continuous glucose monitor    Lab Results   Component Value Date    HGBA1C 7 0 (H) 08/01/2022

## 2022-09-29 ENCOUNTER — TELEPHONE (OUTPATIENT)
Dept: FAMILY MEDICINE CLINIC | Facility: CLINIC | Age: 77
End: 2022-09-29

## 2022-09-29 NOTE — TELEPHONE ENCOUNTER
Patient called having sinus issues and also has a dry mouth was wondering if Dr Tiffanie Izquierdo recommended anything for this, or if something can be called in for her to [facility]   Call patient at [phone] with any questions

## 2022-09-30 ENCOUNTER — OFFICE VISIT (OUTPATIENT)
Dept: FAMILY MEDICINE CLINIC | Facility: CLINIC | Age: 77
End: 2022-09-30
Payer: MEDICARE

## 2022-09-30 VITALS
OXYGEN SATURATION: 98 % | SYSTOLIC BLOOD PRESSURE: 122 MMHG | DIASTOLIC BLOOD PRESSURE: 70 MMHG | HEART RATE: 82 BPM | HEIGHT: 61 IN | RESPIRATION RATE: 20 BRPM | BODY MASS INDEX: 23.6 KG/M2 | TEMPERATURE: 97 F | WEIGHT: 125 LBS

## 2022-09-30 DIAGNOSIS — H61.23 BILATERAL IMPACTED CERUMEN: ICD-10-CM

## 2022-09-30 DIAGNOSIS — R09.81 NASAL CONGESTION: Primary | ICD-10-CM

## 2022-09-30 DIAGNOSIS — L98.9 SKIN LESION: ICD-10-CM

## 2022-09-30 PROCEDURE — 99214 OFFICE O/P EST MOD 30 MIN: CPT | Performed by: FAMILY MEDICINE

## 2022-09-30 RX ORDER — FLASH GLUCOSE SENSOR
KIT MISCELLANEOUS
COMMUNITY

## 2022-09-30 RX ORDER — DOXYCYCLINE HYCLATE 100 MG/1
100 CAPSULE ORAL EVERY 12 HOURS SCHEDULED
Qty: 14 CAPSULE | Refills: 0 | Status: SHIPPED | OUTPATIENT
Start: 2022-09-30 | End: 2022-10-07

## 2022-09-30 NOTE — ASSESSMENT & PLAN NOTE
- Appears to be scabbed bug bites versus secondary infection skin lesion, unclear given scabbing   - Cover skin infection with doxycyline

## 2022-09-30 NOTE — PROGRESS NOTES
Assessment/Plan:       Problem List Items Addressed This Visit        Nervous and Auditory    Bilateral impacted cerumen     - Recommended Debrox daily   - Return in 1 week for ear flush             Musculoskeletal and Integument    Skin lesion     - Appears to be scabbed bug bites versus secondary infection skin lesion, unclear given scabbing   - Cover skin infection with doxycyline          Relevant Medications    doxycycline hyclate (VIBRAMYCIN) 100 mg capsule       Other    Nasal congestion - Primary     - Given length of symptoms will cover for bacterial sinusitis   - Reported allergy to prednisone          Relevant Medications    doxycycline hyclate (VIBRAMYCIN) 100 mg capsule            Subjective:      Patient ID: Elena Sigala is a 76 y o  female  HPI     Nasal congestion- Started around Monday, nasal congestion and post-nasal drip, coughing  Feels pressure in sinuses, bleeding in nose on right side Monday, resolved now  Some throat soreness, dry cough  Some mild chills, no fevers  Aching chest pain, with coughing aching, negative home COVID test  Gets hoarseness  Chest pain with coughing, resolved with Vicks  No chest pain or pressure otherwise  No shortness of breath  Mostly clear mucous  2 spots on right buttock, no rash anywhere else  Were itchy, when warm more itchy  No fevers  No known bug bites  The following portions of the patient's history were reviewed and updated as appropriate: allergies, current medications, past family history, past medical history, past social history, past surgical history, and problem list     Review of Systems   All other systems reviewed and are negative  Objective:      /70   Pulse 82   Temp (!) 97 °F (36 1 °C) (Tympanic)   Resp 20   Ht 5' 1" (1 549 m)   Wt 56 7 kg (125 lb)   SpO2 98%   BMI 23 62 kg/m²          Physical Exam  Vitals reviewed  Constitutional:       General: She is not in acute distress       Appearance: Normal appearance  She is not ill-appearing, toxic-appearing or diaphoretic  HENT:      Head: Normocephalic and atraumatic  Right Ear: There is impacted cerumen  Left Ear: There is impacted cerumen  Nose: Congestion and rhinorrhea present  Mouth/Throat:      Mouth: Mucous membranes are moist       Pharynx: No oropharyngeal exudate or posterior oropharyngeal erythema  Eyes:      General:         Right eye: No discharge  Left eye: No discharge  Extraocular Movements: Extraocular movements intact  Conjunctiva/sclera: Conjunctivae normal    Cardiovascular:      Rate and Rhythm: Normal rate and regular rhythm  Heart sounds: Normal heart sounds  No murmur heard  No friction rub  No gallop  Pulmonary:      Effort: Pulmonary effort is normal  No respiratory distress  Breath sounds: Normal breath sounds  No stridor  No wheezing or rhonchi  Musculoskeletal:         General: No swelling, tenderness or signs of injury  Skin:     General: Skin is warm  Coloration: Skin is not pale  Findings: Erythema and lesion present  No rash  Comments: 2 scabbed lesions with erythema on right buttock    Neurological:      Mental Status: She is alert and oriented to person, place, and time  Motor: No weakness     Psychiatric:         Mood and Affect: Mood normal          Behavior: Behavior normal              DO Nella Galvan [facility]

## 2022-09-30 NOTE — TELEPHONE ENCOUNTER
[id number] Cec[address] Swanson, if she feels it's related to seasonal allergies, would recommend trial of Xyzal at bedtime and flonase nasal spray daily  If she's having URI symptoms - sore throat, fatigue, fevers, new or worsening body aches, would suggest a visit to address

## 2022-10-07 ENCOUNTER — HOSPITAL ENCOUNTER (OUTPATIENT)
Dept: MAMMOGRAPHY | Facility: HOSPITAL | Age: 77
End: 2022-10-07
Attending: INTERNAL MEDICINE
Payer: MEDICARE

## 2022-10-07 ENCOUNTER — OFFICE VISIT (OUTPATIENT)
Dept: FAMILY MEDICINE CLINIC | Facility: CLINIC | Age: 77
End: 2022-10-07
Payer: MEDICARE

## 2022-10-07 VITALS
HEIGHT: 61 IN | BODY MASS INDEX: 23.98 KG/M2 | RESPIRATION RATE: 16 BRPM | OXYGEN SATURATION: 98 % | SYSTOLIC BLOOD PRESSURE: 122 MMHG | DIASTOLIC BLOOD PRESSURE: 72 MMHG | WEIGHT: 127 LBS | TEMPERATURE: 98.2 F | HEART RATE: 74 BPM

## 2022-10-07 VITALS — WEIGHT: 123 LBS | HEIGHT: 61 IN | BODY MASS INDEX: 23.22 KG/M2

## 2022-10-07 DIAGNOSIS — Z12.31 ENCOUNTER FOR SCREENING MAMMOGRAM FOR BREAST CANCER: ICD-10-CM

## 2022-10-07 DIAGNOSIS — Z91.030 BEE STING ALLERGY: ICD-10-CM

## 2022-10-07 DIAGNOSIS — M79.604 RIGHT LEG PAIN: ICD-10-CM

## 2022-10-07 DIAGNOSIS — H61.23 BILATERAL IMPACTED CERUMEN: Primary | ICD-10-CM

## 2022-10-07 PROCEDURE — 77063 BREAST TOMOSYNTHESIS BI: CPT

## 2022-10-07 PROCEDURE — 69210 REMOVE IMPACTED EAR WAX UNI: CPT | Performed by: FAMILY MEDICINE

## 2022-10-07 PROCEDURE — 77067 SCR MAMMO BI INCL CAD: CPT

## 2022-10-07 PROCEDURE — 99214 OFFICE O/P EST MOD 30 MIN: CPT | Performed by: FAMILY MEDICINE

## 2022-10-07 RX ORDER — EPINEPHRINE 0.3 MG/.3ML
0.3 INJECTION SUBCUTANEOUS ONCE
Qty: 0.6 ML | Refills: 0 | Status: SHIPPED | OUTPATIENT
Start: 2022-10-07 | End: 2022-10-07

## 2022-10-07 NOTE — PROGRESS NOTES
Assessment/Plan:       Problem List Items Addressed This Visit        Nervous and Auditory    Bilateral impacted cerumen - Primary     - Incomplete cerumen removal today   - Recommended follow-up with ENT for further removal          Relevant Orders    Ambulatory Referral to Otolaryngology    Ear cerumen removal       Other    Right leg pain     - Likely MSK however given diminished DP pulse on right recommended DANY to assess for PVD         Relevant Orders    VAS ADNY & waveform analysis, multiple levels    Bee sting allergy            Ear cerumen removal    Date/Time: 10/7/2022 2:00 PM  Performed by: Gorge Burkitt, DO  Authorized by: Gorge Burkitt, DO     Procedure details:     Location:  L ear    Procedure type: irrigation with instrumentation      Instrumentation: curette      Approach:  External  Post-procedure details:     Complication:  None    Hearing quality:  Normal    Patient tolerance of procedure: Tolerated well, no immediate complications  Comments:      Incomplete removal         Subjective:      Patient ID: Rodolfo Kam is a 76 y o  female  HPI     Presenting today for ear flush  No ear pain, they feel clogged/decrease hearing  Reports history of bee sting allergy and she has a bee infestation in her apartment right now  Causing her a lot of stress  Unsure if anaphylaxis, last episode age 9  Also mentions continued right leg pain, seems to be in muscle thigh and lower leg, worse laterally IT band region and outer calf  Intermittent  Worse with walking/using it  No swelling or redness  The following portions of the patient's history were reviewed and updated as appropriate: allergies, current medications, past family history, past medical history, past social history, past surgical history, and problem list     Review of Systems   All other systems reviewed and are negative          Objective:      /72   Pulse 74   Temp 98 2 °F (36 8 °C)   Resp 16   Ht 5' 1" (1 549 m)   Wt 57 6 kg (127 lb)   SpO2 98%   BMI 24 00 kg/m²            Physical Exam  HENT:      Right Ear: There is impacted cerumen  Left Ear: There is impacted cerumen  Cardiovascular:      Pulses:           Dorsalis pedis pulses are 0 on the right side and 2+ on the left side  Posterior tibial pulses are 2+ on the right side and 2+ on the left side  Pulmonary:      Effort: Pulmonary effort is normal  No respiratory distress  Musculoskeletal:         General: No swelling, tenderness or signs of injury  Right lower leg: No edema  Left lower leg: No edema  Skin:     General: Skin is warm  Findings: No erythema or rash               Skyler Patient, DO  [address] [facility]

## 2022-10-08 PROBLEM — Z91.030 BEE STING ALLERGY: Status: ACTIVE | Noted: 2022-10-08

## 2022-10-08 PROBLEM — M79.604 RIGHT LEG PAIN: Status: ACTIVE | Noted: 2022-10-08

## 2022-10-13 DIAGNOSIS — I10 PRIMARY HYPERTENSION: ICD-10-CM

## 2022-10-13 RX ORDER — LISINOPRIL 20 MG/1
20 TABLET ORAL DAILY
Qty: 90 TABLET | Refills: 1 | Status: SHIPPED | OUTPATIENT
Start: 2022-10-13

## 2022-10-27 ENCOUNTER — HOSPITAL ENCOUNTER (OUTPATIENT)
Dept: NON INVASIVE DIAGNOSTICS | Facility: HOSPITAL | Age: 77
Discharge: HOME | End: 2022-10-27
Payer: MEDICARE

## 2022-10-27 DIAGNOSIS — M79.604 RIGHT LEG PAIN: ICD-10-CM

## 2022-10-27 PROCEDURE — 93923 UPR/LXTR ART STDY 3+ LVLS: CPT

## 2022-11-07 DIAGNOSIS — E11.22 TYPE 2 DIABETES MELLITUS WITH STAGE 3A CHRONIC KIDNEY DISEASE, WITH LONG-TERM CURRENT USE OF INSULIN (HCC): ICD-10-CM

## 2022-11-07 DIAGNOSIS — N18.31 TYPE 2 DIABETES MELLITUS WITH STAGE 3A CHRONIC KIDNEY DISEASE, WITH LONG-TERM CURRENT USE OF INSULIN (HCC): ICD-10-CM

## 2022-11-07 DIAGNOSIS — Z79.4 TYPE 2 DIABETES MELLITUS WITH STAGE 3A CHRONIC KIDNEY DISEASE, WITH LONG-TERM CURRENT USE OF INSULIN (HCC): ICD-10-CM

## 2022-11-07 RX ORDER — INSULIN ASPART 100 [IU]/ML
12 INJECTION, SUSPENSION SUBCUTANEOUS
Qty: 30 ML | Refills: 3 | Status: SHIPPED | OUTPATIENT
Start: 2022-11-07 | End: 2023-02-05

## 2022-11-07 NOTE — TELEPHONE ENCOUNTER
Please verify insulin dose, as she follows with endocrine   I can refill but want to make sure it's for correct amount

## 2022-11-07 NOTE — TELEPHONE ENCOUNTER
Called and spoke with  patient she verified the sig is,  Inject 12 units, two times a day 10 minutes before meals ,    [facility], [place]

## 2022-11-07 NOTE — TELEPHONE ENCOUNTER
Patient requesting refill(s) of: Novolog     Last filled: 10/19/21  Last appt: 10/7/22  Next appt: 1/30/23  Pharmacy: [facility]

## 2022-11-14 ENCOUNTER — OFFICE VISIT (OUTPATIENT)
Dept: FAMILY MEDICINE CLINIC | Facility: CLINIC | Age: 77
End: 2022-11-14

## 2022-11-14 VITALS
HEIGHT: 61 IN | SYSTOLIC BLOOD PRESSURE: 130 MMHG | OXYGEN SATURATION: 93 % | TEMPERATURE: 97.8 F | HEART RATE: 73 BPM | BODY MASS INDEX: 23.22 KG/M2 | DIASTOLIC BLOOD PRESSURE: 68 MMHG | WEIGHT: 123 LBS

## 2022-11-14 DIAGNOSIS — J01.00 ACUTE NON-RECURRENT MAXILLARY SINUSITIS: Primary | ICD-10-CM

## 2022-11-14 RX ORDER — AMOXICILLIN AND CLAVULANATE POTASSIUM 875; 125 MG/1; MG/1
1 TABLET, FILM COATED ORAL EVERY 12 HOURS SCHEDULED
Qty: 14 TABLET | Refills: 0 | Status: SHIPPED | OUTPATIENT
Start: 2022-11-14 | End: 2022-11-21

## 2022-11-14 NOTE — PATIENT INSTRUCTIONS
Take mucinex 600mg 12 hour tablets  to help with congestion  If worsening we can try an antibiotic  Increase fluids,humidifier, vicks

## 2022-11-14 NOTE — PROGRESS NOTES
[facility]        NAME: Misti Bustillo is a 76 y o  female  : 1945    MRN: [id number]  DATE: 2022  TIME: 2:03 PM    Assessment and Plan   Acute non-recurrent maxillary sinusitis [J01 00]  1  Acute non-recurrent maxillary sinusitis  amoxicillin-clavulanate (AUGMENTIN) 875-125 mg per tablet     1  Acute non-recurrent maxillary sinusitis   symptoms over a week, worsening  Will try mucinex OTC and if no improvement will then start antibiotic    - amoxicillin-clavulanate (AUGMENTIN) 875-125 mg per tablet; Take 1 tablet by mouth every 12 (twelve) hours for 7 days  Dispense: 14 tablet; Refill: 0      Patient Instructions     Patient Instructions   Take mucinex 600mg 12 hour tablets  to help with congestion  If worsening we can try an antibiotic  Increase fluids,humidifier, vicks  Chief Complaint     Chief Complaint   Patient presents with   • Sinusitis     Started Wednesday with runny nose  History of Present Illness       Patient here for an acute visit with c/o cold symptoms for the last week  Reports that she has congestion, sinus pressure, headaches, bodyaches, ear pain, rhinorrhea, green mucous, postnasal drip  Hot shower, vicks, honey with tea,       Review of Systems   Review of Systems   Constitutional: Positive for fatigue  Negative for fever  HENT: Positive for congestion, postnasal drip, rhinorrhea and sore throat  Negative for ear pain  Respiratory: Positive for cough  Negative for shortness of breath and wheezing  Cardiovascular: Negative  Negative for chest pain and palpitations  Gastrointestinal: Negative  Neurological: Negative  Negative for dizziness and headaches         PHQ-2/9 Depression Screening    Little interest or pleasure in doing things: 0 - not at all  Feeling down, depressed, or hopeless: 0 - not at all  PHQ-2 Score: 0  PHQ-2 Interpretation: Negative depression screen        Current Medications       Current Outpatient Medications:   •  amoxicillin-clavulanate (AUGMENTIN) 875-125 mg per tablet, Take 1 tablet by mouth every 12 (twelve) hours for 7 days, Disp: 14 tablet, Rfl: 0  •  Ascorbic Acid (VITAMIN C PO), Take by mouth, Disp: , Rfl:   •  ASPIRIN 81 PO, Take 81 mg by mouth daily, Disp: , Rfl:   •  atorvastatin (LIPITOR) 20 mg tablet, take 1 tablet by mouth once daily, Disp: 90 tablet, Rfl: 5  •  Blood Glucose Monitoring Suppl (OneTouch Verio) w/Device KIT, Use to test blood sugar 4x daily  , Disp: 1 kit, Rfl: 0  •  Cholecalciferol 2000 units CAPS, Take 1 capsule by mouth, Disp: , Rfl:   •  Continuous Blood Gluc Sensor (FreeStyle Kaylah 2 Sensor) MISC, Use, Disp: , Rfl:   •  famotidine (PEPCID) 20 mg tablet, Take 1 tablet (20 mg total) by mouth daily, Disp: 60 tablet, Rfl: 3  •  glucose blood (OneTouch Verio) test strip, Use to test blood sugar 4x daily  , Disp: 400 each, Rfl: 0  •  insulin aspart protamine-insulin aspart (NovoLOG Mix 70/30) 100 units/mL injection, Inject 12 Units under the skin 2 (two) times a day before meals, Disp: 30 mL, Rfl: 3  •  levothyroxine 75 mcg tablet, Take 1 tablet (75 mcg total) by mouth daily, Disp: 90 tablet, Rfl: 3  •  Lidocaine (Salonpas Pain Relieving) 4 % PTCH, Apply 2 patches topically 2 (two) times a day, Disp: 10 patch, Rfl: 0  •  lisinopril (ZESTRIL) 20 mg tablet, Take 1 tablet (20 mg total) by mouth daily, Disp: 90 tablet, Rfl: 1  •  mometasone (ELOCON) 0 1 % cream, Apply topically daily Apply topically BID x 2 weeks then qhs prn, Disp: 45 g, Rfl: 3  •  Multiple Vitamins-Minerals (MULTIVITAL-M PO), Take by mouth, Disp: , Rfl:   •  OneTouch Delica Lancets 39K MISC, Use to test blood sugar 4x daily  , Disp: 300 each, Rfl: 2  •  EPINEPHrine (EPIPEN) 0 3 mg/0 3 mL SOAJ, Inject 0 3 mL (0 3 mg total) into a muscle once for 1 dose, Disp: 0 6 mL, Rfl: 0    Current Allergies     Allergies as of 11/14/2022 - Reviewed 11/14/2022   Allergen Reaction Noted   • Diphenhydramine Shortness Of Breath 04/21/2016   • Meperidine Hives and Shortness Of Breath 04/21/2016   • Prednisone Hives and Shortness Of Breath 01/19/2018   • Bee pollen  04/21/2016   • Ciprofloxacin Hives 04/21/2016   • Sulfamethoxazole-trimethoprim GI Intolerance 07/03/2019            The following portions of the patient's history were reviewed and updated as appropriate: allergies, current medications, past family history, past medical history, past social history, past surgical history and problem list      Past Medical History:   Diagnosis Date   • Anxiety    • Colon polyp    • Depression    • Diabetes mellitus ([facility] [address] )    • Diverticulosis    • Hyperlipidemia    • Hypertension        Past Surgical History:   Procedure Laterality Date   • APPENDECTOMY     • CATARACT EXTRACTION Right    • COLONOSCOPY      difficult exam   • HYSTERECTOMY     • TONSILLECTOMY     • WISDOM TOOTH EXTRACTION         Family History   Problem Relation Age of Onset   • Parkinsonism Mother    • Alzheimer's disease Father    • Breast cancer Sister         in her 42's   • Lung cancer Sister    • Breast cancer Sister    • No Known Problems Daughter    • No Known Problems Daughter    • No Known Problems Maternal Grandmother    • No Known Problems Maternal Grandfather    • No Known Problems Paternal Grandmother    • No Known Problems Paternal Grandfather    • No Known Problems Maternal Aunt    • No Known Problems Maternal Aunt    • No Known Problems Maternal Aunt    • No Known Problems Paternal Aunt          Medications have been verified  Objective   /68   Pulse 73   Temp 97 8 °F (36 6 °C)   Ht 5' 1" (1 549 m)   Wt 55 8 kg (123 lb)   SpO2 93%   BMI 23 24 kg/m²        Physical Exam     Physical Exam  Vitals and nursing note reviewed  Constitutional:       General: She is not in acute distress  Appearance: She is well-developed  She is not ill-appearing or diaphoretic  HENT:      Head: Normocephalic and atraumatic        Right Ear: Tympanic membrane, ear canal and external ear normal       Left Ear: Tympanic membrane, ear canal and external ear normal       Nose: Congestion present  No laceration, mucosal edema or rhinorrhea  Mouth/Throat:      Mouth: Mucous membranes are moist       Pharynx: Uvula midline  No oropharyngeal exudate or posterior oropharyngeal erythema  Eyes:      Conjunctiva/sclera: Conjunctivae normal    Cardiovascular:      Rate and Rhythm: Normal rate and regular rhythm  Pulmonary:      Effort: Pulmonary effort is normal  No bradypnea, accessory muscle usage or respiratory distress  Breath sounds: No stridor  No decreased breath sounds or wheezing  Skin:     General: Skin is warm  Capillary Refill: Capillary refill takes less than 2 seconds  Coloration: Skin is not pale  Findings: No erythema or rash  Neurological:      Mental Status: She is alert and oriented to person, place, and time  Psychiatric:         Behavior: Behavior normal  Behavior is cooperative  Thought Content:  Thought content normal          Judgment: Judgment normal

## 2022-11-29 ENCOUNTER — OFFICE VISIT (OUTPATIENT)
Dept: FAMILY MEDICINE CLINIC | Facility: CLINIC | Age: 77
End: 2022-11-29

## 2022-11-29 VITALS
TEMPERATURE: 99.6 F | OXYGEN SATURATION: 98 % | DIASTOLIC BLOOD PRESSURE: 60 MMHG | HEART RATE: 104 BPM | BODY MASS INDEX: 23.83 KG/M2 | WEIGHT: 126.2 LBS | HEIGHT: 61 IN | RESPIRATION RATE: 18 BRPM | SYSTOLIC BLOOD PRESSURE: 124 MMHG

## 2022-11-29 DIAGNOSIS — M79.10 MYALGIA: ICD-10-CM

## 2022-11-29 DIAGNOSIS — R50.9 FEVER, UNSPECIFIED FEVER CAUSE: ICD-10-CM

## 2022-11-29 DIAGNOSIS — R10.13 DYSPEPSIA: ICD-10-CM

## 2022-11-29 DIAGNOSIS — R05.1 ACUTE COUGH: Primary | ICD-10-CM

## 2022-11-29 PROBLEM — H61.23 BILATERAL IMPACTED CERUMEN: Status: RESOLVED | Noted: 2022-09-30 | Resolved: 2022-11-29

## 2022-11-29 RX ORDER — BENZONATATE 200 MG/1
200 CAPSULE ORAL 3 TIMES DAILY PRN
Qty: 20 CAPSULE | Refills: 0 | Status: SHIPPED | OUTPATIENT
Start: 2022-11-29

## 2022-11-29 RX ORDER — FAMOTIDINE 20 MG/1
20 TABLET, FILM COATED ORAL DAILY
Qty: 60 TABLET | Refills: 3 | Status: SHIPPED | OUTPATIENT
Start: 2022-11-29

## 2022-11-29 NOTE — PROGRESS NOTES
[facility]        NAME: Ren Concepcion is a 76 y o  female  : 1945    MRN: [id number]  DATE: 2022  TIME: 9:42 AM    Assessment and Plan   1  Acute cough  -     famotidine (PEPCID) 20 mg tablet; Take 1 tablet (20 mg total) by mouth daily  -     Covid/Flu- Office Collect  -     benzonatate (TESSALON) 200 MG capsule; Take 1 capsule (200 mg total) by mouth 3 (three) times a day as needed for cough    2  Dyspepsia  -     famotidine (PEPCID) 20 mg tablet; Take 1 tablet (20 mg total) by mouth daily    3  Fever, unspecified fever cause  -     Covid/Flu- Office Collect    4  Myalgia  -     Covid/Flu- Office Collect           Chief Complaint     Chief Complaint   Patient presents with   • Cold Like Symptoms     Onset yesterday cough, fatigue, sweats, temp 100 5, headache, earache, body aches  Finished course of amoxicllin from [place] 2 days ago, and then yesterday symptoms came back   • Constipation     Taking senokot and metamucil         History of Present Illness       67yo female with type 1 diabetes, hypothyroidism here for acute, sick visit  Pt had sinus infection 2 weeks ago and was given amoxicillin  Symptoms resolved, but yesterday she developed headache, sore throat, cough, fever/chills and body aches  No nausea/vomiting or diarrhea but does have constipation and decreased appetite today  Cough is mostly dry, with no chest tightness or SOB  Home COVID test was negative 2 weeks ago, but did not repeat recently  Did not receive annual influenza vaccine  No known sick contacts but was around several family members from out of town over the holiday weekend  Review of Systems   Review of Systems   Constitutional: Positive for activity change, appetite change, chills, fatigue and fever  HENT: Positive for congestion, sinus pressure and sore throat  Respiratory: Positive for cough  Negative for chest tightness and shortness of breath      Cardiovascular: Negative for chest pain, palpitations and leg swelling  Gastrointestinal: Positive for constipation  Negative for abdominal pain, diarrhea, nausea and vomiting  Musculoskeletal: Positive for back pain and myalgias  Neurological: Positive for headaches  Current Medications       Current Outpatient Medications:   •  Ascorbic Acid (VITAMIN C PO), Take by mouth, Disp: , Rfl:   •  ASPIRIN 81 PO, Take 81 mg by mouth daily, Disp: , Rfl:   •  atorvastatin (LIPITOR) 20 mg tablet, take 1 tablet by mouth once daily, Disp: 90 tablet, Rfl: 5  •  benzonatate (TESSALON) 200 MG capsule, Take 1 capsule (200 mg total) by mouth 3 (three) times a day as needed for cough, Disp: 20 capsule, Rfl: 0  •  Blood Glucose Monitoring Suppl (OneTouch Verio) w/Device KIT, Use to test blood sugar 4x daily  , Disp: 1 kit, Rfl: 0  •  Cholecalciferol 2000 units CAPS, Take 1 capsule by mouth, Disp: , Rfl:   •  Continuous Blood Gluc Sensor (FreeStyle Kaylah 2 Sensor) MISC, Use, Disp: , Rfl:   •  EPINEPHrine (EPIPEN) 0 3 mg/0 3 mL SOAJ, Inject 0 3 mL (0 3 mg total) into a muscle once for 1 dose, Disp: 0 6 mL, Rfl: 0  •  famotidine (PEPCID) 20 mg tablet, Take 1 tablet (20 mg total) by mouth daily, Disp: 60 tablet, Rfl: 3  •  glucose blood (OneTouch Verio) test strip, Use to test blood sugar 4x daily  , Disp: 400 each, Rfl: 0  •  insulin aspart protamine-insulin aspart (NovoLOG Mix 70/30) 100 units/mL injection, Inject 12 Units under the skin 2 (two) times a day before meals, Disp: 30 mL, Rfl: 3  •  levothyroxine 75 mcg tablet, Take 1 tablet (75 mcg total) by mouth daily, Disp: 90 tablet, Rfl: 3  •  Lidocaine (Salonpas Pain Relieving) 4 % PTCH, Apply 2 patches topically 2 (two) times a day, Disp: 10 patch, Rfl: 0  •  lisinopril (ZESTRIL) 20 mg tablet, Take 1 tablet (20 mg total) by mouth daily, Disp: 90 tablet, Rfl: 1  •  mometasone (ELOCON) 0 1 % cream, Apply topically daily Apply topically BID x 2 weeks then qhs prn, Disp: 45 g, Rfl: 3  •  Multiple Vitamins-Minerals (MULTIVITAL-M PO), Take by mouth, Disp: , Rfl:   •  OneTouch Delica Lancets 81S MISC, Use to test blood sugar 4x daily  , Disp: 300 each, Rfl: 2    Current Allergies     Allergies as of 11/29/2022 - Reviewed 11/29/2022   Allergen Reaction Noted   • Diphenhydramine Shortness Of Breath 04/21/2016   • Meperidine Hives and Shortness Of Breath 04/21/2016   • Prednisone Hives and Shortness Of Breath 01/19/2018   • Bee pollen  04/21/2016   • Ciprofloxacin Hives 04/21/2016   • Sulfamethoxazole-trimethoprim GI Intolerance 07/03/2019            The following portions of the patient's history were reviewed and updated as appropriate: allergies, current medications, past family history, past medical history, past social history, past surgical history and problem list      Past Medical History:   Diagnosis Date   • Anxiety    • Colon polyp    • Depression    • Diabetes mellitus ([facility] [address] )    • Diverticulosis    • Hyperlipidemia    • Hypertension        Past Surgical History:   Procedure Laterality Date   • APPENDECTOMY     • CATARACT EXTRACTION Right    • COLONOSCOPY      difficult exam   • HYSTERECTOMY     • TONSILLECTOMY     • WISDOM TOOTH EXTRACTION         Family History   Problem Relation Age of Onset   • Parkinsonism Mother    • Alzheimer's disease Father    • Breast cancer Sister         in her 42's   • Lung cancer Sister    • Breast cancer Sister    • No Known Problems Daughter    • No Known Problems Daughter    • No Known Problems Maternal Grandmother    • No Known Problems Maternal Grandfather    • No Known Problems Paternal Grandmother    • No Known Problems Paternal Grandfather    • No Known Problems Maternal Aunt    • No Known Problems Maternal Aunt    • No Known Problems Maternal Aunt    • No Known Problems Paternal Aunt          Medications have been verified          Objective   /60   Pulse 104   Temp 99 6 °F (37 6 °C) (Oral)   Resp 18   Ht 5' 1" (1 549 m)   Wt 57 2 kg (126 lb 3 2 oz) SpO2 98%   BMI 23 85 kg/m²        Physical Exam     Physical Exam  Vitals reviewed  Constitutional:       General: She is not in acute distress  Appearance: She is normal weight  HENT:      Head: Normocephalic and atraumatic  Right Ear: Tympanic membrane, ear canal and external ear normal       Left Ear: Tympanic membrane, ear canal and external ear normal       Mouth/Throat:      Pharynx: No oropharyngeal exudate or posterior oropharyngeal erythema  Cardiovascular:      Rate and Rhythm: Regular rhythm  Tachycardia present  Heart sounds: No murmur heard  No friction rub  No gallop  Pulmonary:      Effort: Pulmonary effort is normal  No respiratory distress  Breath sounds: Normal breath sounds  No wheezing, rhonchi or rales  Abdominal:      General: Abdomen is flat  Bowel sounds are normal  There is no distension  Palpations: Abdomen is soft  There is no mass  Tenderness: There is no abdominal tenderness  There is no guarding or rebound  Hernia: No hernia is present  Lymphadenopathy:      Cervical: No cervical adenopathy  Skin:     General: Skin is warm and dry  Neurological:      Mental Status: She is alert  Psychiatric:         Mood and Affect: Mood normal          Behavior: Behavior normal          Thought Content:  Thought content normal          Judgment: Judgment normal              Results:  Lab Results   Component Value Date    SODIUM 138 08/01/2022    K 4 5 08/01/2022     08/01/2022    CO2 32 08/01/2022    BUN 23 08/01/2022    CREATININE 1 11 08/01/2022    GLUC 264 (H) 12/27/2021    CALCIUM 9 2 08/01/2022       Lab Results   Component Value Date    HGBA1C 7 0 (H) 08/01/2022       Lab Results   Component Value Date    WBC 6 10 07/15/2022    HGB 12 1 07/15/2022    HCT 38 9 07/15/2022    MCV 91 07/15/2022     07/15/2022

## 2022-11-30 LAB
FLUAV RNA RESP QL NAA+PROBE: NEGATIVE
FLUBV RNA RESP QL NAA+PROBE: NEGATIVE
SARS-COV-2 RNA RESP QL NAA+PROBE: NEGATIVE

## 2022-12-05 ENCOUNTER — TELEPHONE (OUTPATIENT)
Dept: FAMILY MEDICINE CLINIC | Facility: CLINIC | Age: 77
End: 2022-12-05

## 2022-12-05 DIAGNOSIS — R05.1 ACUTE COUGH: Primary | ICD-10-CM

## 2022-12-05 RX ORDER — AZITHROMYCIN 250 MG/1
TABLET, FILM COATED ORAL
Qty: 6 TABLET | Refills: 0 | Status: SHIPPED | OUTPATIENT
Start: 2022-12-05 | End: 2022-12-09

## 2022-12-05 NOTE — TELEPHONE ENCOUNTER
Samanta Saul was in last week for a cough, but now she is very weak, shaky  and tired and seems depressed    She was asking if an antibiotic could be called into a [address] [facility]    Phone: [phone]    Please advise

## 2022-12-05 NOTE — TELEPHONE ENCOUNTER
Called and spoke to patient, her cough is getting better, but the weakness and shakiness is the main concern for her, she also mentioned her left leg feeling weak as well and was not sure if this was related to her back,

## 2022-12-07 ENCOUNTER — TELEPHONE (OUTPATIENT)
Dept: FAMILY MEDICINE CLINIC | Facility: CLINIC | Age: 77
End: 2022-12-07

## 2022-12-07 DIAGNOSIS — R05.1 ACUTE COUGH: Primary | ICD-10-CM

## 2022-12-07 RX ORDER — DEXTROMETHORPHAN HYDROBROMIDE AND PROMETHAZINE HYDROCHLORIDE 15; 6.25 MG/5ML; MG/5ML
5 SOLUTION ORAL 4 TIMES DAILY PRN
Qty: 240 ML | Refills: 1 | Status: SHIPPED | OUTPATIENT
Start: 2022-12-07

## 2022-12-07 NOTE — TELEPHONE ENCOUNTER
Patient called back with concerns of still having cough  Said she is feeling better but cannot stop coughing  Is asking if there is anything else she can do

## 2022-12-07 NOTE — TELEPHONE ENCOUNTER
OK, if she's feeling better, no fevers/shortness of breath, can send cough syrup to pharmacy   If any of those symptoms, recommend follow up and chest xray

## 2022-12-08 NOTE — TELEPHONE ENCOUNTER
Called and spoke with patient  States she still just has cough  No fever or SOB  She would like cough syrup sent to [facility]

## 2022-12-16 ENCOUNTER — OFFICE VISIT (OUTPATIENT)
Dept: ENDOCRINOLOGY | Facility: CLINIC | Age: 77
End: 2022-12-16

## 2022-12-16 VITALS
DIASTOLIC BLOOD PRESSURE: 60 MMHG | BODY MASS INDEX: 23.41 KG/M2 | SYSTOLIC BLOOD PRESSURE: 118 MMHG | OXYGEN SATURATION: 96 % | HEART RATE: 72 BPM | HEIGHT: 61 IN | WEIGHT: 124 LBS

## 2022-12-16 DIAGNOSIS — I10 PRIMARY HYPERTENSION: ICD-10-CM

## 2022-12-16 DIAGNOSIS — E10.649 HYPOGLYCEMIA UNAWARENESS ASSOCIATED WITH TYPE 1 DIABETES MELLITUS (HCC): ICD-10-CM

## 2022-12-16 DIAGNOSIS — N18.31 TYPE 1 DIABETES MELLITUS WITH STAGE 3A CHRONIC KIDNEY DISEASE (HCC): Primary | ICD-10-CM

## 2022-12-16 DIAGNOSIS — E10.22 TYPE 1 DIABETES MELLITUS WITH STAGE 3A CHRONIC KIDNEY DISEASE (HCC): Primary | ICD-10-CM

## 2022-12-16 DIAGNOSIS — E78.2 MIXED HYPERLIPIDEMIA: ICD-10-CM

## 2022-12-16 DIAGNOSIS — E03.9 HYPOTHYROIDISM, UNSPECIFIED TYPE: ICD-10-CM

## 2022-12-16 LAB — SL AMB POCT HEMOGLOBIN AIC: 8.1 (ref ?–6.5)

## 2022-12-16 RX ORDER — BLOOD SUGAR DIAGNOSTIC
STRIP MISCELLANEOUS
Qty: 100 EACH | Refills: 2 | Status: SHIPPED | OUTPATIENT
Start: 2022-12-16

## 2022-12-16 NOTE — ASSESSMENT & PLAN NOTE
Continue CGM use  Reviewed the importance of BG confirmation with finger sticks if CGM data does not seem appropriate  Patient is treating lower blood sugars (< 80) appropriately     Lab Results   Component Value Date    HGBA1C 8 1 (A) 12/16/2022

## 2022-12-16 NOTE — ASSESSMENT & PLAN NOTE
Patient is having more frequent hyperglycemia but less hypoglycemia  Hyperglycemia is primarily due to dietary excursions  Ok to flex dose of 70/30 insulin to 14 units prior to larger meals  Counseled on appropriate timing of insulin injection  Patient knows to notify me with persistent hyperglycemia or episodes of hypoglycemia  Complete labs prior to next appointment in 3 months     Lab Results   Component Value Date    HGBA1C 8 1 (A) 12/16/2022

## 2022-12-16 NOTE — PROGRESS NOTES
Established Patient Progress Note      Chief Complaint   Patient presents with   • Diabetes Type 1     Kaylah 2         History of Present Illness:   Elena Sigala is a 76 y o  female with HTN, HLD, hypothyroidism, osteoporosis, and type 1 diabetes with long term use of insulin since 2006  Reports complications of CKD stage 3a and hypoglycemia unawareness  Denies recent illness or hospitalizations  Denies recent severe hypoglycemic or severe hyperglycemic episodes  Denies any issues with her current regimen  home glucose monitoring: are performed regularly; 3x daily       She did not complete labs prior to today's appointment  Component      Latest Ref Rng & Units 1/25/2022 5/17/2022 8/1/2022           8:17 AM  9:21 AM  8:10 AM   Hemoglobin A1C      Normal 3 8-5 6%; PreDiabetic 5 7-6 4%; Diabetic >=6 5%; Glycemic control for adults with diabetes <7 0% % 7 4 (H) 6 4 7 0 (H)   eAG, EST AVG Glucose      mg/dl 166  154       POC HgA1C 8 1%     Current regimen:  NovoLog 70/30 12 units twice daily    Johana Alcantara   Device used Freestyle Kaylah 2  Home use     Indication   Type 1 Diabetes    More than 72 hours of data was reviewed  Report to be scanned to chart  Date Range: December 3, 2022-December 16, 2022    Analysis of data:   Average Glucose: 160 mg/dL  Coefficient of Variation: 34 6%  SD : 55 mg/dL   Time in Target Range: 67%  Time Above Range: 25% 181 to 250 mg/dL; 7% greater than 250 mg/dL  Time Below Range: 1% 54 to 69 mg/dL; 0% less than 54 mg/dL    Interpretation of data: Patient's control has worsened  She is having far more frequent episodes of hyperglycemia, especially after dinner  She is just recovering from a 3 week bout of flu and superimposed infection  She completed two courses of antibiotics and was taking cough medicine as well  Her appetite recently returned   In investigating episodes of hyperglycemia, they occurred most commonly after eating a rich meal (ex- cheesesteak and french fries)  Last Eye Exam: Scheduled  Last Foot Exam: Scheduled    For hypothyroidism, she is taking 75 mcg of levothyroxine daily  She reports taking this consistently and correctly  Thyroid function tests from 8/1/2022 are stable  Denies symptoms of hypothyroidism or over supplementation  For hypertension, she is taking 20 mg of lisinopril daily  She denies headache and cough  For hyperlipidemia, she is taking 20 mg of atorvastatin nightly  She denies myalgias  For vitamin D deficiency, she is taking 2000 and units of vitamin D daily  For osteoporosis, it was recommended by her PCP that she start Fosamax  However, patient has several reservations about any osteoporosis medications  She is currently supplementing Ca+ and Vit D  Denies any recent falls or fractures  Narrative & Impression   CENTRAL  DXA SCAN     CLINICAL HISTORY:  75-year-old postmenopausal female  OTHER RISK FACTORS:  Parental history of hip fracture after minor injury  IDDM     PHARMACOLOGIC THERAPY FOR OSTEOPOROSIS:  None      TECHNIQUE: Bone densitometry was performed using a GE Lunar Prodigy  bone densitometer  Regions of interest appear properly placed       COMPARISON: 3/2/2017      RESULTS:      LUMBAR SPINE L1-L4 (L2 and L3 vertebrae excluded from analysis due to local structural abnormalities or artifact): BMD  1 005  gm/cm2   T-score -1 4         LEFT TOTAL HIP:   BMD: 0 777  gm/cm2   T-score: -1 8      LEFT FEMORAL NECK:   BMD: 0 694  gm/cm2   T score: -2 5      RIGHT TOTAL HIP:   BMD:  0 748  gm/cm2   T-score: -2 1     RIGHT FEMORAL NECK:   BMD:  0 656  gm/cm2    T score: -2 7         IMPRESSION:     1  Osteoporosis  [Based on the left and right femoral necks]     2   Since a DXA study from 3-17, there has been:  A  STATISTICALLY SIGNIFICANT DECREASE in bone mineral density of  0 026 g/cm2 (3 3)% in the hips            3    The 10 year risk of hip fracture is 29 0% with the 10 year risk of major osteoporotic fracture being 39 2% as calculated by the [facility] fracture risk assessment tool (FRAX, which is based on data generated by the [facility])      4  The current NOF guidelines recommend treating patients with a T-score of -2 5 or less in the lumbar spine or hips, or in post-menopausal women and men over the age of 48 with low bone mass (osteopenia) and a FRAX 10 year risk score of >3% for hip   fracture and/or >20% for major osteoporotic fracture      5  The NOF recommends follow-up DXA in 1-2 years after initiating therapy for osteoporosis and every 2 years thereafter  More frequent evaluation is appropriate for patients with conditions associated with rapid bone loss, such as glucocorticoid   therapy  The interval between DXA screenings may be longer for individuals without major risk factors and initial T-score in the normal or upper low bone mass range         Patient Active Problem List   Diagnosis   • Generalized anxiety disorder   • Stage 3a chronic kidney disease ([address] )   • Hypothyroidism   • Hypertension   • Ataxic gait   • 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 ear   • Diabetes mellitus ([address] )   • Hyperlipidemia   • Pelvic mass   • Retinal disorder   • Constipation   • LLQ abdominal pain   • Dry eye   • Age-related osteoporosis without current pathological fracture   • Hypoglycemia unawareness associated with type 1 diabetes mellitus (HCC)   • Nasal congestion   • Skin lesion   • Right leg pain   • Bee sting allergy      Past Medical History:   Diagnosis Date   • Anxiety    • Colon polyp    • Depression    • Diabetes mellitus ([address] )    • Diverticulosis    • Hyperlipidemia    • Hypertension       Past Surgical History:   Procedure Laterality Date   • APPENDECTOMY     • CATARACT EXTRACTION Right    • COLONOSCOPY      difficult exam   • HYSTERECTOMY     • TONSILLECTOMY     • WISDOM TOOTH EXTRACTION        Family History   Problem Relation Age of Onset   • Parkinsonism Mother    • Alzheimer's disease Father    • Breast cancer Sister         in her 42's   • Lung cancer Sister    • Breast cancer Sister    • No Known Problems Daughter    • No Known Problems Daughter    • No Known Problems Maternal Grandmother    • No Known Problems Maternal Grandfather    • No Known Problems Paternal Grandmother    • No Known Problems Paternal Grandfather    • No Known Problems Maternal Aunt    • No Known Problems Maternal Aunt    • No Known Problems Maternal Aunt    • No Known Problems Paternal Aunt      Social History     Tobacco Use   • Smoking status: Never   • Smokeless tobacco: Never   Substance Use Topics   • Alcohol use: Not Currently     Comment: rare     Allergies   Allergen Reactions   • Diphenhydramine Shortness Of Breath   • Meperidine Hives and Shortness Of Breath   • Prednisone Hives and Shortness Of Breath   • Bee Pollen    • Ciprofloxacin Hives   • Sulfamethoxazole-Trimethoprim GI Intolerance         Current Outpatient Medications:   •  Ascorbic Acid (VITAMIN C PO), Take by mouth, Disp: , Rfl:   •  ASPIRIN 81 PO, Take 81 mg by mouth daily, Disp: , Rfl:   •  atorvastatin (LIPITOR) 20 mg tablet, take 1 tablet by mouth once daily, Disp: 90 tablet, Rfl: 5  •  Blood Glucose Monitoring Suppl (OneTouch Verio) w/Device KIT, Use to test blood sugar 4x daily  , Disp: 1 kit, Rfl: 0  •  Cholecalciferol 2000 units CAPS, Take 1 capsule by mouth, Disp: , Rfl:   •  Continuous Blood Gluc Sensor (FreeStyle Kaylah 2 Sensor) MISC, Use, Disp: , Rfl:   •  famotidine (PEPCID) 20 mg tablet, Take 1 tablet (20 mg total) by mouth daily, Disp: 60 tablet, Rfl: 3  •  glucose blood (OneTouch Verio) test strip, Use to test blood sugar 4x daily in case of CGM failure , Disp: 100 each, Rfl: 2  •  insulin aspart protamine-insulin aspart (NovoLOG Mix 70/30) 100 units/mL injection, Inject 12 Units under the skin 2 (two) times a day before meals, Disp: 30 mL, Rfl: 3  •  levothyroxine 75 mcg tablet, Take 1 tablet (75 mcg total) by mouth daily, Disp: 90 tablet, Rfl: 3  •  Lidocaine (Salonpas Pain Relieving) 4 % PTCH, Apply 2 patches topically 2 (two) times a day, Disp: 10 patch, Rfl: 0  •  lisinopril (ZESTRIL) 20 mg tablet, Take 1 tablet (20 mg total) by mouth daily, Disp: 90 tablet, Rfl: 1  •  mometasone (ELOCON) 0 1 % cream, Apply topically daily Apply topically BID x 2 weeks then qhs prn, Disp: 45 g, Rfl: 3  •  Multiple Vitamins-Minerals (MULTIVITAL-M PO), Take by mouth, Disp: , Rfl:   •  OneTouch Delica Lancets 54B MISC, Use to test blood sugar 4x daily  , Disp: 300 each, Rfl: 2  •  Promethazine-DM (PHENERGAN-DM) 6 25-15 mg/5 mL oral syrup, Take 5 mL by mouth 4 (four) times a day as needed for cough, Disp: 240 mL, Rfl: 1  •  EPINEPHrine (EPIPEN) 0 3 mg/0 3 mL SOAJ, Inject 0 3 mL (0 3 mg total) into a muscle once for 1 dose, Disp: 0 6 mL, Rfl: 0    Review of Systems   Constitutional: Positive for fatigue  Negative for activity change, appetite change and unexpected weight change  HENT: Negative for dental problem, sore throat, trouble swallowing and voice change  Eyes: Positive for visual disturbance  Respiratory: Negative for cough, chest tightness and shortness of breath  Cardiovascular: Negative for chest pain, palpitations and leg swelling  Gastrointestinal: Negative for constipation, diarrhea, nausea and vomiting  Endocrine: Negative for cold intolerance, heat intolerance, polydipsia, polyphagia and polyuria  Genitourinary: Negative for frequency  Musculoskeletal: Positive for arthralgias and back pain  Negative for gait problem and myalgias  Skin: Negative for wound  Allergic/Immunologic: Positive for environmental allergies  Negative for food allergies  Neurological: Positive for numbness  Negative for dizziness, weakness, light-headedness and headaches  Hematological: Does not bruise/bleed easily     Psychiatric/Behavioral: Negative for decreased concentration, dysphoric mood and sleep disturbance  The patient is not nervous/anxious  Physical Exam:  Body mass index is 23 43 kg/m²  /60   Pulse 72   Ht 5' 1" (1 549 m)   Wt 56 2 kg (124 lb)   SpO2 96%   BMI 23 43 kg/m²    Wt Readings from Last 3 Encounters:   12/16/22 56 2 kg (124 lb)   11/29/22 57 2 kg (126 lb 3 2 oz)   11/14/22 55 8 kg (123 lb)       Physical Exam  Vitals reviewed  Constitutional:       General: She is not in acute distress  Appearance: She is well-developed  She is not ill-appearing  HENT:      Head: Normocephalic and atraumatic  Eyes:      Pupils: Pupils are equal, round, and reactive to light  Neck:      Thyroid: No thyromegaly  Cardiovascular:      Rate and Rhythm: Normal rate and regular rhythm  Pulses: Normal pulses  Heart sounds: Normal heart sounds  Pulmonary:      Effort: Pulmonary effort is normal       Breath sounds: Normal breath sounds  Abdominal:      General: Bowel sounds are normal  There is no distension  Palpations: Abdomen is soft  Tenderness: There is no abdominal tenderness  Musculoskeletal:      Cervical back: Normal range of motion and neck supple  Right lower leg: No edema  Left lower leg: No edema  Lymphadenopathy:      Cervical: No cervical adenopathy  Skin:     General: Skin is warm and dry  Capillary Refill: Capillary refill takes less than 2 seconds  Neurological:      Mental Status: She is alert and oriented to person, place, and time        Gait: Gait normal    Psychiatric:         Mood and Affect: Mood normal          Behavior: Behavior normal            Labs:   Lab Results   Component Value Date    HGBA1C 8 1 (A) 12/16/2022    HGBA1C 7 0 (H) 08/01/2022    HGBA1C 6 4 05/17/2022     Lab Results   Component Value Date    CREATININE 1 11 08/01/2022    CREATININE 1 23 07/15/2022    CREATININE 1 10 01/25/2022    BUN 23 08/01/2022    K 4 5 08/01/2022     08/01/2022    CO2 32 08/01/2022     eGFR   Date Value Ref Range Status   08/01/2022 47 ml/min/1 73sq m Final     Lab Results   Component Value Date    HDL 78 07/15/2022    TRIG 117 07/15/2022     Lab Results   Component Value Date    ALT 16 08/01/2022    AST 14 08/01/2022    ALKPHOS 68 08/01/2022     Lab Results   Component Value Date    ABZ5XUEQDIBY 3 460 08/01/2022    WFP2TEXLIYBK 1 760 07/15/2022    LMB2WGQCJCJT 2 600 01/25/2022     Lab Results   Component Value Date    FREET4 0 90 08/01/2022       Impression & Plan:    Problem List Items Addressed This Visit        Endocrine    Hypothyroidism     Denies symptoms of hypothyroidism  Check TSH prior to next appointment  Continue 75 mcg of levothyroxine daily  Diabetes mellitus ([facility] [address] ) - Primary     Patient is having more frequent hyperglycemia but less hypoglycemia  Hyperglycemia is primarily due to dietary excursions  Ok to flex dose of 70/30 insulin to 14 units prior to larger meals  Counseled on appropriate timing of insulin injection  Patient knows to notify me with persistent hyperglycemia or episodes of hypoglycemia  Complete labs prior to next appointment in 3 months  Lab Results   Component Value Date    HGBA1C 8 1 (A) 12/16/2022            Relevant Medications    glucose blood (OneTouch Verio) test strip    Other Relevant Orders    POCT hemoglobin A1c (Completed)    Hypoglycemia unawareness associated with type 1 diabetes mellitus ([facility] [address] )     Continue CGM use  Reviewed the importance of BG confirmation with finger sticks if CGM data does not seem appropriate  Patient is treating lower blood sugars (< 80) appropriately  Lab Results   Component Value Date    HGBA1C 8 1 (A) 12/16/2022               Cardiovascular and Mediastinum    Hypertension     BP stable at 118/60  Continue current regimen  Other    Hyperlipidemia     Complete fasting lipid panel prior to next appointment  Continue 20 mg of atorvastatin              Orders Placed This Encounter   Procedures   • POCT hemoglobin A1c       There are no Patient Instructions on file for this visit  Discussed with the patient and all questioned fully answered  She will call me if any problems arise  Follow-up appointment in 3 months       Counseled patient on diagnostic results, prognosis, risk and benefit of treatment options, instruction for management, importance of treatment compliance, Risk  factor reduction and impressions    EUGENIA Carmichael

## 2022-12-16 NOTE — ASSESSMENT & PLAN NOTE
Denies symptoms of hypothyroidism  Check TSH prior to next appointment  Continue 75 mcg of levothyroxine daily

## 2023-01-23 ENCOUNTER — RA CDI HCC (OUTPATIENT)
Dept: OTHER | Facility: HOSPITAL | Age: 78
End: 2023-01-23

## 2023-01-23 NOTE — PROGRESS NOTES
[id number],  [facility] [address]  coding opportunities          Chart Reviewed number of suggestions sent to Provider: 3     Patients Insurance     Medicare Insurance: Medicare         I50 [id number], [id number] [id number]

## 2023-01-30 ENCOUNTER — OFFICE VISIT (OUTPATIENT)
Dept: FAMILY MEDICINE CLINIC | Facility: CLINIC | Age: 78
End: 2023-01-30

## 2023-01-30 VITALS
SYSTOLIC BLOOD PRESSURE: 122 MMHG | RESPIRATION RATE: 18 BRPM | BODY MASS INDEX: 23.3 KG/M2 | HEIGHT: 61 IN | OXYGEN SATURATION: 98 % | HEART RATE: 67 BPM | TEMPERATURE: 97.7 F | DIASTOLIC BLOOD PRESSURE: 80 MMHG | WEIGHT: 123.4 LBS

## 2023-01-30 DIAGNOSIS — N18.31 STAGE 3A CHRONIC KIDNEY DISEASE (HCC): ICD-10-CM

## 2023-01-30 DIAGNOSIS — M81.0 AGE-RELATED OSTEOPOROSIS WITHOUT CURRENT PATHOLOGICAL FRACTURE: ICD-10-CM

## 2023-01-30 DIAGNOSIS — N18.31 TYPE 1 DIABETES MELLITUS WITH STAGE 3A CHRONIC KIDNEY DISEASE (HCC): ICD-10-CM

## 2023-01-30 DIAGNOSIS — E10.22 TYPE 1 DIABETES MELLITUS WITH STAGE 3A CHRONIC KIDNEY DISEASE (HCC): ICD-10-CM

## 2023-01-30 DIAGNOSIS — I10 PRIMARY HYPERTENSION: Primary | ICD-10-CM

## 2023-01-30 PROBLEM — H04.129 DRY EYE: Status: RESOLVED | Noted: 2022-01-18 | Resolved: 2023-01-30

## 2023-01-30 PROBLEM — R26.0 ATAXIC GAIT: Status: RESOLVED | Noted: 2021-01-13 | Resolved: 2023-01-30

## 2023-01-30 NOTE — PROGRESS NOTES
[facility]        NAME: Noman Shabazz is a 76 y o  female  : 1945    MRN: [id number]  DATE: 2023  TIME: 12:13 PM    Assessment and Plan   1  Primary hypertension  - well-controlled with lisinopril, she will get labs done in a few months before endocrine appointment     2  Type 1 diabetes mellitus with stage 3a chronic kidney disease (HCC)  - last A1c high at 8 1  She follows with Endocrine    3  Stage 3a chronic kidney disease (HCC)  Cr stable around 1 1-1 2, no albuminuria    4  Age-related osteoporosis without current pathological fracture  - DEXA last year showed osteoporosis of femoral neck, discussed bisphosphonate treatment at that time but she was reluctant due to side effects  Will get DEXA again at follow up         Chief Complaint     Chief Complaint   Patient presents with   • Follow-up         History of Present Illness       69yo female with history of type 1 diabetes, CKD stage 3, HTN, hypothyroidism here for follow up  Doing well, saw endocrine last month  Reports BG have been higher recently, no lows  Cough and URI symptoms resolved  Anxiety also better  Back pain controlled with tylenol, doing home exercises  Review of Systems   Review of Systems   Constitutional: Negative for appetite change, chills, fatigue and fever  HENT: Negative for sore throat  Respiratory: Negative for cough and shortness of breath  Cardiovascular: Negative for chest pain, palpitations and leg swelling  Gastrointestinal: Positive for constipation  Negative for abdominal pain, blood in stool, diarrhea, nausea and vomiting  Musculoskeletal: Positive for back pain  Neurological: Negative for dizziness, light-headedness and headaches  Psychiatric/Behavioral: Negative for dysphoric mood and sleep disturbance  The patient is not nervous/anxious            Current Medications       Current Outpatient Medications:   •  Ascorbic Acid (VITAMIN C PO), Take by mouth, Disp: , Rfl:   •  ASPIRIN 81 PO, Take 81 mg by mouth daily, Disp: , Rfl:   •  atorvastatin (LIPITOR) 20 mg tablet, take 1 tablet by mouth once daily, Disp: 90 tablet, Rfl: 5  •  Blood Glucose Monitoring Suppl (OneTouch Verio) w/Device KIT, Use to test blood sugar 4x daily  , Disp: 1 kit, Rfl: 0  •  Cholecalciferol 2000 units CAPS, Take 1 capsule by mouth, Disp: , Rfl:   •  Continuous Blood Gluc Sensor (FreeStyle Kaylah 2 Sensor) MISC, Use, Disp: , Rfl:   •  EPINEPHrine (EPIPEN) 0 3 mg/0 3 mL SOAJ, Inject 0 3 mL (0 3 mg total) into a muscle once for 1 dose, Disp: 0 6 mL, Rfl: 0  •  famotidine (PEPCID) 20 mg tablet, Take 1 tablet (20 mg total) by mouth daily (Patient taking differently: Take 20 mg by mouth if needed), Disp: 60 tablet, Rfl: 3  •  glucose blood (OneTouch Verio) test strip, Use to test blood sugar 4x daily in case of CGM failure , Disp: 100 each, Rfl: 2  •  insulin aspart protamine-insulin aspart (NovoLOG Mix 70/30) 100 units/mL injection, Inject 12 Units under the skin 2 (two) times a day before meals, Disp: 30 mL, Rfl: 3  •  levothyroxine 75 mcg tablet, Take 1 tablet (75 mcg total) by mouth daily, Disp: 90 tablet, Rfl: 3  •  lisinopril (ZESTRIL) 20 mg tablet, Take 1 tablet (20 mg total) by mouth daily, Disp: 90 tablet, Rfl: 1  •  Multiple Vitamins-Minerals (MULTIVITAL-M PO), Take by mouth, Disp: , Rfl:   •  OneTouch Delica Lancets 91Q MISC, Use to test blood sugar 4x daily  , Disp: 300 each, Rfl: 2  •  mometasone (ELOCON) 0 1 % cream, Apply topically daily Apply topically BID x 2 weeks then qhs prn (Patient not taking: Reported on 1/30/2023), Disp: 45 g, Rfl: 3    Current Allergies     Allergies as of 01/30/2023 - Reviewed 01/30/2023   Allergen Reaction Noted   • Diphenhydramine Shortness Of Breath 04/21/2016   • Meperidine Hives and Shortness Of Breath 04/21/2016   • Prednisone Hives and Shortness Of Breath 01/19/2018   • Bee pollen  04/21/2016   • Ciprofloxacin Hives 04/21/2016   • Sulfamethoxazole-trimethoprim GI Intolerance 07/03/2019            The following portions of the patient's history were reviewed and updated as appropriate: allergies, current medications, past family history, past medical history, past social history, past surgical history and problem list      Past Medical History:   Diagnosis Date   • Anxiety    • Colon polyp    • Depression    • Diabetes mellitus ([facility] [address] )    • Diverticulosis    • Hyperlipidemia    • Hypertension        Past Surgical History:   Procedure Laterality Date   • APPENDECTOMY     • CATARACT EXTRACTION Right    • COLONOSCOPY      difficult exam   • HYSTERECTOMY     • TONSILLECTOMY     • WISDOM TOOTH EXTRACTION         Family History   Problem Relation Age of Onset   • Parkinsonism Mother    • Alzheimer's disease Father    • Breast cancer Sister         in her 42's   • Lung cancer Sister    • Breast cancer Sister    • No Known Problems Daughter    • No Known Problems Daughter    • No Known Problems Maternal Grandmother    • No Known Problems Maternal Grandfather    • No Known Problems Paternal Grandmother    • No Known Problems Paternal Grandfather    • No Known Problems Maternal Aunt    • No Known Problems Maternal Aunt    • No Known Problems Maternal Aunt    • No Known Problems Paternal Aunt          Medications have been verified  Objective   /80   Pulse 67   Temp 97 7 °F (36 5 °C)   Resp 18   Ht 5' 1" (1 549 m)   Wt 56 kg (123 lb 6 4 oz)   SpO2 98%   BMI 23 32 kg/m²        Physical Exam     Physical Exam  Vitals reviewed  Constitutional:       General: She is not in acute distress  Appearance: She is normal weight  Cardiovascular:      Rate and Rhythm: Normal rate and regular rhythm  Heart sounds: No murmur heard  No friction rub  No gallop  Pulmonary:      Effort: Pulmonary effort is normal  No respiratory distress  Breath sounds: No wheezing, rhonchi or rales  Abdominal:      General: Abdomen is flat   Bowel sounds are normal  There is no distension  Palpations: Abdomen is soft  There is no mass  Tenderness: There is no abdominal tenderness  There is no guarding or rebound  Hernia: No hernia is present  Neurological:      General: No focal deficit present  Mental Status: She is alert  Psychiatric:         Mood and Affect: Mood normal          Behavior: Behavior normal          Thought Content:  Thought content normal          Judgment: Judgment normal              Results:  Lab Results   Component Value Date    SODIUM 138 08/01/2022    K 4 5 08/01/2022     08/01/2022    CO2 32 08/01/2022    BUN 23 08/01/2022    CREATININE 1 11 08/01/2022    GLUC 264 (H) 12/27/2021    CALCIUM 9 2 08/01/2022       Lab Results   Component Value Date    HGBA1C 8 1 (A) 12/16/2022       Lab Results   Component Value Date    WBC 6 10 07/15/2022    HGB 12 1 07/15/2022    HCT 38 9 07/15/2022    MCV 91 07/15/2022     07/15/2022

## 2023-02-21 ENCOUNTER — OFFICE VISIT (OUTPATIENT)
Dept: FAMILY MEDICINE CLINIC | Facility: CLINIC | Age: 78
End: 2023-02-21

## 2023-02-21 VITALS
OXYGEN SATURATION: 99 % | DIASTOLIC BLOOD PRESSURE: 82 MMHG | RESPIRATION RATE: 18 BRPM | TEMPERATURE: 97.9 F | HEIGHT: 61 IN | WEIGHT: 125.6 LBS | BODY MASS INDEX: 23.71 KG/M2 | SYSTOLIC BLOOD PRESSURE: 120 MMHG | HEART RATE: 73 BPM

## 2023-02-21 DIAGNOSIS — N90.7 LABIAL CYST: Primary | ICD-10-CM

## 2023-02-21 NOTE — PROGRESS NOTES
[facility]        NAME: Richard Askew is a 76 y o  female  : 1945    MRN: [id number]  DATE: 2023  TIME: 11:38 AM    Assessment and Plan   1  Labial cyst  -appears to have epidermal inclusion cyst on labia majora, not infected or inflamed at this time, she would like it removed, however  Will refer to [facility]   -    Ambulatory Referral to Obstetrics / Gynecology; Future             Chief Complaint     Chief Complaint   Patient presents with   • Multiple Concerns     As a pimple on the outside of her vagina  Had it over a year  ED removed it a year ago and informed her it is an infected ingrown hair  States today it is very painful and has pressure  States it is hard  History of Present Illness       69yo female with type 1 diabetes here for acute visit due to lesion in her labia  States she had this for a while but recently feels more pressure lately  No pain or discharge  She is interested in getting it removed  Pt is s/p COREEN-BSO for pelvic mass, pathology benign  She no longer follows with Gyn  Review of Systems   Review of Systems   Constitutional: Negative for appetite change, chills, fatigue and fever  Genitourinary: Positive for pelvic pain and vaginal pain  Negative for vaginal bleeding and vaginal discharge  Current Medications       Current Outpatient Medications:   •  Ascorbic Acid (VITAMIN C PO), Take by mouth, Disp: , Rfl:   •  ASPIRIN 81 PO, Take 81 mg by mouth daily, Disp: , Rfl:   •  atorvastatin (LIPITOR) 20 mg tablet, take 1 tablet by mouth once daily, Disp: 90 tablet, Rfl: 5  •  Blood Glucose Monitoring Suppl (OneTouch Verio) w/Device KIT, Use to test blood sugar 4x daily  , Disp: 1 kit, Rfl: 0  •  Cholecalciferol 2000 units CAPS, Take 1 capsule by mouth, Disp: , Rfl:   •  Continuous Blood Gluc Sensor (FreeStyle Kaylah 2 Sensor) MISC, Use, Disp: , Rfl:   •  EPINEPHrine (EPIPEN) 0 3 mg/0 3 mL SOAJ, Inject 0 3 mL (0 3 mg total) into a muscle once for 1 dose, Disp: 0 6 mL, Rfl: 0  •  famotidine (PEPCID) 20 mg tablet, Take 1 tablet (20 mg total) by mouth daily (Patient taking differently: Take 20 mg by mouth if needed), Disp: 60 tablet, Rfl: 3  •  glucose blood (OneTouch Verio) test strip, Use to test blood sugar 4x daily in case of CGM failure , Disp: 100 each, Rfl: 2  •  insulin aspart protamine-insulin aspart (NovoLOG Mix 70/30) 100 units/mL injection, Inject 12 Units under the skin 2 (two) times a day before meals, Disp: 30 mL, Rfl: 3  •  levothyroxine 75 mcg tablet, Take 1 tablet (75 mcg total) by mouth daily, Disp: 90 tablet, Rfl: 3  •  lisinopril (ZESTRIL) 20 mg tablet, Take 1 tablet (20 mg total) by mouth daily, Disp: 90 tablet, Rfl: 1  •  mometasone (ELOCON) 0 1 % cream, Apply topically daily Apply topically BID x 2 weeks then qhs prn, Disp: 45 g, Rfl: 3  •  Multiple Vitamins-Minerals (MULTIVITAL-M PO), Take by mouth, Disp: , Rfl:   •  OneTouch Delica Lancets 81L MISC, Use to test blood sugar 4x daily  , Disp: 300 each, Rfl: 2    Current Allergies     Allergies as of 02/21/2023 - Reviewed 02/21/2023   Allergen Reaction Noted   • Diphenhydramine Shortness Of Breath 04/21/2016   • Meperidine Hives and Shortness Of Breath 04/21/2016   • Prednisone Hives and Shortness Of Breath 01/19/2018   • Bee pollen  04/21/2016   • Ciprofloxacin Hives 04/21/2016   • Sulfamethoxazole-trimethoprim GI Intolerance 07/03/2019            The following portions of the patient's history were reviewed and updated as appropriate: allergies, current medications, past family history, past medical history, past social history, past surgical history and problem list      Past Medical History:   Diagnosis Date   • Anxiety    • Colon polyp    • Depression    • Diabetes mellitus ([facility] [address] )    • Diverticulosis    • Hyperlipidemia    • Hypertension        Past Surgical History:   Procedure Laterality Date   • APPENDECTOMY     • CATARACT EXTRACTION Right    • COLONOSCOPY difficult exam   • HYSTERECTOMY     • TONSILLECTOMY     • WISDOM TOOTH EXTRACTION         Family History   Problem Relation Age of Onset   • Parkinsonism Mother    • Alzheimer's disease Father    • Breast cancer Sister         in her 42's   • Lung cancer Sister    • Breast cancer Sister    • No Known Problems Daughter    • No Known Problems Daughter    • No Known Problems Maternal Grandmother    • No Known Problems Maternal Grandfather    • No Known Problems Paternal Grandmother    • No Known Problems Paternal Grandfather    • No Known Problems Maternal Aunt    • No Known Problems Maternal Aunt    • No Known Problems Maternal Aunt    • No Known Problems Paternal Aunt          Medications have been verified  Objective   /82   Pulse 73   Temp 97 9 °F (36 6 °C)   Resp 18   Ht 5' 1" (1 549 m)   Wt 57 kg (125 lb 9 6 oz)   SpO2 99%   BMI 23 73 kg/m²        Physical Exam     Physical Exam  Vitals reviewed  Exam conducted with a chaperone present  Constitutional:       General: She is not in acute distress  Appearance: She is normal weight  Genitourinary:     Labia:         Left: Lesion (small, cystic lesion on upper labia majora, no erythema, warmth or induration) present  No rash or tenderness  Lymphadenopathy:      Lower Body: No right inguinal adenopathy  No left inguinal adenopathy  Neurological:      General: No focal deficit present  Mental Status: She is alert     Psychiatric:         Mood and Affect: Mood normal          Behavior: Behavior normal              Results:  Lab Results   Component Value Date    SODIUM 138 08/01/2022    K 4 5 08/01/2022     08/01/2022    CO2 32 08/01/2022    BUN 23 08/01/2022    CREATININE 1 11 08/01/2022    GLUC 264 (H) 12/27/2021    CALCIUM 9 2 08/01/2022       Lab Results   Component Value Date    HGBA1C 8 1 (A) 12/16/2022       Lab Results   Component Value Date    WBC 6 10 07/15/2022    HGB 12 1 07/15/2022    HCT 38 9 07/15/2022    MCV 91 07/15/2022     07/15/2022

## 2023-03-07 ENCOUNTER — APPOINTMENT (OUTPATIENT)
Dept: LAB | Facility: CLINIC | Age: 78
End: 2023-03-07

## 2023-03-07 DIAGNOSIS — E10.22 TYPE 1 DIABETES MELLITUS WITH STAGE 3A CHRONIC KIDNEY DISEASE (HCC): ICD-10-CM

## 2023-03-07 DIAGNOSIS — N18.31 TYPE 1 DIABETES MELLITUS WITH STAGE 3A CHRONIC KIDNEY DISEASE (HCC): ICD-10-CM

## 2023-03-07 DIAGNOSIS — E03.9 HYPOTHYROIDISM, UNSPECIFIED TYPE: ICD-10-CM

## 2023-03-07 LAB
ANION GAP SERPL CALCULATED.3IONS-SCNC: 5 MMOL/L (ref 4–13)
BUN SERPL-MCNC: 20 MG/DL (ref 5–25)
CALCIUM SERPL-MCNC: 9.5 MG/DL (ref 8.3–10.1)
CHLORIDE SERPL-SCNC: 108 MMOL/L (ref 96–108)
CHOLEST SERPL-MCNC: 171 MG/DL
CO2 SERPL-SCNC: 25 MMOL/L (ref 21–32)
CREAT SERPL-MCNC: 1.03 MG/DL (ref 0.6–1.3)
CREAT UR-MCNC: 146 MG/DL
EST. AVERAGE GLUCOSE BLD GHB EST-MCNC: 148 MG/DL
GFR SERPL CREATININE-BSD FRML MDRD: 51 ML/MIN/1.73SQ M
GLUCOSE P FAST SERPL-MCNC: 199 MG/DL (ref 65–99)
HBA1C MFR BLD: 6.8 %
HDLC SERPL-MCNC: 69 MG/DL
LDLC SERPL CALC-MCNC: 71 MG/DL (ref 0–100)
MICROALBUMIN UR-MCNC: 23.3 MG/L (ref 0–20)
MICROALBUMIN/CREAT 24H UR: 16 MG/G CREATININE (ref 0–30)
POTASSIUM SERPL-SCNC: 4 MMOL/L (ref 3.5–5.3)
SODIUM SERPL-SCNC: 138 MMOL/L (ref 135–147)
T4 FREE SERPL-MCNC: 1.21 NG/DL (ref 0.76–1.46)
TRIGL SERPL-MCNC: 157 MG/DL
TSH SERPL DL<=0.05 MIU/L-ACNC: 2.74 UIU/ML (ref 0.45–4.5)

## 2023-03-10 LAB
LEFT EYE DIABETIC RETINOPATHY: POSITIVE
RIGHT EYE DIABETIC RETINOPATHY: POSITIVE

## 2023-03-20 ENCOUNTER — OFFICE VISIT (OUTPATIENT)
Dept: OBGYN CLINIC | Facility: CLINIC | Age: 78
End: 2023-03-20

## 2023-03-20 VITALS
HEIGHT: 61 IN | WEIGHT: 126.8 LBS | BODY MASS INDEX: 23.94 KG/M2 | SYSTOLIC BLOOD PRESSURE: 98 MMHG | DIASTOLIC BLOOD PRESSURE: 64 MMHG

## 2023-03-20 DIAGNOSIS — N90.7 LABIAL CYST: Primary | ICD-10-CM

## 2023-03-20 NOTE — PROGRESS NOTES
[facility] [address] [place] [address]    Skin excision    Date/Time: 3/20/2023 11:07 AM  Performed by: Beau Granados MD  Authorized by: Beau Granados MD   Universal Protocol:  Consent: Written consent obtained  Risks and benefits: risks, benefits and alternatives were discussed  Consent given by: patient  Time out: Immediately prior to procedure a "time out" was called to verify the correct patient, procedure, equipment, support staff and site/side marked as required  Timeout called at: 3/20/2023 10:40 AM   Patient understanding: patient states understanding of the procedure being performed  Patient consent: the patient's understanding of the procedure matches consent given  Procedure consent: procedure consent matches procedure scheduled  Relevant documents: relevant documents present and verified  Test results: test results available and properly labeled  Site marked: the operative site was marked  Required items: required blood products, implants, devices, and special equipment available  Patient identity confirmed: verbally with patient      Procedure Details - Skin Excision:     Number of Lesions:  1  Lesion 1:     Body area: Anogenital    Anogenital location:  Vulva    Malignancy: benign lesion        Initial size (mm):  4        Final defect size (mm):  0    Destruction method comment:  Incision and enucleation of cyst    Repair type:  Linear closure    Closure complexity: simple      Surgical defect:  4 mm     Repair Comments: Left labial epidermal inclusion cyst incised and enucleated  Skin closed with 4-0 Vicryl simple interrupted stitch            Barbra Epps MD  [address] [place] [address]  3/20/2023 11:09 AM

## 2023-04-24 DIAGNOSIS — E10.22 TYPE 1 DIABETES MELLITUS WITH STAGE 3A CHRONIC KIDNEY DISEASE (HCC): Primary | ICD-10-CM

## 2023-04-24 DIAGNOSIS — N18.31 TYPE 1 DIABETES MELLITUS WITH STAGE 3A CHRONIC KIDNEY DISEASE (HCC): Primary | ICD-10-CM

## 2023-04-25 ENCOUNTER — OFFICE VISIT (OUTPATIENT)
Dept: ENDOCRINOLOGY | Facility: CLINIC | Age: 78
End: 2023-04-25

## 2023-04-25 VITALS
SYSTOLIC BLOOD PRESSURE: 120 MMHG | OXYGEN SATURATION: 99 % | HEIGHT: 61 IN | HEART RATE: 77 BPM | WEIGHT: 124.6 LBS | DIASTOLIC BLOOD PRESSURE: 60 MMHG | BODY MASS INDEX: 23.53 KG/M2

## 2023-04-25 DIAGNOSIS — E11.22 TYPE 2 DIABETES MELLITUS WITH STAGE 3A CHRONIC KIDNEY DISEASE, WITH LONG-TERM CURRENT USE OF INSULIN (HCC): ICD-10-CM

## 2023-04-25 DIAGNOSIS — E10.22 TYPE 1 DIABETES MELLITUS WITH STAGE 3A CHRONIC KIDNEY DISEASE (HCC): Primary | ICD-10-CM

## 2023-04-25 DIAGNOSIS — N18.31 TYPE 2 DIABETES MELLITUS WITH STAGE 3A CHRONIC KIDNEY DISEASE, WITH LONG-TERM CURRENT USE OF INSULIN (HCC): ICD-10-CM

## 2023-04-25 DIAGNOSIS — N18.31 TYPE 1 DIABETES MELLITUS WITH STAGE 3A CHRONIC KIDNEY DISEASE (HCC): Primary | ICD-10-CM

## 2023-04-25 DIAGNOSIS — E03.9 HYPOTHYROIDISM, UNSPECIFIED TYPE: ICD-10-CM

## 2023-04-25 DIAGNOSIS — I10 PRIMARY HYPERTENSION: ICD-10-CM

## 2023-04-25 DIAGNOSIS — Z79.4 TYPE 2 DIABETES MELLITUS WITH STAGE 3A CHRONIC KIDNEY DISEASE, WITH LONG-TERM CURRENT USE OF INSULIN (HCC): ICD-10-CM

## 2023-04-25 DIAGNOSIS — E78.2 MIXED HYPERLIPIDEMIA: ICD-10-CM

## 2023-04-25 NOTE — PROGRESS NOTES
Established Patient Progress Note      Chief Complaint   Patient presents with   • Diabetes Type 1        Impression & Plan:    Problem List Items Addressed This Visit        Endocrine    Hypothyroidism     Thyroid function is stable  Continue 75 mcg of levothyroxine daily  Diabetes mellitus ([address] ) - Primary     Patient is relatively well controlled but does continue to experience postprandial hyperglycemia, especially when she eats foods higher in carbohydrates  Ok to continue with 12-14 units of mixed insulin twice daily  As she does have hypoglycemia unawareness, she needs the CGM, freestyle yaritza 2, for her safety  Counseled on the importance of eating a mid day meal, or using Glucerna meal replacement, to avoid low blood sugars  Patient knows to notify me with persistent hyperglycemia or episodes of hypoglycemia  F/U in 4 months  Lab Results   Component Value Date    HGBA1C 6 8 (H) 03/07/2023            Relevant Orders    Hemoglobin R4T    Basic metabolic panel       Cardiovascular and Mediastinum    Hypertension     BP stable at 120/60  Continue current regimen  Other    Hyperlipidemia     Well-controlled  Continue 20 mg of atorvastatin nightly  Orders Placed This Encounter   Procedures   • Hemoglobin A1C     Standing Status:   Future     Standing Expiration Date:   4/25/2024   • Basic metabolic panel     This is a patient instruction: Patient fasting for 8 hours or longer recommended  Standing Status:   Future     Standing Expiration Date:   4/25/2024       History of Present Illness:   Hortensia Child is a 76 y o  female with HTN, HLD, hypothyroidism, osteoporosis, and type 1 diabetes with long term use of insulin since 2006  Reports complications of CKD stage 3a and hypoglycemia unawareness  Denies recent illness or hospitalizations  Denies recent severe hypoglycemic or severe hyperglycemic episodes   Denies any issues with her current regimen  home glucose monitoring: are performed regularly with a CGM        At patient's last appointment on 12/16/2022, patient's control had worsened and she was having far more episodes of hypoglycemia  She was counseled on appropriate timing of insulin  Was also told to increase insulin up to 14 units prior to larger meals  Component      Latest Ref Rng & Units 12/16/2022 3/7/2023           2:01 PM  7:17 AM   Hemoglobin A1C      Normal 3 8-5 6%; PreDiabetic 5 7-6 4%; Diabetic >=6 5%; Glycemic control for adults with diabetes <7 0% % 8 1 (A) 6 8 (H)   eAG, EST AVG Glucose      mg/dl  148       Current regimen:   NovoLog 70/30 12 units twice daily (occasionally flexes up to 14 units when she knows she will be having a larger meal)  [facility]   Device used Freestyle yaritza 2  Home use     Indication   Type 1 Diabetes    More than 72 hours of data was reviewed  Report to be scanned to chart  Date Range: April 12, 2023-April 25, 2023    Analysis of data:   Average Glucose: 167 mg/dL  Coefficient of Variation: 32 2%  GMI: 7 3%   Time in Target Range: 60%  Time Above Range: 31% 181 to 250 mg/dL; 7% greater than 250 mg/dL  Time Below Range: 2% 54 to 69 mg/dL; 0% less than 54 mg/dL    Interpretation of data: Patient is relatively well controlled but does continue to have postprandial hyperglycemia  She admits to dietary excursions  She is making an effort to have a mid-day meal or protein drink, Glucerna  She notes that when she is physically active, her blood sugars drop quickly  Last Eye Exam: UTD  Last Foot Exam: UTD    For hypothyroidism, she is taking 75 mcg of levothyroxine daily  She reports taking this consistently and correctly  TSH is stable  Component      Latest Ref Rng & Units 8/1/2022 3/7/2023           8:10 AM  7:17 AM   TSH 3RD GENERATON      0 450 - 4 500 uIU/mL 3 460 2 740     For hyperlipidemia, she is taking 20 mg of atorvastatin nightly  She denies myalgias    For hypertension, she is taking 20 mg of lisinopril daily  She denies headache and cough  For osteoporosis, it has been recommended that the patient start Fosamax  However, patient had several reservations about starting any osteoporosis medications  She is currently supplementing her calcium and vitamin D  Denies any recent falls or fractures    Patient Active Problem List   Diagnosis   • Generalized anxiety disorder   • Stage 3a chronic kidney disease ([place] [address] )   • Hypothyroidism   • Hypertension   • 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 ear   • Diabetes mellitus ([place] [address] )   • Hyperlipidemia   • Pelvic mass   • Retinal disorder   • Constipation   • LLQ abdominal pain   • Age-related osteoporosis without current pathological fracture   • Hypoglycemia unawareness associated with type 1 diabetes mellitus (HCC)   • Nasal congestion   • Skin lesion   • Right leg pain   • Bee sting allergy      Past Medical History:   Diagnosis Date   • Anxiety    • Colon polyp    • Depression    • Diabetes mellitus ([place] [address] )    • Diverticulosis    • Hyperlipidemia    • Hypertension       Past Surgical History:   Procedure Laterality Date   • APPENDECTOMY     • CATARACT EXTRACTION Right    • COLONOSCOPY      difficult exam   • HYSTERECTOMY     • TONSILLECTOMY     • WISDOM TOOTH EXTRACTION        Family History   Problem Relation Age of Onset   • Parkinsonism Mother    • Alzheimer's disease Father    • Breast cancer Sister         in her 42's   • Lung cancer Sister    • Breast cancer Sister    • No Known Problems Daughter    • No Known Problems Daughter    • No Known Problems Maternal Grandmother    • No Known Problems Maternal Grandfather    • No Known Problems Paternal Grandmother    • No Known Problems Paternal Grandfather    • No Known Problems Maternal Aunt    • No Known Problems Maternal Aunt    • No Known Problems Maternal Aunt    • No Known Problems Paternal Aunt      Social History     Tobacco Use   • Smoking status: Never   • Smokeless tobacco: Never   Substance Use Topics   • Alcohol use: Not Currently     Comment: rare     Allergies   Allergen Reactions   • Diphenhydramine Shortness Of Breath   • Meperidine Hives and Shortness Of Breath   • Prednisone Hives and Shortness Of Breath   • Bee Pollen    • Ciprofloxacin Hives   • Sulfamethoxazole-Trimethoprim GI Intolerance         Current Outpatient Medications:   •  Ascorbic Acid (VITAMIN C PO), Take by mouth, Disp: , Rfl:   •  ASPIRIN 81 PO, Take 81 mg by mouth daily, Disp: , Rfl:   •  atorvastatin (LIPITOR) 20 mg tablet, take 1 tablet by mouth once daily, Disp: 90 tablet, Rfl: 5  •  Cholecalciferol 2000 units CAPS, Take 1 capsule by mouth, Disp: , Rfl:   •  Continuous Blood Gluc Sensor (FreeStyle Kaylah 2 Sensor) MISC, Use, Disp: , Rfl:   •  EPINEPHrine (EPIPEN) 0 3 mg/0 3 mL SOAJ, Inject 0 3 mL (0 3 mg total) into a muscle once for 1 dose, Disp: 0 6 mL, Rfl: 0  •  famotidine (PEPCID) 20 mg tablet, Take 1 tablet (20 mg total) by mouth daily (Patient taking differently: Take 20 mg by mouth if needed), Disp: 60 tablet, Rfl: 3  •  insulin aspart protamine-insulin aspart (NovoLOG Mix 70/30) 100 units/mL injection, Inject 12 Units under the skin 2 (two) times a day before meals (Patient taking differently: Inject 14 Units under the skin 2 (two) times a day before meals), Disp: 30 mL, Rfl: 3  •  levothyroxine 75 mcg tablet, Take 1 tablet (75 mcg total) by mouth daily, Disp: 90 tablet, Rfl: 3  •  lisinopril (ZESTRIL) 20 mg tablet, Take 1 tablet (20 mg total) by mouth daily, Disp: 90 tablet, Rfl: 1  •  mometasone (ELOCON) 0 1 % cream, Apply topically daily Apply topically BID x 2 weeks then qhs prn, Disp: 45 g, Rfl: 3  •  Multiple Vitamins-Minerals (MULTIVITAL-M PO), Take by mouth, Disp: , Rfl:   •  Blood Glucose Monitoring Suppl (OneTouch Verio) w/Device KIT, Use to test blood sugar 4x daily   (Patient not taking: Reported on 4/25/2023), Disp: 1 kit, Rfl: 0  •  glucose blood (OneTouch Verio) test strip, Use to test blood sugar 4x daily in case of CGM "failure  (Patient not taking: Reported on 4/25/2023), Disp: 100 each, Rfl: 2  •  OneTouch Delica Lancets 33B MISC, Use to test blood sugar 4x daily  (Patient not taking: Reported on 4/25/2023), Disp: 300 each, Rfl: 2    Review of Systems   Constitutional: Negative for activity change, appetite change, fatigue and unexpected weight change  HENT: Negative for dental problem, sore throat, trouble swallowing and voice change  Eyes: Negative for visual disturbance  Respiratory: Negative for cough, chest tightness and shortness of breath  Cardiovascular: Negative for chest pain, palpitations and leg swelling  Gastrointestinal: Negative for constipation, diarrhea, nausea and vomiting  Endocrine: Negative for cold intolerance, heat intolerance, polydipsia, polyphagia and polyuria  Genitourinary: Negative for frequency  Musculoskeletal: Negative for arthralgias, back pain, gait problem and myalgias  Skin: Negative for wound  Allergic/Immunologic: Positive for environmental allergies  Negative for food allergies  Neurological: Negative for dizziness, weakness, light-headedness, numbness and headaches  Psychiatric/Behavioral: Negative for decreased concentration, dysphoric mood and sleep disturbance  The patient is not nervous/anxious  Physical Exam:  Body mass index is 23 54 kg/m²  /60 (BP Location: Right arm, Patient Position: Sitting, Cuff Size: Standard)   Pulse 77   Ht 5' 1\" (1 549 m)   Wt 56 5 kg (124 lb 9 6 oz)   SpO2 99%   BMI 23 54 kg/m²    Wt Readings from Last 3 Encounters:   04/25/23 56 5 kg (124 lb 9 6 oz)   03/20/23 57 5 kg (126 lb 12 8 oz)   02/21/23 57 kg (125 lb 9 6 oz)       Physical Exam  Vitals reviewed  Constitutional:       General: She is not in acute distress  Appearance: She is well-developed  She is not ill-appearing  HENT:      Head: Normocephalic and atraumatic  Eyes:      Pupils: Pupils are equal, round, and reactive to light     Neck:      " Thyroid: No thyromegaly  Cardiovascular:      Rate and Rhythm: Normal rate and regular rhythm  Pulses: Normal pulses  Heart sounds: Normal heart sounds  Pulmonary:      Effort: Pulmonary effort is normal       Breath sounds: Normal breath sounds  Abdominal:      General: Bowel sounds are normal  There is no distension  Palpations: Abdomen is soft  Tenderness: There is no abdominal tenderness  Musculoskeletal:      Cervical back: Normal range of motion and neck supple  Right lower leg: No edema  Left lower leg: No edema  Lymphadenopathy:      Cervical: No cervical adenopathy  Skin:     General: Skin is warm and dry  Capillary Refill: Capillary refill takes less than 2 seconds  Neurological:      Mental Status: She is alert and oriented to person, place, and time  Gait: Gait normal    Psychiatric:         Mood and Affect: Mood normal          Behavior: Behavior normal            Labs:   Lab Results   Component Value Date    HGBA1C 6 8 (H) 03/07/2023    HGBA1C 8 1 (A) 12/16/2022    HGBA1C 7 0 (H) 08/01/2022     Lab Results   Component Value Date    CREATININE 1 03 03/07/2023    CREATININE 1 11 08/01/2022    CREATININE 1 23 07/15/2022    BUN 20 03/07/2023    K 4 0 03/07/2023     03/07/2023    CO2 25 03/07/2023     eGFR   Date Value Ref Range Status   03/07/2023 51 ml/min/1 73sq m Final     Lab Results   Component Value Date    HDL 69 03/07/2023    TRIG 157 (H) 03/07/2023     Lab Results   Component Value Date    ALT 16 08/01/2022    AST 14 08/01/2022    ALKPHOS 68 08/01/2022     Lab Results   Component Value Date    NIN8BTWSAUKU 2 740 03/07/2023    MIG6DYBOPZEW 3 460 08/01/2022    NCK7HFLJZZUJ 1 760 07/15/2022     Lab Results   Component Value Date    FREET4 1 21 03/07/2023             Discussed with the patient and all questioned fully answered  She will call me if any problems arise  Follow-up appointment in 4 months       Counseled patient on diagnostic results, prognosis, risk and benefit of treatment options, instruction for management, importance of treatment compliance, Risk  factor reduction and impressions    There are no Patient Instructions on file for this visit      EUGENIA Lang

## 2023-04-25 NOTE — ASSESSMENT & PLAN NOTE
Patient is relatively well controlled but does continue to experience postprandial hyperglycemia, especially when she eats foods higher in carbohydrates  Ok to continue with 12-14 units of mixed insulin twice daily  As she does have hypoglycemia unawareness, she needs the CGM, freestyle yaritza 2, for her safety  Counseled on the importance of eating a mid day meal, or using Glucerna meal replacement, to avoid low blood sugars  Patient knows to notify me with persistent hyperglycemia or episodes of hypoglycemia  F/U in 4 months     Lab Results   Component Value Date    HGBA1C 6 8 (H) 03/07/2023

## 2023-04-26 ENCOUNTER — TELEPHONE (OUTPATIENT)
Dept: ADMINISTRATIVE | Facility: OTHER | Age: 78
End: 2023-04-26

## 2023-04-26 NOTE — TELEPHONE ENCOUNTER
Upon review of the In Basket request and the patient's chart, initial outreach has been made via fax to facility  Please see Contacts section for details       Thank you  Jessica Greenberg MA

## 2023-04-26 NOTE — TELEPHONE ENCOUNTER
----- Message from David Dunn sent at 4/25/2023  3:54 PM EDT -----  Regarding: diabetic eye exam  04/25/23 3:56 PM    Hello, our patient Alexa Currie has had Diabetic Eye Exam completed/performed   Please assist in updating the patient chart by making an External outreach to [facility] facility located in [facility] The date of service is march 2030    Thank you,  David Dunn  [facility] [place] [address]

## 2023-04-26 NOTE — LETTER
Diabetic Eye Exam Form    Date Requested: 23  Patient: Romy Moore  Patient : 1945   Referring Provider: Nani De Leon MD      DIABETIC Eye Exam Date _______________________________      Type of Exam MUST be documented for Diabetic Eye Exams  Please CHECK ONE  Retinal Exam       Dilated Retinal Exam       OCT       Optomap-Iris Exam      Fundus Photography       Left Eye - Please check Retinopathy or No Retinopathy        Exam did show retinopathy    Exam did not show retinopathy       Right Eye - Please check Retinopathy or No Retinopathy       Exam did show retinopathy    Exam did not show retinopathy       Comments __________________________________________________________    Practice Providing Exam ______________________________________________    Exam Performed By (print name) _______________________________________      Provider Signature ___________________________________________________      These reports are needed for  compliance  Please fax this completed form and a copy of the Diabetic Eye Exam report to our office located at [place] [address] as soon as possible via Fax [phone] kenton Romero: Phone [phone]  We thank you for your assistance in treating our mutual patient

## 2023-04-27 NOTE — TELEPHONE ENCOUNTER
Upon review of the In Basket request we were able to locate, review, and update the patient chart as requested for Diabetic Eye Exam     Any additional questions or concerns should be emailed to the Practice Liaisons via the appropriate education email address, please do not reply via In Basket      Thank you  Chandra Nuñez MA Statement Selected

## 2023-05-02 ENCOUNTER — CLINICAL SUPPORT (OUTPATIENT)
Dept: ENDOCRINOLOGY | Facility: CLINIC | Age: 78
End: 2023-05-02

## 2023-05-02 VITALS
RESPIRATION RATE: 18 BRPM | BODY MASS INDEX: 23.41 KG/M2 | WEIGHT: 124 LBS | HEIGHT: 61 IN | SYSTOLIC BLOOD PRESSURE: 120 MMHG | OXYGEN SATURATION: 99 % | HEART RATE: 77 BPM | DIASTOLIC BLOOD PRESSURE: 60 MMHG

## 2023-05-02 DIAGNOSIS — E10.649 HYPOGLYCEMIA UNAWARENESS ASSOCIATED WITH TYPE 1 DIABETES MELLITUS (HCC): ICD-10-CM

## 2023-05-09 ENCOUNTER — OFFICE VISIT (OUTPATIENT)
Dept: FAMILY MEDICINE CLINIC | Facility: CLINIC | Age: 78
End: 2023-05-09

## 2023-05-09 VITALS
WEIGHT: 125.4 LBS | HEIGHT: 61 IN | HEART RATE: 70 BPM | BODY MASS INDEX: 23.68 KG/M2 | OXYGEN SATURATION: 98 % | TEMPERATURE: 98 F | SYSTOLIC BLOOD PRESSURE: 124 MMHG | DIASTOLIC BLOOD PRESSURE: 70 MMHG | RESPIRATION RATE: 18 BRPM

## 2023-05-09 DIAGNOSIS — S80.02XA CONTUSION OF LEFT KNEE, INITIAL ENCOUNTER: ICD-10-CM
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
## 2023-05-09 DIAGNOSIS — S80.212A ABRASION OF LEFT KNEE, INITIAL ENCOUNTER: ICD-10-CM

## 2023-05-09 NOTE — PROGRESS NOTES
[facility]        NAME: Dartha Merlin is a 76 y o  female  : 1945    MRN: [id number]  DATE: May 9, 2023  TIME: 10:56 AM    Assessment and Plan   1  Fall, initial encounter  - mechanical fall without LOC or head trauma  No evidence of wound or infection on knee  Able to walk and weight bear without difficulty  Discussed fall prevention strategies, including minimizing trip hazards, etc    2  Contusion of left knee, initial encounter    3  Abrasion of left knee, initial encounter             Chief Complaint     Chief Complaint   Patient presents with   • Knee Pain     Tripped over a hose in her back yard and fell on left knee while walking her dog  Thinks she hit her jaw too as she had a bruise on the left side  There is a black spot on her knee  The fall happened about 2 weeks ago  Would like her left knee evaluated  She does have some pain  History of Present Illness       67yo female with type 1 DM, hypothyroidism here for acute, same-day visit  Pt reports that she fell 1 week ago - tripped over garden hose while walking her dogs and landed on left knee  Left cheek hit her arm  Denies LOC or head trauma  Had some anterior knee pain and swelling, but able to walk and feels fine at this time  Only concern is possible eschar and abrasion on her knee  Denies fever, chills, redness or warmth  She is using neosporin and washing area daily  Review of Systems   Review of Systems   Constitutional: Negative for appetite change, chills and fatigue  Musculoskeletal: Positive for joint swelling  Negative for gait problem  Skin: Positive for wound  Neurological: Negative for dizziness, light-headedness and headaches           Current Medications       Current Outpatient Medications:   •  Ascorbic Acid (VITAMIN C PO), Take by mouth, Disp: , Rfl:   •  ASPIRIN 81 PO, Take 81 mg by mouth daily, Disp: , Rfl:   •  atorvastatin (LIPITOR) 20 mg tablet, take 1 tablet by mouth once daily, Disp: 90 tablet, Rfl: 5  •  Cholecalciferol 2000 units CAPS, Take 1 capsule by mouth, Disp: , Rfl:   •  Continuous Blood Gluc Sensor (FreeStyle Kaylah 2 Sensor) MISC, Use, Disp: , Rfl:   •  famotidine (PEPCID) 20 mg tablet, Take 1 tablet (20 mg total) by mouth daily (Patient taking differently: Take 20 mg by mouth if needed), Disp: 60 tablet, Rfl: 3  •  insulin aspart protamine-insulin aspart (NovoLOG Mix 70/30) 100 units/mL injection, Inject 12 Units under the skin 2 (two) times a day before meals (Patient taking differently: Inject 14 Units under the skin 2 (two) times a day before meals), Disp: 30 mL, Rfl: 3  •  levothyroxine 75 mcg tablet, Take 1 tablet (75 mcg total) by mouth daily, Disp: 90 tablet, Rfl: 3  •  lisinopril (ZESTRIL) 20 mg tablet, Take 1 tablet (20 mg total) by mouth daily, Disp: 90 tablet, Rfl: 1  •  mometasone (ELOCON) 0 1 % cream, Apply topically daily Apply topically BID x 2 weeks then qhs prn, Disp: 45 g, Rfl: 3  •  Multiple Vitamins-Minerals (MULTIVITAL-M PO), Take by mouth, Disp: , Rfl:   •  Blood Glucose Monitoring Suppl (OneTouch Verio) w/Device KIT, Use to test blood sugar 4x daily  (Patient not taking: Reported on 4/25/2023), Disp: 1 kit, Rfl: 0  •  EPINEPHrine (EPIPEN) 0 3 mg/0 3 mL SOAJ, Inject 0 3 mL (0 3 mg total) into a muscle once for 1 dose, Disp: 0 6 mL, Rfl: 0  •  glucose blood (OneTouch Verio) test strip, Use to test blood sugar 4x daily in case of CGM failure  (Patient not taking: Reported on 4/25/2023), Disp: 100 each, Rfl: 2  •  OneTouch Delica Lancets 42A MISC, Use to test blood sugar 4x daily   (Patient not taking: Reported on 4/25/2023), Disp: 300 each, Rfl: 2    Current Allergies     Allergies as of 05/09/2023 - Reviewed 05/09/2023   Allergen Reaction Noted   • Diphenhydramine Shortness Of Breath 04/21/2016   • Meperidine Hives and Shortness Of Breath 04/21/2016   • Prednisone Hives and Shortness Of Breath 01/19/2018   • Bee pollen  04/21/2016   • Ciprofloxacin "Hives 04/21/2016   • Sulfamethoxazole-trimethoprim GI Intolerance 07/03/2019            The following portions of the patient's history were reviewed and updated as appropriate: allergies, current medications, past family history, past medical history, past social history, past surgical history and problem list      Past Medical History:   Diagnosis Date   • Anxiety    • Colon polyp    • Depression    • Diabetes mellitus ([address] )    • Diverticulosis    • Hyperlipidemia    • Hypertension        Past Surgical History:   Procedure Laterality Date   • APPENDECTOMY     • CATARACT EXTRACTION Right    • COLONOSCOPY      difficult exam   • HYSTERECTOMY     • TONSILLECTOMY     • WISDOM TOOTH EXTRACTION         Family History   Problem Relation Age of Onset   • Parkinsonism Mother    • Alzheimer's disease Father    • Breast cancer Sister         in her 42's   • Lung cancer Sister    • Breast cancer Sister    • No Known Problems Daughter    • No Known Problems Daughter    • No Known Problems Maternal Grandmother    • No Known Problems Maternal Grandfather    • No Known Problems Paternal Grandmother    • No Known Problems Paternal Grandfather    • No Known Problems Maternal Aunt    • No Known Problems Maternal Aunt    • No Known Problems Maternal Aunt    • No Known Problems Paternal Aunt          Medications have been verified  Objective   /70   Pulse 70   Temp 98 °F (36 7 °C)   Resp 18   Ht 5' 1\" (1 549 m)   Wt 56 9 kg (125 lb 6 4 oz)   SpO2 98%   BMI 23 69 kg/m²        Physical Exam     Physical Exam  Vitals reviewed  Constitutional:       General: She is not in acute distress  Appearance: She is normal weight  Musculoskeletal:      Left knee: Swelling present  No effusion, ecchymosis or bony tenderness  Normal range of motion  No tenderness  Skin:     Findings: Abrasion present  Neurological:      General: No focal deficit present  Mental Status: She is alert     Psychiatric:         " Mood and Affect: Mood and affect normal          Behavior: Behavior normal  Behavior is cooperative               Results:  Lab Results   Component Value Date    SODIUM 138 03/07/2023    K 4 0 03/07/2023     03/07/2023    CO2 25 03/07/2023    BUN 20 03/07/2023    CREATININE 1 03 03/07/2023    GLUC 264 (H) 12/27/2021    CALCIUM 9 5 03/07/2023       Lab Results   Component Value Date    HGBA1C 6 8 (H) 03/07/2023       Lab Results   Component Value Date    WBC 6 10 07/15/2022    HGB 12 1 07/15/2022    HCT 38 9 07/15/2022    MCV 91 07/15/2022     07/15/2022

## 2023-05-16 ENCOUNTER — TELEPHONE (OUTPATIENT)
Dept: ENDOCRINOLOGY | Facility: CLINIC | Age: 78
End: 2023-05-16

## 2023-05-16 NOTE — TELEPHONE ENCOUNTER
Patient calling back to check on the status of previous call  Patient will continue to finger stick until someone from the endo team reaches out to her  Patient looking for a call back at earliest convenience   #[phone]

## 2023-05-16 NOTE — TELEPHONE ENCOUNTER
Please let the patient know that sometimes the placement of the sensor will cause some bleeding  This is nothing to be concerned about but she did the right thing by removing the sensor and requesting a replacement  Until she receives the replacement sensor, please be sure to use finger sticks three times daily or if symptomatic of low blood sugar  Thank you

## 2023-05-16 NOTE — TELEPHONE ENCOUNTER
Pt called that her Daughter changed her Sensor over the weekend and it bled  She stated they removed the sensor and put a band aid on in  She reported that this has never happened before  She is asking if she has to finger stick until she can get a Sensor?

## 2023-05-22 ENCOUNTER — TELEPHONE (OUTPATIENT)
Dept: ENDOCRINOLOGY | Facility: CLINIC | Age: 78
End: 2023-05-22

## 2023-05-22 NOTE — TELEPHONE ENCOUNTER
Patient called and said she spoke with someone from [facility] [place] and she asked them what her status was for getting new sensors  They told her her contract ran out and they're waiting for the doctor to send paper work over to get this filled out   Patient said [facility] [place] is sending her paperwork

## 2023-05-26 DIAGNOSIS — E03.9 HYPOTHYROIDISM, UNSPECIFIED TYPE: ICD-10-CM

## 2023-05-26 DIAGNOSIS — R10.13 DYSPEPSIA: ICD-10-CM

## 2023-05-26 DIAGNOSIS — R05.1 ACUTE COUGH: ICD-10-CM

## 2023-05-26 RX ORDER — FAMOTIDINE 20 MG/1
20 TABLET, FILM COATED ORAL DAILY
Qty: 90 TABLET | Refills: 3 | Status: SHIPPED | OUTPATIENT
Start: 2023-05-26

## 2023-05-26 RX ORDER — LEVOTHYROXINE SODIUM 0.07 MG/1
75 TABLET ORAL DAILY
Qty: 90 TABLET | Refills: 3 | Status: SHIPPED | OUTPATIENT
Start: 2023-05-26

## 2023-06-06 ENCOUNTER — APPOINTMENT (OUTPATIENT)
Dept: LAB | Facility: CLINIC | Age: 78
End: 2023-06-06
Payer: MEDICARE

## 2023-06-06 DIAGNOSIS — N18.31 TYPE 1 DIABETES MELLITUS WITH STAGE 3A CHRONIC KIDNEY DISEASE (HCC): ICD-10-CM

## 2023-06-06 DIAGNOSIS — E10.22 TYPE 1 DIABETES MELLITUS WITH STAGE 3A CHRONIC KIDNEY DISEASE (HCC): ICD-10-CM

## 2023-06-06 LAB
ANION GAP SERPL CALCULATED.3IONS-SCNC: 6 MMOL/L (ref 4–13)
BUN SERPL-MCNC: 19 MG/DL (ref 5–25)
CALCIUM SERPL-MCNC: 8.9 MG/DL (ref 8.3–10.1)
CHLORIDE SERPL-SCNC: 106 MMOL/L (ref 96–108)
CO2 SERPL-SCNC: 22 MMOL/L (ref 21–32)
CREAT SERPL-MCNC: 1.14 MG/DL (ref 0.6–1.3)
EST. AVERAGE GLUCOSE BLD GHB EST-MCNC: 154 MG/DL
GFR SERPL CREATININE-BSD FRML MDRD: 45 ML/MIN/1.73SQ M
GLUCOSE P FAST SERPL-MCNC: 221 MG/DL (ref 65–99)
HBA1C MFR BLD: 7 %
POTASSIUM SERPL-SCNC: 4.6 MMOL/L (ref 3.5–5.3)
SODIUM SERPL-SCNC: 134 MMOL/L (ref 135–147)

## 2023-06-06 PROCEDURE — 36415 COLL VENOUS BLD VENIPUNCTURE: CPT

## 2023-06-06 PROCEDURE — 83036 HEMOGLOBIN GLYCOSYLATED A1C: CPT

## 2023-06-06 PROCEDURE — 80048 BASIC METABOLIC PNL TOTAL CA: CPT

## 2023-06-16 DIAGNOSIS — E11.22 TYPE 2 DIABETES MELLITUS WITH STAGE 3A CHRONIC KIDNEY DISEASE, WITH LONG-TERM CURRENT USE OF INSULIN (HCC): ICD-10-CM

## 2023-06-16 DIAGNOSIS — N18.31 TYPE 2 DIABETES MELLITUS WITH STAGE 3A CHRONIC KIDNEY DISEASE, WITH LONG-TERM CURRENT USE OF INSULIN (HCC): ICD-10-CM

## 2023-06-16 DIAGNOSIS — Z79.4 TYPE 2 DIABETES MELLITUS WITH STAGE 3A CHRONIC KIDNEY DISEASE, WITH LONG-TERM CURRENT USE OF INSULIN (HCC): ICD-10-CM

## 2023-06-16 RX ORDER — INSULIN ASPART 100 [IU]/ML
14 INJECTION, SUSPENSION SUBCUTANEOUS
Qty: 30 ML | Refills: 3 | Status: SHIPPED | OUTPATIENT
Start: 2023-06-16

## 2023-06-16 NOTE — TELEPHONE ENCOUNTER
Patient requesting refill(s) of: Novolog     Last filled: 11/7/2022  Last appt: 5/9/2023  Next appt: 7/31/2023  Pharmacy:    [facility]

## 2023-07-07 DIAGNOSIS — E78.2 MIXED HYPERLIPIDEMIA: ICD-10-CM

## 2023-07-07 RX ORDER — ATORVASTATIN CALCIUM 20 MG/1
20 TABLET, FILM COATED ORAL DAILY
Qty: 90 TABLET | Refills: 0 | Status: SHIPPED | OUTPATIENT
Start: 2023-07-07

## 2023-07-07 NOTE — TELEPHONE ENCOUNTER
Patient requesting refill(s) of: Atorvastatin     Last filled: 6/28/2022  Last appt: 5/9/2023  Next appt: 7/31/2023  Pharmacy: [address] [place] [address]

## 2023-07-14 ENCOUNTER — TELEPHONE (OUTPATIENT)
Dept: GASTROENTEROLOGY | Facility: CLINIC | Age: 78
End: 2023-07-14

## 2023-07-23 ENCOUNTER — RA CDI HCC (OUTPATIENT)
Dept: OTHER | Facility: HOSPITAL | Age: 78
End: 2023-07-23

## 2023-07-23 NOTE — PROGRESS NOTES
I650.9, I13.0 E10.51    [address] [facility] coding opportunities          Chart Reviewed number of suggestions sent to Provider: 3     Patients Insurance     Medicare Insurance: [facility]

## 2023-08-14 ENCOUNTER — OFFICE VISIT (OUTPATIENT)
Dept: FAMILY MEDICINE CLINIC | Facility: CLINIC | Age: 78
End: 2023-08-14
Payer: MEDICARE

## 2023-08-14 ENCOUNTER — APPOINTMENT (OUTPATIENT)
Dept: LAB | Facility: CLINIC | Age: 78
End: 2023-08-14
Payer: MEDICARE

## 2023-08-14 VITALS
HEART RATE: 79 BPM | DIASTOLIC BLOOD PRESSURE: 84 MMHG | RESPIRATION RATE: 18 BRPM | OXYGEN SATURATION: 98 % | WEIGHT: 122 LBS | TEMPERATURE: 98.6 F | BODY MASS INDEX: 23.03 KG/M2 | SYSTOLIC BLOOD PRESSURE: 132 MMHG | HEIGHT: 61 IN

## 2023-08-14 DIAGNOSIS — M81.0 AGE-RELATED OSTEOPOROSIS WITHOUT CURRENT PATHOLOGICAL FRACTURE: ICD-10-CM

## 2023-08-14 DIAGNOSIS — E03.9 HYPOTHYROIDISM, UNSPECIFIED TYPE: ICD-10-CM

## 2023-08-14 DIAGNOSIS — K59.00 CONSTIPATION, UNSPECIFIED CONSTIPATION TYPE: ICD-10-CM

## 2023-08-14 DIAGNOSIS — N18.31 TYPE 1 DIABETES MELLITUS WITH STAGE 3A CHRONIC KIDNEY DISEASE (HCC): ICD-10-CM

## 2023-08-14 DIAGNOSIS — N18.31 STAGE 3A CHRONIC KIDNEY DISEASE (HCC): ICD-10-CM

## 2023-08-14 DIAGNOSIS — E78.2 MIXED HYPERLIPIDEMIA: ICD-10-CM

## 2023-08-14 DIAGNOSIS — E10.22 TYPE 1 DIABETES MELLITUS WITH STAGE 3A CHRONIC KIDNEY DISEASE (HCC): ICD-10-CM

## 2023-08-14 DIAGNOSIS — Z00.00 MEDICARE ANNUAL WELLNESS VISIT, SUBSEQUENT: Primary | ICD-10-CM

## 2023-08-14 DIAGNOSIS — Z12.31 ENCOUNTER FOR SCREENING MAMMOGRAM FOR BREAST CANCER: ICD-10-CM

## 2023-08-14 PROBLEM — M79.604 RIGHT LEG PAIN: Status: RESOLVED | Noted: 2022-10-08 | Resolved: 2023-08-14

## 2023-08-14 PROBLEM — L98.9 SKIN LESION: Status: RESOLVED | Noted: 2022-09-30 | Resolved: 2023-08-14

## 2023-08-14 PROBLEM — Z91.030 BEE STING ALLERGY: Status: RESOLVED | Noted: 2022-10-08 | Resolved: 2023-08-14

## 2023-08-14 PROBLEM — R09.81 NASAL CONGESTION: Status: RESOLVED | Noted: 2022-09-30 | Resolved: 2023-08-14

## 2023-08-14 PROBLEM — R10.32 LLQ ABDOMINAL PAIN: Status: RESOLVED | Noted: 2022-01-18 | Resolved: 2023-08-14

## 2023-08-14 PROBLEM — R19.00 PELVIC MASS: Status: RESOLVED | Noted: 2018-01-15 | Resolved: 2023-08-14

## 2023-08-14 LAB
25(OH)D3 SERPL-MCNC: 50.3 NG/ML (ref 30–100)
ALBUMIN SERPL BCP-MCNC: 3.7 G/DL (ref 3.5–5)
ALP SERPL-CCNC: 86 U/L (ref 46–116)
ALT SERPL W P-5'-P-CCNC: 15 U/L (ref 12–78)
ANION GAP SERPL CALCULATED.3IONS-SCNC: 5 MMOL/L
AST SERPL W P-5'-P-CCNC: 13 U/L (ref 5–45)
BASOPHILS # BLD AUTO: 0.08 THOUSANDS/ÂΜL (ref 0–0.1)
BASOPHILS NFR BLD AUTO: 1 % (ref 0–1)
BILIRUB SERPL-MCNC: 0.36 MG/DL (ref 0.2–1)
BUN SERPL-MCNC: 23 MG/DL (ref 5–25)
CALCIUM SERPL-MCNC: 9.4 MG/DL (ref 8.3–10.1)
CHLORIDE SERPL-SCNC: 110 MMOL/L (ref 96–108)
CHOLEST SERPL-MCNC: 162 MG/DL
CO2 SERPL-SCNC: 27 MMOL/L (ref 21–32)
CREAT SERPL-MCNC: 1.06 MG/DL (ref 0.6–1.3)
EOSINOPHIL # BLD AUTO: 0.31 THOUSAND/ÂΜL (ref 0–0.61)
EOSINOPHIL NFR BLD AUTO: 4 % (ref 0–6)
ERYTHROCYTE [DISTWIDTH] IN BLOOD BY AUTOMATED COUNT: 12.3 % (ref 11.6–15.1)
GFR SERPL CREATININE-BSD FRML MDRD: 49 ML/MIN/1.73SQ M
GLUCOSE P FAST SERPL-MCNC: 163 MG/DL (ref 65–99)
HCT VFR BLD AUTO: 39.5 % (ref 34.8–46.1)
HDLC SERPL-MCNC: 72 MG/DL
HGB BLD-MCNC: 12.6 G/DL (ref 11.5–15.4)
IMM GRANULOCYTES # BLD AUTO: 0.04 THOUSAND/UL (ref 0–0.2)
IMM GRANULOCYTES NFR BLD AUTO: 1 % (ref 0–2)
LDLC SERPL CALC-MCNC: 65 MG/DL (ref 0–100)
LYMPHOCYTES # BLD AUTO: 1.74 THOUSANDS/ÂΜL (ref 0.6–4.47)
LYMPHOCYTES NFR BLD AUTO: 25 % (ref 14–44)
MCH RBC QN AUTO: 29 PG (ref 26.8–34.3)
MCHC RBC AUTO-ENTMCNC: 31.9 G/DL (ref 31.4–37.4)
MCV RBC AUTO: 91 FL (ref 82–98)
MONOCYTES # BLD AUTO: 0.47 THOUSAND/ÂΜL (ref 0.17–1.22)
MONOCYTES NFR BLD AUTO: 7 % (ref 4–12)
NEUTROPHILS # BLD AUTO: 4.46 THOUSANDS/ÂΜL (ref 1.85–7.62)
NEUTS SEG NFR BLD AUTO: 62 % (ref 43–75)
NRBC BLD AUTO-RTO: 0 /100 WBCS
PLATELET # BLD AUTO: 243 THOUSANDS/UL (ref 149–390)
PMV BLD AUTO: 12 FL (ref 8.9–12.7)
POTASSIUM SERPL-SCNC: 4 MMOL/L (ref 3.5–5.3)
PROT SERPL-MCNC: 7.3 G/DL (ref 6.4–8.4)
RBC # BLD AUTO: 4.35 MILLION/UL (ref 3.81–5.12)
SODIUM SERPL-SCNC: 142 MMOL/L (ref 135–147)
TRIGL SERPL-MCNC: 124 MG/DL
TSH SERPL DL<=0.05 MIU/L-ACNC: 1.63 UIU/ML (ref 0.45–4.5)
WBC # BLD AUTO: 7.1 THOUSAND/UL (ref 4.31–10.16)

## 2023-08-14 PROCEDURE — 82306 VITAMIN D 25 HYDROXY: CPT

## 2023-08-14 PROCEDURE — 80061 LIPID PANEL: CPT

## 2023-08-14 PROCEDURE — 99214 OFFICE O/P EST MOD 30 MIN: CPT | Performed by: INTERNAL MEDICINE

## 2023-08-14 PROCEDURE — 85025 COMPLETE CBC W/AUTO DIFF WBC: CPT

## 2023-08-14 PROCEDURE — 36415 COLL VENOUS BLD VENIPUNCTURE: CPT

## 2023-08-14 PROCEDURE — 84443 ASSAY THYROID STIM HORMONE: CPT

## 2023-08-14 PROCEDURE — G0439 PPPS, SUBSEQ VISIT: HCPCS | Performed by: INTERNAL MEDICINE

## 2023-08-14 PROCEDURE — 80053 COMPREHEN METABOLIC PANEL: CPT

## 2023-08-14 NOTE — PATIENT INSTRUCTIONS
Medicare Preventive Visit Patient Instructions  Thank you for completing your Welcome to Medicare Visit or Medicare Annual Wellness Visit today. Your next wellness visit will be due in one year (8/14/2024). The screening/preventive services that you may require over the next 5-10 years are detailed below. Some tests may not apply to you based off risk factors and/or age. Screening tests ordered at today's visit but not completed yet may show as past due. Also, please note that scanned in results may not display below. Preventive Screenings:  Service Recommendations Previous Testing/Comments   Colorectal Cancer Screening  * Colonoscopy    * Fecal Occult Blood Test (FOBT)/Fecal Immunochemical Test (FIT)  * Fecal DNA/Cologuard Test  * Flexible Sigmoidoscopy Age: 43-73 years old   Colonoscopy: every 10 years (may be performed more frequently if at higher risk)  OR  FOBT/FIT: every 1 year  OR  Cologuard: every 3 years  OR  Sigmoidoscopy: every 5 years  Screening may be recommended earlier than age 39 if at higher risk for colorectal cancer. Also, an individualized decision between you and your healthcare provider will decide whether screening between the ages of 77-80 would be appropriate. Colonoscopy: 02/10/2022  FOBT/FIT: Not on file  Cologuard: Not on file  Sigmoidoscopy: Not on file          Breast Cancer Screening Age: 36 years old  Frequency: every 1-2 years  Not required if history of left and right mastectomy Mammogram: 10/07/2022    Screening Current   Cervical Cancer Screening Between the ages of 21-29, pap smear recommended once every 3 years. Between the ages of 32-69, can perform pap smear with HPV co-testing every 5 years.    Recommendations may differ for women with a history of total hysterectomy, cervical cancer, or abnormal pap smears in past. Pap Smear: Not on file    Screening Not Indicated   Hepatitis C Screening Once for adults born between 1945 and 1965  More frequently in patients at high risk for Hepatitis C Hep C Antibody: Not on file        Diabetes Screening 1-2 times per year if you're at risk for diabetes or have pre-diabetes Fasting glucose: 221 mg/dL (6/6/2023)  A1C: 7.0 % (6/6/2023)  Screening Not Indicated  History Diabetes   Cholesterol Screening Once every 5 years if you don't have a lipid disorder. May order more often based on risk factors. Lipid panel: 03/07/2023    Screening Not Indicated  History Lipid Disorder     Other Preventive Screenings Covered by Medicare:  1. Abdominal Aortic Aneurysm (AAA) Screening: covered once if your at risk. You're considered to be at risk if you have a family history of AAA. 2. Lung Cancer Screening: covers low dose CT scan once per year if you meet all of the following conditions: (1) Age 48-67; (2) No signs or symptoms of lung cancer; (3) Current smoker or have quit smoking within the last 15 years; (4) You have a tobacco smoking history of at least 20 pack years (packs per day multiplied by number of years you smoked); (5) You get a written order from a healthcare provider. 3. Glaucoma Screening: covered annually if you're considered high risk: (1) You have diabetes OR (2) Family history of glaucoma OR (3)  aged 48 and older OR (3)  American aged 72 and older  3. Osteoporosis Screening: covered every 2 years if you meet one of the following conditions: (1) You're estrogen deficient and at risk for osteoporosis based off medical history and other findings; (2) Have a vertebral abnormality; (3) On glucocorticoid therapy for more than 3 months; (4) Have primary hyperparathyroidism; (5) On osteoporosis medications and need to assess response to drug therapy. · Last bone density test (DXA Scan): 09/07/2021.  5. HIV Screening: covered annually if you're between the age of 15-65. Also covered annually if you are younger than 13 and older than 72 with risk factors for HIV infection.  For pregnant patients, it is covered up to 3 times per pregnancy. Immunizations:  Immunization Recommendations   Influenza Vaccine Annual influenza vaccination during flu season is recommended for all persons aged >= 6 months who do not have contraindications   Pneumococcal Vaccine   * Pneumococcal conjugate vaccine = PCV13 (Prevnar 13), PCV15 (Vaxneuvance), PCV20 (Prevnar 20)  * Pneumococcal polysaccharide vaccine = PPSV23 (Pneumovax) Adults 20-63 years old: 1-3 doses may be recommended based on certain risk factors  Adults 72 years old: 1-2 doses may be recommended based off what pneumonia vaccine you previously received   Hepatitis B Vaccine 3 dose series if at intermediate or high risk (ex: diabetes, end stage renal disease, liver disease)   Tetanus (Td) Vaccine - COST NOT COVERED BY MEDICARE PART B Following completion of primary series, a booster dose should be given every 10 years to maintain immunity against tetanus. Td may also be given as tetanus wound prophylaxis. Tdap Vaccine - COST NOT COVERED BY MEDICARE PART B Recommended at least once for all adults. For pregnant patients, recommended with each pregnancy. Shingles Vaccine (Shingrix) - COST NOT COVERED BY MEDICARE PART B  2 shot series recommended in those aged 48 and above     Health Maintenance Due:  There are no preventive care reminders to display for this patient. Immunizations Due:      Topic Date Due   • COVID-19 Vaccine (3 - Pfizer series) 06/27/2021   • Pneumococcal Vaccine: 65+ Years (2 - PCV) 01/13/2022   • Influenza Vaccine (1) 09/01/2023     Advance Directives   What are advance directives? Advance directives are legal documents that state your wishes and plans for medical care. These plans are made ahead of time in case you lose your ability to make decisions for yourself. Advance directives can apply to any medical decision, such as the treatments you want, and if you want to donate organs. What are the types of advance directives?   There are many types of advance directives, and each state has rules about how to use them. You may choose a combination of any of the following:  · Living will: This is a written record of the treatment you want. You can also choose which treatments you do not want, which to limit, and which to stop at a certain time. This includes surgery, medicine, IV fluid, and tube feedings. · Durable power of  for healthcare [facility]): This is a written record that states who you want to make healthcare choices for you when you are unable to make them for yourself. This person, called a proxy, is usually a family member or a friend. You may choose more than 1 proxy. · Do not resuscitate (DNR) order:  A DNR order is used in case your heart stops beating or you stop breathing. It is a request not to have certain forms of treatment, such as CPR. A DNR order may be included in other types of advance directives. · Medical directive: This covers the care that you want if you are in a coma, near death, or unable to make decisions for yourself. You can list the treatments you want for each condition. Treatment may include pain medicine, surgery, blood transfusions, dialysis, IV or tube feedings, and a ventilator (breathing machine). · Values history: This document has questions about your views, beliefs, and how you feel and think about life. This information can help others choose the care that you would choose. Why are advance directives important? An advance directive helps you control your care. Although spoken wishes may be used, it is better to have your wishes written down. Spoken wishes can be misunderstood, or not followed. Treatments may be given even if you do not want them. An advance directive may make it easier for your family to make difficult choices about your care. © Copyright [employer] 2018 Information is for End User's use only and may not be sold, redistributed or otherwise used for commercial purposes.  All illustrations and images included in CareNotes® are the copyrighted property of [employer].D.A[employer]M., Inc. or [facility] Preventive Visit Patient Instructions  Thank you for completing your Welcome to Medicare Visit or Medicare Annual Wellness Visit today. Your next wellness visit will be due in one year (8/14/2024). The screening/preventive services that you may require over the next 5-10 years are detailed below. Some tests may not apply to you based off risk factors and/or age. Screening tests ordered at today's visit but not completed yet may show as past due. Also, please note that scanned in results may not display below. Preventive Screenings:  Service Recommendations Previous Testing/Comments   Colorectal Cancer Screening  * Colonoscopy    * Fecal Occult Blood Test (FOBT)/Fecal Immunochemical Test (FIT)  * Fecal DNA/Cologuard Test  * Flexible Sigmoidoscopy Age: 43-73 years old   Colonoscopy: every 10 years (may be performed more frequently if at higher risk)  OR  FOBT/FIT: every 1 year  OR  Cologuard: every 3 years  OR  Sigmoidoscopy: every 5 years  Screening may be recommended earlier than age 39 if at higher risk for colorectal cancer. Also, an individualized decision between you and your healthcare provider will decide whether screening between the ages of 77-80 would be appropriate. Colonoscopy: 02/10/2022  FOBT/FIT: Not on file  Cologuard: Not on file  Sigmoidoscopy: Not on file          Breast Cancer Screening Age: 36 years old  Frequency: every 1-2 years  Not required if history of left and right mastectomy Mammogram: 10/07/2022    Screening Current   Cervical Cancer Screening Between the ages of 21-29, pap smear recommended once every 3 years. Between the ages of 32-69, can perform pap smear with HPV co-testing every 5 years.    Recommendations may differ for women with a history of total hysterectomy, cervical cancer, or abnormal pap smears in past. Pap Smear: Not on file    Screening Not Indicated   Hepatitis C Screening Once for adults born between 1945 and 1965  More frequently in patients at high risk for Hepatitis C Hep C Antibody: Not on file        Diabetes Screening 1-2 times per year if you're at risk for diabetes or have pre-diabetes Fasting glucose: 221 mg/dL (6/6/2023)  A1C: 7.0 % (6/6/2023)  Screening Not Indicated  History Diabetes   Cholesterol Screening Once every 5 years if you don't have a lipid disorder. May order more often based on risk factors. Lipid panel: 03/07/2023    Screening Not Indicated  History Lipid Disorder     Other Preventive Screenings Covered by Medicare:  6. Abdominal Aortic Aneurysm (AAA) Screening: covered once if your at risk. You're considered to be at risk if you have a family history of AAA. 7. Lung Cancer Screening: covers low dose CT scan once per year if you meet all of the following conditions: (1) Age 48-67; (2) No signs or symptoms of lung cancer; (3) Current smoker or have quit smoking within the last 15 years; (4) You have a tobacco smoking history of at least 20 pack years (packs per day multiplied by number of years you smoked); (5) You get a written order from a healthcare provider. 8. Glaucoma Screening: covered annually if you're considered high risk: (1) You have diabetes OR (2) Family history of glaucoma OR (3)  aged 48 and older OR (3)  American aged 72 and older  5. Osteoporosis Screening: covered every 2 years if you meet one of the following conditions: (1) You're estrogen deficient and at risk for osteoporosis based off medical history and other findings; (2) Have a vertebral abnormality; (3) On glucocorticoid therapy for more than 3 months; (4) Have primary hyperparathyroidism; (5) On osteoporosis medications and need to assess response to drug therapy. · Last bone density test (DXA Scan): 09/07/2021. 10. HIV Screening: covered annually if you're between the age of 14-79.  Also covered annually if you are younger than 13 and older than 72 with risk factors for HIV infection. For pregnant patients, it is covered up to 3 times per pregnancy. Immunizations:  Immunization Recommendations   Influenza Vaccine Annual influenza vaccination during flu season is recommended for all persons aged >= 6 months who do not have contraindications   Pneumococcal Vaccine   * Pneumococcal conjugate vaccine = PCV13 (Prevnar 13), PCV15 (Vaxneuvance), PCV20 (Prevnar 20)  * Pneumococcal polysaccharide vaccine = PPSV23 (Pneumovax) Adults 20-63 years old: 1-3 doses may be recommended based on certain risk factors  Adults 72 years old: 1-2 doses may be recommended based off what pneumonia vaccine you previously received   Hepatitis B Vaccine 3 dose series if at intermediate or high risk (ex: diabetes, end stage renal disease, liver disease)   Tetanus (Td) Vaccine - COST NOT COVERED BY MEDICARE PART B Following completion of primary series, a booster dose should be given every 10 years to maintain immunity against tetanus. Td may also be given as tetanus wound prophylaxis. Tdap Vaccine - COST NOT COVERED BY MEDICARE PART B Recommended at least once for all adults. For pregnant patients, recommended with each pregnancy. Shingles Vaccine (Shingrix) - COST NOT COVERED BY MEDICARE PART B  2 shot series recommended in those aged 48 and above     Health Maintenance Due:  There are no preventive care reminders to display for this patient. Immunizations Due:      Topic Date Due   • COVID-19 Vaccine (3 - Pfizer series) 06/27/2021   • Pneumococcal Vaccine: 65+ Years (2 - PCV) 01/13/2022   • Influenza Vaccine (1) 09/01/2023     Advance Directives   What are advance directives? Advance directives are legal documents that state your wishes and plans for medical care. These plans are made ahead of time in case you lose your ability to make decisions for yourself.  Advance directives can apply to any medical decision, such as the treatments you want, and if you want to donate organs. What are the types of advance directives? There are many types of advance directives, and each state has rules about how to use them. You may choose a combination of any of the following:  · Living will: This is a written record of the treatment you want. You can also choose which treatments you do not want, which to limit, and which to stop at a certain time. This includes surgery, medicine, IV fluid, and tube feedings. · Durable power of  for healthcare [facility]): This is a written record that states who you want to make healthcare choices for you when you are unable to make them for yourself. This person, called a proxy, is usually a family member or a friend. You may choose more than 1 proxy. · Do not resuscitate (DNR) order:  A DNR order is used in case your heart stops beating or you stop breathing. It is a request not to have certain forms of treatment, such as CPR. A DNR order may be included in other types of advance directives. · Medical directive: This covers the care that you want if you are in a coma, near death, or unable to make decisions for yourself. You can list the treatments you want for each condition. Treatment may include pain medicine, surgery, blood transfusions, dialysis, IV or tube feedings, and a ventilator (breathing machine). · Values history: This document has questions about your views, beliefs, and how you feel and think about life. This information can help others choose the care that you would choose. Why are advance directives important? An advance directive helps you control your care. Although spoken wishes may be used, it is better to have your wishes written down. Spoken wishes can be misunderstood, or not followed. Treatments may be given even if you do not want them. An advance directive may make it easier for your family to make difficult choices about your care.        © Copyright [employer] 2018 Information is for End User's use only and may not be sold, redistributed or otherwise used for commercial purposes. All illustrations and images included in CareNotes® are the copyrighted property of [employer] Inc. or [facility] Preventive Visit Patient Instructions  Thank you for completing your Welcome to Medicare Visit or Medicare Annual Wellness Visit today. Your next wellness visit will be due in one year (8/14/2024). The screening/preventive services that you may require over the next 5-10 years are detailed below. Some tests may not apply to you based off risk factors and/or age. Screening tests ordered at today's visit but not completed yet may show as past due. Also, please note that scanned in results may not display below. Preventive Screenings:  Service Recommendations Previous Testing/Comments   Colorectal Cancer Screening  * Colonoscopy    * Fecal Occult Blood Test (FOBT)/Fecal Immunochemical Test (FIT)  * Fecal DNA/Cologuard Test  * Flexible Sigmoidoscopy Age: 43-73 years old   Colonoscopy: every 10 years (may be performed more frequently if at higher risk)  OR  FOBT/FIT: every 1 year  OR  Cologuard: every 3 years  OR  Sigmoidoscopy: every 5 years  Screening may be recommended earlier than age 39 if at higher risk for colorectal cancer. Also, an individualized decision between you and your healthcare provider will decide whether screening between the ages of 77-80 would be appropriate. Colonoscopy: 02/10/2022  FOBT/FIT: Not on file  Cologuard: Not on file  Sigmoidoscopy: Not on file          Breast Cancer Screening Age: 36 years old  Frequency: every 1-2 years  Not required if history of left and right mastectomy Mammogram: 10/07/2022    Screening Current   Cervical Cancer Screening Between the ages of 21-29, pap smear recommended once every 3 years. Between the ages of 32-69, can perform pap smear with HPV co-testing every 5 years.    Recommendations may differ for women with a history of total hysterectomy, cervical cancer, or abnormal pap smears in past. Pap Smear: Not on file    Screening Not Indicated   Hepatitis C Screening Once for adults born between 1945 and 1965  More frequently in patients at high risk for Hepatitis C Hep C Antibody: Not on file        Diabetes Screening 1-2 times per year if you're at risk for diabetes or have pre-diabetes Fasting glucose: 221 mg/dL (6/6/2023)  A1C: 7.0 % (6/6/2023)  Screening Not Indicated  History Diabetes   Cholesterol Screening Once every 5 years if you don't have a lipid disorder. May order more often based on risk factors. Lipid panel: 03/07/2023    Screening Not Indicated  History Lipid Disorder     Other Preventive Screenings Covered by Medicare:  11. Abdominal Aortic Aneurysm (AAA) Screening: covered once if your at risk. You're considered to be at risk if you have a family history of AAA. 12. Lung Cancer Screening: covers low dose CT scan once per year if you meet all of the following conditions: (1) Age 48-67; (2) No signs or symptoms of lung cancer; (3) Current smoker or have quit smoking within the last 15 years; (4) You have a tobacco smoking history of at least 20 pack years (packs per day multiplied by number of years you smoked); (5) You get a written order from a healthcare provider. 15. Glaucoma Screening: covered annually if you're considered high risk: (1) You have diabetes OR (2) Family history of glaucoma OR (3)  aged 48 and older OR (3)  American aged 72 and older  15. Osteoporosis Screening: covered every 2 years if you meet one of the following conditions: (1) You're estrogen deficient and at risk for osteoporosis based off medical history and other findings; (2) Have a vertebral abnormality; (3) On glucocorticoid therapy for more than 3 months; (4) Have primary hyperparathyroidism; (5) On osteoporosis medications and need to assess response to drug therapy.    · Last bone density test (DXA Scan): 09/07/2021. 15. HIV Screening: covered annually if you're between the age of 14-79. Also covered annually if you are younger than 13 and older than 72 with risk factors for HIV infection. For pregnant patients, it is covered up to 3 times per pregnancy. Immunizations:  Immunization Recommendations   Influenza Vaccine Annual influenza vaccination during flu season is recommended for all persons aged >= 6 months who do not have contraindications   Pneumococcal Vaccine   * Pneumococcal conjugate vaccine = PCV13 (Prevnar 13), PCV15 (Vaxneuvance), PCV20 (Prevnar 20)  * Pneumococcal polysaccharide vaccine = PPSV23 (Pneumovax) Adults 20-63 years old: 1-3 doses may be recommended based on certain risk factors  Adults 72 years old: 1-2 doses may be recommended based off what pneumonia vaccine you previously received   Hepatitis B Vaccine 3 dose series if at intermediate or high risk (ex: diabetes, end stage renal disease, liver disease)   Tetanus (Td) Vaccine - COST NOT COVERED BY MEDICARE PART B Following completion of primary series, a booster dose should be given every 10 years to maintain immunity against tetanus. Td may also be given as tetanus wound prophylaxis. Tdap Vaccine - COST NOT COVERED BY MEDICARE PART B Recommended at least once for all adults. For pregnant patients, recommended with each pregnancy. Shingles Vaccine (Shingrix) - COST NOT COVERED BY MEDICARE PART B  2 shot series recommended in those aged 48 and above     Health Maintenance Due:  There are no preventive care reminders to display for this patient. Immunizations Due:      Topic Date Due   • COVID-19 Vaccine (3 - Pfizer series) 06/27/2021   • Pneumococcal Vaccine: 65+ Years (2 - PCV) 01/13/2022   • Influenza Vaccine (1) 09/01/2023     Advance Directives   What are advance directives? Advance directives are legal documents that state your wishes and plans for medical care.  These plans are made ahead of time in case you lose your ability to make decisions for yourself. Advance directives can apply to any medical decision, such as the treatments you want, and if you want to donate organs. What are the types of advance directives? There are many types of advance directives, and each state has rules about how to use them. You may choose a combination of any of the following:  · Living will: This is a written record of the treatment you want. You can also choose which treatments you do not want, which to limit, and which to stop at a certain time. This includes surgery, medicine, IV fluid, and tube feedings. · Durable power of  for healthcare [facility] This is a written record that states who you want to make healthcare choices for you when you are unable to make them for yourself. This person, called a proxy, is usually a family member or a friend. You may choose more than 1 proxy. · Do not resuscitate (DNR) order:  A DNR order is used in case your heart stops beating or you stop breathing. It is a request not to have certain forms of treatment, such as CPR. A DNR order may be included in other types of advance directives. · Medical directive: This covers the care that you want if you are in a coma, near death, or unable to make decisions for yourself. You can list the treatments you want for each condition. Treatment may include pain medicine, surgery, blood transfusions, dialysis, IV or tube feedings, and a ventilator (breathing machine). · Values history: This document has questions about your views, beliefs, and how you feel and think about life. This information can help others choose the care that you would choose. Why are advance directives important? An advance directive helps you control your care. Although spoken wishes may be used, it is better to have your wishes written down. Spoken wishes can be misunderstood, or not followed. Treatments may be given even if you do not want them.  An advance directive may make it easier for your family to make difficult choices about your care. © Copyright [employer] 2018 Information is for End User's use only and may not be sold, redistributed or otherwise used for commercial purposes.  All illustrations and images included in CareNotes® are the copyrighted property of [employer] or [address] [place] [address]

## 2023-08-14 NOTE — PROGRESS NOTES
Assessment and Plan:     Problem List Items Addressed This Visit        Endocrine    Hypothyroidism    Relevant Orders    TSH, 3rd generation with Free T4 reflex    Diabetes mellitus ([address])    Relevant Orders    CBC and differential    Comprehensive metabolic panel       Musculoskeletal and Integument    Age-related osteoporosis without current pathological fracture    Relevant Orders    DXA bone density spine hip and pelvis    Vitamin D 25 hydroxy       Genitourinary    Stage 3a chronic kidney disease ([address])    Relevant Orders    CBC and differential    Comprehensive metabolic panel    Vitamin D 25 hydroxy       Other    Hyperlipidemia    Relevant Orders    Lipid Panel with Direct LDL reflex    Constipation    Relevant Orders    CBC and differential    Comprehensive metabolic panel    TSH, 3rd generation with Free T4 reflex   Other Visit Diagnoses     Medicare annual wellness visit, subsequent    -  Primary    Encounter for screening mammogram for breast cancer        Relevant Orders    Mammo screening bilateral w 3d & cad          Depression Screening and Follow-up Plan: Patient was screened for depression during today's encounter. They screened negative with a PHQ-2 score of 0. Preventive health issues were discussed with patient, and age appropriate screening tests were ordered as noted in patient's After Visit Summary. Personalized health advice and appropriate referrals for health education or preventive services given if needed, as noted in patient's After Visit Summary. History of Present Illness:     Patient presents for a Medicare Wellness Visit    77yo female with history of type 1 DM, CKD stage 3a, hypothyroidism here for follow up/AWV. Doing well, except struggling with constipation past few days. Last BM 4 days ago. Was out of town recently and out of her normal routine. Denies abdominal pain, nausea/vomiting. Passing flatus. Using miralax and docusate without much success.     Diabetes: seeing Endocrine later this month. Reports BG have been labile, up to 300s while out of town. Denies eating differently. Follows with [facility] for retinopathy. Osteoporosis: taking calcium and D3 only, very hesitant to try other medications due to concerns about side effects. Reports that she is also doing weight-bearing exercises (lifting free weights). Patient Care Team:  Yina Armenta MD as PCP - General (Internal Medicine)  Marina Urbano as PCP - Endocrinology (Endocrinology)  Marina Urbano as Nurse Practitioner (Endocrinology)  Mikael Sow RN as Registered Nurse (Diabetes Services)     Review of Systems:     Review of Systems   Constitutional: Positive for fatigue. Negative for chills, fever and unexpected weight change. HENT: Negative for congestion, postnasal drip, rhinorrhea, sore throat and trouble swallowing. Eyes: Negative for pain, redness, itching and visual disturbance. Respiratory: Negative for cough, chest tightness, shortness of breath and wheezing. Cardiovascular: Negative for chest pain, palpitations and leg swelling. Gastrointestinal: Positive for constipation. Negative for abdominal pain, blood in stool, diarrhea, nausea and vomiting. Endocrine: Negative for cold intolerance, heat intolerance, polydipsia and polyuria. Genitourinary: Negative for dysuria, frequency, hematuria and urgency. Musculoskeletal: Negative for arthralgias, back pain and joint swelling. Skin: Negative for rash. Neurological: Negative for dizziness, tremors, weakness, light-headedness, numbness and headaches. Psychiatric/Behavioral: Negative for confusion, dysphoric mood, hallucinations and suicidal ideas. The patient is not nervous/anxious.          Problem List:     Patient Active Problem List   Diagnosis   • Generalized anxiety disorder   • Stage 3a chronic kidney disease ([address])   • Hypothyroidism   • Hypertension   • 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 ear   • Diabetes mellitus ([address])   • Hyperlipidemia   • Retinal disorder   • Constipation   • Age-related osteoporosis without current pathological fracture   • Hypoglycemia unawareness associated with type 1 diabetes mellitus ([address])      Past Medical and Surgical History:     Past Medical History:   Diagnosis Date   • Anxiety    • Colon polyp    • Depression    • Diabetes mellitus ([address])    • Diverticulosis    • Hyperlipidemia    • Hypertension      Past Surgical History:   Procedure Laterality Date   • APPENDECTOMY     • CATARACT EXTRACTION Right    • COLONOSCOPY      difficult exam   • HYSTERECTOMY     • TONSILLECTOMY     • WISDOM TOOTH EXTRACTION        Family History:     Family History   Problem Relation Age of Onset   • Parkinsonism Mother    • Alzheimer's disease Father    • Breast cancer Sister         in her 42's   • Lung cancer Sister    • Breast cancer Sister    • No Known Problems Daughter    • No Known Problems Daughter    • No Known Problems Maternal Grandmother    • No Known Problems Maternal Grandfather    • No Known Problems Paternal Grandmother    • No Known Problems Paternal Grandfather    • No Known Problems Maternal Aunt    • No Known Problems Maternal Aunt    • No Known Problems Maternal Aunt    • No Known Problems Paternal Aunt       Social History:     Social History     Socioeconomic History   • Marital status:       Spouse name: None   • Number of children: None   • Years of education: None   • Highest education level: None   Occupational History   • None   Tobacco Use   • Smoking status: Never   • Smokeless tobacco: Never   Vaping Use   • Vaping Use: Never used   Substance and Sexual Activity   • Alcohol use: Not Currently     Comment: rare   • Drug use: Never   • Sexual activity: None   Other Topics Concern   • None   Social History Narrative   • None     Social Determinants of Health     Financial Resource Strain: Low Risk  (8/14/2023)    Overall Financial Resource Strain (CARDIA)    • Difficulty of Paying Living Expenses: Not very hard   Food Insecurity: Not on file   Transportation Needs: No Transportation Needs (8/14/2023)    PRAPARE - Transportation    • Lack of Transportation (Medical): No    • Lack of Transportation (Non-Medical): No   Physical Activity: Not on file   Stress: Not on file   Social Connections: Not on file   Intimate Partner Violence: Not on file   Housing Stability: Not on file      Medications and Allergies:     Current Outpatient Medications   Medication Sig Dispense Refill   • Ascorbic Acid (VITAMIN C PO) Take by mouth     • ASPIRIN 81 PO Take 81 mg by mouth daily     • atorvastatin (LIPITOR) 20 mg tablet Take 1 tablet (20 mg total) by mouth daily 90 tablet 0   • Blood Glucose Monitoring Suppl (OneTouch Verio) w/Device KIT Use to test blood sugar 4x daily. 1 kit 0   • Cholecalciferol 2000 units CAPS Take 1 capsule by mouth     • Continuous Blood Gluc Sensor (FreeStyle Kaylah 2 Sensor) MISC Use     • famotidine (PEPCID) 20 mg tablet Take 1 tablet (20 mg total) by mouth daily 90 tablet 3   • glucose blood (OneTouch Verio) test strip Use to test blood sugar 4x daily in case of CGM failure. 100 each 2   • insulin aspart protamine-insulin aspart (NovoLOG Mix 70/30) 100 units/mL injection Inject 14 Units under the skin 2 (two) times a day before meals 30 mL 3   • levothyroxine 75 mcg tablet Take 1 tablet (75 mcg total) by mouth daily 90 tablet 3   • lisinopril (ZESTRIL) 20 mg tablet Take 1 tablet (20 mg total) by mouth daily 90 tablet 1   • mometasone (ELOCON) 0.1 % cream Apply topically daily Apply topically BID x 2 weeks then qhs prn 45 g 3   • Multiple Vitamins-Minerals (MULTIVITAL-M PO) Take by mouth     • OneTouch Delica Lancets 42O MISC Use to test blood sugar 4x daily. 300 each 2   • EPINEPHrine (EPIPEN) 0.3 mg/0.3 mL SOAJ Inject 0.3 mL (0.3 mg total) into a muscle once for 1 dose 0.6 mL 0     No current facility-administered medications for this visit.      Allergies Allergen Reactions   • Diphenhydramine Shortness Of Breath   • Meperidine Hives and Shortness Of Breath   • Prednisone Hives and Shortness Of Breath   • Bee Pollen    • Ciprofloxacin Hives   • Sulfamethoxazole-Trimethoprim GI Intolerance      Immunizations:     Immunization History   Administered Date(s) Administered   • COVID-19 PFIZER VACCINE 0.3 ML IM 04/11/2021, 05/02/2021   • Influenza Quadrivalent 3 years and older 12/11/2019, 09/25/2020   • Influenza, seasonal, injectable 10/02/2019, 10/07/2020, 11/23/2020   • Pneumococcal Polysaccharide PPV23 01/01/2013, 01/13/2021   • Zoster 01/01/2012      Health Maintenance: There are no preventive care reminders to display for this patient. Topic Date Due   • COVID-19 Vaccine (3 - Pfizer series) 06/27/2021   • Pneumococcal Vaccine: 65+ Years (2 - PCV) 01/13/2022   • Influenza Vaccine (1) 09/01/2023      Medicare Screening Tests and Risk Assessments:     Chiquis Adam is here for her Subsequent Wellness visit. Health Risk Assessment:   Patient rates overall health as good. Patient feels that their physical health rating is same. Patient is satisfied with their life. Eyesight was rated as same. Hearing was rated as same. Patient feels that their emotional and mental health rating is same. Patients states they are never, rarely angry. Patient states they are often unusually tired/fatigued. Pain experienced in the last 7 days has been none. Patient states that she has experienced no weight loss or gain in last 6 months. Depression Screening:   PHQ-2 Score: 0      Fall Risk Screening: In the past year, patient has experienced: no history of falling in past year      Urinary Incontinence Screening:   Patient has not leaked urine accidently in the last six months. Home Safety:  Patient does not have trouble with stairs inside or outside of their home. Patient has working smoke alarms and has working carbon monoxide detector.  Home safety hazards include: none.     Nutrition:   Current diet is Regular. Medications:   Patient is not currently taking any over-the-counter supplements. Patient is able to manage medications. Activities of Daily Living (ADLs)/Instrumental Activities of Daily Living (IADLs):   Walk and transfer into and out of bed and chair?: Yes  Dress and groom yourself?: Yes    Bathe or shower yourself?: Yes    Feed yourself? Yes  Do your laundry/housekeeping?: Yes  Manage your money, pay your bills and track your expenses?: Yes  Make your own meals?: Yes    Do your own shopping?: Yes    Previous Hospitalizations:   Any hospitalizations or ED visits within the last 12 months?: No      Advance Care Planning:   Living will: Yes    Durable POA for healthcare:  Yes    Advanced directive: Yes    Advanced directive counseling given: Yes    Patient declined ACP directive: No    End of Life Decisions reviewed with patient: Yes    Provider agrees with end of life decisions: Yes      Comments: Pt has DNR, freezer packet at home      Cognitive Screening:   Provider or family/friend/caregiver concerned regarding cognition?: No    PREVENTIVE SCREENINGS      Cardiovascular Screening:    General: Screening Not Indicated and History Lipid Disorder      Diabetes Screening:     General: Screening Not Indicated and History Diabetes      Colorectal Cancer Screening:     General: Screening Current      Breast Cancer Screening:     General: Screening Current      Cervical Cancer Screening:    General: Screening Not Indicated      Osteoporosis Screening:    General: Screening Not Indicated and History Osteoporosis      Abdominal Aortic Aneurysm (AAA) Screening:        General: Screening Not Indicated      Lung Cancer Screening:     General: Screening Not Indicated      Hepatitis C Screening:    General: Screening Not Indicated    Screening, Brief Intervention, and Referral to Treatment (SBIRT)    Screening  Typical number of drinks in a day: 0  Typical number of drinks in a week: 0  Interpretation: Low risk drinking behavior. Single Item Drug Screening:  How often have you used an illegal drug (including marijuana) or a prescription medication for non-medical reasons in the past year? never    Single Item Drug Screen Score: 0  Interpretation: Negative screen for possible drug use disorder    Brief Intervention  Alcohol & drug use screenings were reviewed. No concerns regarding substance use disorder identified. Other Counseling Topics:   Car/seat belt/driving safety, skin self-exam, sunscreen and calcium and vitamin D intake and regular weightbearing exercise. No results found. Physical Exam:     /84   Pulse 79   Temp 98.6 °F (37 °C)   Resp 18   Ht 5' 1" (1.549 m)   Wt 55.3 kg (122 lb)   SpO2 98%   BMI 23.05 kg/m²     Physical Exam  Vitals reviewed. Constitutional:       General: She is not in acute distress. Appearance: She is well-developed and normal weight. HENT:      Head: Normocephalic and atraumatic. Right Ear: External ear normal. There is impacted cerumen. Left Ear: External ear normal. There is impacted cerumen. Mouth/Throat:      Pharynx: No oropharyngeal exudate or posterior oropharyngeal erythema. Eyes:      Conjunctiva/sclera: Conjunctivae normal.      Pupils: Pupils are equal, round, and reactive to light. Neck:      Thyroid: No thyromegaly. Vascular: No carotid bruit. Cardiovascular:      Rate and Rhythm: Normal rate and regular rhythm. Heart sounds: Normal heart sounds. No murmur heard. No friction rub. No gallop. Pulmonary:      Effort: Pulmonary effort is normal. No respiratory distress. Breath sounds: Normal breath sounds. No stridor. No wheezing or rales. Chest:      Chest wall: No tenderness. Abdominal:      General: Abdomen is flat. Bowel sounds are normal. There is no distension. Palpations: Abdomen is soft. There is no mass. Tenderness:  There is no abdominal tenderness. There is no guarding or rebound. Hernia: No hernia is present. Musculoskeletal:         General: No tenderness or deformity. Normal range of motion. Cervical back: Normal range of motion and neck supple. Lymphadenopathy:      Cervical: No cervical adenopathy. Skin:     General: Skin is warm and dry. Capillary Refill: Capillary refill takes less than 2 seconds. Findings: No rash. Neurological:      General: No focal deficit present. Mental Status: She is alert. Motor: No abnormal muscle tone. Coordination: Coordination normal.   Psychiatric:         Mood and Affect: Mood normal.         Behavior: Behavior normal.         Thought Content:  Thought content normal.         Judgment: Judgment normal.          Johanna Scales MD

## 2023-08-19 ENCOUNTER — HOSPITAL ENCOUNTER (EMERGENCY)
Facility: HOSPITAL | Age: 78
Discharge: HOME | End: 2023-08-19
Attending: FAMILY MEDICINE
Payer: MEDICARE

## 2023-08-19 VITALS
TEMPERATURE: 97.8 F | BODY MASS INDEX: 23.05 KG/M2 | SYSTOLIC BLOOD PRESSURE: 169 MMHG | DIASTOLIC BLOOD PRESSURE: 78 MMHG | HEART RATE: 69 BPM | WEIGHT: 122 LBS | RESPIRATION RATE: 18 BRPM | OXYGEN SATURATION: 94 %

## 2023-08-19 DIAGNOSIS — L50.9 URTICARIA: Primary | ICD-10-CM

## 2023-08-19 PROCEDURE — 99284 EMERGENCY DEPT VISIT MOD MDM: CPT | Performed by: FAMILY MEDICINE

## 2023-08-19 PROCEDURE — 99284 EMERGENCY DEPT VISIT MOD MDM: CPT

## 2023-08-19 RX ORDER — HYDROXYZINE HYDROCHLORIDE 25 MG/1
25 TABLET, FILM COATED ORAL EVERY 6 HOURS
Qty: 12 TABLET | Refills: 0 | Status: SHIPPED | OUTPATIENT
Start: 2023-08-19

## 2023-08-19 RX ORDER — HYDROXYZINE HYDROCHLORIDE 25 MG/1
25 TABLET, FILM COATED ORAL ONCE
Status: COMPLETED | OUTPATIENT
Start: 2023-08-19 | End: 2023-08-19

## 2023-08-19 RX ORDER — HYDROCORTISONE 25 MG/G
CREAM TOPICAL 4 TIMES DAILY PRN
Status: DISCONTINUED | OUTPATIENT
Start: 2023-08-19 | End: 2023-08-19

## 2023-08-19 RX ADMIN — HYDROXYZINE HYDROCHLORIDE 25 MG: 25 TABLET, FILM COATED ORAL at 16:21

## 2023-08-19 NOTE — ED PROVIDER NOTES
History  Chief Complaint   Patient presents with   • Rash     Patient arrives with complaints of itchy rash/ hives mostly to back since Thursday. HPI  78-year-old female presented with complaint of multiple hives on her back. Patient states that she feels that she was in the garage and was bit by something. Denies any difficulty breathing. Complain of itching. Prior to Admission Medications   Prescriptions Last Dose Informant Patient Reported? Taking? ASPIRIN 81 PO  Self Yes No   Sig: Take 81 mg by mouth daily   Ascorbic Acid (VITAMIN C PO)  Self Yes No   Sig: Take by mouth   Blood Glucose Monitoring Suppl (OneTouch Verio) w/Device KIT  Self No No   Sig: Use to test blood sugar 4x daily. Cholecalciferol 2000 units CAPS  Self Yes No   Sig: Take 1 capsule by mouth   Continuous Blood Gluc Sensor (FreeStyle Kaylah 2 Sensor) MISC  Self Yes No   Sig: Use   EPINEPHrine (EPIPEN) 0.3 mg/0.3 mL SOAJ   No No   Sig: Inject 0.3 mL (0.3 mg total) into a muscle once for 1 dose   Multiple Vitamins-Minerals (MULTIVITAL-M PO)  Self Yes No   Sig: Take by mouth   OneTouch Delica Lancets 80K MISC  Self No No   Sig: Use to test blood sugar 4x daily. atorvastatin (LIPITOR) 20 mg tablet   No No   Sig: Take 1 tablet (20 mg total) by mouth daily   famotidine (PEPCID) 20 mg tablet   No No   Sig: Take 1 tablet (20 mg total) by mouth daily   glucose blood (OneTouch Verio) test strip  Self No No   Sig: Use to test blood sugar 4x daily in case of CGM failure.    insulin aspart protamine-insulin aspart (NovoLOG Mix 70/30) 100 units/mL injection   No No   Sig: Inject 14 Units under the skin 2 (two) times a day before meals   levothyroxine 75 mcg tablet   No No   Sig: Take 1 tablet (75 mcg total) by mouth daily   lisinopril (ZESTRIL) 20 mg tablet  Self No No   Sig: Take 1 tablet (20 mg total) by mouth daily   mometasone (ELOCON) 0.1 % cream  Self No No   Sig: Apply topically daily Apply topically BID x 2 weeks then qhs prn Facility-Administered Medications: None       Past Medical History:   Diagnosis Date   • Anxiety    • Colon polyp    • Depression    • Diabetes mellitus ([address])    • Diverticulosis    • Hyperlipidemia    • Hypertension        Past Surgical History:   Procedure Laterality Date   • APPENDECTOMY     • CATARACT EXTRACTION Right    • COLONOSCOPY      difficult exam   • HYSTERECTOMY     • TONSILLECTOMY     • WISDOM TOOTH EXTRACTION         Family History   Problem Relation Age of Onset   • Parkinsonism Mother    • Alzheimer's disease Father    • Breast cancer Sister         in her 42's   • Lung cancer Sister    • Breast cancer Sister    • No Known Problems Daughter    • No Known Problems Daughter    • No Known Problems Maternal Grandmother    • No Known Problems Maternal Grandfather    • No Known Problems Paternal Grandmother    • No Known Problems Paternal Grandfather    • No Known Problems Maternal Aunt    • No Known Problems Maternal Aunt    • No Known Problems Maternal Aunt    • No Known Problems Paternal Aunt      I have reviewed and agree with the history as documented. E-Cigarette/Vaping   • E-Cigarette Use Never User      E-Cigarette/Vaping Substances   • Nicotine No    • THC No    • CBD No    • Flavoring No    • Other No    • Unknown No      Social History     Tobacco Use   • Smoking status: Never   • Smokeless tobacco: Never   Vaping Use   • Vaping Use: Never used   Substance Use Topics   • Alcohol use: Not Currently     Comment: rare   • Drug use: Never       Review of Systems   Constitutional: Negative for chills and fever. HENT: Negative for rhinorrhea and sore throat. Eyes: Negative for visual disturbance. Respiratory: Negative for cough and shortness of breath. Cardiovascular: Negative for chest pain and leg swelling. Gastrointestinal: Negative for abdominal pain, diarrhea, nausea and vomiting. Genitourinary: Negative for dysuria. Musculoskeletal: Negative for back pain and myalgias. Skin: Positive for rash. Neurological: Negative for dizziness and headaches. Psychiatric/Behavioral: Negative for confusion. All other systems reviewed and are negative. Physical Exam  Physical Exam  Vitals and nursing note reviewed. Constitutional:       Appearance: Normal appearance. HENT:      Head: Normocephalic and atraumatic. Nose: Nose normal.   Eyes:      Extraocular Movements: Extraocular movements intact. Conjunctiva/sclera: Conjunctivae normal.      Pupils: Pupils are equal, round, and reactive to light. Cardiovascular:      Rate and Rhythm: Normal rate and regular rhythm. Pulmonary:      Effort: Pulmonary effort is normal.      Breath sounds: Normal breath sounds. Abdominal:      General: Bowel sounds are normal.      Palpations: Abdomen is soft. Musculoskeletal:         General: Normal range of motion. Cervical back: Normal range of motion and neck supple. Skin:     Findings: Rash present. Rash is urticarial (lower back: multiple hives  no other rash noted ). Neurological:      Mental Status: She is alert.          Vital Signs  ED Triage Vitals [08/19/23 1532]   Temperature Pulse Respirations Blood Pressure SpO2   97.8 °F (36.6 °C) 69 18 169/78 94 %      Temp Source Heart Rate Source Patient Position - Orthostatic VS BP Location FiO2 (%)   Temporal Monitor Sitting Left arm --      Pain Score       --           Vitals:    08/19/23 1532   BP: 169/78   Pulse: 69   Patient Position - Orthostatic VS: Sitting         Visual Acuity      ED Medications  Medications   hydrOXYzine HCL (ATARAX) tablet 25 mg (has no administration in time range)   hydrocortisone 2.5 % cream (has no administration in time range)       Diagnostic Studies  Results Reviewed     None                 No orders to display              Procedures  Procedures         ED Course                               SBIRT 20yo+    Flowsheet Row Most Recent Value   Initial Alcohol Screen: US AUDIT-C     1. How often do you have a drink containing alcohol? 0 Filed at: 08/19/2023 1534   2. How many drinks containing alcohol do you have on a typical day you are drinking? 0 Filed at: 08/19/2023 1534   3a. Male UNDER 65: How often do you have five or more drinks on one occasion? 0 Filed at: 08/19/2023 1534   3b. FEMALE Any Age, or MALE 65+: How often do you have 4 or more drinks on one occassion? 0 Filed at: 08/19/2023 1534   Audit-C Score 0 Filed at: 08/19/2023 1534   TAHMINA: How many times in the past year have you. .. Used an illegal drug or used a prescription medication for non-medical reasons? Never Filed at: 08/19/2023 1534                    Medical Decision Making  78-year-old female presented with the complaint of hives on her back. States that she was in her garage is sitting or when felt something bit her and started with itching and had multiple hives. Denies any difficulty breathing. History is taken from patient. Patient is stable awake alert oriented times uses 15. Ambulatory in the ED. This time in my opinion patient does not require any labs or imaging at this time we will treat her with Atarax since she is allergic to Benadryl for hives and itching we will also apply hydrocortisone which she states has been working patient is refusing to take her prednisone states that it has caused accelerated heart rate. Patient is stable for discharge. Strict precaution regarding return if symptoms does not improve or worsen. Patient verbalized understand the plan discharge home. Risk  Prescription drug management.           Disposition  Final diagnoses:   Urticaria     Time reflects when diagnosis was documented in both MDM as applicable and the Disposition within this note     Time User Action Codes Description Comment    8/19/2023  4:11 PM Nataliya Murillo Add [L50.9] Urticaria       ED Disposition     ED Disposition   Discharge    Condition   Stable    Date/Time   Sat Aug 19, 2023  4:11 PM    Comment [id number] discharge to home/self care. Follow-up Information     Follow up With Specialties Details Why Contact Info    Gennaro Kaur MD Internal Medicine Schedule an appointment as soon as possible for a visit in 2 days If symptoms worsen [address] [facility]  [phone]            Patient's Medications   Discharge Prescriptions    HYDROXYZINE HCL (ATARAX) 25 MG TABLET    Take 1 tablet (25 mg total) by mouth every 6 (six) hours       Start Date: 8/19/2023 End Date: --       Order Dose: 25 mg       Quantity: 12 tablet    Refills: 0       No discharge procedures on file.     PDMP Review     None          ED Provider  Electronically Signed by           Belgica Rodriguez MD  08/19/23 [id number]

## 2023-08-24 NOTE — PROGRESS NOTES
Established Patient Progress Note      Chief Complaint   Patient presents with   • Diabetes Type 1     Using yaritza to manage bg, reports levels have been higher then normal        Impression & Plan:    Problem List Items Addressed This Visit        Endocrine    Hypothyroidism     Thyroid function is stable. Continue 75 mcg of levothyroxine daily. Repeat thyroid function test in 6 months. Diabetes mellitus ([address]) - Primary     Patient is having more frequent episodes of hyperglycemia with BG>250 mg/dL. Increase Novolog 70/30 to 15 units twice daily. Aim to inject approximately 12 hours apart. Inject no longer than 30 minutes prior to eating. Resume healthier, more consistent diet. Continue with regular physical activity. Patient knows to notify me with persistent hyperglycemia or episodes of hypoglycemia. F/U in 3 months. Lab Results   Component Value Date    HGBA1C 7.0 (H) 06/06/2023            Relevant Medications    insulin aspart protamine-insulin aspart (NovoLOG Mix 70/30) 100 units/mL injection    Other Relevant Orders    Hemoglobin U0V    Basic metabolic panel    Hypoglycemia unawareness associated with type 1 diabetes mellitus ([address])     Patient always carries crackers and regular soda with her in case of hypoglycemia. Continue to use CGM. If CGM fails, be sure to check blood sugar with f.s. at least 4x daily. Patient has a life alert as well as a diabetes ID bracelet. Lab Results   Component Value Date    HGBA1C 7.0 (H) 06/06/2023            Relevant Medications    insulin aspart protamine-insulin aspart (NovoLOG Mix 70/30) 100 units/mL injection       Cardiovascular and Mediastinum    Hypertension     128/68. Continue current regimen. Musculoskeletal and Integument    Age-related osteoporosis without current pathological fracture     Patient declines treatment for osteoporosis. Reviewed risk of osteoporotic fracture.   She would prefer to continue with vitamin D and calcium supplements. Denies any recent falls or fractures. Genitourinary    Stage 3a chronic kidney disease [facility])     Lab Results   Component Value Date    EGFR 49 08/14/2023    EGFR 45 06/06/2023    EGFR 51 03/07/2023    CREATININE 1.06 08/14/2023    CREATININE 1.14 06/06/2023    CREATININE 1.03 03/07/2023   Continue to focus on tighter glycemic control to avoid further kidney injury. BP stable. Check BMP prior to next appointment. Avoid nephrotoxic agents. Other    Hyperlipidemia     Well-controlled. Continue 20 mg of atorvastatin nightly. Orders Placed This Encounter   Procedures   • Hemoglobin A1C     Standing Status:   Future     Standing Expiration Date:   8/25/2024   • Basic metabolic panel     This is a patient instruction: Patient fasting for 8 hours or longer recommended. Standing Status:   Future     Standing Expiration Date:   8/25/2024       History of Present Illness:   Niyah Benites is a 78 y.o. female with HTN, HLD, hypothyroidism, osteoporosis, and type 1 diabetes with long term use of insulin since 2006. Reports complications of CKD stage 3a and hypoglycemia unawareness. Denies recent illness or hospitalizations. Denies recent severe hypoglycemic or severe hyperglycemic episodes. Denies any issues with her current regimen. home glucose monitoring: are performed regularly with a CGM.         She was recently evaluated in the emergency department on 8/19/2023 for urticaria. At patient's last appointment on 4/25/2023, no adjustments were made to patient's regimen as she was well controlled. Hemoglobin A1C   Latest Ref Rng Normal 3.8-5.6%; PreDiabetic 5.7-6.4%;  Diabetic >=6.5%; Glycemic control for adults with diabetes <7.0% %   3/7/2023 6.8 (H)    6/6/2023 7.0 (H)       eAG, EST AVG Glucose   Latest Ref Rng mg/dl   3/7/2023 148    6/6/2023 154       Legend:  (H) High      Current regimen:   NovoLog 70/30 14 units twice daily    Niyah Benites   Device used Freestyle yaritza 2  Home use     Indication   Type 1 Diabetes    More than 72 hours of data was reviewed. Report to be scanned to chart. Date Range: August 12, 2023-August 25, 2023    Analysis of data:   Average Glucose: 216 mg/dL  Coefficient of Variation: 35.3%  Glucose management indicator: 8.5%  Time in Target Range: 34%  Time Above Range: 33% 181 to 250 mg/dL; 33% greater than 250 mg/dL  Time Below Range: 0%    Interpretation of data: Patient's control is worsened significantly. At her last appointment, she was 60% of time in range. Did recently return from vacation. She attributes her hyperglycemia to this. Only one episodes of hypoglycemia noted which occurred after patient injected her insulin and waited close to an hour to eat. She reports feeling well despite the hyperglycemia. Denies polydipsia and polyuria. Last Eye Exam: UTD  Last Foot Exam: UTD    For hypothyroidism, she is taking 75 mcg of levothyroxine daily. She reports taking this consistently and correctly. TSH 3RD GENERATON   Latest Ref Rng 0.450 - 4.500 uIU/mL   3/7/2023 2.740    8/14/2023 1.632            For hypertension, she is taking 20 mg of lisinopril daily. She denies headache and cough. For hyperlipidemia, she is taking 20 mg of atorvastatin nightly. She denies myalgias. For osteoporosis, it has been recommended that the patient start Fosamax. However, patient had several reservations about starting any osteoporosis medications. She is currently supplementing her calcium and vitamin D.   Denies any recent falls or fractures  Patient Active Problem List   Diagnosis   • Generalized anxiety disorder   • Stage 3a chronic kidney disease ([address])   • Hypothyroidism   • Hypertension   • 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 ear   • Diabetes mellitus ([address])   • Hyperlipidemia   • Retinal disorder   • Constipation   • Age-related osteoporosis without current pathological fracture   • Hypoglycemia unawareness associated with type 1 diabetes mellitus ([address])      Past Medical History:   Diagnosis Date   • Anxiety    • Colon polyp    • Depression    • Diabetes mellitus ([address])    • Diverticulosis    • Hyperlipidemia    • Hypertension       Past Surgical History:   Procedure Laterality Date   • APPENDECTOMY     • CATARACT EXTRACTION Right    • COLONOSCOPY      difficult exam   • HYSTERECTOMY     • TONSILLECTOMY     • WISDOM TOOTH EXTRACTION        Family History   Problem Relation Age of Onset   • Parkinsonism Mother    • Alzheimer's disease Father    • Breast cancer Sister         in her 42's   • Lung cancer Sister    • Breast cancer Sister    • No Known Problems Daughter    • No Known Problems Daughter    • No Known Problems Maternal Grandmother    • No Known Problems Maternal Grandfather    • No Known Problems Paternal Grandmother    • No Known Problems Paternal Grandfather    • No Known Problems Maternal Aunt    • No Known Problems Maternal Aunt    • No Known Problems Maternal Aunt    • No Known Problems Paternal Aunt      Social History     Tobacco Use   • Smoking status: Never   • Smokeless tobacco: Never   Substance Use Topics   • Alcohol use: Not Currently     Comment: rare     Allergies   Allergen Reactions   • Diphenhydramine Shortness Of Breath   • Meperidine Hives and Shortness Of Breath   • Prednisone Hives and Shortness Of Breath   • Bee Pollen    • Ciprofloxacin Hives   • Sulfamethoxazole-Trimethoprim GI Intolerance         Current Outpatient Medications:   •  Alcohol Swabs ( Sterile Alcohol Prep) PADS, , Disp: , Rfl:   •  Ascorbic Acid (VITAMIN C PO), Take by mouth, Disp: , Rfl:   •  ASPIRIN 81 PO, Take 81 mg by mouth daily, Disp: , Rfl:   •  atorvastatin (LIPITOR) 20 mg tablet, Take 1 tablet (20 mg total) by mouth daily, Disp: 90 tablet, Rfl: 0  •  Blood Glucose Monitoring Suppl (OneTouch Verio) w/Device KIT, Use to test blood sugar 4x daily. , Disp: 1 kit, Rfl: 0  •  Cholecalciferol 2000 units CAPS, Take 1 capsule by mouth, Disp: , Rfl:   • Continuous Blood Gluc Sensor (FreeStyle Kaylah 2 Sensor) MISC, Use, Disp: , Rfl:   •  famotidine (PEPCID) 20 mg tablet, Take 1 tablet (20 mg total) by mouth daily, Disp: 90 tablet, Rfl: 3  •  fluconazole (DIFLUCAN) 100 mg tablet, Take 1 tablet by mouth 2 (two) times a day, Disp: , Rfl:   •  glucose blood (OneTouch Verio) test strip, Use to test blood sugar 4x daily in case of CGM failure., Disp: 100 each, Rfl: 2  •  hydrOXYzine HCL (ATARAX) 25 mg tablet, Take 1 tablet (25 mg total) by mouth every 6 (six) hours, Disp: 12 tablet, Rfl: 0  •  insulin aspart protamine-insulin aspart (NovoLOG Mix 70/30) 100 units/mL injection, Inject 15 Units under the skin 2 (two) times a day before meals, Disp: 30 mL, Rfl: 3  •  levothyroxine 75 mcg tablet, Take 1 tablet (75 mcg total) by mouth daily, Disp: 90 tablet, Rfl: 3  •  lisinopril (ZESTRIL) 20 mg tablet, Take 1 tablet (20 mg total) by mouth daily, Disp: 90 tablet, Rfl: 1  •  melatonin 3 mg, take 1 tablet by mouth daily at bedtime for 14 days, Disp: , Rfl:   •  meloxicam (MOBIC) 7.5 mg tablet, Take 2 tablets by mouth daily, Disp: , Rfl:   •  mometasone (ELOCON) 0.1 % cream, Apply topically daily Apply topically BID x 2 weeks then qhs prn, Disp: 45 g, Rfl: 3  •  Multiple Vitamins-Minerals (MULTIVITAL-M PO), Take by mouth, Disp: , Rfl:   •  OneTouch Delica Lancets 27V MISC, Use to test blood sugar 4x daily. , Disp: 300 each, Rfl: 2  •  Sure Comfort Insulin Syringe 30G X 1/2" 0.3 ML MISC, , Disp: , Rfl:   •  EPINEPHrine (EPIPEN) 0.3 mg/0.3 mL SOAJ, Inject 0.3 mL (0.3 mg total) into a muscle once for 1 dose, Disp: 0.6 mL, Rfl: 0    Review of Systems   Constitutional: Negative for activity change, appetite change, fatigue and unexpected weight change. HENT: Negative for dental problem, sore throat, trouble swallowing and voice change. Eyes: Negative for visual disturbance. Respiratory: Negative for cough, chest tightness and shortness of breath.     Cardiovascular: Negative for chest pain, palpitations and leg swelling. Gastrointestinal: Positive for constipation. Negative for diarrhea, nausea and vomiting. Endocrine: Negative for cold intolerance, heat intolerance, polydipsia, polyphagia and polyuria. Genitourinary: Negative for frequency. Musculoskeletal: Positive for arthralgias and back pain. Negative for gait problem and myalgias. Skin: Negative for wound. Allergic/Immunologic: Positive for environmental allergies. Negative for food allergies. Neurological: Positive for numbness. Negative for dizziness, weakness, light-headedness and headaches. Psychiatric/Behavioral: Negative for decreased concentration, dysphoric mood and sleep disturbance. The patient is nervous/anxious. Physical Exam:  Body mass index is 23.24 kg/m². /68   Pulse 68   Temp 98.6 °F (37 °C)   Resp 18   Ht 5' 1" (1.549 m)   Wt 55.8 kg (123 lb)   SpO2 98%   BMI 23.24 kg/m²    Wt Readings from Last 3 Encounters:   08/25/23 55.8 kg (123 lb)   08/19/23 55.3 kg (122 lb)   08/14/23 55.3 kg (122 lb)       Physical Exam  Vitals reviewed. Constitutional:       General: She is not in acute distress. Appearance: She is well-developed. She is not ill-appearing. HENT:      Head: Normocephalic and atraumatic. Eyes:      Pupils: Pupils are equal, round, and reactive to light. Neck:      Thyroid: No thyromegaly. Cardiovascular:      Rate and Rhythm: Normal rate and regular rhythm. Pulses: Normal pulses. Heart sounds: Normal heart sounds. Pulmonary:      Effort: Pulmonary effort is normal.      Breath sounds: Normal breath sounds. Abdominal:      General: Bowel sounds are normal. There is no distension. Palpations: Abdomen is soft. Tenderness: There is no abdominal tenderness. Musculoskeletal:      Cervical back: Normal range of motion and neck supple. Right lower leg: No edema. Left lower leg: No edema.    Lymphadenopathy:      Cervical: No cervical adenopathy. Skin:     General: Skin is warm and dry. Capillary Refill: Capillary refill takes less than 2 seconds. Neurological:      Mental Status: She is alert and oriented to person, place, and time. Gait: Gait normal.   Psychiatric:         Mood and Affect: Mood normal.         Behavior: Behavior normal.           Labs:   Lab Results   Component Value Date    HGBA1C 7.0 (H) 06/06/2023    HGBA1C 6.8 (H) 03/07/2023    HGBA1C 8.1 (A) 12/16/2022     Lab Results   Component Value Date    CREATININE 1.06 08/14/2023    CREATININE 1.14 06/06/2023    CREATININE 1.03 03/07/2023    BUN 23 08/14/2023    K 4.0 08/14/2023     (H) 08/14/2023    CO2 27 08/14/2023     eGFR   Date Value Ref Range Status   08/14/2023 49 ml/min/1.73sq m Final     Lab Results   Component Value Date    HDL 72 08/14/2023    TRIG 124 08/14/2023     Lab Results   Component Value Date    ALT 15 08/14/2023    AST 13 08/14/2023    ALKPHOS 86 08/14/2023     Lab Results   Component Value Date    AET7IKLTVDSX 1.632 08/14/2023    GLM8ZIYGZLZK 2.740 03/07/2023    IFJ2TSPDHLLG 3.460 08/01/2022     Lab Results   Component Value Date    FREET4 1.21 03/07/2023             Discussed with the patient and all questioned fully answered. She will call me if any problems arise. Follow-up appointment in 3 months. Counseled patient on diagnostic results, prognosis, risk and benefit of treatment options, instruction for management, importance of treatment compliance, Risk  factor reduction and impressions    Patient Instructions   1. Increase insulin to 15 units twice daily. In the next 10 days or so, please check with us and let me know if you are still seeing a lot of sugars over 250 or if you are having blood sugars less than 70 mg/dL.         [address] [place] [address],[address]

## 2023-08-25 ENCOUNTER — OFFICE VISIT (OUTPATIENT)
Dept: ENDOCRINOLOGY | Facility: CLINIC | Age: 78
End: 2023-08-25
Payer: MEDICARE

## 2023-08-25 VITALS
DIASTOLIC BLOOD PRESSURE: 68 MMHG | RESPIRATION RATE: 18 BRPM | SYSTOLIC BLOOD PRESSURE: 128 MMHG | HEART RATE: 68 BPM | HEIGHT: 61 IN | TEMPERATURE: 98.6 F | OXYGEN SATURATION: 98 % | WEIGHT: 123 LBS | BODY MASS INDEX: 23.22 KG/M2

## 2023-08-25 DIAGNOSIS — M81.0 AGE-RELATED OSTEOPOROSIS WITHOUT CURRENT PATHOLOGICAL FRACTURE: ICD-10-CM

## 2023-08-25 DIAGNOSIS — E78.2 MIXED HYPERLIPIDEMIA: ICD-10-CM

## 2023-08-25 DIAGNOSIS — Z79.4 TYPE 2 DIABETES MELLITUS WITH STAGE 3A CHRONIC KIDNEY DISEASE, WITH LONG-TERM CURRENT USE OF INSULIN (HCC): ICD-10-CM

## 2023-08-25 DIAGNOSIS — E10.22 TYPE 1 DIABETES MELLITUS WITH STAGE 3A CHRONIC KIDNEY DISEASE (HCC): Primary | ICD-10-CM

## 2023-08-25 DIAGNOSIS — E03.9 HYPOTHYROIDISM, UNSPECIFIED TYPE: ICD-10-CM

## 2023-08-25 DIAGNOSIS — N18.31 TYPE 2 DIABETES MELLITUS WITH STAGE 3A CHRONIC KIDNEY DISEASE, WITH LONG-TERM CURRENT USE OF INSULIN (HCC): ICD-10-CM

## 2023-08-25 DIAGNOSIS — N18.31 STAGE 3A CHRONIC KIDNEY DISEASE (HCC): ICD-10-CM

## 2023-08-25 DIAGNOSIS — I10 PRIMARY HYPERTENSION: ICD-10-CM

## 2023-08-25 DIAGNOSIS — N18.31 TYPE 1 DIABETES MELLITUS WITH STAGE 3A CHRONIC KIDNEY DISEASE (HCC): Primary | ICD-10-CM

## 2023-08-25 DIAGNOSIS — E11.22 TYPE 2 DIABETES MELLITUS WITH STAGE 3A CHRONIC KIDNEY DISEASE, WITH LONG-TERM CURRENT USE OF INSULIN (HCC): ICD-10-CM

## 2023-08-25 DIAGNOSIS — E10.649 HYPOGLYCEMIA UNAWARENESS ASSOCIATED WITH TYPE 1 DIABETES MELLITUS (HCC): ICD-10-CM

## 2023-08-25 PROCEDURE — 99214 OFFICE O/P EST MOD 30 MIN: CPT | Performed by: NURSE PRACTITIONER

## 2023-08-25 PROCEDURE — 95251 CONT GLUC MNTR ANALYSIS I&R: CPT | Performed by: NURSE PRACTITIONER

## 2023-08-25 RX ORDER — ISOPROPYL ALCOHOL 0.7 ML/1
SWAB TOPICAL
COMMUNITY
Start: 2023-07-11

## 2023-08-25 RX ORDER — MELOXICAM 7.5 MG/1
2 TABLET ORAL DAILY
COMMUNITY

## 2023-08-25 RX ORDER — LANOLIN ALCOHOL/MO/W.PET/CERES
CREAM (GRAM) TOPICAL
COMMUNITY

## 2023-08-25 RX ORDER — INSULIN ASPART 100 [IU]/ML
15 INJECTION, SUSPENSION SUBCUTANEOUS
Qty: 30 ML | Refills: 3 | Status: SHIPPED | OUTPATIENT
Start: 2023-08-25

## 2023-08-25 RX ORDER — DEXAMETHASONE SODIUM PHOSPHATE 4 MG/ML
INJECTION, SOLUTION INTRAMUSCULAR; INTRAVENOUS
COMMUNITY
Start: 2023-07-11

## 2023-08-25 RX ORDER — FLUCONAZOLE 100 MG/1
1 TABLET ORAL 2 TIMES DAILY
COMMUNITY

## 2023-08-25 NOTE — ASSESSMENT & PLAN NOTE
Patient declines treatment for osteoporosis. Reviewed risk of osteoporotic fracture. She would prefer to continue with vitamin D and calcium supplements. Denies any recent falls or fractures.

## 2023-08-25 NOTE — ASSESSMENT & PLAN NOTE
Patient always carries crackers and regular soda with her in case of hypoglycemia. Continue to use CGM. If CGM fails, be sure to check blood sugar with f.s. at least 4x daily. Patient has a life alert as well as a diabetes ID bracelet.    Lab Results   Component Value Date    HGBA1C 7.0 (H) 06/06/2023

## 2023-08-25 NOTE — PATIENT INSTRUCTIONS
Increase insulin to 15 units twice daily. In the next 10 days or so, please check with us and let me know if you are still seeing a lot of sugars over 250 or if you are having blood sugars less than 70 mg/dL.

## 2023-08-25 NOTE — ASSESSMENT & PLAN NOTE
Thyroid function is stable. Continue 75 mcg of levothyroxine daily. Repeat thyroid function test in 6 months.

## 2023-08-25 NOTE — ASSESSMENT & PLAN NOTE
Lab Results   Component Value Date    EGFR 49 08/14/2023    EGFR 45 06/06/2023    EGFR 51 03/07/2023    CREATININE 1.06 08/14/2023    CREATININE 1.14 06/06/2023    CREATININE 1.03 03/07/2023   Continue to focus on tighter glycemic control to avoid further kidney injury. BP stable. Check BMP prior to next appointment. Avoid nephrotoxic agents.

## 2023-08-25 NOTE — ASSESSMENT & PLAN NOTE
Patient is having more frequent episodes of hyperglycemia with BG>250 mg/dL. Increase Novolog 70/30 to 15 units twice daily. Aim to inject approximately 12 hours apart. Inject no longer than 30 minutes prior to eating. Resume healthier, more consistent diet. Continue with regular physical activity. Patient knows to notify me with persistent hyperglycemia or episodes of hypoglycemia. F/U in 3 months.   Lab Results   Component Value Date    HGBA1C 7.0 (H) 06/06/2023

## 2023-09-30 DIAGNOSIS — E78.2 MIXED HYPERLIPIDEMIA: ICD-10-CM

## 2023-09-30 RX ORDER — ATORVASTATIN CALCIUM 20 MG/1
20 TABLET, FILM COATED ORAL DAILY
Qty: 90 TABLET | Refills: 0 | Status: SHIPPED | OUTPATIENT
Start: 2023-09-30

## 2023-10-02 DIAGNOSIS — I10 PRIMARY HYPERTENSION: ICD-10-CM

## 2023-10-02 RX ORDER — LISINOPRIL 20 MG/1
20 TABLET ORAL DAILY
Qty: 90 TABLET | Refills: 1 | Status: SHIPPED | OUTPATIENT
Start: 2023-10-02

## 2023-10-02 NOTE — TELEPHONE ENCOUNTER
Patient requesting refill(s) of: lisinopril     Last filled: 4/12/2023  Last appt: 8/14/2023  Next appt:2/19/2024  Pharmacy:    [address]

## 2023-11-02 ENCOUNTER — HOSPITAL ENCOUNTER (OUTPATIENT)
Dept: MAMMOGRAPHY | Facility: HOSPITAL | Age: 78
End: 2023-11-02
Attending: INTERNAL MEDICINE
Payer: MEDICARE

## 2023-11-02 ENCOUNTER — HOSPITAL ENCOUNTER (OUTPATIENT)
Dept: BONE DENSITY | Facility: HOSPITAL | Age: 78
End: 2023-11-02
Attending: INTERNAL MEDICINE
Payer: MEDICARE

## 2023-11-02 VITALS — BODY MASS INDEX: 22.92 KG/M2 | HEIGHT: 62 IN | WEIGHT: 124.56 LBS

## 2023-11-02 DIAGNOSIS — M81.0 AGE-RELATED OSTEOPOROSIS WITHOUT CURRENT PATHOLOGICAL FRACTURE: ICD-10-CM

## 2023-11-02 DIAGNOSIS — Z12.31 ENCOUNTER FOR SCREENING MAMMOGRAM FOR BREAST CANCER: ICD-10-CM

## 2023-11-02 PROCEDURE — 77063 BREAST TOMOSYNTHESIS BI: CPT

## 2023-11-02 PROCEDURE — 77080 DXA BONE DENSITY AXIAL: CPT

## 2023-11-02 PROCEDURE — 77067 SCR MAMMO BI INCL CAD: CPT

## 2023-11-16 ENCOUNTER — APPOINTMENT (OUTPATIENT)
Dept: LAB | Facility: CLINIC | Age: 78
End: 2023-11-16
Payer: MEDICARE

## 2023-11-16 DIAGNOSIS — N18.31 TYPE 1 DIABETES MELLITUS WITH STAGE 3A CHRONIC KIDNEY DISEASE (HCC): ICD-10-CM

## 2023-11-16 DIAGNOSIS — E10.22 TYPE 1 DIABETES MELLITUS WITH STAGE 3A CHRONIC KIDNEY DISEASE (HCC): ICD-10-CM

## 2023-11-16 LAB
ANION GAP SERPL CALCULATED.3IONS-SCNC: 9 MMOL/L
BUN SERPL-MCNC: 19 MG/DL (ref 5–25)
CALCIUM SERPL-MCNC: 9.2 MG/DL (ref 8.4–10.2)
CHLORIDE SERPL-SCNC: 103 MMOL/L (ref 96–108)
CO2 SERPL-SCNC: 28 MMOL/L (ref 21–32)
CREAT SERPL-MCNC: 1 MG/DL (ref 0.6–1.3)
EST. AVERAGE GLUCOSE BLD GHB EST-MCNC: 163 MG/DL
GFR SERPL CREATININE-BSD FRML MDRD: 53 ML/MIN/1.73SQ M
GLUCOSE P FAST SERPL-MCNC: 246 MG/DL (ref 65–99)
HBA1C MFR BLD: 7.3 %
POTASSIUM SERPL-SCNC: 4.5 MMOL/L (ref 3.5–5.3)
SODIUM SERPL-SCNC: 140 MMOL/L (ref 135–147)

## 2023-11-16 PROCEDURE — 83036 HEMOGLOBIN GLYCOSYLATED A1C: CPT

## 2023-11-16 PROCEDURE — 36415 COLL VENOUS BLD VENIPUNCTURE: CPT

## 2023-11-16 PROCEDURE — 80048 BASIC METABOLIC PNL TOTAL CA: CPT

## 2023-12-06 NOTE — PROGRESS NOTES
Established Patient Progress Note      No chief complaint on file. Impression & Plan:    Problem List Items Addressed This Visit          Endocrine    Hypothyroidism     Check TSH with next labs. Continue 75 mcg of levothyroxine daily. Relevant Orders    TSH, 3rd generation with Free T4 reflex    Diabetes mellitus ([address]) - Primary     A1C is stable however patient is having frequent morning hypoglycemia between the hours of 9082-5842. Reduce evening novolog 70/30 to 13 units. Hyperglycemia is diet related. Encouraged patient to eat more consistently throughout the day and reduce carbohydrate intake. Continue with regular physical activity. Patient knows to notify me with persistent hyperglycemia or episodes of hypoglycemia. Reviewed appropriate hypoglycemia treatment. F/U in 4 months. Lab Results   Component Value Date    HGBA1C 7.3 (H) 11/16/2023          Relevant Medications    insulin aspart protamine-insulin aspart (NovoLOG Mix 70/30) 100 units/mL injection    Other Relevant Orders    Hemoglobin A1C    Albumin / creatinine urine ratio    Comprehensive metabolic panel    Hypoglycemia unawareness associated with type 1 diabetes mellitus ([address])     Continue to use CGM. Reviewed appropriate surveillance and treatment of hypoglycemia. Lab Results   Component Value Date    HGBA1C 7.3 (H) 11/16/2023          Relevant Medications    insulin aspart protamine-insulin aspart (NovoLOG Mix 70/30) 100 units/mL injection       Cardiovascular and Mediastinum    Hypertension     BP stable at 120/70. Continue current regimen. Musculoskeletal and Integument    Age-related osteoporosis without current pathological fracture     Counseled patient on basic pathophysiology of osteoporosis. Given lowest T-score of -3 in right femoral neck and -2.7 and of left femoral neck, recommend starting with 12-month course of Evenity.   Reviewed mechanism of action as well as potential side effects namely headache and increased arthralgias. Patient has not had any cardiovascular events-myocardial infarction or stroke-in the last 12 months. Will receive 2 injections once monthly for 12 months then transition to Prolia. Patient is in agreement with this plan. Continue calcium and vitamin D supplements. Reviewed fall precautions. Next DEXA scan due in 2025. Genitourinary    Stage 3a chronic kidney disease [facility])     Lab Results   Component Value Date    EGFR 53 11/16/2023    EGFR 49 08/14/2023    EGFR 45 06/06/2023    CREATININE 1.00 11/16/2023    CREATININE 1.06 08/14/2023    CREATININE 1.14 06/06/2023   Stable. Continue lisinopril. Continue to optimize glycemic control. Avoid nephrotoxic agents. Orders Placed This Encounter   Procedures    Hemoglobin A1C     Standing Status:   Future     Standing Expiration Date:   12/8/2024    Albumin / creatinine urine ratio     Standing Status:   Future     Standing Expiration Date:   12/8/2024    TSH, 3rd generation with Free T4 reflex     Standing Status:   Future     Standing Expiration Date:   12/8/2024    Comprehensive metabolic panel     This is a patient instruction: Patient fasting for 8 hours or longer recommended. Standing Status:   Future     Standing Expiration Date:   12/8/2024       History of Present Illness:   Winston Veronica is a 78 y.o. female with HTN, HLD, hypothyroidism, osteoporosis, and type 1 diabetes with long term use of insulin since 2006. Reports complications of CKD stage 3a and hypoglycemia unawareness. Denies recent illness or hospitalizations. Denies recent severe hypoglycemic or severe hyperglycemic episodes. Denies any issues with her current regimen. home glucose monitoring: are performed regularly with a CGM. Hemoglobin A1C   Latest Ref Rng Normal 4.0-5.6%; PreDiabetic 5.7-6.4%;  Diabetic >=6.5%; Glycemic control for adults with diabetes <7.0% %   6/6/2023 7.0 (H)    11/16/2023 7.3 (H)       eAG, EST AVG Glucose Latest Ref Rng mg/dl   6/6/2023 154    11/16/2023 163       Legend:  (H) High     eGFR   Latest Ref Rng ml/min/1.73sq m   8/14/2023 49    11/16/2023 53         Current regimen:   NovoLog 70/30 15 units twice daily    Johana Alcantara   Device used Freestyle yaritza 2  Home use     Indication   Type 2 Diabetes    More than 72 hours of data was reviewed. Report to be scanned to chart. Date Range: November 25, 2023-December 8, 2023    Analysis of data:   Average Glucose: 169 mg/dL  Coefficient of Variation: 44%  Glucose management indicator: 7.4%  Time in Target Range: 55%  Time Above Range: 24% 181 to 250 mg/dL; 15% greater than 250 mg/dL  Time Below Range: 4% 54 to 69 mg/dL; 2% less than 54 mg/dL    Interpretation of data: Patient continues to have wide fluctuations in blood sugars and more frequent episodes of hypoglycemia in the early morning hours. She admits to being less consistent with her meals as well as insulin administration timing. She has hypoglycemia unawareness so she relies on the CGM alerts to notify her when blood sugar is dropping. She treats her hypoglycemia appropriately but notes that she does not always remember to bring fast acting carbohydrates with her she when she is out of the house. She remains physically active with regular walking. Hyperglycemia is diet related. Last Eye Exam: UTD  Last Foot Exam: UTD    For hypothyroidism, she is taking 75 mcg of levothyroxine daily. She reports taking this consistently and correctly. Denies s/s of hypothyroidism. For hypertension, she is taking 20 mg of lisinopril daily. She denies headache and cough. For hyperlipidemia, she is taking 20 mg of atorvastatin nightly. She denies myalgias. Patient completed a DEXA scan on 11/2/2023. This demonstrated osteoporosis in the right total hip and left femoral neck. She is not currently taking any medication for her osteoporosis.     Narrative & Impression   CENTRAL  DXA SCAN CLINICAL HISTORY: 78 years postmenopausal female. OTHER RISK FACTORS: Parental history of hip fracture status post minor injury, diabetes requiring insulin. PHARMACOLOGIC THERAPY FOR OSTEOPOROSIS:  None. TECHNIQUE: Bone densitometry was performed using a GE Lunar Prodigy bone densitometer. Regions of interest appear properly placed. COMPARISON: 9/7/2021. RESULTS:     LUMBAR SPINE  Level: L1, L4   (L2, L3 vertebrae excluded from analysis due to local structural abnormalities or artifact):  BMD: 1.057 gm/cm2  T-score: -1.0  These values may be artifactually elevated due to degenerative sclerosis and osteophytosis. LEFT TOTAL HIP:  BMD: 0.717 gm/cm2  T-score: -2.3     LEFT FEMORAL NECK:  BMD: 0.656 gm/cm2  T score: -2.7     RIGHT TOTAL HIP:  BMD: 0.693 gm/cm2  T-score: -2.5     RIGHT FEMORAL NECK:  BMD: 0.617 gm/cm2  T score: -3.0        IMPRESSION:     1. Osteoporosis. 2.  Since a DXA study from 9/7/2021, there has been:  A  STATISTICALLY SIGNIFICANT INCREASE in bone mineral density of 0.052 g/cm2 (5.2%) in the lumbar spine. This may be artifactual, in part, due to progression of spondylosis since the last DXA study. A  STATISTICALLY SIGNIFICANT DECREASE in bone mineral density of 0.058 g/cm2 (7.6%) in the hips. 3.  The 10 year risk of hip fracture is 36.0% with the 10 year risk of major osteoporotic fracture being 46.1% as calculated by the [facility] fracture risk assessment tool (FRAX, which is based on data generated by the [facility] for Metabolic Bone Diseases). 4.  The current NOF guidelines recommend treating patients with a T-score of -2.5 or less in the lumbar spine or hips, or in post-menopausal women and men over the age of 48 with low bone mass (osteopenia) and a FRAX 10 year risk score of >3% for hip   fracture and/or >20% for major osteoporotic fracture.      HPI:    Onset (age at diagnosis): 2012; age 69; patient reports that she was informed she had osteoporosis at that time but no intervention was recommended. Mother + osteoporisis  Denies FH of hip fracture  Denies history of fragility fracturesHardware in hip or spine/spinal surgery (kyphoplasty/vertebroplasty): -  Severe DJD: -  History of HRT use: -      Medications used in past: None        Risk Factors:  Early menopause: -  Family history of osteoporosis: +  Rheumatoid arthritis: -  Glucocorticoid use: -  Smoking: -  Alcohol: rare     Patient is supplementing her Vit D and calcium with a MV. She receives dietary ca+ through dairy (milk, yogurt, cheese) and eats green leafy vegetables daily. She engages in weight bearing activity in the form of walking. Denies any recent falls or fractures.   Patient Active Problem List   Diagnosis    Generalized anxiety disorder    Stage 3a chronic kidney disease (HCC)    Hypothyroidism    Hypertension    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 ear    Diabetes mellitus ([address])    Hyperlipidemia    Retinal disorder    Constipation    Age-related osteoporosis without current pathological fracture    Hypoglycemia unawareness associated with type 1 diabetes mellitus ([address])      Past Medical History:   Diagnosis Date    Anxiety     Colon polyp     Depression     Diabetes mellitus ([address])     Diverticulosis     Hyperlipidemia     Hypertension       Past Surgical History:   Procedure Laterality Date    APPENDECTOMY      CATARACT EXTRACTION Right     COLONOSCOPY      difficult exam    HYSTERECTOMY      TONSILLECTOMY      WISDOM TOOTH EXTRACTION        Family History   Problem Relation Age of Onset    Parkinsonism Mother     Alzheimer's disease Father     Breast cancer Sister         in her 42's    Lung cancer Sister     Breast cancer Sister     No Known Problems Daughter     No Known Problems Daughter     No Known Problems Maternal Grandmother     No Known Problems Maternal Grandfather     No Known Problems Paternal Grandmother     No Known Problems Paternal Grandfather     No Known Problems Maternal Aunt     No Known Problems Maternal Aunt     No Known Problems Maternal Aunt     No Known Problems Paternal Aunt      Social History     Tobacco Use    Smoking status: Never    Smokeless tobacco: Never   Substance Use Topics    Alcohol use: Not Currently     Comment: rare     Allergies   Allergen Reactions    Diphenhydramine Shortness Of Breath    Meperidine Hives and Shortness Of Breath    Prednisone Hives and Shortness Of Breath    Bee Pollen     Ciprofloxacin Hives    Sulfamethoxazole-Trimethoprim GI Intolerance         Current Outpatient Medications:     Alcohol Swabs ( Sterile Alcohol Prep) PADS, , Disp: , Rfl:     Ascorbic Acid (VITAMIN C PO), Take by mouth, Disp: , Rfl:     ASPIRIN 81 PO, Take 81 mg by mouth daily, Disp: , Rfl:     atorvastatin (LIPITOR) 20 mg tablet, TAKE ONE TABLET BY MOUTH ONCE DAILY, Disp: 90 tablet, Rfl: 0    Blood Glucose Monitoring Suppl (OneTouch Verio) w/Device KIT, Use to test blood sugar 4x daily. , Disp: 1 kit, Rfl: 0    Cholecalciferol 2000 units CAPS, Take 1 capsule by mouth, Disp: , Rfl:     Continuous Blood Gluc Sensor (FreeStyle Kaylah 2 Sensor) MISC, Use, Disp: , Rfl:     famotidine (PEPCID) 20 mg tablet, Take 1 tablet (20 mg total) by mouth daily, Disp: 90 tablet, Rfl: 3    fluconazole (DIFLUCAN) 100 mg tablet, Take 1 tablet by mouth 2 (two) times a day, Disp: , Rfl:     glucose blood (OneTouch Verio) test strip, Use to test blood sugar 4x daily in case of CGM failure., Disp: 100 each, Rfl: 2    hydrOXYzine HCL (ATARAX) 25 mg tablet, Take 1 tablet (25 mg total) by mouth every 6 (six) hours, Disp: 12 tablet, Rfl: 0    insulin aspart protamine-insulin aspart (NovoLOG Mix 70/30) 100 units/mL injection, Inject 15 units in the morning and 13 units in the evening., Disp: 30 mL, Rfl: 3    levothyroxine 75 mcg tablet, Take 1 tablet (75 mcg total) by mouth daily, Disp: 90 tablet, Rfl: 3    lisinopril (ZESTRIL) 20 mg tablet, Take 1 tablet (20 mg total) by mouth daily, Disp: 90 tablet, Rfl: 1    meloxicam (MOBIC) 7.5 mg tablet, Take 2 tablets by mouth daily, Disp: , Rfl:     mometasone (ELOCON) 0.1 % cream, Apply topically daily Apply topically BID x 2 weeks then qhs prn, Disp: 45 g, Rfl: 3    Multiple Vitamins-Minerals (MULTIVITAL-M PO), Take by mouth, Disp: , Rfl:     OneTouch Delica Lancets 00W MISC, Use to test blood sugar 4x daily. , Disp: 300 each, Rfl: 2    Sure Comfort Insulin Syringe 30G X 1/2" 0.3 ML MISC, , Disp: , Rfl:     EPINEPHrine (EPIPEN) 0.3 mg/0.3 mL SOAJ, Inject 0.3 mL (0.3 mg total) into a muscle once for 1 dose, Disp: 0.6 mL, Rfl: 0    melatonin 3 mg, take 1 tablet by mouth daily at bedtime for 14 days (Patient not taking: Reported on 12/8/2023), Disp: , Rfl:     Review of Systems   Constitutional:  Negative for activity change, appetite change, fatigue and unexpected weight change. HENT:  Negative for dental problem, sore throat, trouble swallowing and voice change. Eyes:  Negative for visual disturbance. Respiratory:  Negative for cough, chest tightness and shortness of breath. Cardiovascular:  Negative for chest pain, palpitations and leg swelling. Gastrointestinal:  Negative for constipation, diarrhea, nausea and vomiting. Endocrine: Negative for cold intolerance, polydipsia, polyphagia and polyuria. Genitourinary:  Negative for frequency. Musculoskeletal:  Positive for arthralgias. Negative for back pain, gait problem and myalgias. Skin:  Negative for wound. Allergic/Immunologic: Positive for environmental allergies. Negative for food allergies. Neurological:  Positive for numbness. Negative for dizziness, weakness, light-headedness and headaches. Psychiatric/Behavioral:  Negative for decreased concentration, dysphoric mood and sleep disturbance. The patient is not nervous/anxious. Physical Exam:  Body mass index is 23.28 kg/m².   /70 (BP Location: Left arm, Patient Position: Sitting, Cuff Size: Standard) Pulse 70   Resp 18   Ht 5' 1.5" (1.562 m)   Wt 56.8 kg (125 lb 4 oz)   SpO2 99%   BMI 23.28 kg/m²    Wt Readings from Last 3 Encounters:   12/08/23 56.8 kg (125 lb 4 oz)   11/02/23 56.5 kg (124 lb 9 oz)   10/27/23 55.8 kg (123 lb)       Physical Exam  Vitals reviewed. Constitutional:       General: She is not in acute distress. Appearance: She is well-developed. She is not ill-appearing. HENT:      Head: Normocephalic and atraumatic. Eyes:      Pupils: Pupils are equal, round, and reactive to light. Neck:      Thyroid: No thyromegaly. Cardiovascular:      Rate and Rhythm: Normal rate and regular rhythm. Pulses: Normal pulses. Heart sounds: Normal heart sounds. Pulmonary:      Effort: Pulmonary effort is normal.      Breath sounds: Normal breath sounds. Abdominal:      General: Bowel sounds are normal. There is no distension. Palpations: Abdomen is soft. Tenderness: There is no abdominal tenderness. Musculoskeletal:      Cervical back: Normal range of motion and neck supple. Right lower leg: No edema. Left lower leg: No edema. Lymphadenopathy:      Cervical: No cervical adenopathy. Skin:     General: Skin is warm and dry. Capillary Refill: Capillary refill takes less than 2 seconds. Neurological:      Mental Status: She is alert and oriented to person, place, and time.       Gait: Gait normal.   Psychiatric:         Mood and Affect: Mood normal.         Behavior: Behavior normal.           Labs:   Lab Results   Component Value Date    HGBA1C 7.3 (H) 11/16/2023    HGBA1C 7.0 (H) 06/06/2023    HGBA1C 6.8 (H) 03/07/2023     Lab Results   Component Value Date    CREATININE 1.00 11/16/2023    CREATININE 1.06 08/14/2023    CREATININE 1.14 06/06/2023    BUN 19 11/16/2023    K 4.5 11/16/2023     11/16/2023    CO2 28 11/16/2023     eGFR   Date Value Ref Range Status   11/16/2023 53 ml/min/1.73sq m Final     Lab Results   Component Value Date    HDL 72 08/14/2023    TRIG 124 08/14/2023     Lab Results   Component Value Date    ALT 15 08/14/2023    AST 13 08/14/2023    ALKPHOS 86 08/14/2023     Lab Results   Component Value Date    OBD5PHYVZOJN 1.632 08/14/2023    QPE7RGWHQGUX 2.740 03/07/2023    GRY1YRBRCZZZ 3.460 08/01/2022     Lab Results   Component Value Date    FREET4 1.21 03/07/2023             Discussed with the patient and all questioned fully answered. She will call me if any problems arise. Follow-up appointment in 4 months. Counseled patient on diagnostic results, prognosis, risk and benefit of treatment options, instruction for management, importance of treatment compliance, Risk  factor reduction and impressions    Patient Instructions   Continue with 15 units of novolog in the morning and decrease to 13 units in the evening.     [address] [place] [address],[facility]. [id number]

## 2023-12-08 ENCOUNTER — OFFICE VISIT (OUTPATIENT)
Dept: ENDOCRINOLOGY | Facility: CLINIC | Age: 78
End: 2023-12-08
Payer: MEDICARE

## 2023-12-08 VITALS
DIASTOLIC BLOOD PRESSURE: 70 MMHG | RESPIRATION RATE: 18 BRPM | WEIGHT: 125.25 LBS | HEART RATE: 70 BPM | SYSTOLIC BLOOD PRESSURE: 120 MMHG | BODY MASS INDEX: 23.05 KG/M2 | HEIGHT: 62 IN | OXYGEN SATURATION: 99 %

## 2023-12-08 DIAGNOSIS — N18.31 TYPE 2 DIABETES MELLITUS WITH STAGE 3A CHRONIC KIDNEY DISEASE, WITH LONG-TERM CURRENT USE OF INSULIN (HCC): Primary | ICD-10-CM

## 2023-12-08 DIAGNOSIS — Z79.4 TYPE 2 DIABETES MELLITUS WITH STAGE 3A CHRONIC KIDNEY DISEASE, WITH LONG-TERM CURRENT USE OF INSULIN (HCC): Primary | ICD-10-CM

## 2023-12-08 DIAGNOSIS — N18.31 STAGE 3A CHRONIC KIDNEY DISEASE (HCC): ICD-10-CM

## 2023-12-08 DIAGNOSIS — E10.649 HYPOGLYCEMIA UNAWARENESS ASSOCIATED WITH TYPE 1 DIABETES MELLITUS (HCC): ICD-10-CM

## 2023-12-08 DIAGNOSIS — E03.9 HYPOTHYROIDISM, UNSPECIFIED TYPE: ICD-10-CM

## 2023-12-08 DIAGNOSIS — E11.22 TYPE 2 DIABETES MELLITUS WITH STAGE 3A CHRONIC KIDNEY DISEASE, WITH LONG-TERM CURRENT USE OF INSULIN (HCC): Primary | ICD-10-CM

## 2023-12-08 DIAGNOSIS — I10 PRIMARY HYPERTENSION: ICD-10-CM

## 2023-12-08 DIAGNOSIS — M81.0 AGE-RELATED OSTEOPOROSIS WITHOUT CURRENT PATHOLOGICAL FRACTURE: ICD-10-CM

## 2023-12-08 PROCEDURE — 95251 CONT GLUC MNTR ANALYSIS I&R: CPT | Performed by: NURSE PRACTITIONER

## 2023-12-08 PROCEDURE — 99214 OFFICE O/P EST MOD 30 MIN: CPT | Performed by: NURSE PRACTITIONER

## 2023-12-08 RX ORDER — INSULIN ASPART 100 [IU]/ML
INJECTION, SUSPENSION SUBCUTANEOUS
Qty: 30 ML | Refills: 3 | Status: SHIPPED | OUTPATIENT
Start: 2023-12-08

## 2023-12-08 NOTE — ASSESSMENT & PLAN NOTE
Lab Results   Component Value Date    EGFR 53 11/16/2023    EGFR 49 08/14/2023    EGFR 45 06/06/2023    CREATININE 1.00 11/16/2023    CREATININE 1.06 08/14/2023    CREATININE 1.14 06/06/2023   Stable. Continue lisinopril. Continue to optimize glycemic control. Avoid nephrotoxic agents.

## 2023-12-08 NOTE — ASSESSMENT & PLAN NOTE
Counseled patient on basic pathophysiology of osteoporosis. Given lowest T-score of -3 in right femoral neck and -2.7 and of left femoral neck, recommend starting with 12-month course of Evenity. Reviewed mechanism of action as well as potential side effects namely headache and increased arthralgias. Patient has not had any cardiovascular events-myocardial infarction or stroke-in the last 12 months. Will receive 2 injections once monthly for 12 months then transition to Prolia. Patient is in agreement with this plan. Continue calcium and vitamin D supplements. Reviewed fall precautions. Next DEXA scan due in 2025.

## 2023-12-08 NOTE — ASSESSMENT & PLAN NOTE
A1C is stable however patient is having frequent morning hypoglycemia between the hours of 4232-1757. Reduce evening novolog 70/30 to 13 units. Hyperglycemia is diet related. Encouraged patient to eat more consistently throughout the day and reduce carbohydrate intake. Continue with regular physical activity. Patient knows to notify me with persistent hyperglycemia or episodes of hypoglycemia. Reviewed appropriate hypoglycemia treatment. F/U in 4 months.   Lab Results   Component Value Date    HGBA1C 7.3 (H) 11/16/2023

## 2023-12-08 NOTE — ASSESSMENT & PLAN NOTE
Continue to use CGM. Reviewed appropriate surveillance and treatment of hypoglycemia.    Lab Results   Component Value Date    HGBA1C 7.3 (H) 11/16/2023

## 2023-12-26 DIAGNOSIS — E78.2 MIXED HYPERLIPIDEMIA: ICD-10-CM

## 2023-12-26 RX ORDER — ATORVASTATIN CALCIUM 20 MG/1
20 TABLET, FILM COATED ORAL DAILY
Qty: 90 TABLET | Refills: 0 | Status: SHIPPED | OUTPATIENT
Start: 2023-12-26

## 2024-02-01 DIAGNOSIS — E03.9 HYPOTHYROIDISM, UNSPECIFIED TYPE: ICD-10-CM

## 2024-02-01 NOTE — TELEPHONE ENCOUNTER
Pt need refill on her Levothyroxine 75 mcg taked 1 QD # 90 refill 3    And Famotidine 20 mg takes 1 QD # 90 refill 3         Pharmacy [facility]

## 2024-02-02 RX ORDER — LEVOTHYROXINE SODIUM 0.07 MG/1
75 TABLET ORAL DAILY
Qty: 90 TABLET | Refills: 3 | Status: SHIPPED | OUTPATIENT
Start: 2024-02-02

## 2024-02-11 ENCOUNTER — RA CDI HCC (OUTPATIENT)
Dept: OTHER | Facility: HOSPITAL | Age: 79
End: 2024-02-11

## 2024-02-11 NOTE — PROGRESS NOTES
I50.32, I13.0, E11.51.  HCC coding opportunities          Chart Reviewed number of suggestions sent to Provider: 3     Patients Insurance     Medicare Insurance: Medicare

## 2024-02-15 ENCOUNTER — TELEPHONE (OUTPATIENT)
Dept: ADMINISTRATIVE | Facility: OTHER | Age: 79
End: 2024-02-15

## 2024-02-15 NOTE — TELEPHONE ENCOUNTER
02/15/24 12:21 PM    Patient contacted (left message) to bring Advance Directive, POLST, or Living Will document to next scheduled pcp visit.    Thank you.  Nydia Russell  [facility]

## 2024-02-19 ENCOUNTER — OFFICE VISIT (OUTPATIENT)
Dept: FAMILY MEDICINE CLINIC | Facility: CLINIC | Age: 79
End: 2024-02-19
Payer: MEDICARE

## 2024-02-19 VITALS
TEMPERATURE: 97.8 F | RESPIRATION RATE: 18 BRPM | WEIGHT: 126 LBS | OXYGEN SATURATION: 99 % | HEART RATE: 74 BPM | BODY MASS INDEX: 23.19 KG/M2 | HEIGHT: 62 IN | SYSTOLIC BLOOD PRESSURE: 154 MMHG | DIASTOLIC BLOOD PRESSURE: 76 MMHG

## 2024-02-19 DIAGNOSIS — I10 PRIMARY HYPERTENSION: Primary | ICD-10-CM

## 2024-02-19 DIAGNOSIS — E03.9 HYPOTHYROIDISM, UNSPECIFIED TYPE: ICD-10-CM

## 2024-02-19 DIAGNOSIS — M81.0 AGE-RELATED OSTEOPOROSIS WITHOUT CURRENT PATHOLOGICAL FRACTURE: ICD-10-CM

## 2024-02-19 DIAGNOSIS — E10.22 TYPE 1 DIABETES MELLITUS WITH STAGE 3A CHRONIC KIDNEY DISEASE (HCC): ICD-10-CM

## 2024-02-19 DIAGNOSIS — N18.31 TYPE 1 DIABETES MELLITUS WITH STAGE 3A CHRONIC KIDNEY DISEASE (HCC): ICD-10-CM

## 2024-02-19 PROBLEM — E10.649 HYPOGLYCEMIA UNAWARENESS ASSOCIATED WITH TYPE 1 DIABETES MELLITUS (HCC): Status: RESOLVED | Noted: 2022-05-17 | Resolved: 2024-02-19

## 2024-02-19 PROCEDURE — 99213 OFFICE O/P EST LOW 20 MIN: CPT | Performed by: INTERNAL MEDICINE

## 2024-02-19 NOTE — PROGRESS NOTES
[facility]        NAME: Johana Alcantara is a 78 y.o. female  : 1945    MRN: [id number]  DATE: 2024  TIME: 8:43 AM    Assessment and Plan   1. Primary hypertension    2. Age-related osteoporosis without current pathological fracture    3. Hypothyroidism, unspecified type    4. Type 1 diabetes mellitus with stage 3a chronic kidney disease (HCC)             Chief Complaint     Chief Complaint   Patient presents with   • Follow-up     Labs and DXA - Dr. Mills was talking about injections and has concerns. States blood sugar was high and has just been keeping close eye.   • Earache   • Leg Pain         History of Present Illness       77yo female with history of type 1 diabetes, HTN, hypothyroidism here for 6 month follow up. Doing well, no major concerns today. Spoke with endocrine about treatment for osteoporosis at last visit but hasn't heard back yet.         Review of Systems   Review of Systems   Constitutional:  Positive for fatigue. Negative for chills and fever.   HENT:  Positive for hearing loss.    Respiratory:  Negative for shortness of breath.    Musculoskeletal:  Positive for arthralgias and back pain.         Current Medications       Current Outpatient Medications:   •  Alcohol Swabs ( Sterile Alcohol Prep) PADS, , Disp: , Rfl:   •  Ascorbic Acid (VITAMIN C PO), Take by mouth, Disp: , Rfl:   •  ASPIRIN 81 PO, Take 81 mg by mouth daily, Disp: , Rfl:   •  atorvastatin (LIPITOR) 20 mg tablet, TAKE ONE TABLET BY MOUTH ONCE DAILY, Disp: 90 tablet, Rfl: 0  •  Blood Glucose Monitoring Suppl (OneTouch Verio) w/Device KIT, Use to test blood sugar 4x daily., Disp: 1 kit, Rfl: 0  •  Cholecalciferol 2000 units CAPS, Take 1 capsule by mouth, Disp: , Rfl:   •  Continuous Blood Gluc Sensor (FreeStyle Kaylah 2 Sensor) MISC, Use, Disp: , Rfl:   •  famotidine (PEPCID) 20 mg tablet, Take 1 tablet (20 mg total) by mouth daily, Disp: 90 tablet, Rfl: 3  •  fluconazole (DIFLUCAN)  "100 mg tablet, Take 1 tablet by mouth 2 (two) times a day, Disp: , Rfl:   •  glucose blood (OneTouch Verio) test strip, Use to test blood sugar 4x daily in case of CGM failure., Disp: 100 each, Rfl: 2  •  insulin aspart protamine-insulin aspart (NovoLOG Mix 70/30) 100 units/mL injection, Inject 15 units in the morning and 13 units in the evening., Disp: 30 mL, Rfl: 3  •  levothyroxine 75 mcg tablet, Take 1 tablet (75 mcg total) by mouth daily, Disp: 90 tablet, Rfl: 3  •  lisinopril (ZESTRIL) 20 mg tablet, Take 1 tablet (20 mg total) by mouth daily, Disp: 90 tablet, Rfl: 1  •  meloxicam (MOBIC) 7.5 mg tablet, Take 2 tablets by mouth daily, Disp: , Rfl:   •  mometasone (ELOCON) 0.1 % cream, Apply topically daily Apply topically BID x 2 weeks then qhs prn, Disp: 45 g, Rfl: 3  •  Multiple Vitamins-Minerals (MULTIVITAL-M PO), Take by mouth, Disp: , Rfl:   •  OneTouch Delica Lancets 33G MISC, Use to test blood sugar 4x daily., Disp: 300 each, Rfl: 2  •  Sure Comfort Insulin Syringe 30G X 1/2\" 0.3 ML MISC, , Disp: , Rfl:   •  EPINEPHrine (EPIPEN) 0.3 mg/0.3 mL SOAJ, Inject 0.3 mL (0.3 mg total) into a muscle once for 1 dose, Disp: 0.6 mL, Rfl: 0  •  melatonin 3 mg, take 1 tablet by mouth daily at bedtime for 14 days (Patient not taking: Reported on 12/8/2023), Disp: , Rfl:     Current Allergies     Allergies as of 02/19/2024 - Reviewed 02/19/2024   Allergen Reaction Noted   • Diphenhydramine Shortness Of Breath 04/21/2016   • Meperidine Hives and Shortness Of Breath 04/21/2016   • Prednisone Hives and Shortness Of Breath 01/19/2018   • Bee pollen  04/21/2016   • Ciprofloxacin Hives 04/21/2016   • Sulfamethoxazole-trimethoprim GI Intolerance 07/03/2019            The following portions of the patient's history were reviewed and updated as appropriate: allergies, current medications, past family history, past medical history, past social history, past surgical history and problem list.     Past Medical History:   Diagnosis " "Date   • Anxiety    • Colon polyp    • Depression    • Diabetes mellitus (HCC)    • Diverticulosis    • Hyperlipidemia    • Hypertension        Past Surgical History:   Procedure Laterality Date   • APPENDECTOMY     • CATARACT EXTRACTION Right    • COLONOSCOPY      difficult exam   • HYSTERECTOMY     • TONSILLECTOMY     • WISDOM TOOTH EXTRACTION         Family History   Problem Relation Age of Onset   • Parkinsonism Mother    • Alzheimer's disease Father    • Breast cancer Sister         in her 40's   • Lung cancer Sister    • Breast cancer Sister    • No Known Problems Daughter    • No Known Problems Daughter    • No Known Problems Maternal Grandmother    • No Known Problems Maternal Grandfather    • No Known Problems Paternal Grandmother    • No Known Problems Paternal Grandfather    • No Known Problems Maternal Aunt    • No Known Problems Maternal Aunt    • No Known Problems Maternal Aunt    • No Known Problems Paternal Aunt          Medications have been verified.        Objective   /76   Pulse 74   Temp 97.8 °F (36.6 °C)   Resp 18   Ht 5' 1.5\" (1.562 m)   Wt 57.2 kg (126 lb)   SpO2 99%   BMI 23.42 kg/m²        Physical Exam     Physical Exam  Vitals reviewed.   Constitutional:       General: She is not in acute distress.     Appearance: She is normal weight.   Cardiovascular:      Rate and Rhythm: Normal rate and regular rhythm.      Heart sounds: No murmur heard.     No friction rub. No gallop.   Pulmonary:      Effort: Pulmonary effort is normal. No respiratory distress.      Breath sounds: No wheezing, rhonchi or rales.   Neurological:      General: No focal deficit present.      Mental Status: She is alert.   Psychiatric:         Mood and Affect: Mood normal.         Behavior: Behavior normal.             Results:  Lab Results   Component Value Date    SODIUM 140 11/16/2023    K 4.5 11/16/2023     11/16/2023    CO2 28 11/16/2023    BUN 19 11/16/2023    CREATININE 1.00 11/16/2023    GLUC " 264 (H) 12/27/2021    CALCIUM 9.2 11/16/2023       Lab Results   Component Value Date    HGBA1C 7.3 (H) 11/16/2023       Lab Results   Component Value Date    WBC 7.10 08/14/2023    HGB 12.6 08/14/2023    HCT 39.5 08/14/2023    MCV 91 08/14/2023     08/14/2023

## 2024-03-05 ENCOUNTER — TELEPHONE (OUTPATIENT)
Dept: ENDOCRINOLOGY | Facility: CLINIC | Age: 79
End: 2024-03-05

## 2024-03-05 NOTE — TELEPHONE ENCOUNTER
Please download CGM for review. Please confirm with patient exactly how she is taking her medication and attach to the CGM report. Thank you.

## 2024-03-07 ENCOUNTER — TELEPHONE (OUTPATIENT)
Dept: ENDOCRINOLOGY | Facility: CLINIC | Age: 79
End: 2024-03-07

## 2024-03-07 DIAGNOSIS — E11.22 TYPE 2 DIABETES MELLITUS WITH STAGE 3A CHRONIC KIDNEY DISEASE, WITH LONG-TERM CURRENT USE OF INSULIN (HCC): ICD-10-CM

## 2024-03-07 DIAGNOSIS — Z79.4 TYPE 2 DIABETES MELLITUS WITH STAGE 3A CHRONIC KIDNEY DISEASE, WITH LONG-TERM CURRENT USE OF INSULIN (HCC): ICD-10-CM

## 2024-03-07 DIAGNOSIS — N18.31 TYPE 2 DIABETES MELLITUS WITH STAGE 3A CHRONIC KIDNEY DISEASE, WITH LONG-TERM CURRENT USE OF INSULIN (HCC): ICD-10-CM

## 2024-03-07 RX ORDER — INSULIN ASPART 100 [IU]/ML
INJECTION, SUSPENSION SUBCUTANEOUS
Qty: 30 ML | Refills: 3 | Status: SHIPPED | OUTPATIENT
Start: 2024-03-07

## 2024-03-07 NOTE — TELEPHONE ENCOUNTER
Kaylah report reviewed. Recommend increasing Novolog 70/30 to 16 units twice daily. Please remind patient that Novolog 70/30 should be injected at least 15 minutes prior to eating breakfast and again before eating dinner. Please be mindful of carbohydrate intake. Drink plenty of water. Do not skip lunch.

## 2024-03-07 NOTE — TELEPHONE ENCOUNTER
Patient came to the office with her yaritza for download for provider  Has been downloaded and scanned for providers view

## 2024-03-07 NOTE — TELEPHONE ENCOUNTER
Patient will increase dosage and understands that she needs to do 15 minutes prior. She had no questions

## 2024-03-20 DIAGNOSIS — E78.2 MIXED HYPERLIPIDEMIA: ICD-10-CM

## 2024-03-20 RX ORDER — ATORVASTATIN CALCIUM 20 MG/1
20 TABLET, FILM COATED ORAL DAILY
Qty: 90 TABLET | Refills: 2 | Status: SHIPPED | OUTPATIENT
Start: 2024-03-20

## 2024-03-20 NOTE — TELEPHONE ENCOUNTER
Patient requesting refill(s) of: atorvastatin 20 mg daily     Last filled: 12/26/2023 #90 x 0  Last appt: 2/19/2024  Next appt: 8/20/2024  Pharmacy: [facility]

## 2024-03-21 DIAGNOSIS — I10 PRIMARY HYPERTENSION: ICD-10-CM

## 2024-03-21 RX ORDER — LISINOPRIL 20 MG/1
20 TABLET ORAL DAILY
Qty: 90 TABLET | Refills: 1 | Status: SHIPPED | OUTPATIENT
Start: 2024-03-21

## 2024-03-21 NOTE — TELEPHONE ENCOUNTER
Patient requesting refill(s) of: lisinopril 20 mg daily     Last filled: 10/2/2023 #90 x 1  Last appt: 2/19/2024  Next appt: 8/20/2024  Pharmacy: [facility]

## 2024-04-05 ENCOUNTER — NURSE TRIAGE (OUTPATIENT)
Dept: OTHER | Facility: OTHER | Age: 79
End: 2024-04-05

## 2024-04-05 ENCOUNTER — OFFICE VISIT (OUTPATIENT)
Dept: FAMILY MEDICINE CLINIC | Facility: CLINIC | Age: 79
End: 2024-04-05
Payer: MEDICARE

## 2024-04-05 VITALS
TEMPERATURE: 97.9 F | SYSTOLIC BLOOD PRESSURE: 170 MMHG | OXYGEN SATURATION: 100 % | RESPIRATION RATE: 18 BRPM | HEIGHT: 62 IN | BODY MASS INDEX: 23.37 KG/M2 | DIASTOLIC BLOOD PRESSURE: 74 MMHG | WEIGHT: 127 LBS | HEART RATE: 69 BPM

## 2024-04-05 DIAGNOSIS — L91.8 SKIN TAG: ICD-10-CM

## 2024-04-05 DIAGNOSIS — J34.89 SORE IN NOSE: Primary | ICD-10-CM

## 2024-04-05 PROCEDURE — G2211 COMPLEX E/M VISIT ADD ON: HCPCS | Performed by: NURSE PRACTITIONER

## 2024-04-05 PROCEDURE — 99213 OFFICE O/P EST LOW 20 MIN: CPT | Performed by: NURSE PRACTITIONER

## 2024-04-05 NOTE — TELEPHONE ENCOUNTER
"Regarding: scab in nose  ----- Message from Jona Marin sent at 4/5/2024  6:55 AM EDT -----  \" I have a scab on my nose from blowing it so much, I just want to make sure the scab is ok.\"    "

## 2024-04-05 NOTE — PROGRESS NOTES
Name: Johana Alcantara      : 1945      MRN: [id number]  Encounter Provider: EUGENIA Johns  Encounter Date: 2024   Encounter department: [facility]    Assessment & Plan     1. Sore in nose  -     mupirocin (BACTROBAN) 2 % ointment; Apply topically 3 (three) times a day    2. Skin tag  -     Ambulatory Referral to General Surgery; Future           Subjective      Reports that she has a sore/scab in her nose for 5 days- has tried putting Vicks on this. Is very painful and swollen    Reports skin tags on her neck- there is one specific one she wants removed. Does not have a dermatologist and prefers not to leave [place].       Review of Systems   Constitutional:  Negative for activity change, diaphoresis, fatigue and fever.   HENT:  Negative for congestion, facial swelling, hearing loss, rhinorrhea, sinus pressure, sinus pain, sneezing, sore throat and voice change.    Eyes:  Negative for discharge and visual disturbance.   Respiratory:  Negative for cough, choking, chest tightness, shortness of breath, wheezing and stridor.    Cardiovascular:  Negative for chest pain, palpitations and leg swelling.   Gastrointestinal:  Negative for abdominal distention, abdominal pain, constipation, diarrhea, nausea and vomiting.   Endocrine: Negative for polydipsia, polyphagia and polyuria.   Genitourinary:  Negative for difficulty urinating, dysuria, frequency and urgency.   Musculoskeletal:  Negative for arthralgias, back pain, gait problem, joint swelling, myalgias, neck pain and neck stiffness.   Skin:  Negative for color change, rash and wound.   Neurological:  Negative for dizziness, syncope, speech difficulty, weakness, light-headedness and headaches.   Hematological:  Negative for adenopathy. Does not bruise/bleed easily.   Psychiatric/Behavioral:  Negative for agitation, behavioral problems, confusion, hallucinations, sleep disturbance and suicidal ideas. The patient is  "nervous/anxious (very upset about her sister's recent health problems).        Current Outpatient Medications on File Prior to Visit   Medication Sig    Alcohol Swabs ( Sterile Alcohol Prep) PADS     Ascorbic Acid (VITAMIN C PO) Take by mouth    ASPIRIN 81 PO Take 81 mg by mouth daily    atorvastatin (LIPITOR) 20 mg tablet TAKE ONE TABLET BY MOUTH ONCE DAILY    Blood Glucose Monitoring Suppl (OneTouch Verio) w/Device KIT Use to test blood sugar 4x daily.    Cholecalciferol 2000 units CAPS Take 1 capsule by mouth    Continuous Blood Gluc Sensor (FreeStyle Kaylah 2 Sensor) MISC Use    EPINEPHrine (EPIPEN) 0.3 mg/0.3 mL SOAJ Inject 0.3 mL (0.3 mg total) into a muscle once for 1 dose    famotidine (PEPCID) 20 mg tablet Take 1 tablet (20 mg total) by mouth daily    fluconazole (DIFLUCAN) 100 mg tablet Take 1 tablet by mouth 2 (two) times a day    glucose blood (OneTouch Verio) test strip Use to test blood sugar 4x daily in case of CGM failure.    insulin aspart protamine-insulin aspart (NovoLOG Mix 70/30) 100 units/mL injection Inject 16 units twice daily.    levothyroxine 75 mcg tablet Take 1 tablet (75 mcg total) by mouth daily    lisinopril (ZESTRIL) 20 mg tablet Take 1 tablet (20 mg total) by mouth daily    melatonin 3 mg     meloxicam (MOBIC) 7.5 mg tablet Take 2 tablets by mouth daily    mometasone (ELOCON) 0.1 % cream Apply topically daily Apply topically BID x 2 weeks then qhs prn    Multiple Vitamins-Minerals (MULTIVITAL-M PO) Take by mouth    OneTouch Delica Lancets 33G MISC Use to test blood sugar 4x daily.    Sure Comfort Insulin Syringe 30G X 1/2\" 0.3 ML MISC        Objective     /74   Pulse 69   Temp 97.9 °F (36.6 °C)   Resp 18   Ht 5' 1.5\" (1.562 m)   Wt 57.6 kg (127 lb)   SpO2 100%   BMI 23.61 kg/m²     Physical Exam  Vitals and nursing note reviewed.   Constitutional:       General: She is not in acute distress.     Appearance: She is well-developed. She is not ill-appearing or diaphoretic. "   HENT:      Nose:        Comments: Inside right nostril- erythema, inflamed, open sore with minimal scabbing. External nose mildly swollen but is tender to touch  Neck:        Comments: Dry, scaly, skin colored accessory tag  Pulmonary:      Effort: Pulmonary effort is normal. No respiratory distress.   Skin:     Coloration: Skin is not pale.   Neurological:      Mental Status: She is alert and oriented to person, place, and time. She is not disoriented.   Psychiatric:         Mood and Affect: Mood normal.         Speech: Speech normal.         Behavior: Behavior normal. Behavior is cooperative.         Thought Content: Thought content normal.         Judgment: Judgment normal.       EUGENIA Johns

## 2024-04-05 NOTE — TELEPHONE ENCOUNTER
"Reason for Disposition  • [1] Using antibiotic ointment > 1 week AND [2] sore not completely healed    Answer Assessment - Initial Assessment Questions  1. APPEARANCE of SORES: \"What do the sores look like?\"      Scab on the right side of nose    2. NUMBER: \"How many sores are there?\"      1    3. SIZE: \"How big is the largest sore?\"      Size of an eraser    4. LOCATION: \"Where are the sores located?\"      Right nare    5. ONSET: \"When did the sores begin?\"      Easter    6. CAUSE: \"What do you think is causing the sores?\"      Running nose    7. OTHER SYMPTOMS: \"Do you have any other symptoms?\" (e.g., fever, new weakness)      Denies; feels hard to touch, mildly red around area    Drainage from nose since Easter; has scab at the bottom of nose that she has been treating with vicks, warm and cool compressed. Not healing - keeps forming scab    Protocols used: Sores-ADULT-    "

## 2024-04-05 NOTE — PATIENT INSTRUCTIONS
Use Bactroban in nose as directed, if symptoms continue on Monday call office for oral antibiotic  If symptoms continue more than 2 weeks, call office for referral to Ear, Nose, Throat specialist for possible biopsy  Referral given for General Surgery to get skin tag removed from neck as patient is reporting it bothers her and requesting removal

## 2024-04-08 ENCOUNTER — TELEPHONE (OUTPATIENT)
Dept: FAMILY MEDICINE CLINIC | Facility: CLINIC | Age: 79
End: 2024-04-08

## 2024-04-08 DIAGNOSIS — B02.8 HERPES ZOSTER WITH OTHER COMPLICATION: Primary | ICD-10-CM

## 2024-04-08 RX ORDER — VALACYCLOVIR HYDROCHLORIDE 1 G/1
1000 TABLET, FILM COATED ORAL 2 TIMES DAILY
Qty: 14 TABLET | Refills: 0 | Status: SHIPPED | OUTPATIENT
Start: 2024-04-08 | End: 2024-04-15

## 2024-04-08 NOTE — TELEPHONE ENCOUNTER
Patient was here Friday for a scab on nose. Scab is gone, Now is feeling tingling, pressure, and itchy, started yesterday. Is using prescribed cream 3x a day. Is wondering if she should stop cream since the scab is gone and is experiencing these symptoms.

## 2024-04-08 NOTE — TELEPHONE ENCOUNTER
Patient informed. Verbalized understanding. Will call Dr Brush for an appt. Said she will call back if she were to think of any questions.

## 2024-04-08 NOTE — TELEPHONE ENCOUNTER
It sounds like this could be Shingles with the new symptoms of tingling and itchy. I will send in Valtrex to her pharmacy. She needs to call her eye doctor (Dr. Brush) immediately to get her eye checked as this can quickly spread to her eye on that side. She needs to tell them we diagnosed her with Shingles and is involving her nose.

## 2024-04-10 ENCOUNTER — OFFICE VISIT (OUTPATIENT)
Dept: ENDOCRINOLOGY | Facility: CLINIC | Age: 79
End: 2024-04-10
Payer: MEDICARE

## 2024-04-10 VITALS
WEIGHT: 128 LBS | TEMPERATURE: 98.8 F | RESPIRATION RATE: 18 BRPM | DIASTOLIC BLOOD PRESSURE: 70 MMHG | HEART RATE: 72 BPM | BODY MASS INDEX: 23.55 KG/M2 | HEIGHT: 62 IN | SYSTOLIC BLOOD PRESSURE: 140 MMHG | OXYGEN SATURATION: 98 %

## 2024-04-10 DIAGNOSIS — M81.0 AGE-RELATED OSTEOPOROSIS WITHOUT CURRENT PATHOLOGICAL FRACTURE: ICD-10-CM

## 2024-04-10 DIAGNOSIS — E03.9 HYPOTHYROIDISM, UNSPECIFIED TYPE: ICD-10-CM

## 2024-04-10 DIAGNOSIS — N18.31 TYPE 1 DIABETES MELLITUS WITH STAGE 3A CHRONIC KIDNEY DISEASE (HCC): Primary | ICD-10-CM

## 2024-04-10 DIAGNOSIS — E10.22 TYPE 1 DIABETES MELLITUS WITH STAGE 3A CHRONIC KIDNEY DISEASE (HCC): Primary | ICD-10-CM

## 2024-04-10 LAB — SL AMB POCT HEMOGLOBIN AIC: 8.2 (ref ?–6.5)

## 2024-04-10 PROCEDURE — 83036 HEMOGLOBIN GLYCOSYLATED A1C: CPT | Performed by: NURSE PRACTITIONER

## 2024-04-10 PROCEDURE — 99214 OFFICE O/P EST MOD 30 MIN: CPT | Performed by: NURSE PRACTITIONER

## 2024-04-10 PROCEDURE — 95251 CONT GLUC MNTR ANALYSIS I&R: CPT | Performed by: NURSE PRACTITIONER

## 2024-04-10 RX ORDER — INSULIN ASPART 100 [IU]/ML
INJECTION, SOLUTION INTRAVENOUS; SUBCUTANEOUS
Qty: 6 ML | Refills: 1 | Status: SHIPPED | OUTPATIENT
Start: 2024-04-10

## 2024-04-10 RX ORDER — INSULIN GLARGINE 100 [IU]/ML
INJECTION, SOLUTION SUBCUTANEOUS
Qty: 6 ML | Refills: 3 | Status: SHIPPED | OUTPATIENT
Start: 2024-04-10

## 2024-04-10 NOTE — ASSESSMENT & PLAN NOTE
Continue 75 mcg of levothyroxine Monday through Friday.  No dose on Saturday or Sunday.  Check TSH with reflex T4 prior to follow-up appointment.

## 2024-04-10 NOTE — ASSESSMENT & PLAN NOTE
While patient is relatively well controlled for her age and overall health, she continues to have significant postpranidal hyperglycemia mid day. Willing to switch from Novolog 70/30 to one injection of basal insulin daily and three injections of mealtime insulin daily. Stop novolog 70/30. Start Lantus 12 units nightly.  Counseled on mechanism of action of Lantus and instructed patient to inject at the same time every evening.  Start 5 units of NovoLog 3 times daily.  Counseled on mechanism of action and instructed to inject 15 minutes prior to her meals.  If she skips a meal, she is to skip NovoLog injection.  Will download CGM in 2 weeks for review.  We reviewed her diet in detail.  Patient is snacking more frequently.  Recommend that she reduce her high carbohydrate/high sugar snacks (potato chips, [employer] peanut butter cups).  Remain well-hydrated.  Continue with regular physical activity.  Patient is to notify me with persistent hyperglycemia or any episodes of hypoglycemia.  Follow-up in 3 months.  Complete labs prior.  Lab Results   Component Value Date    HGBA1C 8.2 (A) 04/10/2024

## 2024-04-10 NOTE — PATIENT INSTRUCTIONS
STOP Novolog 70/30.  START Lantus. Inject 12 units nightly. Aim to inject at the SAME TIME every day.  START Novolog. Inject 5 units three times daily before your meals. Aim to inject approximately 15 minutes prior to meals. If you skip a meal, skip the dose.   We will download your Kaylah report in 2 weeks to review and make adjustments if needed.   Complete labs prior to your next appointment in 3 months.   I will order Prolia injections for you to treat your osteoporosis.

## 2024-04-10 NOTE — PROGRESS NOTES
Established Patient Progress Note    Chief Complaint:  Diabetes follow up visit    Impression & Plan:    Problem List Items Addressed This Visit       Hypothyroidism     Continue 75 mcg of levothyroxine Monday through Friday.  No dose on Saturday or Sunday.  Check TSH with reflex T4 prior to follow-up appointment.         Relevant Orders    TSH, 3rd generation with Free T4 reflex    Diabetes mellitus (HCC) - Primary     While patient is relatively well controlled for her age and overall health, she continues to have significant postpranidal hyperglycemia mid day. Willing to switch from Novolog 70/30 to one injection of basal insulin daily and three injections of mealtime insulin daily. Stop novolog 70/30. Start Lantus 12 units nightly.  Counseled on mechanism of action of Lantus and instructed patient to inject at the same time every evening.  Start 5 units of NovoLog 3 times daily.  Counseled on mechanism of action and instructed to inject 15 minutes prior to her meals.  If she skips a meal, she is to skip NovoLog injection.  Will download CGM in 2 weeks for review.  We reviewed her diet in detail.  Patient is snacking more frequently.  Recommend that she reduce her high carbohydrate/high sugar snacks (potato chips, [employer] peanut butter cups).  Remain well-hydrated.  Continue with regular physical activity.  Patient is to notify me with persistent hyperglycemia or any episodes of hypoglycemia.  Follow-up in 3 months.  Complete labs prior.  Lab Results   Component Value Date    HGBA1C 8.2 (A) 04/10/2024            Relevant Medications    Insulin Glargine Solostar (Lantus SoloStar) 100 UNIT/ML SOPN    insulin aspart (NovoLOG FlexPen) 100 UNIT/ML injection pen    Other Relevant Orders    Hemoglobin A1C    Albumin / creatinine urine ratio    Comprehensive metabolic panel    POCT hemoglobin A1c (Completed)    Age-related osteoporosis without current pathological fracture     Evenity was denied by patient's  insurance.  Will now apply for Prolia injections.  Patient agrees with this plan.  Denies any recent falls or fractures.  She is supplementing her calcium and vitamin D.  She engages in weightbearing activity daily. Check CMP and Vit D levels with next labs.          Relevant Orders    Vitamin D 25 hydroxy       History of Present Illness:   Johana Alcantara is a 78 y.o. female with HTN, HLD, hypothyroidism, osteoporosis, and type 1 diabetes with long term use of insulin since 2006. Reports complications of CKD stage 3a and hypoglycemia unawareness. Denies recent illness or hospitalizations. Denies recent severe hypoglycemic or severe hyperglycemic episodes. Denies any issues with her current regimen. home glucose monitoring: are performed regularly with a CGM.       POC Hga1C today is 8.2%    Johana Alcantara   Device used Freestyle yaritza 2  Home use     Indication   Type 1 Diabetes    More than 72 hours of data was reviewed. Report to be scanned to chart.     Date Range: March 28, 2024- April 10, 2024    Analysis of data:   Average Glucose: 178 mg/dL  Coefficient of Variation: 36.7%  Glucose management indicator: 7.6%  Time in Target Range: 53%  Time Above Range: 32% 181 to 250 mg/dL; 13% greater than 250 mg/dL  Time Below Range: 2% 54 to 69 mg/dL; 0% less than 54 mg/dL    Interpretation of data: Patient is having more frequent episodes of hyperglycemia mid day.        Current regimen:     Novolog 70/30 16 units twice daily        For hypothyroidism, she is taking 75 mcg daily.     For osteoporosis, recommended 12 month treatment with Evenity before switching to Prolia given patient's Tscore of -3. However, her insurance did not cover this.     Patient Active Problem List   Diagnosis    Generalized anxiety disorder    Hypothyroidism    Hypertension    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 ear    Diabetes mellitus (HCC)    Hyperlipidemia    Retinal disorder    Constipation    Age-related osteoporosis without current pathological  fracture      Past Medical History:   Diagnosis Date    Anxiety     Colon polyp     Depression     Diabetes mellitus (HCC)     Diverticulosis     Hyperlipidemia     Hypertension       Past Surgical History:   Procedure Laterality Date    APPENDECTOMY      CATARACT EXTRACTION Right     COLONOSCOPY      difficult exam    HYSTERECTOMY      TONSILLECTOMY      WISDOM TOOTH EXTRACTION        Family History   Problem Relation Age of Onset    Parkinsonism Mother     Alzheimer's disease Father     Breast cancer Sister         in her 40's    Lung cancer Sister     Breast cancer Sister     No Known Problems Daughter     No Known Problems Daughter     No Known Problems Maternal Grandmother     No Known Problems Maternal Grandfather     No Known Problems Paternal Grandmother     No Known Problems Paternal Grandfather     No Known Problems Maternal Aunt     No Known Problems Maternal Aunt     No Known Problems Maternal Aunt     No Known Problems Paternal Aunt      Social History     Tobacco Use    Smoking status: Never    Smokeless tobacco: Never   Substance Use Topics    Alcohol use: Not Currently     Comment: rare     Allergies   Allergen Reactions    Diphenhydramine Shortness Of Breath    Meperidine Hives and Shortness Of Breath    Prednisone Hives and Shortness Of Breath    Bee Pollen     Ciprofloxacin Hives    Sulfamethoxazole-Trimethoprim GI Intolerance         Current Outpatient Medications:     Alcohol Swabs ( Sterile Alcohol Prep) PADS, , Disp: , Rfl:     Ascorbic Acid (VITAMIN C PO), Take by mouth, Disp: , Rfl:     ASPIRIN 81 PO, Take 81 mg by mouth daily, Disp: , Rfl:     atorvastatin (LIPITOR) 20 mg tablet, TAKE ONE TABLET BY MOUTH ONCE DAILY, Disp: 90 tablet, Rfl: 2    Blood Glucose Monitoring Suppl (OneTouch Verio) w/Device KIT, Use to test blood sugar 4x daily., Disp: 1 kit, Rfl: 0    Cholecalciferol 2000 units CAPS, Take 1 capsule by mouth, Disp: , Rfl:     Continuous Blood Gluc Sensor (FreeStyle Kaylah 2 Sensor)  "MISC, Use, Disp: , Rfl:     famotidine (PEPCID) 20 mg tablet, Take 1 tablet (20 mg total) by mouth daily, Disp: 90 tablet, Rfl: 3    fluconazole (DIFLUCAN) 100 mg tablet, Take 1 tablet by mouth 2 (two) times a day, Disp: , Rfl:     glucose blood (OneTouch Verio) test strip, Use to test blood sugar 4x daily in case of CGM failure., Disp: 100 each, Rfl: 2    insulin aspart (NovoLOG FlexPen) 100 UNIT/ML injection pen, Inject 5 units three times daily before meals., Disp: 6 mL, Rfl: 1    Insulin Glargine Solostar (Lantus SoloStar) 100 UNIT/ML SOPN, Inject 12 units nightly., Disp: 6 mL, Rfl: 3    levothyroxine 75 mcg tablet, Take 1 tablet (75 mcg total) by mouth daily, Disp: 90 tablet, Rfl: 3    lisinopril (ZESTRIL) 20 mg tablet, Take 1 tablet (20 mg total) by mouth daily, Disp: 90 tablet, Rfl: 1    melatonin 3 mg, , Disp: , Rfl:     meloxicam (MOBIC) 7.5 mg tablet, Take 2 tablets by mouth daily, Disp: , Rfl:     mometasone (ELOCON) 0.1 % cream, Apply topically daily Apply topically BID x 2 weeks then qhs prn, Disp: 45 g, Rfl: 3    Multiple Vitamins-Minerals (MULTIVITAL-M PO), Take by mouth, Disp: , Rfl:     mupirocin (BACTROBAN) 2 % ointment, Apply topically 3 (three) times a day, Disp: 30 g, Rfl: 1    OneTouch Delica Lancets 33G MISC, Use to test blood sugar 4x daily., Disp: 300 each, Rfl: 2    Sure Comfort Insulin Syringe 30G X 1/2\" 0.3 ML MISC, , Disp: , Rfl:     EPINEPHrine (EPIPEN) 0.3 mg/0.3 mL SOAJ, Inject 0.3 mL (0.3 mg total) into a muscle once for 1 dose, Disp: 0.6 mL, Rfl: 0    valACYclovir (VALTREX) 1,000 mg tablet, Take 1 tablet (1,000 mg total) by mouth 2 (two) times a day for 7 days (Patient not taking: Reported on 4/10/2024), Disp: 14 tablet, Rfl: 0    Review of Systems   Constitutional:  Positive for fatigue. Negative for activity change, appetite change and unexpected weight change.   HENT:  Negative for dental problem, sore throat, trouble swallowing and voice change.    Eyes:  Positive for visual " "disturbance.   Respiratory:  Negative for cough, chest tightness and shortness of breath.    Cardiovascular:  Negative for chest pain, palpitations and leg swelling.   Gastrointestinal:  Negative for constipation, diarrhea, nausea and vomiting.   Endocrine: Negative for cold intolerance, heat intolerance, polydipsia, polyphagia and polyuria.   Genitourinary:  Negative for frequency.   Musculoskeletal:  Negative for arthralgias, back pain, gait problem and myalgias.   Skin:  Positive for rash.   Allergic/Immunologic: Positive for environmental allergies. Negative for food allergies.   Neurological:  Negative for dizziness, weakness, light-headedness, numbness and headaches.   Psychiatric/Behavioral:  Negative for decreased concentration, dysphoric mood and sleep disturbance. The patient is nervous/anxious.        Physical Exam:  Body mass index is 23.79 kg/m².  /70   Pulse 72   Temp 98.8 °F (37.1 °C)   Resp 18   Ht 5' 1.5\" (1.562 m)   Wt 58.1 kg (128 lb)   SpO2 98%   BMI 23.79 kg/m²    Wt Readings from Last 3 Encounters:   04/10/24 58.1 kg (128 lb)   04/05/24 57.6 kg (127 lb)   02/19/24 57.2 kg (126 lb)       Physical Exam  Vitals reviewed.   Constitutional:       General: She is not in acute distress.     Appearance: She is well-developed. She is not ill-appearing.   HENT:      Head: Normocephalic and atraumatic.   Eyes:      Pupils: Pupils are equal, round, and reactive to light.   Neck:      Thyroid: No thyromegaly.   Cardiovascular:      Rate and Rhythm: Normal rate.      Pulses: Normal pulses.   Pulmonary:      Effort: Pulmonary effort is normal.   Musculoskeletal:      Cervical back: Normal range of motion and neck supple.      Right lower leg: No edema.      Left lower leg: No edema.   Lymphadenopathy:      Cervical: No cervical adenopathy.   Skin:     General: Skin is warm and dry.      Capillary Refill: Capillary refill takes less than 2 seconds.   Neurological:      Mental Status: She is alert " and oriented to person, place, and time.      Gait: Gait normal.   Psychiatric:         Mood and Affect: Mood normal.         Behavior: Behavior normal.           Labs:   Lab Results   Component Value Date    HGBA1C 8.2 (A) 04/10/2024    HGBA1C 7.3 (H) 11/16/2023    HGBA1C 7.0 (H) 06/06/2023     Lab Results   Component Value Date    CREATININE 1.00 11/16/2023    CREATININE 1.06 08/14/2023    CREATININE 1.14 06/06/2023    BUN 19 11/16/2023    K 4.5 11/16/2023     11/16/2023    CO2 28 11/16/2023     GFR, Calculated   Date Value Ref Range Status   01/19/2018 48 (L) >60 mL/min/1.73m2 Final     Comment:     mL/min per 1.73 square meters                                            Normal Function or Mild Renal    Disease (if clinically at risk):  >or=60  Moderately Decreased:                30-59  Severely Decreased:                  15-29  Renal Failure:                         <15                                            -American GFR: multiply reported GFR by 1.16    Please note that the eGFR is based on the CKD-EPI calculation, and is not intended to be used for drug dosing.                                            Note: Calculated GFR may not be an accurate indicator of renal function if the patient's renal function is not in a steady state.     eGFR   Date Value Ref Range Status   11/16/2023 53 ml/min/1.73sq m Final     Lab Results   Component Value Date    HDL 72 08/14/2023    TRIG 124 08/14/2023     Lab Results   Component Value Date    ALT 15 08/14/2023    AST 13 08/14/2023    ALKPHOS 86 08/14/2023     Lab Results   Component Value Date    TWP6ZLDWKXWV 1.632 08/14/2023    [id number] 2.740 03/07/2023    [id number] 3.460 08/01/2022     Lab Results   Component Value Date    FREET4 1.21 03/07/2023       Discussed with the patient and all questioned fully answered. She will call me if any problems arise.    Follow-up appointment in 3 months.     Counseled patient on diagnostic results, prognosis,  risk and benefit of treatment options, instruction for management, importance of treatment compliance, Risk  factor reduction and impressions    EUGENIA Phan

## 2024-04-10 NOTE — ASSESSMENT & PLAN NOTE
Evenity was denied by patient's insurance.  Will now apply for Prolia injections.  Patient agrees with this plan.  Denies any recent falls or fractures.  She is supplementing her calcium and vitamin D.  She engages in weightbearing activity daily. Check CMP and Vit D levels with next labs.

## 2024-04-11 ENCOUNTER — NURSE TRIAGE (OUTPATIENT)
Dept: OTHER | Facility: OTHER | Age: 79
End: 2024-04-11

## 2024-04-11 NOTE — TELEPHONE ENCOUNTER
"Reason for Disposition  • Caller has medicine question only, adult not sick, AND triager answers question    Answer Assessment - Initial Assessment Questions  1. NAME of MEDICATION: \"What medicine are you calling about?\"      Lantus   2. QUESTION: \"What is your question?\" (e.g., medication refill, side effect)      The new medication is not available yet, so I stayed on my old regimen   3. PRESCRIBING HCP: \"Who prescribed it?\" Reason: if prescribed by specialist, call should be referred to that group.      Endocrinology  4. SYMPTOMS: \"Do you have any symptoms?\"      no    Protocols used: Medication Question Call-ADULT-AH    "

## 2024-04-11 NOTE — TELEPHONE ENCOUNTER
Pt called stating the new medication that was prescribed for her yesterday is still not available as it is awaiting approval.  Pt calling to ask if she should stay on her old regimen for now while she awaits new medication approval.  Advised pt that she should stay on old plan for now and then change over to the new regimen when the medication becomes available to her.  Advice offered per protocol

## 2024-04-11 NOTE — TELEPHONE ENCOUNTER
"Regarding: insulin dosage  ----- Message from Ynes Joyce sent at 4/11/2024  7:07 AM EDT -----  \" I was put on a new regimen for my insulin yesterday, but my pharmacy did not have it ready. Should I just take my old medicine until I get new one?\"    "

## 2024-04-17 ENCOUNTER — APPOINTMENT (OUTPATIENT)
Dept: RADIOLOGY | Facility: CLINIC | Age: 79
End: 2024-04-17
Payer: MEDICARE

## 2024-04-17 ENCOUNTER — OFFICE VISIT (OUTPATIENT)
Dept: FAMILY MEDICINE CLINIC | Facility: CLINIC | Age: 79
End: 2024-04-17
Payer: MEDICARE

## 2024-04-17 ENCOUNTER — APPOINTMENT (OUTPATIENT)
Dept: LAB | Facility: CLINIC | Age: 79
End: 2024-04-17
Payer: MEDICARE

## 2024-04-17 VITALS
OXYGEN SATURATION: 97 % | SYSTOLIC BLOOD PRESSURE: 126 MMHG | TEMPERATURE: 98.1 F | BODY MASS INDEX: 23.45 KG/M2 | WEIGHT: 127.4 LBS | HEART RATE: 70 BPM | HEIGHT: 62 IN | DIASTOLIC BLOOD PRESSURE: 70 MMHG | RESPIRATION RATE: 16 BRPM

## 2024-04-17 DIAGNOSIS — R35.0 URINARY FREQUENCY: ICD-10-CM

## 2024-04-17 DIAGNOSIS — R19.8 LLQ FULLNESS: ICD-10-CM

## 2024-04-17 DIAGNOSIS — F41.1 GENERALIZED ANXIETY DISORDER: ICD-10-CM

## 2024-04-17 DIAGNOSIS — R10.2 PELVIC PRESSURE IN FEMALE: Primary | ICD-10-CM

## 2024-04-17 DIAGNOSIS — R22.1 PULSATILE NECK MASS: ICD-10-CM

## 2024-04-17 DIAGNOSIS — R10.2 PELVIC PRESSURE IN FEMALE: ICD-10-CM

## 2024-04-17 LAB
BILIRUB UR QL STRIP: NEGATIVE
CLARITY UR: CLEAR
COLOR UR: COLORLESS
GLUCOSE UR STRIP-MCNC: ABNORMAL MG/DL
HGB UR QL STRIP.AUTO: NEGATIVE
KETONES UR STRIP-MCNC: NEGATIVE MG/DL
LEUKOCYTE ESTERASE UR QL STRIP: NEGATIVE
NITRITE UR QL STRIP: NEGATIVE
PH UR STRIP.AUTO: 6 [PH]
PROT UR STRIP-MCNC: NEGATIVE MG/DL
SP GR UR STRIP.AUTO: 1.01 (ref 1–1.03)
UROBILINOGEN UR STRIP-ACNC: <2 MG/DL

## 2024-04-17 PROCEDURE — 99214 OFFICE O/P EST MOD 30 MIN: CPT | Performed by: FAMILY MEDICINE

## 2024-04-17 PROCEDURE — 87086 URINE CULTURE/COLONY COUNT: CPT

## 2024-04-17 PROCEDURE — 74022 RADEX COMPL AQT ABD SERIES: CPT

## 2024-04-17 PROCEDURE — G2211 COMPLEX E/M VISIT ADD ON: HCPCS | Performed by: FAMILY MEDICINE

## 2024-04-17 NOTE — PROGRESS NOTES
"Assessment/Plan:       Problem List Items Addressed This Visit          Behavioral Health    Generalized anxiety disorder     Other Visit Diagnoses       Pelvic pressure in female    -  Primary    Relevant Orders    UA w Reflex to Microscopic w Reflex to Culture (Completed)    Urine culture (Completed)    Urinary frequency        Relevant Orders    UA w Reflex to Microscopic w Reflex to Culture (Completed)    Urine culture (Completed)    LLQ fullness        Relevant Orders    XR abdomen obstruction series (Completed)    Pulsatile neck mass        Relevant Orders    Echo complete w/ contrast if indicated    VAS carotid complete study (Completed)              Subjective:      Patient ID: Johana Alcantara is a 78 y.o. female.    HPI    A couple weeks ago was having pressure in nose, better now, for months dealing with lower back pain, was constipated/straining with BM, pain across lower left back, down left leg, pain also upper left back. Couple days ago was feeling good, took Advil. She is urinating a lot, no foul smell or blood, pelvic pressure, no vaginal discharge, no burning with urination. Neck pain as well. Fatigued a little bit but nothing more than usual. She is feeling nervous, scared as well.     The following portions of the patient's history were reviewed and updated as appropriate: allergies, current medications, past family history, past medical history, past social history, past surgical history, and problem list.    Review of Systems   All other systems reviewed and are negative.        Objective:      /70   Pulse 70   Temp 98.1 °F (36.7 °C) (Temporal)   Resp 16   Ht 5' 1.5\" (1.562 m)   Wt 57.8 kg (127 lb 6.4 oz)   SpO2 97%   BMI 23.68 kg/m²          Physical Exam  Vitals reviewed.   Constitutional:       General: She is not in acute distress.     Appearance: Normal appearance. She is not ill-appearing, toxic-appearing or diaphoretic.   Neck:      Vascular: No carotid bruit.      " Comments: Prominent pulsation right anterior neck, non-tender, no palpable mass  Cardiovascular:      Rate and Rhythm: Normal rate and regular rhythm.      Heart sounds: Normal heart sounds. No murmur heard.     No friction rub. No gallop.   Pulmonary:      Effort: Pulmonary effort is normal. No respiratory distress.      Breath sounds: Normal breath sounds. No stridor. No wheezing or rhonchi.   Abdominal:      General: Bowel sounds are normal. There is no distension.      Palpations: Abdomen is soft. There is no mass.      Tenderness: There is no abdominal tenderness. There is no guarding or rebound.      Comments: LLQ fullness   Skin:     General: Skin is warm.      Findings: No erythema or rash.   Neurological:      Mental Status: She is alert and oriented to person, place, and time.      Motor: No weakness.   Psychiatric:         Mood and Affect: Mood normal.         Behavior: Behavior normal.             Concepcion Flowers DO  [facility]

## 2024-04-18 LAB — BACTERIA UR CULT: NORMAL

## 2024-04-19 ENCOUNTER — HOSPITAL ENCOUNTER (OUTPATIENT)
Dept: NON INVASIVE DIAGNOSTICS | Facility: HOSPITAL | Age: 79
Discharge: HOME | End: 2024-04-19
Payer: MEDICARE

## 2024-04-19 DIAGNOSIS — R22.1 PULSATILE NECK MASS: ICD-10-CM

## 2024-04-19 PROCEDURE — 93880 EXTRACRANIAL BILAT STUDY: CPT | Performed by: SURGERY

## 2024-04-19 PROCEDURE — 93880 EXTRACRANIAL BILAT STUDY: CPT

## 2024-04-22 ENCOUNTER — OFFICE VISIT (OUTPATIENT)
Dept: FAMILY MEDICINE CLINIC | Facility: CLINIC | Age: 79
End: 2024-04-22
Payer: MEDICARE

## 2024-04-22 ENCOUNTER — APPOINTMENT (OUTPATIENT)
Dept: RADIOLOGY | Facility: CLINIC | Age: 79
End: 2024-04-22
Payer: MEDICARE

## 2024-04-22 ENCOUNTER — NURSE TRIAGE (OUTPATIENT)
Dept: OTHER | Facility: OTHER | Age: 79
End: 2024-04-22

## 2024-04-22 VITALS
HEIGHT: 62 IN | RESPIRATION RATE: 16 BRPM | SYSTOLIC BLOOD PRESSURE: 124 MMHG | TEMPERATURE: 97.2 F | HEART RATE: 78 BPM | WEIGHT: 127 LBS | BODY MASS INDEX: 23.37 KG/M2 | DIASTOLIC BLOOD PRESSURE: 74 MMHG | OXYGEN SATURATION: 98 %

## 2024-04-22 DIAGNOSIS — M54.42 ACUTE LEFT-SIDED LOW BACK PAIN WITH LEFT-SIDED SCIATICA: Primary | ICD-10-CM

## 2024-04-22 DIAGNOSIS — M54.42 ACUTE LEFT-SIDED LOW BACK PAIN WITH LEFT-SIDED SCIATICA: ICD-10-CM

## 2024-04-22 DIAGNOSIS — K59.00 CONSTIPATION, UNSPECIFIED CONSTIPATION TYPE: ICD-10-CM

## 2024-04-22 DIAGNOSIS — F41.1 GENERALIZED ANXIETY DISORDER: ICD-10-CM

## 2024-04-22 LAB
BACTERIA UR QL AUTO: NORMAL /HPF
BILIRUB UR QL STRIP: NEGATIVE
CLARITY UR: CLEAR
COLOR UR: COLORLESS
GLUCOSE UR STRIP-MCNC: NEGATIVE MG/DL
HGB UR QL STRIP.AUTO: NEGATIVE
KETONES UR STRIP-MCNC: NEGATIVE MG/DL
LEUKOCYTE ESTERASE UR QL STRIP: NEGATIVE
NITRITE UR QL STRIP: NEGATIVE
NON-SQ EPI CELLS URNS QL MICRO: NORMAL /HPF
PH UR STRIP.AUTO: 6.5 [PH]
PROT UR STRIP-MCNC: NEGATIVE MG/DL
RBC #/AREA URNS AUTO: NORMAL /HPF
SP GR UR STRIP.AUTO: 1.01 (ref 1–1.03)
UROBILINOGEN UR STRIP-ACNC: <2 MG/DL
WBC #/AREA URNS AUTO: NORMAL /HPF

## 2024-04-22 PROCEDURE — 72202 X-RAY EXAM SI JOINTS 3/> VWS: CPT

## 2024-04-22 PROCEDURE — G2211 COMPLEX E/M VISIT ADD ON: HCPCS | Performed by: INTERNAL MEDICINE

## 2024-04-22 PROCEDURE — 81001 URINALYSIS AUTO W/SCOPE: CPT | Performed by: INTERNAL MEDICINE

## 2024-04-22 PROCEDURE — 99213 OFFICE O/P EST LOW 20 MIN: CPT | Performed by: INTERNAL MEDICINE

## 2024-04-22 PROCEDURE — 72110 X-RAY EXAM L-2 SPINE 4/>VWS: CPT

## 2024-04-22 NOTE — TELEPHONE ENCOUNTER
"Reason for Disposition   [1] SEVERE back pain (e.g., excruciating, unable to do any normal activities) AND [2] not improved 2 hours after pain medicine    Answer Assessment - Initial Assessment Questions  1. ONSET: \"When did the pain begin?\"       Saturday with burning pain  2. LOCATION: \"Where does it hurt?\" (upper, mid or lower back)      Tailgate up to her lower back  3. SEVERITY: \"How bad is the pain?\"  (e.g., Scale 1-10; mild, moderate, or severe)    - MILD (1-3): doesn't interfere with normal activities     - MODERATE (4-7): interferes with normal activities or awakens from sleep     - SEVERE (8-10): excruciating pain, unable to do any normal activities       6-7/10   10/10 on Saturday  4. PATTERN: \"Is the pain constant?\" (e.g., yes, no; constant, intermittent)       intermittent  5. RADIATION: \"Does the pain shoot into your legs or elsewhere?\"      Shoots down right leg sometimes  6. CAUSE:  \"What do you think is causing the back pain?\"       Unsure  7. BACK OVERUSE:  \"Any recent lifting of heavy objects, strenuous work or exercise?\"      Denies  8. MEDICATIONS: \"What have you taken so far for the pain?\" (e.g., nothing, acetaminophen, NSAIDS)      Aleve or Advil -relief a little but not completely. Using heat too.   9. NEUROLOGIC SYMPTOMS: \"Do you have any weakness, numbness, or problems with bowel/bladder control?\"      denies  10. OTHER SYMPTOMS: \"Do you have any other symptoms?\" (e.g., fever, abdominal pain, burning with urination, blood in urine)        Denies  11. PREGNANCY: \"Is there any chance you are pregnant?\" (e.g., yes, no; LMP)        N/A    Protocols used: Back Pain-ADULT-AH    "

## 2024-04-22 NOTE — PROGRESS NOTES
[facility]        NAME: Johana Alcantara is a 78 y.o. female  : 1945    MRN: [id number]  DATE: 2024  TIME: 12:09 PM    Assessment and Plan   1. Acute left-sided low back pain with left-sided sciatica  -     XR spine lumbar minimum 4 views non injury; Future; Expected date: 2024  -     XR sacroiliac joints 3+ views; Future; Expected date: 2024  -     Urinalysis with microscopic; Future  -     Ambulatory Referral to Physical Therapy; Future  -     Urinalysis with microscopic    2. Constipation, unspecified constipation type    3. Generalized anxiety disorder             Chief Complaint     Chief Complaint   Patient presents with   • Tailbone Pain     Reports pain is burning from tailbone to lower back up to shoulder. Had xray done on Friday. Started to have sx since not this past weekend but weekend prior.         History of Present Illness       77yo female with history of type 1 diabtetes here for follow up worsening back pain    Started last week and seen . Abdominal xray showed stool throughout her colon without obstruction. She reports some constipation but having small hard stools and straining. Presents today due to burning in her lower back, mainly right SI joint and radiates upwards. No dysuria, urgency, hesitancy or hematuria. Feels similar to back pain she had 2 years ago. Taking aleve and tylenol and gets a few hours of relief.         Review of Systems   Review of Systems   Constitutional:  Negative for appetite change, chills, fatigue and fever.   Gastrointestinal:  Positive for constipation. Negative for abdominal distention, abdominal pain, diarrhea, nausea and vomiting.   Genitourinary:  Negative for difficulty urinating.   Musculoskeletal:  Positive for back pain.   Psychiatric/Behavioral:  The patient is nervous/anxious.          Current Medications       Current Outpatient Medications:   •  Alcohol Swabs ( Sterile Alcohol Prep) PADS, ,  "Disp: , Rfl:   •  ASPIRIN 81 PO, Take 81 mg by mouth daily, Disp: , Rfl:   •  atorvastatin (LIPITOR) 20 mg tablet, TAKE ONE TABLET BY MOUTH ONCE DAILY, Disp: 90 tablet, Rfl: 2  •  Blood Glucose Monitoring Suppl (OneTouch Verio) w/Device KIT, Use to test blood sugar 4x daily., Disp: 1 kit, Rfl: 0  •  Cholecalciferol 2000 units CAPS, Take 1 capsule by mouth, Disp: , Rfl:   •  Continuous Blood Gluc Sensor (FreeStyle Kaylah 2 Sensor) MISC, Use, Disp: , Rfl:   •  famotidine (PEPCID) 20 mg tablet, Take 1 tablet (20 mg total) by mouth daily, Disp: 90 tablet, Rfl: 3  •  fluconazole (DIFLUCAN) 100 mg tablet, Take 1 tablet by mouth 2 (two) times a day, Disp: , Rfl:   •  glucose blood (OneTouch Verio) test strip, Use to test blood sugar 4x daily in case of CGM failure., Disp: 100 each, Rfl: 2  •  insulin aspart (NovoLOG FlexPen) 100 UNIT/ML injection pen, Inject 5 units three times daily before meals., Disp: 6 mL, Rfl: 1  •  Insulin Glargine Solostar (Lantus SoloStar) 100 UNIT/ML SOPN, Inject 12 units nightly., Disp: 6 mL, Rfl: 3  •  levothyroxine 75 mcg tablet, Take 1 tablet (75 mcg total) by mouth daily, Disp: 90 tablet, Rfl: 3  •  lisinopril (ZESTRIL) 20 mg tablet, Take 1 tablet (20 mg total) by mouth daily, Disp: 90 tablet, Rfl: 1  •  Multiple Vitamins-Minerals (MULTIVITAL-M PO), Take by mouth, Disp: , Rfl:   •  mupirocin (BACTROBAN) 2 % ointment, Apply topically 3 (three) times a day, Disp: 30 g, Rfl: 1  •  OneTouch Delica Lancets 33G MISC, Use to test blood sugar 4x daily., Disp: 300 each, Rfl: 2  •  Sure Comfort Insulin Syringe 30G X 1/2\" 0.3 ML MISC, , Disp: , Rfl:   •  Ascorbic Acid (VITAMIN C PO), Take by mouth (Patient not taking: Reported on 4/17/2024), Disp: , Rfl:   •  EPINEPHrine (EPIPEN) 0.3 mg/0.3 mL SOAJ, Inject 0.3 mL (0.3 mg total) into a muscle once for 1 dose, Disp: 0.6 mL, Rfl: 0  •  melatonin 3 mg, , Disp: , Rfl:   •  meloxicam (MOBIC) 7.5 mg tablet, Take 2 tablets by mouth daily (Patient not taking: " Reported on 4/17/2024), Disp: , Rfl:   •  mometasone (ELOCON) 0.1 % cream, Apply topically daily Apply topically BID x 2 weeks then qhs prn (Patient not taking: Reported on 4/17/2024), Disp: 45 g, Rfl: 3    Current Allergies     Allergies as of 04/22/2024 - Reviewed 04/22/2024   Allergen Reaction Noted   • Diphenhydramine Shortness Of Breath 04/21/2016   • Meperidine Hives and Shortness Of Breath 04/21/2016   • Prednisone Hives and Shortness Of Breath 01/19/2018   • Bee pollen  04/21/2016   • Ciprofloxacin Hives 04/21/2016   • Sulfamethoxazole-trimethoprim GI Intolerance 07/03/2019            The following portions of the patient's history were reviewed and updated as appropriate: allergies, current medications, past family history, past medical history, past social history, past surgical history and problem list.     Past Medical History:   Diagnosis Date   • Anxiety    • Colon polyp    • Depression    • Diabetes mellitus (HCC)    • Diverticulosis    • Hyperlipidemia    • Hypertension        Past Surgical History:   Procedure Laterality Date   • APPENDECTOMY     • CATARACT EXTRACTION Right    • COLONOSCOPY      difficult exam   • HYSTERECTOMY     • TONSILLECTOMY     • WISDOM TOOTH EXTRACTION         Family History   Problem Relation Age of Onset   • Parkinsonism Mother    • Alzheimer's disease Father    • Breast cancer Sister         in her 40's   • Lung cancer Sister    • Breast cancer Sister    • No Known Problems Daughter    • No Known Problems Daughter    • No Known Problems Maternal Grandmother    • No Known Problems Maternal Grandfather    • No Known Problems Paternal Grandmother    • No Known Problems Paternal Grandfather    • No Known Problems Maternal Aunt    • No Known Problems Maternal Aunt    • No Known Problems Maternal Aunt    • No Known Problems Paternal Aunt          Medications have been verified.        Objective   /74   Pulse 78   Temp (!) 97.2 °F (36.2 °C) (Temporal)   Resp 16   Ht 5'  "1.5\" (1.562 m)   Wt 57.6 kg (127 lb)   SpO2 98%   BMI 23.61 kg/m²        Physical Exam     Physical Exam  Vitals reviewed.   Constitutional:       General: She is not in acute distress.     Appearance: She is normal weight.   Cardiovascular:      Rate and Rhythm: Normal rate and regular rhythm.      Heart sounds: No murmur heard.     No friction rub. No gallop.   Pulmonary:      Effort: Pulmonary effort is normal. No respiratory distress.      Breath sounds: No wheezing, rhonchi or rales.   Abdominal:      General: Abdomen is flat. Bowel sounds are normal. There is no distension.      Palpations: Abdomen is soft. There is no mass.      Tenderness: There is no abdominal tenderness. There is no guarding or rebound.   Musculoskeletal:      Lumbar back: Tenderness (left SI joint) present. Negative right straight leg raise test and negative left straight leg raise test. Scoliosis present.   Neurological:      Mental Status: She is alert.      Motor: No weakness.      Gait: Gait normal.   Psychiatric:         Mood and Affect: Affect normal. Mood is anxious.         Speech: Speech normal.         Behavior: Behavior normal. Behavior is cooperative.             Results:  Lab Results   Component Value Date    SODIUM 140 11/16/2023    K 4.5 11/16/2023     11/16/2023    CO2 28 11/16/2023    BUN 19 11/16/2023    CREATININE 1.00 11/16/2023    GLUC 264 (H) 12/27/2021    CALCIUM 9.2 11/16/2023       Lab Results   Component Value Date    HGBA1C 8.2 (A) 04/10/2024       Lab Results   Component Value Date    WBC 7.10 08/14/2023    HGB 12.6 08/14/2023    HCT 39.5 08/14/2023    MCV 91 08/14/2023     08/14/2023         "

## 2024-04-23 ENCOUNTER — TELEPHONE (OUTPATIENT)
Dept: FAMILY MEDICINE CLINIC | Facility: CLINIC | Age: 79
End: 2024-04-23

## 2024-04-23 NOTE — TELEPHONE ENCOUNTER
----- Message from Jessica Duggan MD sent at 4/23/2024  9:00 AM EDT -----  Please advise pt that her urine looks good, no signs of UTI or blood.

## 2024-04-23 NOTE — TELEPHONE ENCOUNTER
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 Recommend PT as we discussed and call if worsening or no improvement in 4-6 weeks.

## 2024-04-23 NOTE — TELEPHONE ENCOUNTER
Pt called in for results. PCP notes relayed to pt, who verbalized understanding. Pt has PT appt scheduled for next Monday.

## 2024-04-29 ENCOUNTER — EVALUATION (OUTPATIENT)
Dept: PHYSICAL THERAPY | Facility: CLINIC | Age: 79
End: 2024-04-29
Payer: MEDICARE

## 2024-04-29 DIAGNOSIS — M54.42 ACUTE LEFT-SIDED LOW BACK PAIN WITH LEFT-SIDED SCIATICA: Primary | ICD-10-CM

## 2024-04-29 DIAGNOSIS — R26.2 DIFFICULTY WALKING: ICD-10-CM

## 2024-04-29 PROCEDURE — 97112 NEUROMUSCULAR REEDUCATION: CPT | Performed by: PHYSICAL THERAPIST

## 2024-04-29 PROCEDURE — 97162 PT EVAL MOD COMPLEX 30 MIN: CPT | Performed by: PHYSICAL THERAPIST

## 2024-04-29 NOTE — PROGRESS NOTES
PT Evaluation     Today's date: 2024  Patient name: Johana Alcantara  : 1945  MRN: [id number]  Referring provider: Jessica Duggan MD  Dx:   Encounter Diagnosis     ICD-10-CM    1. Acute left-sided low back pain with left-sided sciatica  M54.42 Ambulatory Referral to Physical Therapy      2. Difficulty walking  R26.2           Start Time: 1445  Stop Time: 1530  Total time in clinic (min): 45 minutes    Assessment  Assessment details: Johana Alcantara was seen for an initial PT evaluation today. Patient is a 78 y.o. female with diagnosis of low back pain and past medical history significant for HTN, hypothyroidism, DM, R ear deafness, osteoporosis, anxiety, retinal disorder, hyperlipidemia, appendectomy, hysterectomy. Moderate complexity evaluation  due to number of participation restrictions, functional outcome measure of 44% limitation, and evolving clinical presentation. Findings today show limitation of lumbar range of motion with decreased core activation and weakness of bilateral LE with pain  impacting overall functional mobility including ability to twist, lifting, bend, sqat. Skilled PT indicated to treat at this time to address above stated deficits and return patient to PLOF.      Impairments: abnormal gait, abnormal muscle firing, abnormal muscle tone, abnormal or restricted ROM, abnormal movement, activity intolerance, impaired balance, impaired physical strength, lacks appropriate home exercise program, pain with function, poor posture  and poor body mechanics  Functional limitations: lifting, pushing, pulling, bending, twisting, walking, stairs  Goals  STG (6 weeks)  1. Patient will have reported 0/10 pain in low back at rest.   2. Improve patient's lumbar extension to 15 degrees for improved upright posture.  3. Increase patient's bilateral single leg balance to 5 seconds for increased stability on stairs.   LTG (12 weeks)  1. Patient's LE strength will be equal bilaterally for  "ability to ambulate and return to functional activities at [facility].   2. Patient will be able to walk in community with 0/10 pain in low back.   3. Patient will be independent with home exercise program for continued maintenance post PT discharge.       Plan  Plan details: Progress note in 4 weeks.   Patient would benefit from: skilled physical therapy  Planned modality interventions: unattended electrical stimulation, thermotherapy: hydrocollator packs, cryotherapy and traction  Planned therapy interventions: manual therapy, neuromuscular re-education, self care, therapeutic activities, therapeutic exercise, home exercise program and gait training  Frequency: 2x week  Duration in weeks: 12  Plan of Care beginning date: 2024  Plan of Care expiration date: 2024  Treatment plan discussed with: patient and PTA      Subjective Evaluation    History of Present Illness  Date of onset: 2024  Mechanism of injury: Johana Alcantara is a 78 y.o. female who presents to outpatient Physical Therapy today with complaints of low back pain. States she started having low back/tailbone pain that radiates up her back and across top of shoulder. Has been trying to keep moving, but does sit with ice and heat pack. This has occurred in the past, but hasn't in 2 years. Burning pain has eased the past 2-3 days. Seen by PCP who took imaging (+) lumbar osteopenia.     Patient Goals  Patient goals for therapy: decreased pain  Patient goal: \"find out why the pain comes\".  Pain  Current pain ratin  At best pain ratin  At worst pain rating: 10  Location: L glut radiating up back  Quality: burning, dull ache and radiating  Relieving factors: heat and change in position  Aggravating factors: lifting (laying supine)    Social Support  Steps to enter house: no  Stairs in house: no   Lives in: apartment  Lives with: alone    Employment status: not working      Objective     Concurrent Complaints  Negative for bladder " dysfunction, bowel dysfunction, saddle (S4) numbness and history of trauma    Neurological Testing     Sensation     Lumbar   Left   Intact: light touch    Right   Intact: light touch    Active Range of Motion     Lumbar   Flexion:  WFL  Extension:  Restriction level: minimal  Left lateral flexion:  WFL  Right lateral flexion:  WFL and with pain  Left rotation:  with pain Restriction level: moderate  Right rotation:  WFL    Joint Play     Hypomobile: L1, L2, L3, L4 and L5   Mechanical Assessment    Cervical      Thoracic      Lumbar    Standing flexion: repeated movements   Standing extension: repeated movements    Strength/Myotome Testing     Left Hip   Planes of Motion   Flexion: 5  Extension: 3-  Abduction: 4+  Adduction: 4    Right Hip   Planes of Motion   Flexion: 5  Extension: 3-  Abduction: 4-  Adduction: 4    Left Knee   Flexion: 5  Extension: 5    Right Knee   Flexion: 5  Extension: 5    Left Ankle/Foot   Dorsiflexion: 5    Right Ankle/Foot   Dorsiflexion: 5    Muscle Activation     Additional Muscle Activation Details  Activation of TrA with resisted SLR R= trace L=min    Tests     Lumbar     Left   Negative passive SLR.     Right   Negative passive SLR.     Left Pelvic Girdle/Sacrum   Positive: active SLR test.     Right Pelvic Girdle/Sacrum   Negative: active SLR test.     Left Hip   Negative long sit.     Right Hip   Negative long sit.     Additional Tests Details  Hamstring 90/90 SLR R=(25) L=(10)        General Comments:      Lumbar Comments      FOTO: 56% function, 72% predicted function              Precautions: lumbar osteopenia  Access code: [email or web address]  Progress note: 5/29  POC: 7/29    Manuals 4/29       traction ?                               Neuro Re-Ed        Core brace 10x       kegel 10x       Knee fall out        Supine march        SLR with core brace        UBE *                       Ther Ex        Nustep                        Bridge 10x                               LTR *                Supine ball squeeze *       Supine clamshell *                                               Ther Activity        Step ups        Sit to stand        Gait Training                        Modalities

## 2024-05-01 ENCOUNTER — TELEPHONE (OUTPATIENT)
Age: 79
End: 2024-05-01

## 2024-05-01 NOTE — TELEPHONE ENCOUNTER
"Phone call from patient stating \"she received a call about her Flores\". No notes in chart unable to respond to patient. In addition patient would like to know if \"she needs to remove her sensors for her Echo Cardiogram appt on 5/24/24. Please contact patient at [phone].  "

## 2024-05-02 DIAGNOSIS — R05.1 ACUTE COUGH: ICD-10-CM

## 2024-05-02 DIAGNOSIS — R10.13 DYSPEPSIA: ICD-10-CM

## 2024-05-02 NOTE — TELEPHONE ENCOUNTER
Called patient. Has a Kaylah that is not connected.   Called her to bring it in for a download.    Patient was unavailable so I left a voicemail.    Sending to provider to advise about removing her sensor before her echo cardiogram

## 2024-05-02 NOTE — TELEPHONE ENCOUNTER
Called patient. She is aware she doesn't need to take the senor off and is bringing her Kaylah in for a download

## 2024-05-04 RX ORDER — FAMOTIDINE 20 MG/1
20 TABLET, FILM COATED ORAL DAILY
Qty: 90 TABLET | Refills: 1 | Status: SHIPPED | OUTPATIENT
Start: 2024-05-04

## 2024-05-06 ENCOUNTER — OFFICE VISIT (OUTPATIENT)
Dept: PHYSICAL THERAPY | Facility: CLINIC | Age: 79
End: 2024-05-06
Payer: MEDICARE

## 2024-05-06 ENCOUNTER — TELEPHONE (OUTPATIENT)
Dept: ENDOCRINOLOGY | Facility: CLINIC | Age: 79
End: 2024-05-06

## 2024-05-06 DIAGNOSIS — E10.22 TYPE 1 DIABETES MELLITUS WITH STAGE 3A CHRONIC KIDNEY DISEASE (HCC): ICD-10-CM

## 2024-05-06 DIAGNOSIS — R26.2 DIFFICULTY WALKING: ICD-10-CM

## 2024-05-06 DIAGNOSIS — M54.42 ACUTE LEFT-SIDED LOW BACK PAIN WITH LEFT-SIDED SCIATICA: Primary | ICD-10-CM

## 2024-05-06 DIAGNOSIS — N18.31 TYPE 1 DIABETES MELLITUS WITH STAGE 3A CHRONIC KIDNEY DISEASE (HCC): ICD-10-CM

## 2024-05-06 PROCEDURE — 97110 THERAPEUTIC EXERCISES: CPT

## 2024-05-06 PROCEDURE — 97112 NEUROMUSCULAR REEDUCATION: CPT

## 2024-05-06 NOTE — PROGRESS NOTES
"Daily Note     Today's date: 2024  Patient name: Johana Alcantara  : 1945  MRN: [id number]  Referring provider: Jessica Duggan MD  Dx:   Encounter Diagnosis     ICD-10-CM    1. Acute left-sided low back pain with left-sided sciatica  M54.42       2. Difficulty walking  R26.2           Start Time: 1114  Stop Time: 1203  Total time in clinic (min): 49 minutes    Subjective: Patient reports compliance with her HEP and notes onset of L LE numbness/tingling during core bracing exercise, therefore patient held exercise when experiencing this sensation. Patient otherwise had no adverse response or onset of lumbar pain with any of the other home exercises last session. Patient notes improvements with lumbar \"pinching\" sensation after initial eval last session and only notes some soreness in her low back today.      Objective: See treatment diary below      Assessment: Tolerated treatment well. Patient did not experience any worsening of lumbar symptoms throughout today's session. Reviewed proper instruction, body mechanics, and pacing of exercises included in patient's HEP, with good carry noted. Patient demonstrated an appropriate level of challenge with all exercises added to POC today and did not have any onset of pain or adverse response throughout session. Patient required verbal, tactile, and visual cueing for proper muscle activation during core bracing exercise, with fair carryover noted. Patient did not experience any numbness or tingling down her L LE during core bracing exercise today and noted exercise being more comfortable for her vs how she was performing exercise while at home this weekend. Muscle fatigue present at the conclusion of session. Patient would benefit from continued PT to restore lumbar stability, mobility, strength, and to maximize overall functional ability.      Plan: Continue per plan of care.      Precautions: lumbar osteopenia  Access code: [email or web address]  Progress note: " 5/29  POC: 7/29    Manuals 4/29 5/6      traction ?                               Neuro Re-Ed        Core brace 10x 10x      kegel 10x 10x      Knee fall out        Supine march        SLR with core brace        UBE * L1 8 min alt                      Ther Ex        Nustep                        Bridge 10x 10x                              LTR * :05x10 ea              Supine ball squeeze * :05x15       Supine clamshell * YTB :03x10                                              Ther Activity        Step ups        Sit to stand        Gait Training                        Modalities

## 2024-05-07 DIAGNOSIS — N18.31 TYPE 1 DIABETES MELLITUS WITH STAGE 3A CHRONIC KIDNEY DISEASE (HCC): ICD-10-CM

## 2024-05-07 DIAGNOSIS — E10.22 TYPE 1 DIABETES MELLITUS WITH STAGE 3A CHRONIC KIDNEY DISEASE (HCC): ICD-10-CM

## 2024-05-07 RX ORDER — INSULIN ASPART 100 [IU]/ML
INJECTION, SOLUTION INTRAVENOUS; SUBCUTANEOUS
Qty: 6 ML | Refills: 1 | Status: SHIPPED | OUTPATIENT
Start: 2024-05-07

## 2024-05-07 RX ORDER — INSULIN GLARGINE 100 [IU]/ML
INJECTION, SOLUTION SUBCUTANEOUS
Qty: 6 ML | Refills: 3 | Status: SHIPPED | OUTPATIENT
Start: 2024-05-07 | End: 2024-05-07 | Stop reason: SDUPTHER

## 2024-05-07 RX ORDER — INSULIN GLARGINE 100 [IU]/ML
INJECTION, SOLUTION SUBCUTANEOUS
Qty: 6 ML | Refills: 3 | Status: SHIPPED | OUTPATIENT
Start: 2024-05-07 | End: 2024-05-15 | Stop reason: SDUPTHER

## 2024-05-07 NOTE — TELEPHONE ENCOUNTER
CGM reviewed. Overall, patient is doing well but she is having more episodes of low blood sugars.    Recommend reducing Lantus to 10 units nightly. Reduce novolog to 4 units prior to meals.     Thank you.

## 2024-05-07 NOTE — TELEPHONE ENCOUNTER
In that case, we will need to have her start her basal insulin. Based on this report, I would recommend she start at 8 units of Lantus nightly. Still reduce to 4 units of novolog. I will resend the insulin accordingly.       In the future, please always confirm what medications patient is taking and when before sending me a CGM report to review. This should be included in documentation sent to me along with request to review CGM report.

## 2024-05-07 NOTE — TELEPHONE ENCOUNTER
Patient notified. States she actually never had her lantus. States pharmacy has been trying to contact us (which I am not seeing documentation of this). Patient is just taking novolog

## 2024-05-10 ENCOUNTER — OFFICE VISIT (OUTPATIENT)
Dept: PHYSICAL THERAPY | Facility: CLINIC | Age: 79
End: 2024-05-10
Payer: MEDICARE

## 2024-05-10 DIAGNOSIS — R26.2 DIFFICULTY WALKING: ICD-10-CM

## 2024-05-10 DIAGNOSIS — M54.42 ACUTE LEFT-SIDED LOW BACK PAIN WITH LEFT-SIDED SCIATICA: Primary | ICD-10-CM

## 2024-05-10 PROCEDURE — 97110 THERAPEUTIC EXERCISES: CPT | Performed by: PHYSICAL THERAPIST

## 2024-05-10 PROCEDURE — 97112 NEUROMUSCULAR REEDUCATION: CPT | Performed by: PHYSICAL THERAPIST

## 2024-05-10 NOTE — PROGRESS NOTES
Daily Note     Today's date: 5/10/2024  Patient name: Johana Alcantara  : 1945  MRN: [id number]  Referring provider: Jsesica Duggan MD  Dx:   Encounter Diagnosis     ICD-10-CM    1. Acute left-sided low back pain with left-sided sciatica  M54.42       2. Difficulty walking  R26.2           Start Time: 0745  Stop Time: 0830  Total time in clinic (min): 45 minutes    Subjective: Patient states she has been getting some radicular pain with core bracing. Overall is feeling much better with resolution of burning symptoms.       Objective: See treatment diary below      Assessment: Tolerated treatment well. No increased pain throughout session. Sensation noted with bridge exercise, but resolved when finished with ex. Reviewed HEP and was given band and updated HEP. Patient exhibited good technique with therapeutic exercises and would benefit from continued PT      Plan: Continue per plan of care.  Progress treatment as tolerated.       Precautions: lumbar osteopenia  Access code: [email or web address]  Progress note:   POC:     Manuals 4/29 5/6 5/10     traction ?                               Neuro Re-Ed        Core brace 10x 10x :05x10     kegel 10x 10x :05x10     Knee fall out        Supine march        SLR with core brace        UBE * L1 8 min alt 120/80 7 min standing alt     Tandem balance   :30x2             Ther Ex        Nustep   10 min     Standing hip abd/ext diagonals   10x alt             Bridge 10x 10x 10x                             LTR * :05x10 ea :05x10             Supine ball squeeze * :05x15  20x     Supine clamshell * YTB :03x10 YTB 2x10                                             Ther Activity        Step ups        Sit to stand        Gait Training                        Modalities

## 2024-05-13 ENCOUNTER — OFFICE VISIT (OUTPATIENT)
Dept: PHYSICAL THERAPY | Facility: CLINIC | Age: 79
End: 2024-05-13
Payer: MEDICARE

## 2024-05-13 DIAGNOSIS — R26.2 DIFFICULTY WALKING: ICD-10-CM

## 2024-05-13 DIAGNOSIS — M54.42 ACUTE LEFT-SIDED LOW BACK PAIN WITH LEFT-SIDED SCIATICA: Primary | ICD-10-CM

## 2024-05-13 PROCEDURE — 97110 THERAPEUTIC EXERCISES: CPT

## 2024-05-13 PROCEDURE — 97112 NEUROMUSCULAR REEDUCATION: CPT

## 2024-05-13 NOTE — PROGRESS NOTES
Daily Note     Today's date: 2024  Patient name: Johana Alcantara  : 1945  MRN: [id number]  Referring provider: Jessica Duggan MD  Dx:   Encounter Diagnosis     ICD-10-CM    1. Acute left-sided low back pain with left-sided sciatica  M54.42       2. Difficulty walking  R26.2             Start Time: 1030  Stop Time: 1120  Total time in clinic (min): 50 minutes      Subjective: Patient reports having an exacerbation of lumbar symptoms at her grand daughters graduation on Saturday from sitting on the bleachers for a prolonged period of time. Patient notes that she was sitting on a blanket to add some support which helped for the time being, but still caused moderate lumbar soreness the next day as soon as she woke up. Patient states that she needed to take advil and put ice on her low back in order to get relief yesterday and currently has some residual soreness, but no noted pain.      Objective: See treatment diary below      Assessment: Tolerated treatment well. Patient did not experience any worsening of lumbar symptoms throughout today's session. Patient demonstrated an appropriate level of challenge with all exercises performed today and did not have any onset of lumbar pain or adverse response throughout session. Patient required less verbal cueing to maintain proper muscle activation during core bracing exercise, but still experienced moderate challenges with practicing proper breathing techniques to prevent breath holding and needed to be reminded throughout duration of exercise. Progressed patient by adding step ups during session to promote improvements in stair navigation/gait training, which was moderately fatiguing vs being difficult, but was tolerable overall and did not cause onset of lumbar pain. Muscle fatigue present at the conclusion of session. Patient would benefit from continued PT to restore lumbar stability, mobility, strength, and to maximize overall functional ability.  "      Plan: Continue per plan of care.  Progress treatment as tolerated.       Precautions: lumbar osteopenia  Access code: [email or web address]  Progress note: 5/29  POC: 7/29    Manuals 4/29 5/6 5/10 5/13    traction ?                               Neuro Re-Ed        Core brace 10x 10x :05x10 :05x15    kegel 10x 10x :05x10 :05x10    Knee fall out        Supine march        SLR with core brace        UBE * L1 8 min alt 120/80 7 min standing alt 120/80 7 min standing alt    Tandem balance   :30x2 :30x2 bilat            Ther Ex        Nustep   10 min 10 min    Standing hip abd/ext diagonals   10x alt 2x10 alt            Bridge 10x 10x 10x 15x                            LTR * :05x10 ea :05x10 :05x15            Supine ball squeeze * :05x15  20x :05x20    Supine clamshell * YTB :03x10 YTB 2x10 YTB 2x10                                            Ther Activity        Step ups    Fwd 6\" x15 bilat    Sit to stand        Gait Training                        Modalities                                   "

## 2024-05-14 ENCOUNTER — TELEPHONE (OUTPATIENT)
Age: 79
End: 2024-05-14

## 2024-05-14 NOTE — TELEPHONE ENCOUNTER
Patient was calling in regards to her sensors. She said [facility] medical needs the paperwork by the 20th so the patient can get her sensors in time.

## 2024-05-15 ENCOUNTER — TELEPHONE (OUTPATIENT)
Dept: GASTROENTEROLOGY | Facility: CLINIC | Age: 79
End: 2024-05-15

## 2024-05-15 DIAGNOSIS — E10.22 TYPE 1 DIABETES MELLITUS WITH STAGE 3A CHRONIC KIDNEY DISEASE (HCC): ICD-10-CM

## 2024-05-15 DIAGNOSIS — N18.31 TYPE 1 DIABETES MELLITUS WITH STAGE 3A CHRONIC KIDNEY DISEASE (HCC): ICD-10-CM

## 2024-05-15 RX ORDER — INSULIN GLARGINE 100 [IU]/ML
INJECTION, SOLUTION SUBCUTANEOUS
Qty: 6 ML | Refills: 3 | Status: SHIPPED | OUTPATIENT
Start: 2024-05-15

## 2024-05-15 NOTE — TELEPHONE ENCOUNTER
Pt called into med refill line stating Lantus is not at the pharmacy. Was supposed to be sent on 5/7.

## 2024-05-15 NOTE — TELEPHONE ENCOUNTER
Patient needs Lantus. Sending to refill pod  Looks like 5/7 it was ordered but not sent to pharmacy

## 2024-05-17 ENCOUNTER — APPOINTMENT (OUTPATIENT)
Dept: PHYSICAL THERAPY | Facility: CLINIC | Age: 79
End: 2024-05-17
Payer: MEDICARE

## 2024-05-17 NOTE — PROGRESS NOTES
Daily Note     Today's date: 2024  Patient name: Johana Alcantara  : 1945  MRN: [id number]  Referring provider: Jessica Duggan MD  Dx:   No diagnosis found.                     Subjective: Patient reports having an exacerbation of lumbar symptoms at her grand daughters graduation on Saturday from sitting on the bleachers for a prolonged period of time. Patient notes that she was sitting on a blanket to add some support which helped for the time being, but still caused moderate lumbar soreness the next day as soon as she woke up. Patient states that she needed to take advil and put ice on her low back in order to get relief yesterday and currently has some residual soreness, but no noted pain.      Objective: See treatment diary below      Assessment: Tolerated treatment well. Patient did not experience any worsening of lumbar symptoms throughout today's session. Patient demonstrated an appropriate level of challenge with all exercises performed today and did not have any onset of lumbar pain or adverse response throughout session. Patient required less verbal cueing to maintain proper muscle activation during core bracing exercise, but still experienced moderate challenges with practicing proper breathing techniques to prevent breath holding and needed to be reminded throughout duration of exercise. Progressed patient by adding step ups during session to promote improvements in stair navigation/gait training, which was moderately fatiguing vs being difficult, but was tolerable overall and did not cause onset of lumbar pain. Muscle fatigue present at the conclusion of session. Patient would benefit from continued PT to restore lumbar stability, mobility, strength, and to maximize overall functional ability.       Plan: Continue per plan of care.  Progress treatment as tolerated.       Precautions: lumbar osteopenia  Access code: [email or web address]  Progress note:   POC:     Manuals 4/29 5/6 5/10  "5/13    traction ?                               Neuro Re-Ed        Core brace 10x 10x :05x10 :05x15    kegel 10x 10x :05x10 :05x10    Knee fall out        Supine march        SLR with core brace        UBE * L1 8 min alt 120/80 7 min standing alt 120/80 7 min standing alt    Tandem balance   :30x2 :30x2 bilat            Ther Ex        Nustep   10 min 10 min    Standing hip abd/ext diagonals   10x alt 2x10 alt            Bridge 10x 10x 10x 15x                            LTR * :05x10 ea :05x10 :05x15            Supine ball squeeze * :05x15  20x :05x20    Supine clamshell * YTB :03x10 YTB 2x10 YTB 2x10                                            Ther Activity        Step ups    Fwd 6\" x15 bilat    Sit to stand        Gait Training                        Modalities                                   "

## 2024-05-20 ENCOUNTER — TELEPHONE (OUTPATIENT)
Dept: FAMILY MEDICINE CLINIC | Facility: CLINIC | Age: 79
End: 2024-05-20

## 2024-05-20 ENCOUNTER — OFFICE VISIT (OUTPATIENT)
Dept: PHYSICAL THERAPY | Facility: CLINIC | Age: 79
End: 2024-05-20
Payer: MEDICARE

## 2024-05-20 DIAGNOSIS — M54.42 ACUTE LEFT-SIDED LOW BACK PAIN WITH LEFT-SIDED SCIATICA: Primary | ICD-10-CM

## 2024-05-20 DIAGNOSIS — R26.2 DIFFICULTY WALKING: ICD-10-CM

## 2024-05-20 PROCEDURE — 97112 NEUROMUSCULAR REEDUCATION: CPT

## 2024-05-20 PROCEDURE — 97110 THERAPEUTIC EXERCISES: CPT

## 2024-05-20 NOTE — TELEPHONE ENCOUNTER
----- Message from Concepcion Flowers DO sent at 5/20/2024  6:47 AM EDT -----  Please let Johana know her carotid study shows a mild amount of plaque on the right side compared to study 2019, left side is normal. The best way to treat this and prevent it from getting worse is controlling her diabetes, cholesterol and blood pressure, which she is doing. No further testing needed at this time.

## 2024-05-20 NOTE — PROGRESS NOTES
"Daily Note     Today's date: 2024  Patient name: Johana Alcantara  : 1945  MRN: [id number]  Referring provider: Jessica Duggan MD  Dx:   Encounter Diagnosis     ICD-10-CM    1. Acute left-sided low back pain with left-sided sciatica  M54.42       2. Difficulty walking  R26.2               Start Time: 1033  Stop Time: 1125  Total time in clinic (min): 52 minutes      Subjective: Patient reports that she did not feel good this weekend and therefore experienced an exacerbation of lumbar symptoms from not being as mobile and active as she usually is on her weekends. Patient notes that she has some \"sciatic symptoms\" down her L glute since she woke up this morning, but does not have any lumbar pain currently.      Objective: See treatment diary below      Assessment: Tolerated treatment well. Patient did not experience any worsening of lumbar symptoms throughout today's session. Patient demonstrated an appropriate level of challenge with all exercises performed today and did not have any onset of lumbar pain or adverse response throughout session. Patient required less verbal cueing to maintain proper muscle activation and breathing technique during core bracing exercises today. Progressed patient by adding SLR and marching exercises both in conjunction w/ core bracing, which was moderately fatiguing and challenging, but was tolerable overall and did not cause onset of lumbar pain. Muscle fatigue present at the conclusion of session. Patient would benefit from continued PT to restore lumbar stability, mobility, strength, and to maximize overall functional ability.       Plan: Continue per plan of care.  Progress treatment as tolerated.       Precautions: lumbar osteopenia  Access code: [email or web address]  Progress note:   POC:     Manuals 4/29 5/6 5/10 5/13 5/20   traction ?                               Neuro Re-Ed        Core brace 10x 10x :05x10 :05x15 05x15   kegel 10x 10x :05x10 :05x10 :05x10 " "  Knee fall out     X10 alt   Supine march        SLR with core brace        UBE * L1 8 min alt 120/80 7 min standing alt 120/80 7 min standing alt 120/80 8 min standing alt   Tandem balance   :30x2 :30x2 bilat :30x2 bilat           Ther Ex        Nustep   10 min 10 min L1 10 min   Standing hip abd/ext diagonals   10x alt 2x10 alt 2x10 alt           Bridge 10x 10x 10x 15x 15x                           LTR * :05x10 ea :05x10 :05x15 :05x15           Supine ball squeeze * :05x15  20x :05x20 :05x20   Supine clamshell * YTB :03x10 YTB 2x10 YTB 2x10 nv                                           Ther Activity        Step ups    Fwd 6\" x15 bilat Fwd 6\" x20 bilat   Sit to stand        Gait Training                        Modalities                                   "

## 2024-05-21 ENCOUNTER — TELEPHONE (OUTPATIENT)
Age: 79
End: 2024-05-21

## 2024-05-22 ENCOUNTER — TELEPHONE (OUTPATIENT)
Age: 79
End: 2024-05-22

## 2024-05-22 DIAGNOSIS — N18.31 TYPE 1 DIABETES MELLITUS WITH STAGE 3A CHRONIC KIDNEY DISEASE (HCC): ICD-10-CM

## 2024-05-22 DIAGNOSIS — E10.22 TYPE 1 DIABETES MELLITUS WITH STAGE 3A CHRONIC KIDNEY DISEASE (HCC): ICD-10-CM

## 2024-05-22 NOTE — TELEPHONE ENCOUNTER
Please call patient to clarify.    We switched her to 8 units of Lantus nightly and 4 units of novolog three times daily prior to meals.     She is not currently prescribed mixed insulin.

## 2024-05-22 NOTE — TELEPHONE ENCOUNTER
Pharmacy called requesting the Novolog 70/30 which her medicaiton list says was discontinued but the pharmacy said patient requested that insulin. Please review and prescribe if appropriate.

## 2024-05-24 ENCOUNTER — OFFICE VISIT (OUTPATIENT)
Dept: PHYSICAL THERAPY | Facility: CLINIC | Age: 79
End: 2024-05-24
Payer: MEDICARE

## 2024-05-24 ENCOUNTER — HOSPITAL ENCOUNTER (OUTPATIENT)
Dept: NON INVASIVE DIAGNOSTICS | Facility: HOSPITAL | Age: 79
Discharge: HOME | End: 2024-05-24
Payer: MEDICARE

## 2024-05-24 VITALS
DIASTOLIC BLOOD PRESSURE: 74 MMHG | HEART RATE: 70 BPM | SYSTOLIC BLOOD PRESSURE: 124 MMHG | HEIGHT: 62 IN | WEIGHT: 127 LBS | BODY MASS INDEX: 23.37 KG/M2

## 2024-05-24 DIAGNOSIS — R22.1 PULSATILE NECK MASS: ICD-10-CM

## 2024-05-24 DIAGNOSIS — R26.2 DIFFICULTY WALKING: ICD-10-CM

## 2024-05-24 DIAGNOSIS — M54.42 ACUTE LEFT-SIDED LOW BACK PAIN WITH LEFT-SIDED SCIATICA: Primary | ICD-10-CM

## 2024-05-24 LAB
AORTIC ARCH: 3.2 CM
AORTIC ROOT: 3 CM
AORTIC VALVE MEAN VELOCITY: 7.8 M/S
APICAL FOUR CHAMBER EJECTION FRACTION: 60 %
ASCENDING AORTA: 3.6 CM
AV AREA BY CONTINUOUS VTI: 1.9 CM2
AV AREA PEAK VELOCITY: 1.5 CM2
AV LVOT MEAN GRADIENT: 1 MMHG
AV LVOT PEAK GRADIENT: 2 MMHG
AV MEAN GRADIENT: 3 MMHG
AV PEAK GRADIENT: 7 MMHG
AV VALVE AREA: 1.93 CM2
AV VELOCITY RATIO: 0.54
BSA FOR ECHO PROCEDURE: 1.58 M2
DOP CALC AO PEAK VEL: 1.31 M/S
DOP CALC AO VTI: 25.03 CM
DOP CALC LVOT AREA: 2.83 CM2
DOP CALC LVOT CARDIAC INDEX: 1.93 L/MIN/M2
DOP CALC LVOT CARDIAC OUTPUT: 3.03 L/MIN
DOP CALC LVOT DIAMETER: 1.9 CM
DOP CALC LVOT PEAK VEL VTI: 17.03 CM
DOP CALC LVOT PEAK VEL: 0.71 M/S
DOP CALC LVOT STROKE INDEX: 31.8 ML/M2
DOP CALC LVOT STROKE VOLUME: 50
E WAVE DECELERATION TIME: 235 MS
E/A RATIO: 0.79
FRACTIONAL SHORTENING: 31 (ref 28–44)
INTERVENTRICULAR SEPTUM IN DIASTOLE (PARASTERNAL SHORT AXIS VIEW): 1.2 CM
INTERVENTRICULAR SEPTUM: 1.2 CM (ref 0.6–1.1)
LAAS-AP2: 17.5 CM2
LAAS-AP4: 12.7 CM2
LEFT ATRIUM SIZE: 3.3 CM
LEFT ATRIUM VOLUME (MOD BIPLANE): 41 ML
LEFT ATRIUM VOLUME INDEX (MOD BIPLANE): 26.1 ML/M2
LEFT INTERNAL DIMENSION IN SYSTOLE: 2.7 CM (ref 2.1–4)
LEFT VENTRICULAR INTERNAL DIMENSION IN DIASTOLE: 3.9 CM (ref 3.5–6)
LEFT VENTRICULAR POSTERIOR WALL IN END DIASTOLE: 1.1 CM
LEFT VENTRICULAR STROKE VOLUME: 39 ML
LVSV (TEICH): 39 ML
MV E'TISSUE VEL-SEP: 7 CM/S
MV PEAK A VEL: 0.98 M/S
MV PEAK E VEL: 77 CM/S
MV STENOSIS PRESSURE HALF TIME: 68 MS
MV VALVE AREA P 1/2 METHOD: 3.2
RIGHT ATRIUM AREA SYSTOLE A4C: 10.8 CM2
RIGHT VENTRICLE ID DIMENSION: 2.5 CM
SINOTUBULAR JUNCTION: 2.1 CM
SL CV LEFT ATRIUM LENGTH A2C: 5.1 CM
SL CV LV EF: 60
SL CV PED ECHO LEFT VENTRICLE DIASTOLIC VOLUME (MOD BIPLANE) 2D: 66 ML
SL CV PED ECHO LEFT VENTRICLE SYSTOLIC VOLUME (MOD BIPLANE) 2D: 27 ML
SL CV SINUS OF VALSALVA 2D: 2.9 CM
STJ: 2.1 CM
TR MAX PG: 21 MMHG
TR PEAK VELOCITY: 2.3 M/S
TRICUSPID ANNULAR PLANE SYSTOLIC EXCURSION: 1.9 CM
TRICUSPID VALVE PEAK REGURGITATION VELOCITY: 2.31 M/S

## 2024-05-24 PROCEDURE — 97110 THERAPEUTIC EXERCISES: CPT | Performed by: PHYSICAL THERAPIST

## 2024-05-24 PROCEDURE — 93306 TTE W/DOPPLER COMPLETE: CPT | Performed by: INTERNAL MEDICINE

## 2024-05-24 PROCEDURE — 93306 TTE W/DOPPLER COMPLETE: CPT

## 2024-05-24 PROCEDURE — 97112 NEUROMUSCULAR REEDUCATION: CPT | Performed by: PHYSICAL THERAPIST

## 2024-05-24 NOTE — TELEPHONE ENCOUNTER
Patient is calling. She has some concerns regarding her sensor. She Changed it to her other arm, but it is now bleeding. She does see that the blood is pooling under the sensor. Patient would like a call back asap to give her recommendation on what to do.   # [phone]
Spoke to patient, advise that is completely ok and happens from time to time. The blood will dry up and will have no effect on the life of the sensor. Patient was grateful for the call.
patient tolerance, in order to progress toward full function and prevent re-injury.    Status: [x] Progressing: [] Met: [] Not Met: [] Adjusted  Patient will have a decrease in pain to 3/10 to help facilitate improvement in movement, function, and ADLs as indicated by functional deficits.   Status: [x] Progressing: [] Met: [] Not Met: [] Adjusted    Long Term Goals: To be achieved in: 8-10 weeks  Disability index score of 15% or less for the WOMAC to assist with return top prior level of function.   Status: [x] Progressing: [] Met: [] Not Met: [] Adjusted  Improve ROM to WNL to allow for proper joint functioning as indicated by patients functional deficits.  Status: [x] Progressing: [] Met: [] Not Met: [] Adjusted  Pt to improve strength to 4+/5 or better of proximal hip, quadriceps, gastroc/soleus, and hamstrings to allow for proper muscle and joint use in functional mobility, ADLs and prior level of function   Status: [x] Progressing: [] Met: [] Not Met: [] Adjusted  Patient will return to  Usual work, housework or activities, Usual recreational activities, heavy home activities, walk 2 blocks, and up/down 1 flight of stairs  without increased symptoms or restriction to work towards return to prior level of function.        Status: [x] Progressing: [] Met: [] Not Met: [] Adjusted  Patient will increase LE function to return to work with regular duties.   Patient will resume normal work/leisure activities without pain.            Status: [x] Progressing: [] Met: [x] Not Met: [] Adjusted    Overall Progression Towards Functional goals/ Treatment Progress Update:  [] Patient is progressing as expected towards functional goals listed.    [x] Progression is slowed due to complexities/Impairments listed.  [] Progression has been slowed due to co-morbidities.  [] Plan just implemented, too soon (<30days) to assess goals progression   [] Goals require adjustment due to lack of progress  [] Patient is not progressing as

## 2024-05-24 NOTE — PROGRESS NOTES
"Daily Note     Today's date: 2024  Patient name: Johana Alcantara  : 1945  MRN: [id number]  Referring provider: Jessica Duggan MD  Dx:   Encounter Diagnosis     ICD-10-CM    1. Acute left-sided low back pain with left-sided sciatica  M54.42       2. Difficulty walking  R26.2           Start Time: 08  Stop Time: 920  Total time in clinic (min): 50 minutes    Subjective: Patient states overall she is feeling much better with decreased burning and pinching pain in low back.       Objective: See treatment diary below      Assessment: Tolerated treatment well. Decreased stability on LLE compared to contralateral limb. Overall progressing well towards goals. Some instability with standing exercises, should attempt to increase dynamic balance training as tolerated. Patient exhibited good technique with therapeutic exercises and would benefit from continued PT      Plan: Continue per plan of care.  Progress treatment as tolerated.       Precautions: lumbar osteopenia  Access code: [email or web address]  Progress note:   POC:     Manuals 5/24 5/6 5/10 5/13 5/20   traction                                Neuro Re-Ed        Core brace :05x10 10x :05x10 :05x15 05x15   kegel :05x10 10x :05x10 :05x10 :05x10   Knee fall out 10x     X10 alt   Supine march        SLR with core brace        /70 10 min standing alt L1 8 min alt 120/80 7 min standing alt 120/80 7 min standing alt 120/80 8 min standing alt   Tandem balance :30x2 bilat  :30x2 :30x2 bilat :30x2 bilat           Ther Ex        Nustep deferred  10 min 10 min L1 10 min   Standing hip abd/ext diagonals 2x10 alt  10x alt 2x10 alt 2x10 alt           Bridge 15x 10x 10x 15x 15x                           LTR :05x15 :05x10 ea :05x10 :05x15 :05x15           Supine ball squeeze :05x20 :05x15  20x :05x20 :05x20   Supine clamshell RTB 20x YTB :03x10 YTB 2x10 YTB 2x10 nv                                           Ther Activity        Step ups Fwd 6\" x20 bilat   " "Fwd 6\" x15 bilat Fwd 6\" x20 bilat   Sit to stand        Gait Training                        Modalities                                     "

## 2024-05-29 NOTE — TELEPHONE ENCOUNTER
Patient called- she is concerned because her sugars are still high with the new regimen she is on. She didn't eat lunch, took the 4 units and at 1 pm her sugar was at 324. Please advise.

## 2024-05-29 NOTE — TELEPHONE ENCOUNTER
Patient called back. Yes she is injecting Lantus 18 units at bedtime, and Novolog 4 units before meals.     Her blood sugar right now is 186.

## 2024-05-29 NOTE — TELEPHONE ENCOUNTER
Please call patient and confirm that she is injecting 8 units of Lantus once nightly at the same time each night and 4 units of novolog three times daily (15 minutes before eating).    Once this is confirmed, please download CGM for my review. Thank you.

## 2024-05-31 ENCOUNTER — TELEPHONE (OUTPATIENT)
Dept: ENDOCRINOLOGY | Facility: CLINIC | Age: 79
End: 2024-05-31

## 2024-05-31 ENCOUNTER — OFFICE VISIT (OUTPATIENT)
Dept: PHYSICAL THERAPY | Facility: CLINIC | Age: 79
End: 2024-05-31
Payer: MEDICARE

## 2024-05-31 DIAGNOSIS — R26.2 DIFFICULTY WALKING: ICD-10-CM

## 2024-05-31 DIAGNOSIS — N18.31 TYPE 1 DIABETES MELLITUS WITH STAGE 3A CHRONIC KIDNEY DISEASE (HCC): ICD-10-CM

## 2024-05-31 DIAGNOSIS — E10.22 TYPE 1 DIABETES MELLITUS WITH STAGE 3A CHRONIC KIDNEY DISEASE (HCC): ICD-10-CM

## 2024-05-31 DIAGNOSIS — M54.42 ACUTE LEFT-SIDED LOW BACK PAIN WITH LEFT-SIDED SCIATICA: Primary | ICD-10-CM

## 2024-05-31 PROCEDURE — 97112 NEUROMUSCULAR REEDUCATION: CPT

## 2024-05-31 PROCEDURE — 97110 THERAPEUTIC EXERCISES: CPT

## 2024-05-31 PROCEDURE — 97530 THERAPEUTIC ACTIVITIES: CPT

## 2024-05-31 RX ORDER — INSULIN ASPART 100 [IU]/ML
INJECTION, SOLUTION INTRAVENOUS; SUBCUTANEOUS
Qty: 3 ML | Refills: 1 | Status: SHIPPED | OUTPATIENT
Start: 2024-05-31 | End: 2024-06-04 | Stop reason: SDUPTHER

## 2024-05-31 RX ORDER — INSULIN ASPART 100 [IU]/ML
INJECTION, SOLUTION INTRAVENOUS; SUBCUTANEOUS
Qty: 6 ML | Refills: 1 | Status: SHIPPED | OUTPATIENT
Start: 2024-05-31 | End: 2024-05-31 | Stop reason: SDUPTHER

## 2024-05-31 RX ORDER — INSULIN GLARGINE 100 [IU]/ML
INJECTION, SOLUTION SUBCUTANEOUS
Qty: 6 ML | Refills: 3 | Status: SHIPPED | OUTPATIENT
Start: 2024-05-31

## 2024-05-31 NOTE — TELEPHONE ENCOUNTER
Patient should not be using a vial. I am concerned that if she is, she is using mixed insulin.    I have ordered her the novolog PEN to [facility] again. She needs to pick this up.     I have asked the staff to put patient on my schedule at 7:45am this upcoming Tuesday 6/4/2024.    Please ask patient to bring ALL of her insulins/diabetes medications/and CGM with her to the appointment so we can review.    Should patient not be able to make this time, please ask her to come over my lunch break and we can address everything then. Thank you.

## 2024-05-31 NOTE — TELEPHONE ENCOUNTER
Caller: Amira    Doctor: Nash    Reason for call: Patient stated you increased her Novolog and it is not a pen. States she uses Novolog 6 times a day.  She gets a vial.  One in chart is different. She does not want to run out and she is almost out.     Call back#: [phone]

## 2024-05-31 NOTE — TELEPHONE ENCOUNTER
Please instruct patient to increase Lantus to 12 units nightly. Increase Novolog to 6 units three times daily prior to meals.     Please ask patient if she can come by next week to download her CGM so we can review these changes. Thank you.

## 2024-05-31 NOTE — PROGRESS NOTES
Daily Note     Today's date: 2024  Patient name: Johana Alcantara  : 1945  MRN: [id number]  Referring provider: Jessica Duggan MD  Dx:   Encounter Diagnosis     ICD-10-CM    1. Acute left-sided low back pain with left-sided sciatica  M54.42       2. Difficulty walking  R26.2           Start Time: 1004          Subjective: Patient states she has no pain except when she uses her back too much. When she relaxes the burning goes away.       Objective: See treatment diary below    Patient educated on how some effects the body while performing them.     Assessment: Tolerated treatment well. Patient would benefit from continued PT for stretching and strengthening. Verbal cues needed for some of the exercises. Patient seemed to understand all education given throughout session about exercises and how it effects the body. Patient felt tired, but good by the end of the session.      Plan: Continue per plan of care.  Progress note during next visit.  Progress treatment as tolerated.       Precautions: lumbar osteopenia  Access code: [email or web address]  Progress note:   POC:     Manuals 5/24 5/31 5/10 5/13 5/20   traction                                Neuro Re-Ed        Core brace :05x10 :05x10 :05x10 :05x15 05x15   kegel :05x10 :05 x10 :05x10 :05x10 :05x10   Knee fall out 10x  x10   X10 alt   Supine march        SLR with core brace        /70 10 min standing alt 120/70 x10 alt 120/80 7 min standing alt 120/80 7 min standing alt 120/80 8 min standing alt   Tandem balance :30x2 bilat :30x2 B/L :30x2 :30x2 bilat :30x2 bilat           Ther Ex        Nustep deferred L1 x10 min w/ towel 10 min 10 min L1 10 min   Standing hip abd/ext diagonals 2x10 alt 2x10 alt 10x alt 2x10 alt 2x10 alt           Bridge 15x x15 10x 15x 15x                           LTR :05x15 :05x15 :05x10 :05x15 :05x15           Supine ball squeeze :05x20 :05x20 20x :05x20 :05x20   Supine clamshell RTB 20x Red x20 YTB 2x10 YTB 2x10 nv      "                                      Ther Activity        Step ups Fwd 6\" x20 bilat Fwd 6\" x20 bilat  Fwd 6\" x15 bilat Fwd 6\" x20 bilat   Sit to stand        Gait Training                        Modalities                                       "

## 2024-06-03 ENCOUNTER — EVALUATION (OUTPATIENT)
Dept: PHYSICAL THERAPY | Facility: CLINIC | Age: 79
End: 2024-06-03
Payer: MEDICARE

## 2024-06-03 DIAGNOSIS — M54.42 ACUTE LEFT-SIDED LOW BACK PAIN WITH LEFT-SIDED SCIATICA: Primary | ICD-10-CM

## 2024-06-03 DIAGNOSIS — R26.2 DIFFICULTY WALKING: ICD-10-CM

## 2024-06-03 PROCEDURE — 97110 THERAPEUTIC EXERCISES: CPT | Performed by: PHYSICAL THERAPIST

## 2024-06-03 NOTE — PROGRESS NOTES
PT Re-Evaluation     Today's date: 6/3/2024  Patient name: Johana Alcantara  : 1945  MRN: [id number]  Referring provider: Jessica Duggan MD  Dx:   Encounter Diagnosis     ICD-10-CM    1. Acute left-sided low back pain with left-sided sciatica  M54.42       2. Difficulty walking  R26.2           Start Time: 1030  Stop Time: 1100  Total time in clinic (min): 30 minutes    Assessment  Assessment details: Johana Alcantara was seen for an initial PT evaluation and 7 f/u sessions. Patient is a 78 y.o. female with diagnosis of low back pain and past medical history significant for HTN, hypothyroidism, DM, R ear deafness, osteoporosis, anxiety, retinal disorder, hyperlipidemia, appendectomy, hysterectomy. FOTO functional score shows improvement from 56% at intake to 83% today surpassing predicted value of 72%. Findings of examination today shows resolution of symptoms with  objective findings at baseline for flexibility, strength and stability. Patient is independent with her home exercise program and no longer requires skilled PT services. Plan to discharge from care at this time.       Impairments: abnormal gait, abnormal muscle firing, abnormal muscle tone, abnormal or restricted ROM, abnormal movement, activity intolerance, impaired balance, impaired physical strength, lacks appropriate home exercise program, pain with function, poor posture  and poor body mechanics  Functional limitations: lifting, pushing, pulling, bending, twisting, walking, stairs  Goals  STG (6 weeks)  1. Patient will have reported 0/10 pain in low back at rest. - MET 6/3  2. Improve patient's lumbar extension to 15 degrees for improved upright posture. - MET 6/3  3. Increase patient's bilateral single leg balance to 5 seconds for increased stability on stairs. - NT  LTG (12 weeks)  1. Patient's LE strength will be equal bilaterally for ability to ambulate and return to functional activities at [facility]. - MET 6/3  2. Patient will  "be able to walk in community with 0/10 pain in low back. - MET 6/3  3. Patient will be independent with home exercise program for continued maintenance post PT discharge. - MET 6/3      Plan  Plan details: DC to [facility]  Plan of Care beginning date: 2024  Treatment plan discussed with: patient and PTA      Subjective Evaluation    History of Present Illness  Date of onset: 2024  Subjective 6/3: States overall she is feeling much better. Denies any radiating pain into LE or pulling in low back. Has noticed she is sitting up straighter with improved balance. Has had some discomfort in foot, seen by DPM and given cushion. Does have some ache in lateral right LE which she thinks is more related to how she walks because of foot. Seeing JERI Mancini Due to medication.     Mechanism of injury: Johana Alcantara is a 78 y.o. female who presents to outpatient Physical Therapy today with complaints of low back pain. States she started having low back/tailbone pain that radiates up her back and across top of shoulder. Has been trying to keep moving, but does sit with ice and heat pack. This has occurred in the past, but hasn't in 2 years. Burning pain has eased the past 2-3 days. Seen by PCP who took imaging (+) lumbar osteopenia.     Patient Goals  Patient goals for therapy: decreased pain  Patient goal: \"find out why the pain comes\".  Pain    6/3  Current pain ratin  0  At best pain ratin  At worst pain rating: 10 0  Location: L glut radiating up back  Quality: burning, dull ache and radiating  Relieving factors: heat and change in position  Aggravating factors: lifting (laying supine)    Social Support  Steps to enter house: no  Stairs in house: no   Lives in: apartment  Lives with: alone    Employment status: not working      Objective     Concurrent Complaints  Negative for bladder dysfunction, bowel dysfunction, saddle (S4) numbness and history of trauma    Neurological Testing     Sensation     Lumbar   Left "   Intact: light touch    Right   Intact: light touch    Active Range of Motion     Lumbar   Flexion:  WFL  Extension:  Restriction level: minimal  6/3: minimal  Left lateral flexion:  WFL  Right lateral flexion:  WFL and with pain  6/3: WFL pain free   Left rotation:  with pain Restriction level: moderate  6/3: moderate pain free  Right rotation:  WFL    Joint Play     Hypomobile: L1, L2, L3, L4 and L5       Strength/Myotome Testing     Left Hip   6/3  Planes of Motion   Flexion: 5  Extension: 3-  4-  Abduction: 4+  5  Adduction: 4      Right Hip   6/3  Planes of Motion   Flexion: 5  Extension: 3-  4-  Abduction: 4-  4  Adduction: 4      Left Knee   Flexion: 5  Extension: 5    Right Knee   Flexion: 5  Extension: 5    Left Ankle/Foot   Dorsiflexion: 5    Right Ankle/Foot   Dorsiflexion: 5    Muscle Activation     Additional Muscle Activation Details  Activation of TrA with resisted SLR R= trace L=min  6/3: Moderate bilaterally    Tests     Lumbar     Left   Negative passive SLR.     Right   Negative passive SLR.     Left Pelvic Girdle/Sacrum   Positive: active SLR test.   6/3: (-)    Right Pelvic Girdle/Sacrum   Negative: active SLR test.     Left Hip   Negative long sit.     Right Hip   Negative long sit.     Additional Tests Details  Hamstring 90/90 SLR R=(25) L=(10)  6/3: R=(10) L=(10)        General Comments:      Lumbar Comments      FOTO: 56% function, 72% predicted function   6/3: 83%           Precautions: lumbar osteopenia  Access code: [email or web address]      Manuals 5/24 5/31 6/3 5/13 5/20   traction                                Neuro Re-Ed   HEP     Core brace :05x10 :05x10  :05x15 05x15   kegel :05x10 :05 x10  :05x10 :05x10   Knee fall out 10x  x10   X10 alt   Supine march        SLR with core brace        /70 10 min standing alt 120/70 x10 alt  120/80 7 min standing alt 120/80 8 min standing alt   Tandem balance :30x2 bilat :30x2 B/L  :30x2 bilat :30x2 bilat           Ther Ex   HEP     Nustep deferred L1  "x10 min w/ towel  10 min L1 10 min   Standing hip abd/ext diagonals 2x10 alt 2x10 alt  2x10 alt 2x10 alt           Bridge 15x x15  15x 15x                           LTR :05x15 :05x15  :05x15 :05x15           Supine ball squeeze :05x20 :05x20  :05x20 :05x20   Supine clamshell RTB 20x Red x20  YTB 2x10 nv                                           Ther Activity        Step ups Fwd 6\" x20 bilat Fwd 6\" x20 bilat  Fwd 6\" x15 bilat Fwd 6\" x20 bilat   Sit to stand        Gait Training                        Modalities                               "

## 2024-06-04 ENCOUNTER — OFFICE VISIT (OUTPATIENT)
Dept: ENDOCRINOLOGY | Facility: CLINIC | Age: 79
End: 2024-06-04
Payer: MEDICARE

## 2024-06-04 VITALS
BODY MASS INDEX: 23.08 KG/M2 | HEART RATE: 60 BPM | HEIGHT: 62 IN | TEMPERATURE: 97.6 F | OXYGEN SATURATION: 98 % | WEIGHT: 125.4 LBS | SYSTOLIC BLOOD PRESSURE: 126 MMHG | DIASTOLIC BLOOD PRESSURE: 68 MMHG

## 2024-06-04 DIAGNOSIS — I10 PRIMARY HYPERTENSION: ICD-10-CM

## 2024-06-04 DIAGNOSIS — E10.22 TYPE 1 DIABETES MELLITUS WITH STAGE 3A CHRONIC KIDNEY DISEASE (HCC): Primary | ICD-10-CM

## 2024-06-04 DIAGNOSIS — N18.31 TYPE 1 DIABETES MELLITUS WITH STAGE 3A CHRONIC KIDNEY DISEASE (HCC): Primary | ICD-10-CM

## 2024-06-04 DIAGNOSIS — E03.9 ACQUIRED HYPOTHYROIDISM: ICD-10-CM

## 2024-06-04 PROCEDURE — 95251 CONT GLUC MNTR ANALYSIS I&R: CPT | Performed by: NURSE PRACTITIONER

## 2024-06-04 PROCEDURE — 99214 OFFICE O/P EST MOD 30 MIN: CPT | Performed by: NURSE PRACTITIONER

## 2024-06-04 RX ORDER — PEN NEEDLE, DIABETIC 32GX 5/32"
NEEDLE, DISPOSABLE MISCELLANEOUS
Qty: 300 EACH | Refills: 1 | Status: SHIPPED | OUTPATIENT
Start: 2024-06-04

## 2024-06-04 RX ORDER — INSULIN ASPART 100 [IU]/ML
INJECTION, SOLUTION INTRAVENOUS; SUBCUTANEOUS
Qty: 15 ML | Refills: 1 | Status: SHIPPED | OUTPATIENT
Start: 2024-06-04

## 2024-06-04 NOTE — ASSESSMENT & PLAN NOTE
Reviewed new insulin regimen with patient. Discussed the difference between taking mixed insulin vs basal/bolus insulin. Reviewed how and when to inject different insulins. Patient verbalized and demonstrated understanding. She will keep her appointment in July with me and complete labs prior to that. Patient knows to notify me with persistent hyperglycemia or episodes of hypoglycemia.    Lab Results   Component Value Date    HGBA1C 8.2 (A) 04/10/2024

## 2024-06-04 NOTE — PATIENT INSTRUCTIONS
Continue Basaglar 12 units nightly  Start Novolog pen. Inject 5 units three times daily before meals. Inject 15 minutes before eating.   I ordered you new pen needles tips so that you can inject your insulin 4 x daily.  Keep your July appointment. Complete labs prior to that.     Once you have your new novolog pens, discard your insulin vials.

## 2024-06-04 NOTE — PROGRESS NOTES
Established Patient Progress Note     CC: Endocrinology follow up visit    Impression & Plan:    Problem List Items Addressed This Visit       Hypothyroidism     Continue 75 mcg of LT4 daily. Repeat labs prior to scheduled appointment in July.          Hypertension     BP well controlled at 126/68. Continue 20 mg of lisinopril daily.          Diabetes mellitus (HCC) - Primary     Reviewed new insulin regimen with patient. Discussed the difference between taking mixed insulin vs basal/bolus insulin. Reviewed how and when to inject different insulins. Patient verbalized and demonstrated understanding. She will keep her appointment in July with me and complete labs prior to that. Patient knows to notify me with persistent hyperglycemia or episodes of hypoglycemia.    Lab Results   Component Value Date    HGBA1C 8.2 (A) 04/10/2024            Relevant Medications    Insulin Pen Needle (BD Pen Needle Karma U/F) 32G X 4 MM MISC    insulin aspart (NovoLOG FlexPen) 100 UNIT/ML injection pen       History of Present Illness:     Johana Alcantara is a 79 y.o. female with Type 1 diabetes with long term use of insulin presenting today for an early follow up.    At patient's last appointment, we agreed to transition her from novolog 70/30 mix (with vial and syringe) to Basaglar and novolog pens.     Patient has called the office reporting high blood sugars and some confusion about her new regimen.    I asked that she come to the office today and bring all of her insulin supplies so that we could review.    She has been injecting 12 units of basaglar at 9pm night as prescribed.    However, she has also been injecting 6 units of her mixed insulin three times daily before meals. Thankfully, she has not had any severe hypoglycemia but she is having some episodes where her blood sugars drop overnight.    Johana Alcantara   Device used Freestyle yaritza 2  Home use     Indication   Type 1 Diabetes    More than 72 hours of data was  reviewed. Report to be scanned to chart.     Date Range:  May 22, 2024-June 4, 2024    Analysis of data:   Average Glucose: 178 mg/dL  Coefficient of Variation: 42.9%   GMI: 7.6%   Time in Target Range: 55%   Time Above Range: 23% 181 to 250 mg/dL; 19% greater than 250 mg/dL  Time Below Range: 3% 54 to 69 mg/dL; 0% less than 54 mg/dL    Interpretation of data: Continues to have high variability.       Patient Active Problem List   Diagnosis    Generalized anxiety disorder    Hypothyroidism    Hypertension    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 ear    Diabetes mellitus (HCC)    Hyperlipidemia    Retinal disorder    Constipation    Age-related osteoporosis without current pathological fracture      Past Medical History:   Diagnosis Date    Anxiety     Colon polyp     Depression     Diabetes mellitus (HCC)     Diverticulosis     Hyperlipidemia     Hypertension       Past Surgical History:   Procedure Laterality Date    APPENDECTOMY      CATARACT EXTRACTION Right     COLONOSCOPY      difficult exam    HYSTERECTOMY      TONSILLECTOMY      WISDOM TOOTH EXTRACTION        Family History   Problem Relation Age of Onset    Parkinsonism Mother     Alzheimer's disease Father     Breast cancer Sister         in her 40's    Lung cancer Sister     Breast cancer Sister     No Known Problems Daughter     No Known Problems Daughter     No Known Problems Maternal Grandmother     No Known Problems Maternal Grandfather     No Known Problems Paternal Grandmother     No Known Problems Paternal Grandfather     No Known Problems Maternal Aunt     No Known Problems Maternal Aunt     No Known Problems Maternal Aunt     No Known Problems Paternal Aunt      Social History     Tobacco Use    Smoking status: Never    Smokeless tobacco: Never   Substance Use Topics    Alcohol use: Not Currently     Comment: rare     Allergies   Allergen Reactions    Diphenhydramine Shortness Of Breath    Meperidine Hives and Shortness Of Breath    Prednisone Hives and Shortness Of  Breath    Bee Pollen     Ciprofloxacin Hives    Sulfamethoxazole-Trimethoprim GI Intolerance       Current Outpatient Medications:     Alcohol Swabs ( Sterile Alcohol Prep) PADS, , Disp: , Rfl:     ASPIRIN 81 PO, Take 81 mg by mouth daily, Disp: , Rfl:     atorvastatin (LIPITOR) 20 mg tablet, TAKE ONE TABLET BY MOUTH ONCE DAILY, Disp: 90 tablet, Rfl: 2    Blood Glucose Monitoring Suppl (OneTouch Verio) w/Device KIT, Use to test blood sugar 4x daily., Disp: 1 kit, Rfl: 0    Cholecalciferol 2000 units CAPS, Take 1 capsule by mouth, Disp: , Rfl:     Continuous Blood Gluc Sensor (FreeStyle Kaylah 2 Sensor) MISC, Use, Disp: , Rfl:     famotidine (PEPCID) 20 mg tablet, Take 1 tablet (20 mg total) by mouth daily, Disp: 90 tablet, Rfl: 1    glucose blood (OneTouch Verio) test strip, Use to test blood sugar 4x daily in case of CGM failure., Disp: 100 each, Rfl: 2    insulin aspart (NovoLOG FlexPen) 100 UNIT/ML injection pen, Inject 6 units three times daily before meals., Disp: 15 mL, Rfl: 1    Insulin Glargine Solostar (Lantus SoloStar) 100 UNIT/ML SOPN, Inject 12 units nightly., Disp: 6 mL, Rfl: 3    Insulin Pen Needle (BD Pen Needle Karma U/F) 32G X 4 MM MISC, Use to inject insulin 4x daily., Disp: 300 each, Rfl: 1    levothyroxine 75 mcg tablet, Take 1 tablet (75 mcg total) by mouth daily, Disp: 90 tablet, Rfl: 3    lisinopril (ZESTRIL) 20 mg tablet, Take 1 tablet (20 mg total) by mouth daily, Disp: 90 tablet, Rfl: 1    Multiple Vitamins-Minerals (MULTIVITAL-M PO), Take by mouth, Disp: , Rfl:     mupirocin (BACTROBAN) 2 % ointment, Apply topically 3 (three) times a day, Disp: 30 g, Rfl: 1    OneTouch Delica Lancets 33G MISC, Use to test blood sugar 4x daily., Disp: 300 each, Rfl: 2    Ascorbic Acid (VITAMIN C PO), Take by mouth (Patient not taking: Reported on 4/17/2024), Disp: , Rfl:     EPINEPHrine (EPIPEN) 0.3 mg/0.3 mL SOAJ, Inject 0.3 mL (0.3 mg total) into a muscle once for 1 dose, Disp: 0.6 mL, Rfl: 0     "fluconazole (DIFLUCAN) 100 mg tablet, Take 1 tablet by mouth 2 (two) times a day (Patient not taking: Reported on 6/4/2024), Disp: , Rfl:     melatonin 3 mg, , Disp: , Rfl:     meloxicam (MOBIC) 7.5 mg tablet, Take 2 tablets by mouth daily (Patient not taking: Reported on 4/17/2024), Disp: , Rfl:     mometasone (ELOCON) 0.1 % cream, Apply topically daily Apply topically BID x 2 weeks then qhs prn (Patient not taking: Reported on 6/4/2024), Disp: 45 g, Rfl: 3    Review of Systems   Constitutional:  Negative for activity change, appetite change, fatigue and unexpected weight change.   HENT:  Negative for dental problem, sore throat, trouble swallowing and voice change.    Eyes:  Negative for visual disturbance.   Respiratory:  Negative for cough, chest tightness and shortness of breath.    Cardiovascular:  Negative for chest pain, palpitations and leg swelling.   Gastrointestinal:  Negative for constipation, diarrhea, nausea and vomiting.   Endocrine: Negative for cold intolerance, heat intolerance, polydipsia, polyphagia and polyuria.   Genitourinary:  Negative for frequency.   Musculoskeletal:  Positive for arthralgias and back pain. Negative for gait problem and myalgias.   Skin:  Negative for wound.   Allergic/Immunologic: Positive for environmental allergies. Negative for food allergies.   Neurological:  Negative for dizziness, weakness, light-headedness, numbness and headaches.   Psychiatric/Behavioral:  Negative for decreased concentration, dysphoric mood and sleep disturbance. The patient is not nervous/anxious.        Physical Exam:  Body mass index is 22.94 kg/m².  /68 (BP Location: Right arm, Patient Position: Sitting, Cuff Size: Standard)   Pulse 60   Temp 97.6 °F (36.4 °C) (Temporal)   Ht 5' 2\" (1.575 m)   Wt 56.9 kg (125 lb 6.4 oz)   SpO2 98%   BMI 22.94 kg/m²    Wt Readings from Last 3 Encounters:   06/04/24 56.9 kg (125 lb 6.4 oz)   05/24/24 57.6 kg (127 lb)   04/22/24 57.6 kg (127 lb) "       Physical Exam  Vitals reviewed.   Constitutional:       General: She is not in acute distress.     Appearance: She is well-developed. She is not ill-appearing.   HENT:      Head: Normocephalic and atraumatic.   Eyes:      Pupils: Pupils are equal, round, and reactive to light.   Neck:      Thyroid: No thyromegaly.   Cardiovascular:      Rate and Rhythm: Normal rate.      Pulses: Normal pulses.   Pulmonary:      Effort: Pulmonary effort is normal.   Musculoskeletal:      Cervical back: Normal range of motion and neck supple.      Right lower leg: No edema.      Left lower leg: No edema.   Lymphadenopathy:      Cervical: No cervical adenopathy.   Skin:     General: Skin is warm and dry.      Capillary Refill: Capillary refill takes less than 2 seconds.   Neurological:      Mental Status: She is alert and oriented to person, place, and time.      Gait: Gait normal.   Psychiatric:         Mood and Affect: Mood normal.         Behavior: Behavior normal.         Labs:       Discussed with the patient and all questioned fully answered. She will call me if any problems arise.    Follow-up appointment in 1 months.     Counseled patient on diagnostic results, prognosis, risk and benefit of treatment options, instruction for management, importance of treatment compliance, Risk  factor reduction and impressions    EUGENIA Phan

## 2024-06-13 ENCOUNTER — TELEPHONE (OUTPATIENT)
Dept: FAMILY MEDICINE CLINIC | Facility: CLINIC | Age: 79
End: 2024-06-13

## 2024-06-13 NOTE — TELEPHONE ENCOUNTER
----- Message from Concepcion Flowers DO sent at 6/13/2024  7:26 AM EDT -----  Please let Johana know her ECHO shows normal pumping function of the heart, the wall thickness of the heart is mildly increased likely due to high blood pressure over time. There is mild calcification of her mitral valve, as well as mild regurgitation of her tricuspid and pulmonic valve, which are common findings and no cause for concern. Her aorta, which is one of the large blood vessels that attaches to the heart, is mildly dilated. I would recommend monitoring this in 1-2 years to make sure it doesn't grow or change. I'll forward this to Dr. Duggan as well.     Overall nothing to explain the more prominent pulse in her neck, carotid ultrasound was reassuring also. This just may be her anatomy that her pulse is more prominent/visible.

## 2024-07-05 ENCOUNTER — TELEPHONE (OUTPATIENT)
Age: 79
End: 2024-07-05

## 2024-07-05 NOTE — TELEPHONE ENCOUNTER
Spoke to provider in the office. Dr Bailon reviewed Kaylah report. Recommendations are to decrease Lantus from 12 to 10 units and decrease the Novolog from 6 to 4 units. Called patient to inform and she said she will bring her Kaylah in for a download again next week

## 2024-07-05 NOTE — TELEPHONE ENCOUNTER
Patient calling because she takes 6 units of Novolog with every meal, and then 12 units of Lantus nightly. She said she has been waking up with very low sugars. 65, and 53 were the latest. She wants to see if the provider wants to make adjustments with her insulin?

## 2024-07-05 NOTE — TELEPHONE ENCOUNTER
Called patient and stated that when she drops she just feels tired and weak. She once had vision issues. She feels fine as soon as she gets her sugar back up. She is bringing her CGM to the office for a download

## 2024-07-11 ENCOUNTER — TELEPHONE (OUTPATIENT)
Dept: ENDOCRINOLOGY | Facility: CLINIC | Age: 79
End: 2024-07-11

## 2024-07-11 ENCOUNTER — APPOINTMENT (OUTPATIENT)
Dept: LAB | Facility: CLINIC | Age: 79
End: 2024-07-11
Payer: MEDICARE

## 2024-07-11 DIAGNOSIS — E03.9 HYPOTHYROIDISM, UNSPECIFIED TYPE: ICD-10-CM

## 2024-07-11 DIAGNOSIS — M81.0 AGE-RELATED OSTEOPOROSIS WITHOUT CURRENT PATHOLOGICAL FRACTURE: ICD-10-CM

## 2024-07-11 DIAGNOSIS — N18.31 TYPE 1 DIABETES MELLITUS WITH STAGE 3A CHRONIC KIDNEY DISEASE (HCC): ICD-10-CM

## 2024-07-11 DIAGNOSIS — E10.22 TYPE 1 DIABETES MELLITUS WITH STAGE 3A CHRONIC KIDNEY DISEASE (HCC): ICD-10-CM

## 2024-07-11 LAB
25(OH)D3 SERPL-MCNC: 41 NG/ML (ref 30–100)
ALBUMIN SERPL BCG-MCNC: 3.8 G/DL (ref 3.5–5)
ALP SERPL-CCNC: 73 U/L (ref 34–104)
ALT SERPL W P-5'-P-CCNC: 9 U/L (ref 7–52)
ANION GAP SERPL CALCULATED.3IONS-SCNC: 11 MMOL/L (ref 4–13)
AST SERPL W P-5'-P-CCNC: 16 U/L (ref 13–39)
BILIRUB SERPL-MCNC: 0.38 MG/DL (ref 0.2–1)
BUN SERPL-MCNC: 22 MG/DL (ref 5–25)
CALCIUM SERPL-MCNC: 8.9 MG/DL (ref 8.4–10.2)
CHLORIDE SERPL-SCNC: 104 MMOL/L (ref 96–108)
CO2 SERPL-SCNC: 26 MMOL/L (ref 21–32)
CREAT SERPL-MCNC: 0.87 MG/DL (ref 0.6–1.3)
CREAT UR-MCNC: 36.7 MG/DL
EST. AVERAGE GLUCOSE BLD GHB EST-MCNC: 174 MG/DL
GFR SERPL CREATININE-BSD FRML MDRD: 62 ML/MIN/1.73SQ M
GLUCOSE P FAST SERPL-MCNC: 134 MG/DL (ref 65–99)
HBA1C MFR BLD: 7.7 %
MICROALBUMIN UR-MCNC: <7 MG/L
POTASSIUM SERPL-SCNC: 4 MMOL/L (ref 3.5–5.3)
PROT SERPL-MCNC: 6.6 G/DL (ref 6.4–8.4)
SODIUM SERPL-SCNC: 141 MMOL/L (ref 135–147)
TSH SERPL DL<=0.05 MIU/L-ACNC: 1.48 UIU/ML (ref 0.45–4.5)

## 2024-07-11 PROCEDURE — 36415 COLL VENOUS BLD VENIPUNCTURE: CPT

## 2024-07-11 PROCEDURE — 82570 ASSAY OF URINE CREATININE: CPT

## 2024-07-11 PROCEDURE — 82043 UR ALBUMIN QUANTITATIVE: CPT

## 2024-07-11 PROCEDURE — 83036 HEMOGLOBIN GLYCOSYLATED A1C: CPT

## 2024-07-11 PROCEDURE — 80053 COMPREHEN METABOLIC PANEL: CPT

## 2024-07-11 PROCEDURE — 82306 VITAMIN D 25 HYDROXY: CPT

## 2024-07-11 PROCEDURE — 84443 ASSAY THYROID STIM HORMONE: CPT

## 2024-07-11 RX ORDER — INSULIN ASPART 100 [IU]/ML
INJECTION, SOLUTION INTRAVENOUS; SUBCUTANEOUS
Qty: 15 ML | Refills: 1 | Status: SHIPPED | OUTPATIENT
Start: 2024-07-11

## 2024-07-11 RX ORDER — INSULIN GLARGINE 100 [IU]/ML
INJECTION, SOLUTION SUBCUTANEOUS
Qty: 6 ML | Refills: 3 | Status: SHIPPED | OUTPATIENT
Start: 2024-07-11

## 2024-07-11 NOTE — TELEPHONE ENCOUNTER
Current regimen (as of 7/5/2024)    Lantus 10 units nightly    Novolog 4 units three times daily prior to meals    Decrease Lantus to 8 units. Continue novolog 4 units prior to meals.     Please double check blood sugars with finger sticks when Kaylah alarms for low blood sugars. Thank you.

## 2024-07-12 NOTE — TELEPHONE ENCOUNTER
Patient calling back, relayed above message and she will decrease lantus to 8 units. She states she does double check with finger stick when it is low.   Thank you.

## 2024-07-23 ENCOUNTER — OFFICE VISIT (OUTPATIENT)
Dept: ENDOCRINOLOGY | Facility: CLINIC | Age: 79
End: 2024-07-23
Payer: MEDICARE

## 2024-07-23 VITALS
WEIGHT: 123.4 LBS | HEART RATE: 59 BPM | HEIGHT: 61 IN | OXYGEN SATURATION: 97 % | SYSTOLIC BLOOD PRESSURE: 130 MMHG | TEMPERATURE: 98.7 F | BODY MASS INDEX: 23.3 KG/M2 | DIASTOLIC BLOOD PRESSURE: 68 MMHG

## 2024-07-23 DIAGNOSIS — M81.0 AGE-RELATED OSTEOPOROSIS WITHOUT CURRENT PATHOLOGICAL FRACTURE: ICD-10-CM

## 2024-07-23 DIAGNOSIS — I10 PRIMARY HYPERTENSION: ICD-10-CM

## 2024-07-23 DIAGNOSIS — E03.9 ACQUIRED HYPOTHYROIDISM: ICD-10-CM

## 2024-07-23 DIAGNOSIS — E10.22 TYPE 1 DIABETES MELLITUS WITH STAGE 3A CHRONIC KIDNEY DISEASE (HCC): Primary | ICD-10-CM

## 2024-07-23 DIAGNOSIS — E78.2 MIXED HYPERLIPIDEMIA: ICD-10-CM

## 2024-07-23 DIAGNOSIS — N18.31 TYPE 1 DIABETES MELLITUS WITH STAGE 3A CHRONIC KIDNEY DISEASE (HCC): Primary | ICD-10-CM

## 2024-07-23 PROCEDURE — 95251 CONT GLUC MNTR ANALYSIS I&R: CPT | Performed by: NURSE PRACTITIONER

## 2024-07-23 PROCEDURE — 99214 OFFICE O/P EST MOD 30 MIN: CPT | Performed by: NURSE PRACTITIONER

## 2024-07-23 RX ORDER — INSULIN ASPART 100 [IU]/ML
INJECTION, SOLUTION INTRAVENOUS; SUBCUTANEOUS
Qty: 15 ML | Refills: 1 | Status: SHIPPED | OUTPATIENT
Start: 2024-07-23

## 2024-07-23 RX ORDER — INSULIN GLARGINE 100 [IU]/ML
INJECTION, SOLUTION SUBCUTANEOUS
Qty: 6 ML | Refills: 3 | Status: SHIPPED | OUTPATIENT
Start: 2024-07-23

## 2024-07-23 NOTE — PROGRESS NOTES
Established Patient Progress Note    Chief Complaint:  Diabetes follow up visit    Impression & Plan:    Problem List Items Addressed This Visit       Hypothyroidism     Stable.  Continue 75 mcg of levothyroxine daily.         Hypertension     BP well-controlled at 130/68.  Continue 20 mg of lisinopril daily.         Diabetes mellitus (HCC) - Primary     While A1C has improved, patient is having far more episodes of hyperglycemia over the last two weeks.   Increase basaglar to 10 units. Adjust novolog to 4-6-6. Reviewed the importance of dating her insulin pens when she opens them. As she is on such low doses of insulin, she should not use pens until they run out as the insulin could lose its efficacy in the period of time. Date pens and discard 28 days after opening. Patient also notes that she is not always timing her mealtime boluses appropriately. She will continue to make an effort to leave approximately 15 minutes between novolog injection and eating her meals. Patient will bring her CGM by the office next week for review and recommendations. F/U in 4 months.   Lab Results   Component Value Date    HGBA1C 7.7 (H) 07/11/2024            Relevant Medications    Insulin Glargine Solostar (Lantus SoloStar) 100 UNIT/ML SOPN    insulin aspart (NovoLOG FlexPen) 100 UNIT/ML injection pen    Other Relevant Orders    Hemoglobin A1C    Basic metabolic panel    Hyperlipidemia     Continue 20 mg of atorvastatin nightly.          Age-related osteoporosis without current pathological fracture     Evenity denied by insurance. Had requested Prolia as next line of therapy. Will do so again as it appears the process to apply was not completed. Continue with Vit D and Ca+ supplements. Continue with weight bearing activity. Serum ca+ and Vit D levels are stable.             History of Present Illness:   Johana Alcantara is a 79 y.o. female with Type 1 diabetes with long term use of insulin and hypothyroidism presenting to  the office today for routine follow up.     Hemoglobin A1C   Latest Ref Rng Normal 4.0-5.6%; PreDiabetic 5.7-6.4%; Diabetic >=6.5%; Glycemic control for adults with diabetes <7.0% %   11/16/2023 7.3 (H)    4/10/2024 8.2 !    7/11/2024 7.7 (H)       eAG, EST AVG Glucose   Latest Ref Rng mg/dl   11/16/2023 163    4/10/2024    7/11/2024 174       Legend:  (H) High  ! Abnormal       Current regimen:  Lantus 8 units nightly  NovoLog 4 units 3 times daily prior to meals    Johana Alcanatra   Device used Freestyle yaritza 2  Home use     Indication   Type 1 Diabetes    More than 72 hours of data was reviewed. Report to be scanned to chart.     Date Range: 7/10/24-7/23/24    Analysis of data:   Average Glucose: 233 mg/dL  Coefficient of Variation: 37.9%   GMI: 8.9%   Time in Target Range: 28%   Time Above Range: 32% 181-250 mg/dL; 39% >250 mg/dL   Time Below Range: 1% 54-69 mg/dL; 0% < 54 mg/dL     Interpretation of data: Patient is poorly controlled with frequent and persistent hyperglycemia.    Hypothyroidism, she is taking 75 mcg of levothyroxine daily.  TSH is stable.  Patient Active Problem List   Diagnosis    Generalized anxiety disorder    Hypothyroidism    Hypertension    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 ear    Diabetes mellitus (HCC)    Hyperlipidemia    Retinal disorder    Constipation    Age-related osteoporosis without current pathological fracture      Past Medical History:   Diagnosis Date    Anxiety     Colon polyp     Depression     Diabetes mellitus (HCC)     Diverticulosis     Hyperlipidemia     Hypertension       Past Surgical History:   Procedure Laterality Date    APPENDECTOMY      CATARACT EXTRACTION Right     COLONOSCOPY      difficult exam    HYSTERECTOMY      TONSILLECTOMY      WISDOM TOOTH EXTRACTION        Family History   Problem Relation Age of Onset    Parkinsonism Mother     Alzheimer's disease Father     Breast cancer Sister         in her 40's    Lung cancer Sister     Breast cancer Sister     No Known  Problems Daughter     No Known Problems Daughter     No Known Problems Maternal Grandmother     No Known Problems Maternal Grandfather     No Known Problems Paternal Grandmother     No Known Problems Paternal Grandfather     No Known Problems Maternal Aunt     No Known Problems Maternal Aunt     No Known Problems Maternal Aunt     No Known Problems Paternal Aunt      Social History     Tobacco Use    Smoking status: Never     Passive exposure: Never    Smokeless tobacco: Never   Substance Use Topics    Alcohol use: Not Currently     Comment: rare     Allergies   Allergen Reactions    Diphenhydramine Shortness Of Breath    Meperidine Hives and Shortness Of Breath    Prednisone Hives and Shortness Of Breath    Bee Pollen     Ciprofloxacin Hives    Sulfamethoxazole-Trimethoprim GI Intolerance         Current Outpatient Medications:     Alcohol Swabs ( Sterile Alcohol Prep) PADS, , Disp: , Rfl:     ASPIRIN 81 PO, Take 81 mg by mouth daily, Disp: , Rfl:     atorvastatin (LIPITOR) 20 mg tablet, TAKE ONE TABLET BY MOUTH ONCE DAILY, Disp: 90 tablet, Rfl: 2    Blood Glucose Monitoring Suppl (OneTouch Verio) w/Device KIT, Use to test blood sugar 4x daily., Disp: 1 kit, Rfl: 0    Cholecalciferol 2000 units CAPS, Take 1 capsule by mouth, Disp: , Rfl:     Continuous Blood Gluc Sensor (FreeStyle Kaylah 2 Sensor) MISC, Use, Disp: , Rfl:     EPINEPHrine (EPIPEN) 0.3 mg/0.3 mL SOAJ, Inject 0.3 mL (0.3 mg total) into a muscle once for 1 dose, Disp: 0.6 mL, Rfl: 0    glucose blood (OneTouch Verio) test strip, Use to test blood sugar 4x daily in case of CGM failure., Disp: 100 each, Rfl: 2    insulin aspart (NovoLOG FlexPen) 100 UNIT/ML injection pen, Inject 4 units prior to breakfast. Inject 6 units prior to lunch and dinner., Disp: 15 mL, Rfl: 1    Insulin Glargine Solostar (Lantus SoloStar) 100 UNIT/ML SOPN, Inject 10 units nightly., Disp: 6 mL, Rfl: 3    Insulin Pen Needle (BD Pen Needle Karma U/F) 32G X 4 MM MISC, Use to inject  insulin 4x daily., Disp: 300 each, Rfl: 1    levothyroxine 75 mcg tablet, Take 1 tablet (75 mcg total) by mouth daily, Disp: 90 tablet, Rfl: 3    lisinopril (ZESTRIL) 20 mg tablet, Take 1 tablet (20 mg total) by mouth daily, Disp: 90 tablet, Rfl: 1    mometasone (ELOCON) 0.1 % cream, Apply topically daily Apply topically BID x 2 weeks then qhs prn, Disp: 45 g, Rfl: 3    Multiple Vitamins-Minerals (MULTIVITAL-M PO), Take by mouth, Disp: , Rfl:     OneTouch Delica Lancets 33G MISC, Use to test blood sugar 4x daily., Disp: 300 each, Rfl: 2    Ascorbic Acid (VITAMIN C PO), Take by mouth (Patient not taking: Reported on 4/17/2024), Disp: , Rfl:     famotidine (PEPCID) 20 mg tablet, Take 1 tablet (20 mg total) by mouth daily (Patient not taking: Reported on 7/23/2024), Disp: 90 tablet, Rfl: 1    fluconazole (DIFLUCAN) 100 mg tablet, Take 1 tablet by mouth 2 (two) times a day (Patient not taking: Reported on 6/4/2024), Disp: , Rfl:     melatonin 3 mg, , Disp: , Rfl:     meloxicam (MOBIC) 7.5 mg tablet, Take 2 tablets by mouth daily (Patient not taking: Reported on 4/17/2024), Disp: , Rfl:     mupirocin (BACTROBAN) 2 % ointment, Apply topically 3 (three) times a day (Patient not taking: Reported on 7/23/2024), Disp: 30 g, Rfl: 1    Review of Systems   Constitutional:  Negative for activity change, appetite change, fatigue and unexpected weight change.   HENT:  Negative for dental problem, sore throat, trouble swallowing and voice change.    Eyes:  Negative for visual disturbance.   Respiratory:  Negative for cough, chest tightness and shortness of breath.    Cardiovascular:  Negative for chest pain, palpitations and leg swelling.   Gastrointestinal:  Negative for constipation, diarrhea, nausea and vomiting.   Endocrine: Negative for cold intolerance, heat intolerance, polydipsia, polyphagia and polyuria.   Genitourinary:  Negative for frequency.   Musculoskeletal:  Negative for arthralgias, back pain, gait problem and  "myalgias.   Skin:  Negative for wound.   Allergic/Immunologic: Positive for environmental allergies. Negative for food allergies.   Neurological:  Positive for numbness. Negative for dizziness, weakness, light-headedness and headaches.   Psychiatric/Behavioral:  Negative for decreased concentration, dysphoric mood and sleep disturbance. The patient is not nervous/anxious.        Physical Exam:  Body mass index is 23.32 kg/m².  /68 (BP Location: Left arm, Patient Position: Sitting, Cuff Size: Standard)   Pulse 59   Temp 98.7 °F (37.1 °C) (Temporal)   Ht 5' 1\" (1.549 m) Comment: verbal  Wt 56 kg (123 lb 6.4 oz)   SpO2 97%   BMI 23.32 kg/m²    Wt Readings from Last 3 Encounters:   07/23/24 56 kg (123 lb 6.4 oz)   06/04/24 56.9 kg (125 lb 6.4 oz)   05/24/24 57.6 kg (127 lb)       Physical Exam  Vitals reviewed.   Constitutional:       General: She is not in acute distress.     Appearance: She is well-developed. She is not ill-appearing.   HENT:      Head: Normocephalic and atraumatic.   Eyes:      Pupils: Pupils are equal, round, and reactive to light.   Neck:      Thyroid: No thyromegaly.   Cardiovascular:      Rate and Rhythm: Normal rate.      Pulses: Normal pulses.   Pulmonary:      Effort: Pulmonary effort is normal.   Musculoskeletal:      Cervical back: Normal range of motion and neck supple.      Right lower leg: No edema.      Left lower leg: No edema.   Lymphadenopathy:      Cervical: No cervical adenopathy.   Skin:     General: Skin is warm and dry.      Capillary Refill: Capillary refill takes less than 2 seconds.   Neurological:      Mental Status: She is alert and oriented to person, place, and time.      Gait: Gait normal.   Psychiatric:         Mood and Affect: Mood normal.         Behavior: Behavior normal.           Labs:   Lab Results   Component Value Date    HGBA1C 7.7 (H) 07/11/2024    HGBA1C 8.2 (A) 04/10/2024    HGBA1C 7.3 (H) 11/16/2023     Lab Results   Component Value Date    " CREATININE 0.87 07/11/2024    CREATININE 1.00 11/16/2023    CREATININE 1.06 08/14/2023    BUN 22 07/11/2024    K 4.0 07/11/2024     07/11/2024    CO2 26 07/11/2024     GFR, Calculated   Date Value Ref Range Status   01/19/2018 48 (L) >60 mL/min/1.73m2 Final     Comment:     mL/min per 1.73 square meters                                            Normal Function or Mild Renal    Disease (if clinically at risk):  >or=60  Moderately Decreased:                30-59  Severely Decreased:                  15-29  Renal Failure:                         <15                                            -American GFR: multiply reported GFR by 1.16    Please note that the eGFR is based on the CKD-EPI calculation, and is not intended to be used for drug dosing.                                            Note: Calculated GFR may not be an accurate indicator of renal function if the patient's renal function is not in a steady state.     eGFR   Date Value Ref Range Status   07/11/2024 62 ml/min/1.73sq m Final     Lab Results   Component Value Date    HDL 72 08/14/2023    TRIG 124 08/14/2023     Lab Results   Component Value Date    ALT 9 07/11/2024    AST 16 07/11/2024    ALKPHOS 73 07/11/2024     Lab Results   Component Value Date    [id number] 1.482 07/11/2024    [id number] 1.632 08/14/2023    [id number] 2.740 03/07/2023     Lab Results   Component Value Date    FREET4 1.21 03/07/2023       Discussed with the patient and all questioned fully answered. She will call me if any problems arise.    Follow-up appointment in 4 months.     Counseled patient on diagnostic results, prognosis, risk and benefit of treatment options, instruction for management, importance of treatment compliance, Risk  factor reduction and impressions    EUGENIA Phan

## 2024-07-23 NOTE — PATIENT INSTRUCTIONS
Increase basaglar back up to 10 units nightly  Continue 4 units of novolog prior to breakfast. Increase to 6 units prior to lunch and dinner. Remember to inject 15 minutes prior to eating.   Let me know how it goes.

## 2024-07-23 NOTE — ASSESSMENT & PLAN NOTE
While A1C has improved, patient is having far more episodes of hyperglycemia over the last two weeks.   Increase basaglar to 10 units. Adjust novolog to 4-6-6. Reviewed the importance of dating her insulin pens when she opens them. As she is on such low doses of insulin, she should not use pens until they run out as the insulin could lose its efficacy in the period of time. Date pens and discard 28 days after opening. Patient also notes that she is not always timing her mealtime boluses appropriately. She will continue to make an effort to leave approximately 15 minutes between novolog injection and eating her meals. Patient will bring her CGM by the office next week for review and recommendations. F/U in 4 months.   Lab Results   Component Value Date    HGBA1C 7.7 (H) 07/11/2024

## 2024-07-23 NOTE — ASSESSMENT & PLAN NOTE
Evenity denied by insurance. Had requested Prolia as next line of therapy. Will do so again as it appears the process to apply was not completed. Continue with Vit D and Ca+ supplements. Continue with weight bearing activity. Serum ca+ and Vit D levels are stable.

## 2024-07-23 NOTE — Clinical Note
Apply for Prolia, please. Last DEXA scan completed in November 2023 demonstrates significant osteoporosis. Thank you.

## 2024-07-24 ENCOUNTER — TELEPHONE (OUTPATIENT)
Dept: ENDOCRINOLOGY | Facility: CLINIC | Age: 79
End: 2024-07-24

## 2024-07-24 NOTE — TELEPHONE ENCOUNTER
----- Message from EUGENIA Hanley sent at 7/24/2024 12:35 PM EDT -----  Regarding: FW: Prolia  Please schedule patient for Prolia injections. Thank you.  ----- Message -----  From: Elysia Romero  Sent: 7/24/2024   9:03 AM EDT  To: EUGENIA Phan  Subject: Prolia                                           No Auth required for Prolia with [employer]. [employer] scanned into Media. Thank you.

## 2024-07-29 ENCOUNTER — TELEPHONE (OUTPATIENT)
Age: 79
End: 2024-07-29

## 2024-07-29 NOTE — TELEPHONE ENCOUNTER
Patient states over the weekend she was having high blood sugars. She is taking Novolog 4-6-6 and Lantus 10 units at bedtime. She wears her Kaylah 2 and checks via fingerstick as well. She has been feeling more tired recently. She states she is scheduled for her 1st prolia injection tomorrow and is not sure if she should come.  Please advise

## 2024-07-29 NOTE — TELEPHONE ENCOUNTER
Spoke with patient. States her blood sugars have been all over the place since starting new insulin regimen.  Reports a blood sugar of over 400 over the weekend. States she is having 2-3 normal bowel movements per day currently but feeling weak/shaky after, noted that she hasn't been eating as much due to being nervous about her blood sugar being all over the place right now.    Sugars from today:  185 fasting- meter, took 4 units of insulin  255 after eating on yaritza, ate a yogurt  274 at 1120 on meter    Stated she is going to drop off meter to be read at Endo office today.

## 2024-07-29 NOTE — TELEPHONE ENCOUNTER
Patient is having bad sugar readings and and has to urinate often and is feeling shaky. She has an appt that was available for tomorrow, May be anxiety. Would like  to call her back today about symptoms

## 2024-07-29 NOTE — TELEPHONE ENCOUNTER
Patient came in with her meter to download.   Scanned into media. She is anxious about her sugars and cancelled her appt for tomorrow to receive her prolia injection. He sugars have been running high    185 this morning  She took 4 units of insulin  255 after she ate

## 2024-07-29 NOTE — TELEPHONE ENCOUNTER
Called patient. She is not connected to the office. She is bringing her CGM down tot the office for a download

## 2024-07-29 NOTE — TELEPHONE ENCOUNTER
"Please call to triage and inquire what \"bad sugar readings\" means. She is a type 1 diabetic, and follows with Endo. "

## 2024-07-30 ENCOUNTER — OFFICE VISIT (OUTPATIENT)
Dept: FAMILY MEDICINE CLINIC | Facility: CLINIC | Age: 79
End: 2024-07-30
Payer: MEDICARE

## 2024-07-30 ENCOUNTER — APPOINTMENT (OUTPATIENT)
Dept: LAB | Facility: CLINIC | Age: 79
End: 2024-07-30
Payer: MEDICARE

## 2024-07-30 VITALS
HEART RATE: 83 BPM | WEIGHT: 122.6 LBS | DIASTOLIC BLOOD PRESSURE: 80 MMHG | TEMPERATURE: 97.5 F | SYSTOLIC BLOOD PRESSURE: 130 MMHG | RESPIRATION RATE: 16 BRPM | OXYGEN SATURATION: 97 % | HEIGHT: 61 IN | BODY MASS INDEX: 23.15 KG/M2

## 2024-07-30 DIAGNOSIS — F41.1 GENERALIZED ANXIETY DISORDER: ICD-10-CM

## 2024-07-30 DIAGNOSIS — R35.89 POLYURIA: ICD-10-CM

## 2024-07-30 DIAGNOSIS — E10.65 TYPE 1 DIABETES MELLITUS WITH HYPERGLYCEMIA (HCC): Primary | ICD-10-CM

## 2024-07-30 DIAGNOSIS — J02.9 SORE THROAT: ICD-10-CM

## 2024-07-30 DIAGNOSIS — E10.65 TYPE 1 DIABETES MELLITUS WITH HYPERGLYCEMIA (HCC): ICD-10-CM

## 2024-07-30 DIAGNOSIS — Z91.030 BEE STING ALLERGY: ICD-10-CM

## 2024-07-30 LAB
ALBUMIN SERPL BCG-MCNC: 4.1 G/DL (ref 3.5–5)
ALP SERPL-CCNC: 86 U/L (ref 34–104)
ALT SERPL W P-5'-P-CCNC: 8 U/L (ref 7–52)
ANION GAP SERPL CALCULATED.3IONS-SCNC: 7 MMOL/L (ref 4–13)
AST SERPL W P-5'-P-CCNC: 11 U/L (ref 13–39)
B-OH-BUTYR SERPL-MCNC: <0.05 MMOL/L (ref 0.02–0.27)
BACTERIA UR QL AUTO: ABNORMAL /HPF
BASOPHILS # BLD AUTO: 0.07 THOUSANDS/ÂΜL (ref 0–0.1)
BASOPHILS NFR BLD AUTO: 1 % (ref 0–1)
BILIRUB SERPL-MCNC: 0.52 MG/DL (ref 0.2–1)
BILIRUB UR QL STRIP: NEGATIVE
BUN SERPL-MCNC: 18 MG/DL (ref 5–25)
CALCIUM SERPL-MCNC: 9 MG/DL (ref 8.4–10.2)
CHLORIDE SERPL-SCNC: 100 MMOL/L (ref 96–108)
CLARITY UR: CLEAR
CO2 SERPL-SCNC: 28 MMOL/L (ref 21–32)
COLOR UR: COLORLESS
CREAT SERPL-MCNC: 1.01 MG/DL (ref 0.6–1.3)
EOSINOPHIL # BLD AUTO: 0.24 THOUSAND/ÂΜL (ref 0–0.61)
EOSINOPHIL NFR BLD AUTO: 3 % (ref 0–6)
ERYTHROCYTE [DISTWIDTH] IN BLOOD BY AUTOMATED COUNT: 12.3 % (ref 11.6–15.1)
GFR SERPL CREATININE-BSD FRML MDRD: 52 ML/MIN/1.73SQ M
GLUCOSE P FAST SERPL-MCNC: 371 MG/DL (ref 65–99)
GLUCOSE UR STRIP-MCNC: ABNORMAL MG/DL
HCT VFR BLD AUTO: 40 % (ref 34.8–46.1)
HGB BLD-MCNC: 12.9 G/DL (ref 11.5–15.4)
HGB UR QL STRIP.AUTO: NEGATIVE
IMM GRANULOCYTES # BLD AUTO: 0.03 THOUSAND/UL (ref 0–0.2)
IMM GRANULOCYTES NFR BLD AUTO: 0 % (ref 0–2)
KETONES UR STRIP-MCNC: NEGATIVE MG/DL
LEUKOCYTE ESTERASE UR QL STRIP: ABNORMAL
LYMPHOCYTES # BLD AUTO: 1.37 THOUSANDS/ÂΜL (ref 0.6–4.47)
LYMPHOCYTES NFR BLD AUTO: 20 % (ref 14–44)
MCH RBC QN AUTO: 29.3 PG (ref 26.8–34.3)
MCHC RBC AUTO-ENTMCNC: 32.3 G/DL (ref 31.4–37.4)
MCV RBC AUTO: 91 FL (ref 82–98)
MONOCYTES # BLD AUTO: 0.58 THOUSAND/ÂΜL (ref 0.17–1.22)
MONOCYTES NFR BLD AUTO: 8 % (ref 4–12)
NEUTROPHILS # BLD AUTO: 4.73 THOUSANDS/ÂΜL (ref 1.85–7.62)
NEUTS SEG NFR BLD AUTO: 68 % (ref 43–75)
NITRITE UR QL STRIP: NEGATIVE
NON-SQ EPI CELLS URNS QL MICRO: ABNORMAL /HPF
NRBC BLD AUTO-RTO: 0 /100 WBCS
PH UR STRIP.AUTO: 6 [PH]
PLATELET # BLD AUTO: 212 THOUSANDS/UL (ref 149–390)
PMV BLD AUTO: 12.9 FL (ref 8.9–12.7)
POTASSIUM SERPL-SCNC: 4.5 MMOL/L (ref 3.5–5.3)
PROT SERPL-MCNC: 6.9 G/DL (ref 6.4–8.4)
PROT UR STRIP-MCNC: NEGATIVE MG/DL
RBC # BLD AUTO: 4.4 MILLION/UL (ref 3.81–5.12)
RBC #/AREA URNS AUTO: ABNORMAL /HPF
SODIUM SERPL-SCNC: 135 MMOL/L (ref 135–147)
SP GR UR STRIP.AUTO: 1.01 (ref 1–1.03)
UROBILINOGEN UR STRIP-ACNC: <2 MG/DL
WBC # BLD AUTO: 7.02 THOUSAND/UL (ref 4.31–10.16)
WBC #/AREA URNS AUTO: ABNORMAL /HPF

## 2024-07-30 PROCEDURE — 81001 URINALYSIS AUTO W/SCOPE: CPT

## 2024-07-30 PROCEDURE — 80053 COMPREHEN METABOLIC PANEL: CPT

## 2024-07-30 PROCEDURE — 82010 KETONE BODYS QUAN: CPT

## 2024-07-30 PROCEDURE — 87086 URINE CULTURE/COLONY COUNT: CPT

## 2024-07-30 PROCEDURE — 99214 OFFICE O/P EST MOD 30 MIN: CPT | Performed by: INTERNAL MEDICINE

## 2024-07-30 PROCEDURE — 36415 COLL VENOUS BLD VENIPUNCTURE: CPT

## 2024-07-30 PROCEDURE — 85025 COMPLETE CBC W/AUTO DIFF WBC: CPT

## 2024-07-30 PROCEDURE — G2211 COMPLEX E/M VISIT ADD ON: HCPCS | Performed by: INTERNAL MEDICINE

## 2024-07-30 RX ORDER — EPINEPHRINE 0.3 MG/.3ML
0.3 INJECTION SUBCUTANEOUS ONCE
Qty: 0.6 ML | Refills: 0 | Status: SHIPPED | OUTPATIENT
Start: 2024-07-30 | End: 2024-07-30

## 2024-07-30 NOTE — PROGRESS NOTES
Ambulatory Visit  Name: Johana Alcantara      : 1945      MRN: [id number]  Encounter Provider: Jessica Duggan MD  Encounter Date: 2024   Encounter department: [facility] PRIMARY CARE    Assessment & Plan   1. Type 1 diabetes mellitus with hyperglycemia (HCC)  -will get labs/urine to check for ketoacidosis, dehydration. Discussed increasing basal-bolus insulin by 1-2 units, confirmed that she is uncapping pen needles. Contacted her Endocrinologist to follow up with pt this week.     -   CBC and differential; Future  -     Comprehensive metabolic panel; Future  -     Urinalysis with microscopic; Future  -     Beta Hydroxybutyrate; Future  -     Urine culture; Future  2. Bee sting allergy  -     EPINEPHrine (EPIPEN) 0.3 mg/0.3 mL SOAJ; Inject 0.3 mL (0.3 mg total) into a muscle once for 1 dose  3. Sore throat  4. Polyuria  -     Urine culture; Future  5. Generalized anxiety disorder       History of Present Illness     78yo female with history of type 1 diabetes here for acute visit. Reports her endocrinologist switched her from vials to pens 1 week ago, and having elevated -400s since then. Using Basaglar 10 units at bedtime, with novolog 4-6-6 with meals.  today. Took extra 2 units of novolog last night because . Reports sore throat today, no fever/chills, rhinorrhea or cough. Reports increased thirst, frequent urination. No nausea/vomiting or abdominal pain. Reports loose stools today because she took miralax yesterday for constipation.         Review of Systems   Constitutional:  Negative for chills, fatigue and fever.   HENT:  Positive for sore throat. Negative for congestion, postnasal drip, rhinorrhea and sinus pressure.    Respiratory:  Negative for cough.    Gastrointestinal:  Positive for diarrhea. Negative for abdominal pain, nausea and vomiting.   Endocrine: Positive for polydipsia and polyuria.   Genitourinary:  Positive for frequency and urgency.  Negative for difficulty urinating and dysuria.   Neurological:  Negative for dizziness, light-headedness and headaches.     Current Outpatient Medications on File Prior to Visit   Medication Sig Dispense Refill   • Alcohol Swabs ( Sterile Alcohol Prep) PADS      • ASPIRIN 81 PO Take 81 mg by mouth daily     • atorvastatin (LIPITOR) 20 mg tablet TAKE ONE TABLET BY MOUTH ONCE DAILY 90 tablet 2   • Blood Glucose Monitoring Suppl (OneTouch Verio) w/Device KIT Use to test blood sugar 4x daily. 1 kit 0   • Cholecalciferol 2000 units CAPS Take 1 capsule by mouth     • Continuous Blood Gluc Sensor (FreeStyle Kaylah 2 Sensor) MISC Use     • famotidine (PEPCID) 20 mg tablet Take 1 tablet (20 mg total) by mouth daily 90 tablet 1   • fluconazole (DIFLUCAN) 100 mg tablet Take 1 tablet by mouth 2 (two) times a day     • glucose blood (OneTouch Verio) test strip Use to test blood sugar 4x daily in case of CGM failure. 100 each 2   • insulin aspart (NovoLOG FlexPen) 100 UNIT/ML injection pen Inject 4 units prior to breakfast. Inject 6 units prior to lunch and dinner. 15 mL 1   • Insulin Glargine Solostar (Lantus SoloStar) 100 UNIT/ML SOPN Inject 10 units nightly. 6 mL 3   • Insulin Pen Needle (BD Pen Needle Karma U/F) 32G X 4 MM MISC Use to inject insulin 4x daily. 300 each 1   • levothyroxine 75 mcg tablet Take 1 tablet (75 mcg total) by mouth daily 90 tablet 3   • lisinopril (ZESTRIL) 20 mg tablet Take 1 tablet (20 mg total) by mouth daily 90 tablet 1   • mometasone (ELOCON) 0.1 % cream Apply topically daily Apply topically BID x 2 weeks then qhs prn 45 g 3   • Multiple Vitamins-Minerals (MULTIVITAL-M PO) Take by mouth     • OneTouch Delica Lancets 33G MISC Use to test blood sugar 4x daily. 300 each 2   • Ascorbic Acid (VITAMIN C PO) Take by mouth (Patient not taking: Reported on 4/17/2024)     • melatonin 3 mg  (Patient not taking: Reported on 4/17/2024)     • meloxicam (MOBIC) 7.5 mg tablet Take 2 tablets by mouth daily  "(Patient not taking: Reported on 4/17/2024)     • mupirocin (BACTROBAN) 2 % ointment Apply topically 3 (three) times a day (Patient not taking: Reported on 7/23/2024) 30 g 1   • [DISCONTINUED] EPINEPHrine (EPIPEN) 0.3 mg/0.3 mL SOAJ Inject 0.3 mL (0.3 mg total) into a muscle once for 1 dose 0.6 mL 0     No current facility-administered medications on file prior to visit.      Objective     /80   Pulse 83   Temp 97.5 °F (36.4 °C) (Temporal)   Resp 16   Ht 5' 1\" (1.549 m)   Wt 55.6 kg (122 lb 9.6 oz)   SpO2 97%   BMI 23.17 kg/m²     Physical Exam  Vitals reviewed.   Constitutional:       General: She is not in acute distress.     Appearance: She is normal weight.   HENT:      Mouth/Throat:      Pharynx: No oropharyngeal exudate or posterior oropharyngeal erythema.   Cardiovascular:      Rate and Rhythm: Normal rate and regular rhythm.      Heart sounds: No murmur heard.     No friction rub. No gallop.   Pulmonary:      Effort: Pulmonary effort is normal. No respiratory distress.      Breath sounds: No wheezing, rhonchi or rales.   Abdominal:      General: Abdomen is flat. Bowel sounds are increased.      Palpations: Abdomen is soft.      Tenderness: There is no abdominal tenderness. There is no guarding or rebound.   Lymphadenopathy:      Cervical: No cervical adenopathy.   Neurological:      Mental Status: She is alert.      Motor: Motor function is intact.      Gait: Gait is intact.   Psychiatric:         Mood and Affect: Mood is anxious. Affect is tearful.         Behavior: Behavior normal. Behavior is cooperative.       Administrative Statements           "

## 2024-07-30 NOTE — PATIENT INSTRUCTIONS
Please increase long-acting insulin Basaglar to 12 units at bedtime, increase fasting acting insulin Novolog to 7-8 units with meal  Get blood work and urine checked

## 2024-07-31 ENCOUNTER — TELEPHONE (OUTPATIENT)
Dept: FAMILY MEDICINE CLINIC | Facility: CLINIC | Age: 79
End: 2024-07-31

## 2024-07-31 NOTE — TELEPHONE ENCOUNTER
Called and left detailed message informing pt of results. Informed her to call if she has any questions or concerns.

## 2024-07-31 NOTE — TELEPHONE ENCOUNTER
----- Message from Jessica Duggan MD sent at 7/31/2024  7:57 AM EDT -----  Please advise pt that labs and urine look okay, no evidence of ketoacidosis but BG high at 371.

## 2024-08-01 ENCOUNTER — TELEPHONE (OUTPATIENT)
Dept: ENDOCRINOLOGY | Facility: CLINIC | Age: 79
End: 2024-08-01

## 2024-08-01 DIAGNOSIS — N18.31 TYPE 1 DIABETES MELLITUS WITH STAGE 3A CHRONIC KIDNEY DISEASE (HCC): ICD-10-CM

## 2024-08-01 DIAGNOSIS — E10.22 TYPE 1 DIABETES MELLITUS WITH STAGE 3A CHRONIC KIDNEY DISEASE (HCC): ICD-10-CM

## 2024-08-01 LAB — BACTERIA UR CULT: NORMAL

## 2024-08-01 NOTE — TELEPHONE ENCOUNTER
"Called and spoke with patient she said that she is feeling better would like Deysi to check her CGM report which she will drop off tomorrow morning if she can review she says her sugars are running in the 100's and then spike up in the 200\"s quickly and that is what worries her.   "

## 2024-08-01 NOTE — TELEPHONE ENCOUNTER
----- Message from EUGENIA Hanley sent at 8/1/2024  9:25 AM EDT -----  Please call patient and ask her how her sugars have been? Feeling better? Does she want us to look at her CGM report? Thanks.

## 2024-08-05 NOTE — TELEPHONE ENCOUNTER
Patient states the past 2 days BS has been running high. States BS yesterday was 234 267 250 234 Today, 257 at 6:41 am, 427 at 11:00 am and 397 at 11:40 am.  States she feels ok but feels depressed because of her high BS. Please advise, thank you.

## 2024-08-06 RX ORDER — INSULIN ASPART 100 [IU]/ML
INJECTION, SOLUTION INTRAVENOUS; SUBCUTANEOUS
Qty: 15 ML | Refills: 1 | Status: SHIPPED | OUTPATIENT
Start: 2024-08-06

## 2024-08-06 RX ORDER — INSULIN GLARGINE 100 [IU]/ML
INJECTION, SOLUTION SUBCUTANEOUS
Qty: 6 ML | Refills: 3 | Status: SHIPPED | OUTPATIENT
Start: 2024-08-06

## 2024-08-06 NOTE — TELEPHONE ENCOUNTER
CGM reviewed.  Please instruct patient to increase her Lantus to 14 units nightly.  For now, continue with 4 units of NovoLog prior to breakfast.  Continue 6 units prior to lunch and prior to dinner.    Per PCP note patient reported that she had switched from vials to pens recently?  Patient has been using pens with me for several months.  I have placed a referral for patient to meet with our diabetes educator to review appropriate use of pens/injection technique/dosing.

## 2024-08-07 NOTE — TELEPHONE ENCOUNTER
Called and spoke with Johana and she stated she understands and will try the 14 units nightly and see how it goes

## 2024-08-13 DIAGNOSIS — E10.22 TYPE 1 DIABETES MELLITUS WITH STAGE 3A CHRONIC KIDNEY DISEASE (HCC): ICD-10-CM

## 2024-08-13 DIAGNOSIS — N18.31 TYPE 1 DIABETES MELLITUS WITH STAGE 3A CHRONIC KIDNEY DISEASE (HCC): ICD-10-CM

## 2024-08-13 DIAGNOSIS — E10.22 TYPE 1 DIABETES MELLITUS WITH STAGE 3A CHRONIC KIDNEY DISEASE (HCC): Primary | ICD-10-CM

## 2024-08-13 DIAGNOSIS — N18.31 TYPE 1 DIABETES MELLITUS WITH STAGE 3A CHRONIC KIDNEY DISEASE (HCC): Primary | ICD-10-CM

## 2024-08-13 RX ORDER — PEN NEEDLE, DIABETIC 32GX 5/32"
NEEDLE, DISPOSABLE MISCELLANEOUS
Qty: 300 EACH | Refills: 1 | Status: SHIPPED | OUTPATIENT
Start: 2024-08-13

## 2024-08-13 RX ORDER — INSULIN GLARGINE 100 [IU]/ML
INJECTION, SOLUTION SUBCUTANEOUS
Qty: 15 ML | Refills: 1 | Status: SHIPPED | OUTPATIENT
Start: 2024-08-13

## 2024-08-13 RX ORDER — INSULIN ASPART 100 [IU]/ML
INJECTION, SOLUTION INTRAVENOUS; SUBCUTANEOUS
Qty: 15 ML | Refills: 1 | Status: SHIPPED | OUTPATIENT
Start: 2024-08-13

## 2024-08-13 NOTE — TELEPHONE ENCOUNTER
Patient Stopped in the office for refills on her BASAGLAR takes 14 units at bedtime, with refills please send to [facility]( no longer uses [facility]) on last pen

## 2024-08-16 ENCOUNTER — NURSE TRIAGE (OUTPATIENT)
Dept: OTHER | Facility: OTHER | Age: 79
End: 2024-08-16

## 2024-08-16 NOTE — TELEPHONE ENCOUNTER
"Reason for Disposition  • Back pain    Answer Assessment - Initial Assessment Questions  1. ONSET: \"When did the pain begin?\"       Several weeks, then felt better over weekend, then last night started again   2. LOCATION: \"Where does it hurt?\" (upper, mid or lower back)      More on left   3. SEVERITY: \"How bad is the pain?\"  (e.g., Scale 1-10; mild, moderate, or severe)    - MILD (1-3): doesn't interfere with normal activities     - MODERATE (4-7): interferes with normal activities or awakens from sleep     - SEVERE (8-10): excruciating pain, unable to do any normal activities       Mild to severe   4. PATTERN: \"Is the pain constant?\" (e.g., yes, no; constant, intermittent)       Come and go   5. RADIATION: \"Does the pain shoot into your legs or elsewhere?\"      To hip, butt and then goes upper back   6. CAUSE:  \"What do you think is causing the back pain?\"       Seen in office for same  7. BACK OVERUSE:  \"Any recent lifting of heavy objects, strenuous work or exercise?\"      No   8. MEDICATIONS: \"What have you taken so far for the pain?\" (e.g., nothing, acetaminophen, NSAIDS)      Advil   9. NEUROLOGIC SYMPTOMS: \"Do you have any weakness, numbness, or problems with bowel/bladder control?\"      No   10. OTHER SYMPTOMS: \"Do you have any other symptoms?\" (e.g., fever, abdominal pain, burning with urination, blood in urine)  Can't sleep    Blood sugar 160 this am   Took advil and did stretching then it goes away    Patient states she also has increased urination overnight (3 times since 11pm)  She is drinking water    Protocols used: Back Pain-ADULT-    "

## 2024-08-16 NOTE — TELEPHONE ENCOUNTER
"Regarding: frequent urination/back pain  ----- Message from Svitlana DEMARCO sent at 8/16/2024  6:55 AM EDT -----  Pt stated, \"I am urinating every couple of hours. I have back pain and can't sleep. I've been alternating hot and cold compresses, and experiencing pain in my hips and butt as well.\"    "

## 2024-08-19 ENCOUNTER — NURSE TRIAGE (OUTPATIENT)
Dept: OTHER | Facility: OTHER | Age: 79
End: 2024-08-19

## 2024-08-19 NOTE — TELEPHONE ENCOUNTER
"Regarding: lower back pain  ----- Message from Jona YUAN sent at 8/19/2024  7:15 AM EDT -----  \" I am a diabetic and my sugar is on the lower sid3e right now. But my concern is for my lower back and butt, I have this burning aching pain.\"    "

## 2024-08-19 NOTE — TELEPHONE ENCOUNTER
"Reason for Disposition  • Back pain    Answer Assessment - Initial Assessment Questions  1. ONSET: \"When did the pain begin?\"       Pain started last week.     2. LOCATION: \"Where does it hurt?\" (upper, mid or lower back)      Pinching in the buttocks to the tailbone and then left leg    3. SEVERITY: \"How bad is the pain?\"  (e.g., Scale 1-10; mild, moderate, or severe)    - MILD (1-3): doesn't interfere with normal activities     - MODERATE (4-7): interferes with normal activities or awakens from sleep     - SEVERE (8-10): excruciating pain, unable to do any normal activities       10/10 when walking      Sitting with heat it calms it so 3/10    4. PATTERN: \"Is the pain constant?\" (e.g., yes, no; constant, intermittent)       Constantly there but severity comes and goes. Pain in the buttocks.    5. RADIATION: \"Does the pain shoot into your legs or elsewhere?\"      Radiating to left thigh.      6. CAUSE:  \"What do you think is causing the back pain?\"       Had this a couple years ago but just coming back.     7. BACK OVERUSE:  \"Any recent lifting of heavy objects, strenuous work or exercise?\"      Stretches and sciatic.     8. MEDICATIONS: \"What have you taken so far for the pain?\" (e.g., nothing, acetaminophen, NSAIDS)      Advil once daily and taking tylenol    9. NEUROLOGIC SYMPTOMS: \"Do you have any weakness, numbness, or problems with bowel/bladder control?\"      Denies    10. OTHER SYMPTOMS: \"Do you have any other symptoms?\" (e.g., fever, abdominal pain, burning with urination, blood in urine)        Gassy.             Blood glucose 73 this AM now 108.    Do sciatic stretching, applying heat and cold but it keeps going back.  Went to UrgentCare on Friday and had a urine test and blood test were okay.   Patient is afraid to leave the house because of the pinches in buttocks are debilitating when they come on. The pain was okay this weekend but the pinches are random and hurt a lot. Patient is fearful of being " alone and something happening. Talked with patient and she is okay to wait for her appointment. Patient has an appointment tomorrow at 9:20 am.  Reminded patient that nursing staff is always available to call if she needs anything or has concerns. Reviewed home care and call back instructions, verbalized understanding.    Protocols used: Back Pain-ADULT-AH

## 2024-08-20 ENCOUNTER — OFFICE VISIT (OUTPATIENT)
Dept: FAMILY MEDICINE CLINIC | Facility: CLINIC | Age: 79
End: 2024-08-20
Payer: MEDICARE

## 2024-08-20 VITALS
BODY MASS INDEX: 23.53 KG/M2 | HEART RATE: 68 BPM | WEIGHT: 124.6 LBS | SYSTOLIC BLOOD PRESSURE: 130 MMHG | DIASTOLIC BLOOD PRESSURE: 78 MMHG | HEIGHT: 61 IN | RESPIRATION RATE: 16 BRPM | OXYGEN SATURATION: 98 % | TEMPERATURE: 98.2 F

## 2024-08-20 DIAGNOSIS — Z00.00 ENCOUNTER FOR ANNUAL WELLNESS VISIT (AWV) IN MEDICARE PATIENT: Primary | ICD-10-CM

## 2024-08-20 DIAGNOSIS — K59.00 CONSTIPATION, UNSPECIFIED CONSTIPATION TYPE: ICD-10-CM

## 2024-08-20 DIAGNOSIS — R10.30 LOWER ABDOMINAL PAIN: ICD-10-CM

## 2024-08-20 DIAGNOSIS — E10.65 TYPE 1 DIABETES MELLITUS WITH HYPERGLYCEMIA (HCC): ICD-10-CM

## 2024-08-20 DIAGNOSIS — E78.2 MIXED HYPERLIPIDEMIA: ICD-10-CM

## 2024-08-20 DIAGNOSIS — Z12.31 ENCOUNTER FOR SCREENING MAMMOGRAM FOR BREAST CANCER: ICD-10-CM

## 2024-08-20 DIAGNOSIS — K62.89 RECTAL PAIN: ICD-10-CM

## 2024-08-20 PROCEDURE — G0439 PPPS, SUBSEQ VISIT: HCPCS | Performed by: INTERNAL MEDICINE

## 2024-08-20 PROCEDURE — 99214 OFFICE O/P EST MOD 30 MIN: CPT | Performed by: INTERNAL MEDICINE

## 2024-08-20 NOTE — PROGRESS NOTES
Ambulatory Visit  Name: Johana Alcantara      : 1945      MRN: [id number]  Encounter Provider: Jessica Duggan MD  Encounter Date: 2024   Encounter department: [facility] PRIMARY CARE    Assessment & Plan   1. Encounter for annual wellness visit (AWV) in Medicare patient  2. Rectal pain  -     CT abdomen pelvis w contrast; Future; Expected date: 2024  3. Constipation, unspecified constipation type  -     CT abdomen pelvis w contrast; Future; Expected date: 2024  4. Lower abdominal pain  -     CT abdomen pelvis w contrast; Future; Expected date: 2024  5. Type 1 diabetes mellitus with hyperglycemia (HCC)  -     Lipid Panel with Direct LDL reflex; Future  6. Mixed hyperlipidemia  -     Lipid Panel with Direct LDL reflex; Future  7. Encounter for screening mammogram for breast cancer  -     Mammo screening bilateral w 3d & cad; Future     Preventive health issues were discussed with patient, and age appropriate screening tests were ordered as noted in patient's After Visit Summary. Personalized health advice and appropriate referrals for health education or preventive services given if needed, as noted in patient's After Visit Summary.    History of Present Illness     80yo female with type 1 diabetes here for AWV. Reports some rectal and lower back pain recently, as well as lower abdominal pressure, increased bowel sounds and urinary frequency. Went to urgent care recently and had urinalysis showing blood/RBCs, culture showing mixed contaminants. Denies burning or hesitancy. Struggles with constipation, increased gas pressure. No blood with wiping or in toilet water. S/p COREEN-BSO in 2018.          Patient Care Team:  Jessica Duggan MD as PCP - General (Internal Medicine)  EUGENIA Phan as PCP - Endocrinology (Endocrinology)  EUGENIA Phan as Nurse Practitioner (Endocrinology)  Jeimy Rice RN as Registered Nurse (Diabetes  Services)    Review of Systems   Gastrointestinal:  Positive for abdominal pain, constipation and rectal pain. Negative for nausea and vomiting.   Genitourinary:  Positive for urgency.   Musculoskeletal:  Positive for back pain.     Medical History Reviewed by provider this encounter:  Meds  Problems       Annual Wellness Visit Questionnaire   Johana is here for her Subsequent Wellness visit.     Health Risk Assessment:   Patient rates overall health as good. Patient feels that their physical health rating is same. Patient is satisfied with their life. Eyesight was rated as same. Hearing was rated as same. Patient feels that their emotional and mental health rating is same. Patients states they are never, rarely angry. Patient states they are sometimes unusually tired/fatigued. Pain experienced in the last 7 days has been none. Patient states that she has experienced no weight loss or gain in last 6 months.     Depression Screening:   PHQ-2 Score: 1      Fall Risk Screening:   In the past year, patient has experienced: no history of falling in past year      Urinary Incontinence Screening:   Patient has leaked urine accidently in the last six months.     Home Safety:  Patient does not have trouble with stairs inside or outside of their home. Patient has working smoke alarms and has working carbon monoxide detector. Home safety hazards include: none.     Nutrition:   Current diet is Regular.     Medications:   Patient is currently taking over-the-counter supplements. OTC medications include: see medication list, advil. Patient is able to manage medications.     Activities of Daily Living (ADLs)/Instrumental Activities of Daily Living (IADLs):   Walk and transfer into and out of bed and chair?: Yes  Dress and groom yourself?: Yes    Bathe or shower yourself?: Yes    Feed yourself? Yes  Do your laundry/housekeeping?: Yes  Manage your money, pay your bills and track your expenses?: Yes  Make your own meals?: Yes     Do your own shopping?: Yes    Previous Hospitalizations:   Any hospitalizations or ED visits within the last 12 months?: No      Advance Care Planning:   Living will: Yes    Durable POA for healthcare: Yes    Advanced directive: Yes    End of Life Decisions reviewed with patient: Yes    Provider agrees with end of life decisions: Yes      Cognitive Screening:   Provider or family/friend/caregiver concerned regarding cognition?: No    PREVENTIVE SCREENINGS      Cardiovascular Screening:    General: Screening Not Indicated and History Lipid Disorder      Diabetes Screening:     General: Screening Not Indicated and History Diabetes      Colorectal Cancer Screening:     General: Screening Not Indicated      Breast Cancer Screening:     General: Screening Current      Cervical Cancer Screening:    General: Screening Not Indicated      Osteoporosis Screening:    General: Screening Not Indicated and History Osteoporosis      Abdominal Aortic Aneurysm (AAA) Screening:        General: Screening Not Indicated      Lung Cancer Screening:     General: Screening Not Indicated      Hepatitis C Screening:    General: Screening Not Indicated    Screening, Brief Intervention, and Referral to Treatment (SBIRT)    Screening  Typical number of drinks in a day: 0  Typical number of drinks in a week: 0  Interpretation: Low risk drinking behavior.    Single Item Drug Screening:  How often have you used an illegal drug (including marijuana) or a prescription medication for non-medical reasons in the past year? never    Single Item Drug Screen Score: 0  Interpretation: Negative screen for possible drug use disorder    Brief Intervention  Alcohol & drug use screenings were reviewed. No concerns regarding substance use disorder identified.     Other Counseling Topics:   Car/seat belt/driving safety, skin self-exam, sunscreen and calcium and vitamin D intake and regular weightbearing exercise.     Social Determinants of Health     Financial  "Resource Strain: Low Risk  (8/14/2023)    Overall Financial Resource Strain (CARDIA)    • Difficulty of Paying Living Expenses: Not very hard   Transportation Needs: No Transportation Needs (8/14/2023)    PRAPARE - Transportation    • Lack of Transportation (Medical): No    • Lack of Transportation (Non-Medical): No     No results found.    Objective     /78   Pulse 68   Temp 98.2 °F (36.8 °C)   Resp 16   Ht 5' 1\" (1.549 m)   Wt 56.5 kg (124 lb 9.6 oz)   SpO2 98%   BMI 23.54 kg/m²     Physical Exam  Cardiovascular:      Pulses: no weak pulses.           Dorsalis pedis pulses are 2+ on the right side and 2+ on the left side.        Posterior tibial pulses are 2+ on the right side and 2+ on the left side.   Feet:      Right foot:      Skin integrity: No ulcer, skin breakdown, erythema, warmth, callus or dry skin.      Left foot:      Skin integrity: No ulcer, skin breakdown, erythema, warmth, callus or dry skin.         Diabetic Foot Exam    Patient's shoes and socks removed.    Right Foot/Ankle   Right Foot Inspection  Skin Exam: skin normal and skin intact. No dry skin, no warmth, no callus, no erythema, no maceration, no abnormal color, no pre-ulcer, no ulcer and no callus.     Toe Exam: ROM and strength within normal limits and right toe deformity.     Sensory   Proprioception: intact  Monofilament testing: intact    Vascular  Capillary refills: < 3 seconds  The right DP pulse is 2+. The right PT pulse is 2+.     Left Foot/Ankle  Left Foot Inspection  Skin Exam: skin normal and skin intact. No dry skin, no warmth, no erythema, no maceration, normal color, no pre-ulcer, no ulcer and no callus.     Toe Exam: ROM and strength within normal limits and left toe deformity.     Sensory   Proprioception: intact  Monofilament testing: intact    Vascular  Capillary refills: < 3 seconds  The left DP pulse is 2+. The left PT pulse is 2+.     Assign Risk Category  Deformity present  No loss of protective " sensation  No weak pulses  Risk: 0

## 2024-08-20 NOTE — PATIENT INSTRUCTIONS
Medicare Preventive Visit Patient Instructions  Thank you for completing your Welcome to Medicare Visit or Medicare Annual Wellness Visit today. Your next wellness visit will be due in one year (8/21/2025).  The screening/preventive services that you may require over the next 5-10 years are detailed below. Some tests may not apply to you based off risk factors and/or age. Screening tests ordered at today's visit but not completed yet may show as past due. Also, please note that scanned in results may not display below.  Preventive Screenings:  Service Recommendations Previous Testing/Comments   Colorectal Cancer Screening  * Colonoscopy    * Fecal Occult Blood Test (FOBT)/Fecal Immunochemical Test (FIT)  * Fecal DNA/Cologuard Test  * Flexible Sigmoidoscopy Age: 45-75 years old   Colonoscopy: every 10 years (may be performed more frequently if at higher risk)  OR  FOBT/FIT: every 1 year  OR  Cologuard: every 3 years  OR  Sigmoidoscopy: every 5 years  Screening may be recommended earlier than age 45 if at higher risk for colorectal cancer. Also, an individualized decision between you and your healthcare provider will decide whether screening between the ages of 76-85 would be appropriate. Colonoscopy: 02/10/2022  FOBT/FIT: Not on file  Cologuard: Not on file  Sigmoidoscopy: Not on file    Screening Not Indicated     Breast Cancer Screening Age: 40+ years old  Frequency: every 1-2 years  Not required if history of left and right mastectomy Mammogram: 11/02/2023    Screening Current   Cervical Cancer Screening Between the ages of 21-29, pap smear recommended once every 3 years.   Between the ages of 30-65, can perform pap smear with HPV co-testing every 5 years.   Recommendations may differ for women with a history of total hysterectomy, cervical cancer, or abnormal pap smears in past. Pap Smear: Not on file    Screening Not Indicated   Hepatitis C Screening Once for adults born between 1945 and 1965  More frequently in  patients at high risk for Hepatitis C Hep C Antibody: Not on file    Screening Not Indicated   Diabetes Screening 1-2 times per year if you're at risk for diabetes or have pre-diabetes Fasting glucose: 371 mg/dL (7/30/2024)  A1C: 7.7 % (7/11/2024)  Screening Not Indicated  History Diabetes   Cholesterol Screening Once every 5 years if you don't have a lipid disorder. May order more often based on risk factors. Lipid panel: 08/14/2023    Screening Not Indicated  History Lipid Disorder     Other Preventive Screenings Covered by Medicare:  Abdominal Aortic Aneurysm (AAA) Screening: covered once if your at risk. You're considered to be at risk if you have a family history of AAA.  Lung Cancer Screening: covers low dose CT scan once per year if you meet all of the following conditions: (1) Age 55-77; (2) No signs or symptoms of lung cancer; (3) Current smoker or have quit smoking within the last 15 years; (4) You have a tobacco smoking history of at least 20 pack years (packs per day multiplied by number of years you smoked); (5) You get a written order from a healthcare provider.  Glaucoma Screening: covered annually if you're considered high risk: (1) You have diabetes OR (2) Family history of glaucoma OR (3)  aged 50 and older OR (4)  American aged 65 and older  Osteoporosis Screening: covered every 2 years if you meet one of the following conditions: (1) You're estrogen deficient and at risk for osteoporosis based off medical history and other findings; (2) Have a vertebral abnormality; (3) On glucocorticoid therapy for more than 3 months; (4) Have primary hyperparathyroidism; (5) On osteoporosis medications and need to assess response to drug therapy.   Last bone density test (DXA Scan): 11/02/2023.  HIV Screening: covered annually if you're between the age of 15-65. Also covered annually if you are younger than 15 and older than 65 with risk factors for HIV infection. For pregnant patients,  it is covered up to 3 times per pregnancy.    Immunizations:  Immunization Recommendations   Influenza Vaccine Annual influenza vaccination during flu season is recommended for all persons aged >= 6 months who do not have contraindications   Pneumococcal Vaccine   * Pneumococcal conjugate vaccine = PCV13 (Prevnar 13), PCV15 (Vaxneuvance), PCV20 (Prevnar 20)  * Pneumococcal polysaccharide vaccine = PPSV23 (Pneumovax) Adults 19-65 yo with certain risk factors or if 65+ yo  If never received any pneumonia vaccine: recommend Prevnar 20 (PCV20)  Give PCV20 if previously received 1 dose of PCV13 or PPSV23   Hepatitis B Vaccine 3 dose series if at intermediate or high risk (ex: diabetes, end stage renal disease, liver disease)   Respiratory syncytial virus (RSV) Vaccine - COVERED BY MEDICARE PART D  * RSVPreF3 (Arexvy) CDC recommends that adults 60 years of age and older may receive a single dose of RSV vaccine using shared clinical decision-making (SCDM)   Tetanus (Td) Vaccine - COST NOT COVERED BY MEDICARE PART B Following completion of primary series, a booster dose should be given every 10 years to maintain immunity against tetanus. Td may also be given as tetanus wound prophylaxis.   Tdap Vaccine - COST NOT COVERED BY MEDICARE PART B Recommended at least once for all adults. For pregnant patients, recommended with each pregnancy.   Shingles Vaccine (Shingrix) - COST NOT COVERED BY MEDICARE PART B  2 shot series recommended in those 19 years and older who have or will have weakened immune systems or those 50 years and older     Health Maintenance Due:  There are no preventive care reminders to display for this patient.  Immunizations Due:      Topic Date Due   • Pneumococcal Vaccine: 65+ Years (2 of 2 - PCV) 01/13/2022   • COVID-19 Vaccine (4 - 2023-24 season) 09/01/2023   • Influenza Vaccine (1) 09/01/2024     Advance Directives   What are advance directives?  Advance directives are legal documents that state your  wishes and plans for medical care. These plans are made ahead of time in case you lose your ability to make decisions for yourself. Advance directives can apply to any medical decision, such as the treatments you want, and if you want to donate organs.   What are the types of advance directives?  There are many types of advance directives, and each state has rules about how to use them. You may choose a combination of any of the following:  Living will:  This is a written record of the treatment you want. You can also choose which treatments you do not want, which to limit, and which to stop at a certain time. This includes surgery, medicine, IV fluid, and tube feedings.   Durable power of  for healthcare (DPAHC):  This is a written record that states who you want to make healthcare choices for you when you are unable to make them for yourself. This person, called a proxy, is usually a family member or a friend. You may choose more than 1 proxy.  Do not resuscitate (DNR) order:  A DNR order is used in case your heart stops beating or you stop breathing. It is a request not to have certain forms of treatment, such as CPR. A DNR order may be included in other types of advance directives.  Medical directive:  This covers the care that you want if you are in a coma, near death, or unable to make decisions for yourself. You can list the treatments you want for each condition. Treatment may include pain medicine, surgery, blood transfusions, dialysis, IV or tube feedings, and a ventilator (breathing machine).  Values history:  This document has questions about your views, beliefs, and how you feel and think about life. This information can help others choose the care that you would choose.  Why are advance directives important?  An advance directive helps you control your care. Although spoken wishes may be used, it is better to have your wishes written down. Spoken wishes can be misunderstood, or not followed.  Treatments may be given even if you do not want them. An advance directive may make it easier for your family to make difficult choices about your care.   Urinary Incontinence   Urinary incontinence (UI)  is when you lose control of your bladder. UI develops because your bladder cannot store or empty urine properly. The 3 most common types of UI are stress incontinence, urge incontinence, or both.  Medicines:   May be given to help strengthen your bladder control. Report any side effects of medication to your healthcare provider.  Do pelvic muscle exercises often:  Your pelvic muscles help you stop urinating. Squeeze these muscles tight for 5 seconds, then relax for 5 seconds. Gradually work up to squeezing for 10 seconds. Do 3 sets of 15 repetitions a day, or as directed. This will help strengthen your pelvic muscles and improve bladder control.  Train your bladder:  Go to the bathroom at set times, such as every 2 hours, even if you do not feel the urge to go. You can also try to hold your urine when you feel the urge to go. For example, hold your urine for 5 minutes when you feel the urge to go. As that becomes easier, hold your urine for 10 minutes.   Self-care:   Keep a UI record.  Write down how often you leak urine and how much you leak. Make a note of what you were doing when you leaked urine.  Drink liquids as directed. You may need to limit the amount of liquid you drink to help control your urine leakage. Do not drink any liquid right before you go to bed. Limit or do not have drinks that contain caffeine or alcohol.   Prevent constipation.  Eat a variety of high-fiber foods. Good examples are high-fiber cereals, beans, vegetables, and whole-grain breads. Walking is the best way to trigger your intestines to have a bowel movement.  Exercise regularly and maintain a healthy weight.  Weight loss and exercise will decrease pressure on your bladder and help you control your leakage.   Use a catheter as  directed  to help empty your bladder. A catheter is a tiny, plastic tube that is put into your bladder to drain your urine.   Go to behavior therapy as directed.  Behavior therapy may be used to help you learn to control your urge to urinate.     © Copyright [employer] 2018 Information is for End User's use only and may not be sold, redistributed or otherwise used for commercial purposes. All illustrations and images included in CareNotes® are the copyrighted property of [employer] or [employer]      Medicare Preventive Visit Patient Instructions  Thank you for completing your Welcome to Medicare Visit or Medicare Annual Wellness Visit today. Your next wellness visit will be due in one year (8/21/2025).  The screening/preventive services that you may require over the next 5-10 years are detailed below. Some tests may not apply to you based off risk factors and/or age. Screening tests ordered at today's visit but not completed yet may show as past due. Also, please note that scanned in results may not display below.  Preventive Screenings:  Service Recommendations Previous Testing/Comments   Colorectal Cancer Screening  * Colonoscopy    * Fecal Occult Blood Test (FOBT)/Fecal Immunochemical Test (FIT)  * Fecal DNA/Cologuard Test  * Flexible Sigmoidoscopy Age: 45-75 years old   Colonoscopy: every 10 years (may be performed more frequently if at higher risk)  OR  FOBT/FIT: every 1 year  OR  Cologuard: every 3 years  OR  Sigmoidoscopy: every 5 years  Screening may be recommended earlier than age 45 if at higher risk for colorectal cancer. Also, an individualized decision between you and your healthcare provider will decide whether screening between the ages of 76-85 would be appropriate. Colonoscopy: 02/10/2022  FOBT/FIT: Not on file  Cologuard: Not on file  Sigmoidoscopy: Not on file    Screening Not Indicated     Breast Cancer Screening Age: 40+ years old  Frequency: every 1-2 years  Not required if  history of left and right mastectomy Mammogram: 11/02/2023    Screening Current   Cervical Cancer Screening Between the ages of 21-29, pap smear recommended once every 3 years.   Between the ages of 30-65, can perform pap smear with HPV co-testing every 5 years.   Recommendations may differ for women with a history of total hysterectomy, cervical cancer, or abnormal pap smears in past. Pap Smear: Not on file    Screening Not Indicated   Hepatitis C Screening Once for adults born between 1945 and 1965  More frequently in patients at high risk for Hepatitis C Hep C Antibody: Not on file    Screening Not Indicated   Diabetes Screening 1-2 times per year if you're at risk for diabetes or have pre-diabetes Fasting glucose: 371 mg/dL (7/30/2024)  A1C: 7.7 % (7/11/2024)  Screening Not Indicated  History Diabetes   Cholesterol Screening Once every 5 years if you don't have a lipid disorder. May order more often based on risk factors. Lipid panel: 08/14/2023    Screening Not Indicated  History Lipid Disorder     Other Preventive Screenings Covered by Medicare:  Abdominal Aortic Aneurysm (AAA) Screening: covered once if your at risk. You're considered to be at risk if you have a family history of AAA.  Lung Cancer Screening: covers low dose CT scan once per year if you meet all of the following conditions: (1) Age 55-77; (2) No signs or symptoms of lung cancer; (3) Current smoker or have quit smoking within the last 15 years; (4) You have a tobacco smoking history of at least 20 pack years (packs per day multiplied by number of years you smoked); (5) You get a written order from a healthcare provider.  Glaucoma Screening: covered annually if you're considered high risk: (1) You have diabetes OR (2) Family history of glaucoma OR (3)  aged 50 and older OR (4)  American aged 65 and older  Osteoporosis Screening: covered every 2 years if you meet one of the following conditions: (1) You're estrogen  deficient and at risk for osteoporosis based off medical history and other findings; (2) Have a vertebral abnormality; (3) On glucocorticoid therapy for more than 3 months; (4) Have primary hyperparathyroidism; (5) On osteoporosis medications and need to assess response to drug therapy.   Last bone density test (DXA Scan): 11/02/2023.  HIV Screening: covered annually if you're between the age of 15-65. Also covered annually if you are younger than 15 and older than 65 with risk factors for HIV infection. For pregnant patients, it is covered up to 3 times per pregnancy.    Immunizations:  Immunization Recommendations   Influenza Vaccine Annual influenza vaccination during flu season is recommended for all persons aged >= 6 months who do not have contraindications   Pneumococcal Vaccine   * Pneumococcal conjugate vaccine = PCV13 (Prevnar 13), PCV15 (Vaxneuvance), PCV20 (Prevnar 20)  * Pneumococcal polysaccharide vaccine = PPSV23 (Pneumovax) Adults 19-63 yo with certain risk factors or if 65+ yo  If never received any pneumonia vaccine: recommend Prevnar 20 (PCV20)  Give PCV20 if previously received 1 dose of PCV13 or PPSV23   Hepatitis B Vaccine 3 dose series if at intermediate or high risk (ex: diabetes, end stage renal disease, liver disease)   Respiratory syncytial virus (RSV) Vaccine - COVERED BY MEDICARE PART D  * RSVPreF3 (Arexvy) CDC recommends that adults 60 years of age and older may receive a single dose of RSV vaccine using shared clinical decision-making (SCDM)   Tetanus (Td) Vaccine - COST NOT COVERED BY MEDICARE PART B Following completion of primary series, a booster dose should be given every 10 years to maintain immunity against tetanus. Td may also be given as tetanus wound prophylaxis.   Tdap Vaccine - COST NOT COVERED BY MEDICARE PART B Recommended at least once for all adults. For pregnant patients, recommended with each pregnancy.   Shingles Vaccine (Shingrix) - COST NOT COVERED BY MEDICARE PART  B  2 shot series recommended in those 19 years and older who have or will have weakened immune systems or those 50 years and older     Health Maintenance Due:  There are no preventive care reminders to display for this patient.  Immunizations Due:      Topic Date Due   • Pneumococcal Vaccine: 65+ Years (2 of 2 - PCV) 01/13/2022   • COVID-19 Vaccine (4 - 2023-24 season) 09/01/2023   • Influenza Vaccine (1) 09/01/2024     Advance Directives   What are advance directives?  Advance directives are legal documents that state your wishes and plans for medical care. These plans are made ahead of time in case you lose your ability to make decisions for yourself. Advance directives can apply to any medical decision, such as the treatments you want, and if you want to donate organs.   What are the types of advance directives?  There are many types of advance directives, and each state has rules about how to use them. You may choose a combination of any of the following:  Living will:  This is a written record of the treatment you want. You can also choose which treatments you do not want, which to limit, and which to stop at a certain time. This includes surgery, medicine, IV fluid, and tube feedings.   Durable power of  for healthcare (DPAHC):  This is a written record that states who you want to make healthcare choices for you when you are unable to make them for yourself. This person, called a proxy, is usually a family member or a friend. You may choose more than 1 proxy.  Do not resuscitate (DNR) order:  A DNR order is used in case your heart stops beating or you stop breathing. It is a request not to have certain forms of treatment, such as CPR. A DNR order may be included in other types of advance directives.  Medical directive:  This covers the care that you want if you are in a coma, near death, or unable to make decisions for yourself. You can list the treatments you want for each condition. Treatment may  include pain medicine, surgery, blood transfusions, dialysis, IV or tube feedings, and a ventilator (breathing machine).  Values history:  This document has questions about your views, beliefs, and how you feel and think about life. This information can help others choose the care that you would choose.  Why are advance directives important?  An advance directive helps you control your care. Although spoken wishes may be used, it is better to have your wishes written down. Spoken wishes can be misunderstood, or not followed. Treatments may be given even if you do not want them. An advance directive may make it easier for your family to make difficult choices about your care.   Urinary Incontinence   Urinary incontinence (UI)  is when you lose control of your bladder. UI develops because your bladder cannot store or empty urine properly. The 3 most common types of UI are stress incontinence, urge incontinence, or both.  Medicines:   May be given to help strengthen your bladder control. Report any side effects of medication to your healthcare provider.  Do pelvic muscle exercises often:  Your pelvic muscles help you stop urinating. Squeeze these muscles tight for 5 seconds, then relax for 5 seconds. Gradually work up to squeezing for 10 seconds. Do 3 sets of 15 repetitions a day, or as directed. This will help strengthen your pelvic muscles and improve bladder control.  Train your bladder:  Go to the bathroom at set times, such as every 2 hours, even if you do not feel the urge to go. You can also try to hold your urine when you feel the urge to go. For example, hold your urine for 5 minutes when you feel the urge to go. As that becomes easier, hold your urine for 10 minutes.   Self-care:   Keep a UI record.  Write down how often you leak urine and how much you leak. Make a note of what you were doing when you leaked urine.  Drink liquids as directed. You may need to limit the amount of liquid you drink to help  control your urine leakage. Do not drink any liquid right before you go to bed. Limit or do not have drinks that contain caffeine or alcohol.   Prevent constipation.  Eat a variety of high-fiber foods. Good examples are high-fiber cereals, beans, vegetables, and whole-grain breads. Walking is the best way to trigger your intestines to have a bowel movement.  Exercise regularly and maintain a healthy weight.  Weight loss and exercise will decrease pressure on your bladder and help you control your leakage.   Use a catheter as directed  to help empty your bladder. A catheter is a tiny, plastic tube that is put into your bladder to drain your urine.   Go to behavior therapy as directed.  Behavior therapy may be used to help you learn to control your urge to urinate.     © Copyright [employer] 2018 Information is for End User's use only and may not be sold, redistributed or otherwise used for commercial purposes. All illustrations and images included in CareNotes® are the copyrighted property of [employer] or [employer]

## 2024-08-23 ENCOUNTER — HOSPITAL ENCOUNTER (OUTPATIENT)
Dept: CT IMAGING | Facility: HOSPITAL | Age: 79
End: 2024-08-23
Attending: INTERNAL MEDICINE
Payer: MEDICARE

## 2024-08-23 DIAGNOSIS — K62.89 RECTAL PAIN: ICD-10-CM

## 2024-08-23 DIAGNOSIS — K59.00 CONSTIPATION, UNSPECIFIED CONSTIPATION TYPE: ICD-10-CM

## 2024-08-23 DIAGNOSIS — R10.30 LOWER ABDOMINAL PAIN: ICD-10-CM

## 2024-08-23 PROCEDURE — 74177 CT ABD & PELVIS W/CONTRAST: CPT

## 2024-08-23 RX ADMIN — IOHEXOL 100 ML: 350 INJECTION, SOLUTION INTRAVENOUS at 10:58

## 2024-08-25 DIAGNOSIS — K59.04 CHRONIC IDIOPATHIC CONSTIPATION: Primary | ICD-10-CM

## 2024-08-28 ENCOUNTER — TELEPHONE (OUTPATIENT)
Age: 79
End: 2024-08-28

## 2024-08-28 NOTE — TELEPHONE ENCOUNTER
Pt called in to return call from office about CT scan,Triage nurse went over PCP recommendations from PCP. Pt has some questions in regards to the medication PCP ordered for pt. Medication side effects.    Pt also stated she has some sciatic nerve pain, not exactly pain but dull. Pt reports she would like to know if PCP had any suggestions. Pt states she has been walking around, icing the region and utilizing OTC options.  PCP please further advise pt @Phone: [phone].

## 2024-08-29 NOTE — TELEPHONE ENCOUNTER
Pt informed. Verbalized understanding. She does report feeling gassy.     States she does not feel she needs a PT or Pain Management referral at this time.

## 2024-08-29 NOTE — TELEPHONE ENCOUNTER
The Linzess is for constipation, she can try take 1 daily and hold miralax for now. Does she want another PT referral or see pain management for her back?

## 2024-08-30 ENCOUNTER — NURSE TRIAGE (OUTPATIENT)
Dept: OTHER | Facility: OTHER | Age: 79
End: 2024-08-30

## 2024-08-30 NOTE — TELEPHONE ENCOUNTER
"Reason for Disposition   [1] Minor bleeding from rectum (e.g., blood just on toilet paper, few drops, streaks on surface of normal formed BM) AND [2] 3 or more times    Answer Assessment - Initial Assessment Questions  1. STOOL PATTERN OR FREQUENCY: \"How often do you pass bowel movements (BMs)?\"  (Normal range: tid to q 3 days)  \"When was the last BM passed?\"        Still has not had a BM after starting linzess  \"Just nuggets\"    2. STRAINING: \"Do you have to strain to have a BM?\"       Yes    3. RECTAL PAIN: \"Does your rectum hurt when the stool comes out?\" If Yes, ask: \"Do you have hemorrhoids? How bad is the pain?\"  (Scale 1-10; or mild, moderate, severe)      No pain    4. STOOL COMPOSITION: \"Are the stools hard?\"       \"Nuggets:    5. BLOOD ON STOOLS: \"Has there been any blood on the toilet tissue or on the surface of the BM?\" If Yes, ask: \"When was the last time?\"       Blood after wiping, \"light, not dark.\"    6. CHRONIC CONSTIPATION: \"Is this a new problem for you?\"  If no, ask: \"How long have you had this problem?\" (days, weeks, months)       Yes     7. CHANGES IN DIET OR HYDRATION: \"Have there been any recent changes in your diet?\" \"How much fluids are you drinking consuming on a daily basis?\"  \"How much have you had to drink today?\"      Hydrating well     8. MEDICATIONS: \"Have you been taking any new medications?\" \"Are you taking any narcotic pain medications?\" (e.g., Vicoden, Percocet, morphine, dilaudid)     Started  Linzess yesterday    9. LAXATIVES: \"Have you been using any stool softeners, laxatives, or enemas?\"  If yes, ask \"What, how often, and when was the last time?\"  Took stool softener and miralax    10.ACTIVITY:  \"How much walking do you do every day? on a daily basis?\"  \"Has your activity level decreased in the past week?\"         Very active    11. CAUSE: \"What do you think is causing the constipation?\"         Chronic constipation x 8 years    12. OTHER SYMPTOMS: \"Do you have any other " "symptoms?\" (e.g., abdominal pain, bloating, fever, vomiting)        No    13. MEDICAL HISTORY: \"Do you have a history of hemorrhoids, rectal fissures, or rectal surgery or rectal abscess?\"    No hemorrhoids    Protocols used: Constipation-ADULT-AH    "

## 2024-09-03 NOTE — TELEPHONE ENCOUNTER
I just saw her for this recently and started Linzess. Please see if we can get her in with GI at this point.

## 2024-09-04 ENCOUNTER — CONSULT (OUTPATIENT)
Dept: GASTROENTEROLOGY | Facility: CLINIC | Age: 79
End: 2024-09-04
Payer: MEDICARE

## 2024-09-04 VITALS
DIASTOLIC BLOOD PRESSURE: 72 MMHG | TEMPERATURE: 98.1 F | HEIGHT: 61 IN | SYSTOLIC BLOOD PRESSURE: 140 MMHG | WEIGHT: 124 LBS | OXYGEN SATURATION: 97 % | BODY MASS INDEX: 23.41 KG/M2 | HEART RATE: 79 BPM | RESPIRATION RATE: 18 BRPM

## 2024-09-04 DIAGNOSIS — K59.00 CONSTIPATION, UNSPECIFIED CONSTIPATION TYPE: Primary | ICD-10-CM

## 2024-09-04 DIAGNOSIS — Z86.010 PERSONAL HISTORY OF COLONIC POLYPS: ICD-10-CM

## 2024-09-04 DIAGNOSIS — K21.9 GASTROESOPHAGEAL REFLUX DISEASE WITHOUT ESOPHAGITIS: ICD-10-CM

## 2024-09-04 PROCEDURE — 99214 OFFICE O/P EST MOD 30 MIN: CPT | Performed by: PHYSICIAN ASSISTANT

## 2024-09-04 NOTE — PATIENT INSTRUCTIONS
I think that your bowels are sluggish to move, perhaps there is a component of irritable bowel, perhaps your back pain is also playing a role. Sometimes the pelvic floor muscles also play a role.     For now, stay on linzess 72 mcg every morning.   Start benefiber powder, follow instructions on bottle though take 1 dose daily to start. Fiber tends to help move the stool but also bulk up loose pieces of stool.   You can also use squatty stool to help ease evacuation.  If after 1 week, you are not seeing much improvement, I would then add back in 1 capful or 17 g of miralax (mixed in 8 ounces of liquid) daily.  Stay well hydrated.     You can use a lidocaine patch on your back for local pain relief.   We can feel reassured that you had a good colonoscopy in 2022 and that you had a CT scan recently that did not show any masses or inflammation.

## 2024-09-04 NOTE — PROGRESS NOTES
[facility] - Outpatient Follow-up Note  Johana Alcantara 79 y.o. female MRN: [id number]  Encounter: [id number]    ASSESSMENT AND PLAN:      1. Constipation, unspecified constipation type    Patient with constipation which has been resting.  She is up-to-date on colonoscopy and recently had three-dimensional imaging of the abdomen and pelvis with no obvious inflammatory or malignant process.  I suspect her symptoms are multifactorial, she may have some degree of slow transit constipation, irritable bowel syndrome, perhaps pelvic floor/defecatory dysfunction.  Recommend she continue the linaclotide 72 mcg daily now.  Recommend addition of Benefiber daily.  If no improvement, continue to titrate cathartic regimen with the addition of Colace daily.    Continue excellent hydration and regular physical activity.  No alarm features present at this time that would warrant a colonoscopy.  Consider evaluation by pelvic floor PT in the future.  Defer low back pain management to primary care. we did review topical lidocaine patch may be reasonable option to try.     2. Personal history of colonic polyps    History of subcentimeter adenomas removed in 02/2022.  No additional surveillance colonoscopies were recommended due to patient's age.  I think this is reasonable unless her clinical picture changes.    3. Gastroesophageal reflux disease without esophagitis    History of such, last EGD in 2022 with some gastritis and a hiatal hernia.  Biopsies were negative for H. pylori or intestinal metaplasia.  Continue with daily H2RA.  Continue small frequent meals and diet and lifestyle modifications for GERD.  No alarm features to the upper GI tract at this time.    We will follow up in 3 months to reassess symptoms.   ______________________________________________________________________    SUBJECTIVE: Patient is a 79 y.o. female who presents today for follow-up regarding constipation. Pmhx sig for DM2,  CHF, CKD 3, hypothyroidism, HTN, HLD, RAMILA.      She was initially evaluated by GI in 01/2022 for abnormal weight loss and lower abdominal pain. She underwent bidirectional endoscopic evaluation at that time. Some gastritis and adenomatous colon polyps were identified.  She was more recently evaluated by her primary care physician in 08/2024.  At that time, she was complaining of some low back and rectal pain as well as lower abdominal pressure.  She had CT scan completed which was largely unremarkable.  She was instructed to start on Linzess daily.      09/04/24:    At today's office visit, patient tells me that she has been dealing with constipation chronically, though it has gradually progressed over the past couple of years.  She has also been dealing with low back pain for some time and was in physical therapy for this.      Patient shares that she is feeling gurgling in her lower abdomen.  She feels that the Linzess was helpful initially though now feels it is not eating and evacuation of stool.  She feels the urge to defecate though needs to strain in order to promote passage of stool.  Notes that her stool is typically a [place] 4-5.  She denies any BRBPR or melena.  She feels like she is incompletely evacuating.  She denies any nocturnal BMs.  No significant abdominal pain or rectal pain.  She does describe low back pain/burning that radiates down into her legs.  She is using topical analgesics to help alleviate the discomfort as well as heat and cold packs.    She denies any significant heartburn, indigestion, nausea, vomiting, dysphagia or odynophagia.  No early satiety or abnormal weight loss (weight gain since last GI OV In 02/2022). Does have some belching/gas for which she takes Tums.     NSAIDs: PRN  Etoh: none  Tobacco: none     07/2024: Hb 12.9, MCV 91, Plt 212, BUN 18, Cr 1.01, AST 11, ALT 8, ALP 86, albumin 4.1, t bili 0.52   08/2024: CT A/P:  No acute intra-abdominal disease. No evidence of  perianal disease or fistula although limited without IV contrast. Normal caliber bowel loops. Appendectomy. No colitis or enteritis. No pelvic mass. Hysterectomy. Normal vaginal cuff. Hepatic steatosis    Endoscopic history:   EGD: 02/2022: Mild erythematous mucosa in the antrum; medium sliding hiatal hernia  A.  Duodenum (biopsy): Small bowel mucosa with no significant pathologic abnormalities. No villous atrophy, increased intraepithelial lymphocytes or crypt hyperplasia to suggest malabsorptive enteropathy. No active inflammation, granulomas, organisms, dysplasia or neoplasia identified  B.  Gastric antrum (biopsy): Minimal chronic inactive gastritis involving antral and oxyntic mucosa. No definitive morphologic evidence of Helicobacter on routine sections. No intestinal metaplasia, dysplasia or neoplasia identified  Colon: 02/2022: Two sessile polyps measuring smaller than 5 mm in the proximal descending colon   C.  Descending colon polyp x2 (cold biopsy): Portions of tubular adenomas; negative for high-grade dysplasia.    Review of Systems   Otherwise Per HPI    Historical Information   Past Medical History:   Diagnosis Date    Anxiety     Colon polyp     Depression     Diabetes mellitus (HCC)     Diverticulosis     Hyperlipidemia     Hypertension      Past Surgical History:   Procedure Laterality Date    APPENDECTOMY      CATARACT EXTRACTION Right     COLONOSCOPY      difficult exam    HYSTERECTOMY      TONSILLECTOMY      WISDOM TOOTH EXTRACTION       Social History   Social History     Substance and Sexual Activity   Alcohol Use Not Currently    Comment: rare     Social History     Substance and Sexual Activity   Drug Use Never     Social History     Tobacco Use   Smoking Status Never    Passive exposure: Never   Smokeless Tobacco Never     Family History   Problem Relation Age of Onset    Parkinsonism Mother     Alzheimer's disease Father     Breast cancer Sister         in her 40's    Lung cancer Sister      "Breast cancer Sister     No Known Problems Daughter     No Known Problems Daughter     No Known Problems Maternal Grandmother     No Known Problems Maternal Grandfather     No Known Problems Paternal Grandmother     No Known Problems Paternal Grandfather     No Known Problems Maternal Aunt     No Known Problems Maternal Aunt     No Known Problems Maternal Aunt     No Known Problems Paternal Aunt      Meds/Allergies       Current Outpatient Medications:     Alcohol Swabs ( Sterile Alcohol Prep) PADS    ASPIRIN 81 PO    atorvastatin (LIPITOR) 20 mg tablet    Basaglar KwikPen 100 units/mL SOPN    Blood Glucose Monitoring Suppl (OneTouch Verio) w/Device KIT    Cholecalciferol 2000 units CAPS    Continuous Blood Gluc Sensor (FreeStyle Kaylah 2 Sensor) MISC    famotidine (PEPCID) 20 mg tablet    fluconazole (DIFLUCAN) 100 mg tablet    glucose blood (OneTouch Verio) test strip    Insulin Pen Needle (BD Pen Needle Karma U/F) 32G X 4 MM MISC    levothyroxine 75 mcg tablet    linaCLOtide 72 MCG CAPS    lisinopril (ZESTRIL) 20 mg tablet    Multiple Vitamins-Minerals (MULTIVITAL-M PO)    NovoLOG FlexPen 100 units/mL injection pen    OneTouch Delica Lancets 33G MISC    EPINEPHrine (EPIPEN) 0.3 mg/0.3 mL SOAJ    Allergies   Allergen Reactions    Diphenhydramine Shortness Of Breath    Meperidine Hives and Shortness Of Breath    Prednisone Hives and Shortness Of Breath    Bee Pollen     Ciprofloxacin Hives    Sulfamethoxazole-Trimethoprim GI Intolerance     Objective     Blood pressure 140/72, pulse 79, temperature 98.1 °F (36.7 °C), resp. rate 18, height 5' 1\" (1.549 m), weight 56.2 kg (124 lb), SpO2 97%, not currently breastfeeding. Body mass index is 23.43 kg/m².    Physical Exam  Vitals and nursing note reviewed.   Constitutional:       General: She is not in acute distress.     Appearance: She is well-developed.   HENT:      Head: Normocephalic and atraumatic.   Eyes:      General: No scleral icterus.     Conjunctiva/sclera: " Conjunctivae normal.   Cardiovascular:      Rate and Rhythm: Normal rate.   Pulmonary:      Effort: Pulmonary effort is normal. No respiratory distress.   Abdominal:      General: Bowel sounds are normal. There is no distension.      Palpations: Abdomen is soft.      Tenderness: There is no abdominal tenderness. There is no guarding or rebound.   Musculoskeletal:      Cervical back: Neck supple.   Skin:     General: Skin is warm and dry.      Coloration: Skin is not jaundiced.   Neurological:      General: No focal deficit present.      Mental Status: She is alert and oriented to person, place, and time.   Psychiatric:         Mood and Affect: Mood normal.         Behavior: Behavior normal.       Lab Results:   No visits with results within 1 Day(s) from this visit.   Latest known visit with results is:   Appointment on 07/30/2024   Component Date Value    WBC 07/30/2024 7.02     RBC 07/30/2024 4.40     Hemoglobin 07/30/2024 12.9     Hematocrit 07/30/2024 40.0     MCV 07/30/2024 91     MCH 07/30/2024 29.3     MCHC 07/30/2024 32.3     RDW 07/30/2024 12.3     MPV 07/30/2024 12.9 (H)     Platelets 07/30/2024 212     nRBC 07/30/2024 0     Segmented % 07/30/2024 68     Immature Grans % 07/30/2024 0     Lymphocytes % 07/30/2024 20     Monocytes % 07/30/2024 8     Eosinophils Relative 07/30/2024 3     Basophils Relative 07/30/2024 1     Absolute Neutrophils 07/30/2024 4.73     Absolute Immature Grans 07/30/2024 0.03     Absolute Lymphocytes 07/30/2024 1.37     Absolute Monocytes 07/30/2024 0.58     Eosinophils Absolute 07/30/2024 0.24     Basophils Absolute 07/30/2024 0.07     Sodium 07/30/2024 135     Potassium 07/30/2024 4.5     Chloride 07/30/2024 100     CO2 07/30/2024 28     ANION GAP 07/30/2024 7     BUN 07/30/2024 18     Creatinine 07/30/2024 1.01     Glucose, Fasting 07/30/2024 371 (H)     Calcium 07/30/2024 9.0     AST 07/30/2024 11 (L)     ALT 07/30/2024 8     Alkaline Phosphatase 07/30/2024 86     Total Protein  07/30/2024 6.9     Albumin 07/30/2024 4.1     Total Bilirubin 07/30/2024 0.52     eGFR 07/30/2024 52     Color, UA 07/30/2024 Colorless     Clarity, UA 07/30/2024 Clear     Specific Gravity, UA 07/30/2024 1.007     pH, UA 07/30/2024 6.0     Leukocytes, UA 07/30/2024 Elevated glucose may cause decreased leukocyte values. See urine microscopic for UWBC result (A)     Nitrite, UA 07/30/2024 Negative     Protein, UA 07/30/2024 Negative     Glucose, UA 07/30/2024 >=1000 (1%) (A)     Ketones, UA 07/30/2024 Negative     Urobilinogen, UA 07/30/2024 <2.0     Bilirubin, UA 07/30/2024 Negative     Occult Blood, UA 07/30/2024 Negative     RBC, UA 07/30/2024 None Seen     WBC, UA 07/30/2024 1-2     Epithelial Cells 07/30/2024 Occasional     Bacteria, UA 07/30/2024 None Seen     Beta- Hydroxybutyrate 07/30/2024 <0.05     Urine Culture 07/30/2024 >100,000 cfu/ml        Radiology Results:   CT abdomen pelvis w contrast    Result Date: 8/24/2024  Narrative: CT ABDOMEN AND PELVIS WITH IV CONTRAST INDICATION:   Other specified diseases of anus and rectum. Constipation, unspecified. Lower abdominal pain, unspecified.  COMPARISON: 1/18/2022 TECHNIQUE:  CT examination of the abdomen and pelvis was performed. Multiplanar 2D reformatted images were created from the source data. This examination, like all CT scans performed in the [facility], was performed utilizing techniques to minimize radiation dose exposure, including the use of iterative reconstruction and automated exposure control. Radiation dose length product (DLP) for this visit: IV Contrast:  iohexol (OMNIPAQUE) 350 MG/ML injection (MULTI-DOSE) 100 mL - Enteric Contrast:  Enteric contrast was not administered. FINDINGS: ABDOMEN LOWER CHEST: No clinically significant abnormality in the visualized lower chest. LIVER/BILIARY TREE: Hepatic steatosis. Stable hepatic cysts. Normal hepatic contours. No biliary dilation. GALLBLADDER: No calcified gallstones. No  pericholecystic inflammatory change. SPLEEN: Unremarkable. PANCREAS: Unremarkable. ADRENAL GLANDS: Unremarkable. KIDNEYS/URETERS: Unremarkable. No hydronephrosis. STOMACH AND BOWEL: Unremarkable. No perianal disease. No colitis or enteritis. APPENDIX: Surgically absent. ABDOMINOPELVIC CAVITY: No ascites. No pneumoperitoneum. No lymphadenopathy. VESSELS: Atherosclerosis without abdominal aortic aneurysm. PELVIS REPRODUCTIVE ORGANS: Post hysterectomy. URINARY BLADDER: Unremarkable. ABDOMINAL WALL/INGUINAL REGIONS: Unremarkable. BONES: No acute fracture or suspicious osseous lesion. Spinal degenerative changes.     Impression: 1. No acute intra-abdominal disease. No evidence of perianal disease or fistula although limited without IV contrast. Normal caliber bowel loops. Appendectomy. No colitis or enteritis. 2. No pelvic mass. Hysterectomy. Normal vaginal cuff. 3. Hepatic steatosis Electronically signed: 08/24/2024 06:27 PM MD Faith Bravo PA-C    **Please note:  Dictation voice to text software may have been used in the creation of this record.  Occasional wrong word or “sound alike” substitutions may have occurred due to the inherent limitations of voice recognition software.  Read the chart carefully and recognize, using context, where substitutions have occurred.**

## 2024-09-05 DIAGNOSIS — E78.2 MIXED HYPERLIPIDEMIA: ICD-10-CM

## 2024-09-06 RX ORDER — ATORVASTATIN CALCIUM 20 MG/1
20 TABLET, FILM COATED ORAL DAILY
Qty: 30 TABLET | Refills: 0 | Status: SHIPPED | OUTPATIENT
Start: 2024-09-06

## 2024-09-09 ENCOUNTER — TELEPHONE (OUTPATIENT)
Age: 79
End: 2024-09-09

## 2024-09-09 NOTE — TELEPHONE ENCOUNTER
I would like her to add MiraLAX 1 capful in 8 ounces of liquid every day, increase this to twice daily dosing if needed.  Work on staying very well-hydrated. Okay for PRN génesis/colace. Call us at end of week with update. So long as pt is passing flatus/stool and tolerating oral intake, we can continue to work on this!

## 2024-09-09 NOTE — TELEPHONE ENCOUNTER
I spoke with patient and conveyed provider recommendations. She will follow through and update at the end of the week.

## 2024-09-09 NOTE — TELEPHONE ENCOUNTER
Consult 9/4/14 CLINTON Nichols (constipation, GERD)  Patient states she was told to call to report in. She is taking her Linzess and Benefiber as recommended at office visit. She is passing small pieces of semi hard stool and has passed some with passing gas. She had four episodes occur yesterday She as feeling of need to evacuate but cannot. There is no blood noted on stool. She is becoming concerned. She has Senna at home but I asked that she not take it until reviewed/instructed by provider. She is eating and hydrating. She states everything else is normal but her bowel pattern. Patient willing to purchase Colace if necessary (noted on progress note). She would like a call back today with provider instructions. Please review/advise.

## 2024-09-12 ENCOUNTER — NURSE TRIAGE (OUTPATIENT)
Age: 79
End: 2024-09-12

## 2024-09-12 NOTE — TELEPHONE ENCOUNTER
Pt called returning call from office. I relied provider's message verbatim. Pt understood.     KIRK

## 2024-09-12 NOTE — TELEPHONE ENCOUNTER
I think for now she should continue on this regimen because we want to regulate her stool output and prevent the constipation from recurring.  If she finds that her stool is getting too soft, she can start to cut back the MiraLAX to a half capful daily or even every other day.  She should give us an update in a few weeks with how she is doing

## 2024-09-12 NOTE — TELEPHONE ENCOUNTER
"Patient called in to report. She continues on the Linzess 72 mcg, MiraLax and fiber as instructed. She states last evening 6 pm \"everything came out, I opened up\". She passed soft formed \"brown nuggets\", no blood and is feeling much better today. She would like to know if you want her to continue with the Linzess 72 mcg daily dose at this time. Her next follow up appointment is 12/27/24. Please advise.      Answer Assessment - Initial Assessment Questions  1. REASON FOR CALL or QUESTION: \"What is your reason for calling today?\" or \"How can I best help you?\" or \"What question do you have that I can help answer?\"      Patient calling with update.    Protocols used: Information Only Call - No Triage-ADULT-[place]    "

## 2024-09-12 NOTE — TELEPHONE ENCOUNTER
Called patient, no answer, left message requesting she call back to review provider recommendations noted.

## 2024-09-30 ENCOUNTER — TELEPHONE (OUTPATIENT)
Dept: ENDOCRINOLOGY | Facility: CLINIC | Age: 79
End: 2024-09-30

## 2024-09-30 ENCOUNTER — CONSULT (OUTPATIENT)
Dept: DIABETES SERVICES | Facility: CLINIC | Age: 79
End: 2024-09-30
Payer: MEDICARE

## 2024-09-30 VITALS — WEIGHT: 127 LBS | BODY MASS INDEX: 24 KG/M2

## 2024-09-30 DIAGNOSIS — E10.22 TYPE 1 DIABETES MELLITUS WITH STAGE 3A CHRONIC KIDNEY DISEASE (HCC): Primary | ICD-10-CM

## 2024-09-30 DIAGNOSIS — N18.31 TYPE 1 DIABETES MELLITUS WITH STAGE 3A CHRONIC KIDNEY DISEASE (HCC): ICD-10-CM

## 2024-09-30 DIAGNOSIS — N18.31 TYPE 1 DIABETES MELLITUS WITH STAGE 3A CHRONIC KIDNEY DISEASE (HCC): Primary | ICD-10-CM

## 2024-09-30 DIAGNOSIS — E10.22 TYPE 1 DIABETES MELLITUS WITH STAGE 3A CHRONIC KIDNEY DISEASE (HCC): ICD-10-CM

## 2024-09-30 PROCEDURE — G0108 DIAB MANAGE TRN  PER INDIV: HCPCS

## 2024-09-30 NOTE — TELEPHONE ENCOUNTER
Patient had an appt with Nilda today. Downloaded her CGM to send to the provider. She has been feeling okay and taking medication as directed    Scanned into media

## 2024-09-30 NOTE — PROGRESS NOTES
Insulin Instruction  Met with Johana KWABENA Maricruz for initial insulin start education. Johana is currently taking the following diabetes medications: none.   Prescribed Insulin: Novolog 4-6-6, and Lantus patient takes 10 units nightly    Patient instructed on: Insulin type; timing of insulin; site selection and rotation; proper technique of injection and insulin administration, safe needle disposal; medication storage; side effects and precautions of insulin.    Comments: Johana has good understanding of insulin usage and demonstrated use of injection technique in the office.     Patient instruction completed on Hypoglycemia: definition/risk, causes/symptoms, treatment, prevention of hypoglycemia, when to notify physician and EMS.  Comments: Johana has good understanding of hypoglycemia and it's treatment.    Patient instruction completed on Hyperglycemia: definition, causes/symptoms, treatment, prevention of hypoglycemia, when to notify physician and EMS.  Comments: Johana has good understanding of hyperglycemia and treatment.    Diabetes Education Record: Johana was given the following education material: Fast 15 List, Hypoglycemia Fact Sheet, Hyperglycemia Fact Sheet.       Patient response to instruction  Comprehension: good  Motivation: good  Expected Compliance: good  Readiness: action  Barriers to Learning: none known     Begin Time: 1:00 pm  End Time: 2:00 pm  Referring Provider: EUGENIA Perez    Thank you for referring your patient to [facility], it was a pleasure working with them today. Please feel free to call with any questions or concerns.    Nilda Zayas, RD  [address] [place] [place] [id number]  [phone]

## 2024-09-30 NOTE — Clinical Note
Patient is questioning if she should inject 4-6-6 because she has two different recommendations from endo and her PCP.

## 2024-09-30 NOTE — LETTER
Oswaldo Vaughn. We have attempted to reach out on multiple occasions unsuccessfully. Deysi reviewed you CGM report and has a few recommendations.    Patient is having a lot of hypoglycemia, especially in the morning. Please reduce basaglar to 8 units nightly.     Continue Novolog 4 units before breakfast, 6 units before lunch, and 6 units before dinner. At her next appointment, we will discuss starting a sliding scale to help her when blood sugars are running higher.       Please contact us if you need anything. Your next appointment is November 26th at 10:30

## 2024-10-01 RX ORDER — INSULIN GLARGINE 100 [IU]/ML
INJECTION, SOLUTION SUBCUTANEOUS
Qty: 15 ML | Refills: 1 | Status: SHIPPED | OUTPATIENT
Start: 2024-10-01

## 2024-10-01 NOTE — TELEPHONE ENCOUNTER
Current regimen:    Basaglar 10 units nightly  Novolog 4-6-6    CGM reviewed. Patient is having a lot of hypoglycemia, especially in the morning. Please reduce basaglar to 8 units nightly.    Continue Novolog 4 units before breakfast, 6 units before lunch, and 6 units before dinner. At her next appointment, we will discuss starting a sliding scale to help her when blood sugars are running higher.

## 2024-10-08 DIAGNOSIS — E03.9 HYPOTHYROIDISM, UNSPECIFIED TYPE: ICD-10-CM

## 2024-10-08 RX ORDER — LEVOTHYROXINE SODIUM 75 UG/1
75 TABLET ORAL DAILY
Qty: 90 TABLET | Refills: 3 | Status: SHIPPED | OUTPATIENT
Start: 2024-10-08

## 2024-10-08 NOTE — TELEPHONE ENCOUNTER
Patient requesting refill(s) of: Levothyroxine 75mcg    Last filled: 2/2/24  Last appt: 8/20/24  Next appt:2/19/25  Pharmacy: [facility]

## 2024-10-11 ENCOUNTER — APPOINTMENT (EMERGENCY)
Dept: RADIOLOGY | Facility: HOSPITAL | Age: 79
End: 2024-10-11
Payer: MEDICARE

## 2024-10-11 ENCOUNTER — HOSPITAL ENCOUNTER (EMERGENCY)
Facility: HOSPITAL | Age: 79
Discharge: HOME | End: 2024-10-12
Payer: MEDICARE

## 2024-10-11 VITALS
OXYGEN SATURATION: 97 % | HEART RATE: 83 BPM | TEMPERATURE: 97.7 F | SYSTOLIC BLOOD PRESSURE: 185 MMHG | RESPIRATION RATE: 18 BRPM | DIASTOLIC BLOOD PRESSURE: 81 MMHG

## 2024-10-11 DIAGNOSIS — M25.562 LEFT KNEE PAIN: Primary | ICD-10-CM

## 2024-10-11 PROCEDURE — 73564 X-RAY EXAM KNEE 4 OR MORE: CPT

## 2024-10-11 PROCEDURE — 99284 EMERGENCY DEPT VISIT MOD MDM: CPT

## 2024-10-11 PROCEDURE — 99283 EMERGENCY DEPT VISIT LOW MDM: CPT

## 2024-10-11 RX ORDER — NAPROXEN 500 MG/1
500 TABLET ORAL ONCE
Status: COMPLETED | OUTPATIENT
Start: 2024-10-12 | End: 2024-10-11

## 2024-10-11 RX ADMIN — NAPROXEN 500 MG: 500 TABLET ORAL at 23:54

## 2024-10-12 NOTE — DISCHARGE INSTRUCTIONS
"Your ultrasound showed a small amount of fluid which is consistent with inflammation in the joint. The Xray showed no concerning injuries for this. I expect your symptoms should improve with home management including rest, ice/heat, elevation, and time for the inflammation to improve. I have sent a different form of topical medication called \"voltaren gel\" which you can use as well with the medications you have been taking to provide further relief.     If your symptoms do not improve, I have also placed a referral to the orthopedic team for followup and ongoing management.   "

## 2024-10-12 NOTE — ED PROVIDER NOTES
ED Disposition       None          Assessment & Plan   {Hyperlinks  Risk Stratification - NIHSS - HEART SCORE - Fill out sepsis note and make sure you call [phone] if severe or septic shock:[id number]}    MDM         Medications - No data to display    ED Risk Strat Scores                                               History of Present Illness   {Hyperlinks  History (Med, Surg, Fam, Social) - Current Medications - Allergies  :[id number]}    No chief complaint on file.      Past Medical History:   Diagnosis Date    Anxiety     Colon polyp     Depression     Diabetes mellitus (HCC)     Diverticulosis     Hyperlipidemia     Hypertension       Past Surgical History:   Procedure Laterality Date    APPENDECTOMY      CATARACT EXTRACTION Right     COLONOSCOPY      difficult exam    HYSTERECTOMY      TONSILLECTOMY      WISDOM TOOTH EXTRACTION        Family History   Problem Relation Age of Onset    Parkinsonism Mother     Alzheimer's disease Father     Breast cancer Sister         in her 40's    Lung cancer Sister     Breast cancer Sister     No Known Problems Daughter     No Known Problems Daughter     No Known Problems Maternal Grandmother     No Known Problems Maternal Grandfather     No Known Problems Paternal Grandmother     No Known Problems Paternal Grandfather     No Known Problems Maternal Aunt     No Known Problems Maternal Aunt     No Known Problems Maternal Aunt     No Known Problems Paternal Aunt       Social History     Tobacco Use    Smoking status: Never     Passive exposure: Never    Smokeless tobacco: Never   Vaping Use    Vaping status: Never Used   Substance Use Topics    Alcohol use: Not Currently     Comment: rare    Drug use: Never      E-Cigarette/Vaping    E-Cigarette Use Never User       E-Cigarette/Vaping Substances    Nicotine No     THC No     CBD No     Flavoring No     Other No     Unknown No       I have reviewed and agree with the history as documented.     HPI    Review of  Systems        Objective   {Hyperlinks  Historical Vitals - Historical Labs - Chart Review/Microbiology - Last Echo - Code Status  :[id number]}    ED Triage Vitals   Temp Pulse BP Resp SpO2 Patient Position - Orthostatic VS   -- -- -- -- -- --      Temp src Heart Rate Source BP Location FiO2 (%) Pain Score    -- -- -- -- --      Vitals    None         Physical Exam    Results Reviewed       None            No orders to display       Procedures    ED Medication and Procedure Management   Prior to Admission Medications   Prescriptions Last Dose Informant Patient Reported? Taking?   ASPIRIN 81 PO  Self Yes No   Sig: Take 81 mg by mouth daily   Alcohol Swabs ( Sterile Alcohol Prep) PADS  Self Yes No   Basaglar KwikPen 100 units/mL SOPN   No No   Sig: Inject 8 units nightly.   Blood Glucose Monitoring Suppl (OneTouch Verio) w/Device KIT  Self No No   Sig: Use to test blood sugar 4x daily.   Cholecalciferol 2000 units CAPS  Self Yes No   Sig: Take 1 capsule by mouth   Continuous Blood Gluc Sensor (FreeStyle Kaylah 2 Sensor) MISC  Self Yes No   Sig: Use   EPINEPHrine (EPIPEN) 0.3 mg/0.3 mL SOAJ   No No   Sig: Inject 0.3 mL (0.3 mg total) into a muscle once for 1 dose   Insulin Pen Needle (BD Pen Needle Karma U/F) 32G X 4 MM MISC   No No   Sig: USE TO INJECT INSULIN FOUR TIMES DAILY.   Multiple Vitamins-Minerals (MULTIVITAL-M PO)  Self Yes No   Sig: Take by mouth   NovoLOG FlexPen 100 units/mL injection pen   No No   Sig: Inject 4 units prior to breakfast. Inject 6 units prior to lunch and dinner.   OneTouch Delica Lancets 33G MISC  Self No No   Sig: Use to test blood sugar 4x daily.   atorvastatin (LIPITOR) 20 mg tablet   No No   Sig: TAKE ONE TABLET BY MOUTH ONCE DAILY   famotidine (PEPCID) 20 mg tablet  Self No No   Sig: Take 1 tablet (20 mg total) by mouth daily   fluconazole (DIFLUCAN) 100 mg tablet  Self Yes No   Sig: Take 1 tablet by mouth 2 (two) times a day   glucose blood (OneTouch Verio) test strip  Self No No    Sig: Use to test blood sugar 4x daily in case of CGM failure.   levothyroxine 75 mcg tablet   No No   Sig: Take 1 tablet (75 mcg total) by mouth daily   linaCLOtide 72 MCG CAPS   No No   Sig: Take 72 mcg by mouth daily at least 30 minutes prior to the first meal of the day   lisinopril (ZESTRIL) 20 mg tablet  Self No No   Sig: Take 1 tablet (20 mg total) by mouth daily      Facility-Administered Medications: None     Patient's Medications   Discharge Prescriptions    No medications on file     No discharge procedures on file.  ED SEPSIS DOCUMENTATION               Computerized Assisted Algorithm (CAA) may have been used to analyze all applicable images.         Workstation performed: [id number]             Procedures    ED Medication and Procedure Management   Prior to Admission Medications   Prescriptions Last Dose Informant Patient Reported? Taking?   ASPIRIN 81 PO  Self Yes No   Sig: Take 81 mg by mouth daily   Alcohol Swabs ( Sterile Alcohol Prep) PADS  Self Yes No   Basaglar KwikPen 100 units/mL SOPN   No No   Sig: Inject 8 units nightly.   Blood Glucose Monitoring Suppl (OneTouch Verio) w/Device KIT  Self No No   Sig: Use to test blood sugar 4x daily.   Cholecalciferol 2000 units CAPS  Self Yes No   Sig: Take 1 capsule by mouth   Continuous Blood Gluc Sensor (FreeStyle Kaylah 2 Sensor) MISC  Self Yes No   Sig: Use   EPINEPHrine (EPIPEN) 0.3 mg/0.3 mL SOAJ   No No   Sig: Inject 0.3 mL (0.3 mg total) into a muscle once for 1 dose   Insulin Pen Needle (BD Pen Needle Karma U/F) 32G X 4 MM MISC   No No   Sig: USE TO INJECT INSULIN FOUR TIMES DAILY.   Multiple Vitamins-Minerals (MULTIVITAL-M PO)  Self Yes No   Sig: Take by mouth   NovoLOG FlexPen 100 units/mL injection pen   No No   Sig: Inject 4 units prior to breakfast. Inject 6 units prior to lunch and dinner.   OneTouch Delica Lancets 33G MISC  Self No No   Sig: Use to test blood sugar 4x daily.   atorvastatin (LIPITOR) 20 mg tablet   No No   Sig: TAKE ONE TABLET BY MOUTH ONCE DAILY   famotidine (PEPCID) 20 mg tablet  Self No No   Sig: Take 1 tablet (20 mg total) by mouth daily   fluconazole (DIFLUCAN) 100 mg tablet  Self Yes No   Sig: Take 1 tablet by mouth 2 (two) times a day   glucose blood (OneTouch Verio) test strip  Self No No   Sig: Use to test blood sugar 4x daily in case of CGM failure.   levothyroxine 75 mcg tablet   No No   Sig: Take 1 tablet (75 mcg total) by mouth daily   linaCLOtide 72 MCG CAPS   No No   Sig: Take 72 mcg by mouth daily at least 30 minutes prior to the first meal of the day   lisinopril  (ZESTRIL) 20 mg tablet  Self No No   Sig: Take 1 tablet (20 mg total) by mouth daily      Facility-Administered Medications: None     Discharge Medication List as of 10/12/2024 12:12 AM        START taking these medications    Details   Diclofenac Sodium (VOLTAREN) 1 % Apply 2 g topically 4 (four) times a day, Starting Sat 10/12/2024, Normal           CONTINUE these medications which have NOT CHANGED    Details   Alcohol Swabs ( Sterile Alcohol Prep) PADS Starting Tue 7/11/2023, Historical Med      ASPIRIN 81 PO Take 81 mg by mouth daily, Starting Fri 1/17/2014, Historical Med      atorvastatin (LIPITOR) 20 mg tablet TAKE ONE TABLET BY MOUTH ONCE DAILY, Starting Fri 9/6/2024, Normal      Basaglar KwikPen 100 units/mL SOPN Inject 8 units nightly., Fill Later      Blood Glucose Monitoring Suppl (OneTouch Verio) w/Device KIT Use to test blood sugar 4x daily., Sample      Cholecalciferol 2000 units CAPS Take 1 capsule by mouth, Historical Med      Continuous Blood Gluc Sensor (FreeStyle Kaylah 2 Sensor) MISC Use, Historical Med      EPINEPHrine (EPIPEN) 0.3 mg/0.3 mL SOAJ Inject 0.3 mL (0.3 mg total) into a muscle once for 1 dose, Starting Tue 7/30/2024, Normal      famotidine (PEPCID) 20 mg tablet Take 1 tablet (20 mg total) by mouth daily, Starting Sat 5/4/2024, Normal      fluconazole (DIFLUCAN) 100 mg tablet Take 1 tablet by mouth 2 (two) times a day, Historical Med      glucose blood (OneTouch Verio) test strip Use to test blood sugar 4x daily in case of CGM failure., Normal      Insulin Pen Needle (BD Pen Needle Karma U/F) 32G X 4 MM MISC USE TO INJECT INSULIN FOUR TIMES DAILY., Normal      levothyroxine 75 mcg tablet Take 1 tablet (75 mcg total) by mouth daily, Starting Tue 10/8/2024, Normal      linaCLOtide 72 MCG CAPS Take 72 mcg by mouth daily at least 30 minutes prior to the first meal of the day, Starting Sun 8/25/2024, Normal      lisinopril (ZESTRIL) 20 mg tablet Take 1 tablet (20 mg total) by mouth daily,  Starting Thu 3/21/2024, Normal      Multiple Vitamins-Minerals (MULTIVITAL-M PO) Take by mouth, Historical Med      NovoLOG FlexPen 100 units/mL injection pen Inject 4 units prior to breakfast. Inject 6 units prior to lunch and dinner., Normal      OneTouch Delica Lancets 33G MISC Use to test blood sugar 4x daily., Normal             ED SEPSIS DOCUMENTATION   Time reflects when diagnosis was documented in both MDM as applicable and the Disposition within this note       Time User Action Codes Description Comment    10/12/2024 12:08 AM Francisco Javier Arzate Add [M25.562] Left knee pain                  Francisco Javier Arzate MD  10/15/24 [id number]

## 2024-10-14 ENCOUNTER — OFFICE VISIT (OUTPATIENT)
Dept: OBGYN CLINIC | Facility: CLINIC | Age: 79
End: 2024-10-14
Payer: MEDICARE

## 2024-10-14 VITALS
SYSTOLIC BLOOD PRESSURE: 166 MMHG | HEIGHT: 61 IN | BODY MASS INDEX: 23.98 KG/M2 | WEIGHT: 127 LBS | DIASTOLIC BLOOD PRESSURE: 75 MMHG | HEART RATE: 73 BPM

## 2024-10-14 DIAGNOSIS — M25.562 LEFT KNEE PAIN, UNSPECIFIED CHRONICITY: Primary | ICD-10-CM

## 2024-10-14 DIAGNOSIS — M25.562 LEFT KNEE PAIN: ICD-10-CM

## 2024-10-14 DIAGNOSIS — M17.12 PRIMARY OSTEOARTHRITIS OF LEFT KNEE: ICD-10-CM

## 2024-10-14 PROCEDURE — 99203 OFFICE O/P NEW LOW 30 MIN: CPT | Performed by: ORTHOPAEDIC SURGERY

## 2024-10-14 PROCEDURE — 20610 DRAIN/INJ JOINT/BURSA W/O US: CPT | Performed by: ORTHOPAEDIC SURGERY

## 2024-10-14 RX ORDER — BUPIVACAINE HYDROCHLORIDE 2.5 MG/ML
4 INJECTION, SOLUTION INFILTRATION; PERINEURAL
Status: COMPLETED | OUTPATIENT
Start: 2024-10-14 | End: 2024-10-14

## 2024-10-14 RX ORDER — TRIAMCINOLONE ACETONIDE 40 MG/ML
80 INJECTION, SUSPENSION INTRA-ARTICULAR; INTRAMUSCULAR
Status: COMPLETED | OUTPATIENT
Start: 2024-10-14 | End: 2024-10-14

## 2024-10-14 RX ADMIN — BUPIVACAINE HYDROCHLORIDE 4 ML: 2.5 INJECTION, SOLUTION INFILTRATION; PERINEURAL at 10:30

## 2024-10-14 RX ADMIN — TRIAMCINOLONE ACETONIDE 80 MG: 40 INJECTION, SUSPENSION INTRA-ARTICULAR; INTRAMUSCULAR at 10:30

## 2024-10-14 NOTE — PROGRESS NOTES
ASSESSMENT/PLAN:    Diagnoses and all orders for this visit:    Left knee pain, unspecified chronicity  -     XR knee 3 vw left non injury; Future    Left knee pain  -     Ambulatory Referral to Orthopedic Surgery    Primary osteoarthritis of left knee  -     Brace    Other orders  -     Large joint arthrocentesis        X-ray of the patient's left knee are negative for any fractures or dislocations.  There are moderate arthritic changes.  Possible treatment options were discussed with the patient.  She elected for corticosteroid injection.  The patient's left knee was injected with Kenalog and Marcaine.  She tolerated injection quite well.  She was also given a hinged knee brace.  She will follow-up with our office in 3 months.  She is acceptable to this plan.    Return in about 3 months (around 1/14/2025).    The patient has osteoarthritis of her left knee.  Under aseptic technique, the left knee was injected with Kenalog and Marcaine.  She tolerated the procedure quite well.  Return back in 3 months for evaluation.  She was also requesting a brace which was prescribed by this office      _____________________________________________________  CHIEF COMPLAINT:  Chief Complaint   Patient presents with    Left Knee - Pain         SUBJECTIVE:  Johana Alcantara is a 79 y.o. female who presents to our office complaining of left knee pain.  The patient states that her pain started approximately 3 days ago.  She denies any new falls or trauma.  She denies any numbness or tingling.  She denies any fever or chills.    The following portions of the patient's history were reviewed and updated as appropriate: allergies, current medications, past family history, past medical history, past social history, past surgical history and problem list.    PAST MEDICAL HISTORY:  Past Medical History:   Diagnosis Date    Anxiety     Colon polyp     Depression     Diabetes mellitus (HCC)     Diverticulosis     Hyperlipidemia      Hypertension        PAST SURGICAL HISTORY:  Past Surgical History:   Procedure Laterality Date    APPENDECTOMY      CATARACT EXTRACTION Right     COLONOSCOPY      difficult exam    HYSTERECTOMY      TONSILLECTOMY      WISDOM TOOTH EXTRACTION         FAMILY HISTORY:  Family History   Problem Relation Age of Onset    Parkinsonism Mother     Alzheimer's disease Father     Breast cancer Sister         in her 40's    Lung cancer Sister     Breast cancer Sister     No Known Problems Daughter     No Known Problems Daughter     No Known Problems Maternal Grandmother     No Known Problems Maternal Grandfather     No Known Problems Paternal Grandmother     No Known Problems Paternal Grandfather     No Known Problems Maternal Aunt     No Known Problems Maternal Aunt     No Known Problems Maternal Aunt     No Known Problems Paternal Aunt        SOCIAL HISTORY:  Social History     Tobacco Use    Smoking status: Never     Passive exposure: Never    Smokeless tobacco: Never   Vaping Use    Vaping status: Never Used   Substance Use Topics    Alcohol use: Not Currently     Comment: rare    Drug use: Never       MEDICATIONS:    Current Outpatient Medications:     Alcohol Swabs ( Sterile Alcohol Prep) PADS, , Disp: , Rfl:     ASPIRIN 81 PO, Take 81 mg by mouth daily, Disp: , Rfl:     atorvastatin (LIPITOR) 20 mg tablet, TAKE ONE TABLET BY MOUTH ONCE DAILY, Disp: 30 tablet, Rfl: 0    Basaglar KwikPen 100 units/mL SOPN, Inject 8 units nightly., Disp: 15 mL, Rfl: 1    Blood Glucose Monitoring Suppl (OneTouch Verio) w/Device KIT, Use to test blood sugar 4x daily., Disp: 1 kit, Rfl: 0    Cholecalciferol 2000 units CAPS, Take 1 capsule by mouth, Disp: , Rfl:     Continuous Blood Gluc Sensor (FreeStyle Kaylah 2 Sensor) MISC, Use, Disp: , Rfl:     Diclofenac Sodium (VOLTAREN) 1 %, Apply 2 g topically 4 (four) times a day, Disp: 50 g, Rfl: 0    famotidine (PEPCID) 20 mg tablet, Take 1 tablet (20 mg total) by mouth daily, Disp: 90 tablet, Rfl:  1    fluconazole (DIFLUCAN) 100 mg tablet, Take 1 tablet by mouth 2 (two) times a day, Disp: , Rfl:     glucose blood (OneTouch Verio) test strip, Use to test blood sugar 4x daily in case of CGM failure., Disp: 100 each, Rfl: 2    Insulin Pen Needle (BD Pen Needle Karma U/F) 32G X 4 MM MISC, USE TO INJECT INSULIN FOUR TIMES DAILY., Disp: 300 each, Rfl: 1    levothyroxine 75 mcg tablet, Take 1 tablet (75 mcg total) by mouth daily, Disp: 90 tablet, Rfl: 3    linaCLOtide 72 MCG CAPS, Take 72 mcg by mouth daily at least 30 minutes prior to the first meal of the day, Disp: 30 capsule, Rfl: 5    lisinopril (ZESTRIL) 20 mg tablet, Take 1 tablet (20 mg total) by mouth daily, Disp: 90 tablet, Rfl: 1    Multiple Vitamins-Minerals (MULTIVITAL-M PO), Take by mouth, Disp: , Rfl:     NovoLOG FlexPen 100 units/mL injection pen, Inject 4 units prior to breakfast. Inject 6 units prior to lunch and dinner., Disp: 15 mL, Rfl: 1    OneTouch Delica Lancets 33G MISC, Use to test blood sugar 4x daily., Disp: 300 each, Rfl: 2    EPINEPHrine (EPIPEN) 0.3 mg/0.3 mL SOAJ, Inject 0.3 mL (0.3 mg total) into a muscle once for 1 dose, Disp: 0.6 mL, Rfl: 0    ALLERGIES:  Allergies   Allergen Reactions    Diphenhydramine Shortness Of Breath    Meperidine Hives and Shortness Of Breath    Prednisone Hives and Shortness Of Breath    Bee Pollen     Ciprofloxacin Hives    Sulfamethoxazole-Trimethoprim GI Intolerance       ROS:  Review of Systems     Constitutional: Negative for fatigue, fever or loss of appetite.   HENT: Negative.    Respiratory: Negative for shortness of breath, dyspnea.    Cardiovascular: Negative for chest pain/tightness.   Gastrointestinal: Negative for abdominal pain, N/V.   Endocrine: Negative for cold/heat intolerance, unexplained weight loss/gain.   Genitourinary: Negative for flank pain, dysuria, hematuria.   Musculoskeletal: Positive for arthralgia   Skin: Negative for rash.    Neurological: Negative for numbness or  "tingling  Psychiatric/Behavioral: Negative for agitation.  _____________________________________________________  PHYSICAL EXAMINATION:    Blood pressure 166/75, pulse 73, height 5' 1\" (1.549 m), weight 57.6 kg (127 lb), not currently breastfeeding.    Constitutional: Oriented to person, place, and time. Appears well-developed and well-nourished. No distress.   HENT:   Head: Normocephalic.   Eyes: Conjunctivae are normal. Right eye exhibits no discharge. Left eye exhibits no discharge. No scleral icterus.   Cardiovascular: Normal rate.    Pulmonary/Chest: Effort normal.   Neurological: Alert and oriented to person, place, and time.   Skin: Skin is warm and dry. No rash noted. Not diaphoretic. No erythema. No pallor.   Psychiatric: Normal mood and affect. Behavior is normal. Judgment and thought content normal.      MUSCULOSKELETAL EXAMINATION:   Physical Exam  Ortho Exam    Left lower extremity is neurovascularly intact  Toes are pink and mobile   Compartments are soft  No warmth, erythema or ecchymosis  ROM of knee is from 5-115 degrees  Negative Lachman, drawer or pivot shift  No medial instability  Medial joint line tenderness, slight lateral joint line tenderness  Patellofemoral crepitation   Objective:  BP Readings from Last 1 Encounters:   10/14/24 166/75      Wt Readings from Last 1 Encounters:   10/14/24 57.6 kg (127 lb)        BMI:   Estimated body mass index is 24 kg/m² as calculated from the following:    Height as of this encounter: 5' 1\" (1.549 m).    Weight as of this encounter: 57.6 kg (127 lb).      PROCEDURES PERFORMED:  Large joint arthrocentesis: L knee  Universal Protocol:  Risks and benefits: risks, benefits and alternatives were discussed  Consent given by: patient  Patient understanding: patient states understanding of the procedure being performed  Site marked: the operative site was marked  Patient identity confirmed: verbally with patient  Supporting Documentation  Indications: pain "   Procedure Details  Location: knee - L knee  Preparation: Patient was prepped and draped in the usual sterile fashion  Needle size: 22 G  Ultrasound guidance: no  Approach: lateral  Medications administered: 4 mL bupivacaine 0.25 %; 80 mg triamcinolone acetonide 40 mg/mL    Patient tolerance: patient tolerated the procedure well with no immediate complications  Dressing:  Sterile dressing applied            Scribe Attestation      I,:  Michael Van PA-C am acting as a scribe while in the presence of the attending physician.:       I,:  Richardson Parikh DO personally performed the services described in this documentation    as scribed in my presence.:

## 2024-10-15 DIAGNOSIS — I10 PRIMARY HYPERTENSION: ICD-10-CM

## 2024-10-15 NOTE — TELEPHONE ENCOUNTER
Reason for call:   [x] Refill   [] Prior Auth  [] Other:     Office:   [x] PCP/Provider - bradleyle  [] Specialty/Provider -     Medication:   Lisinopril 20 mg, 1 qd, 90    Pharmacy:   [facility]    Does the patient have enough for 3 days?   [x] Yes   [] No - Send as HP to POD

## 2024-10-16 RX ORDER — LISINOPRIL 20 MG/1
20 TABLET ORAL DAILY
Qty: 90 TABLET | Refills: 1 | Status: SHIPPED | OUTPATIENT
Start: 2024-10-16

## 2024-10-22 ENCOUNTER — NURSE TRIAGE (OUTPATIENT)
Age: 79
End: 2024-10-22

## 2024-10-22 DIAGNOSIS — N18.31 TYPE 1 DIABETES MELLITUS WITH STAGE 3A CHRONIC KIDNEY DISEASE (HCC): ICD-10-CM

## 2024-10-22 DIAGNOSIS — E10.22 TYPE 1 DIABETES MELLITUS WITH STAGE 3A CHRONIC KIDNEY DISEASE (HCC): ICD-10-CM

## 2024-10-22 NOTE — TELEPHONE ENCOUNTER
If her fasting blood sugars have been elevated, recommend increasing basal insulin to 10 units. Forwarding message to endocrinologist as well.

## 2024-10-22 NOTE — TELEPHONE ENCOUNTER
"Patient calling to report elevated sugars without symptoms. She said they have been high the last 2 weeks. She woke up around 0630 today and her sugar was 355. She gave herself 4 units of novolog and it is 204. She takes Novolog 4 units with breakfast and 6 with lunch and dinner. She takes a night time insulin and that is 8 units. I asked her if she had a log of her sugars or if she can go back on her yaritza and she was unable to do so. She would like to be called to see what can be done to help lower her sugars. I informed her she may need to come to the office to have her meter downloaded so the doctor can review her sugars and make adjustments accordingly.         Reason for Disposition   Blood glucose 240 - 300 mg/dL (13.3 - 16.7 mmol/L)    Answer Assessment - Initial Assessment Questions  1. BLOOD GLUCOSE: \"What is your blood glucose level?\"       204  2. ONSET: \"When did you check the blood glucose?\"      current  3. USUAL RANGE: \"What is your glucose level usually?\" (e.g., usual fasting morning value, usual evening value)      -  4. KETONES: \"Do you check for ketones (urine or blood test strips)?\" If yes, ask: \"What does the test show now?\"       -  5. TYPE 1 or 2:  \"Do you know what type of diabetes you have?\"  (e.g., Type 1, Type 2, Gestational; doesn't know)       Type 2?  6. INSULIN: \"Do you take insulin?\" \"What type of insulin(s) do you use? What is the mode of delivery? (syringe, pen; injection or pump)?\"       yes  7. DIABETES PILLS: \"Do you take any pills for your diabetes?\" If yes, ask: \"Have you missed taking any pills recently?\"      denies  8. OTHER SYMPTOMS: \"Do you have any symptoms?\" (e.g., fever, frequent urination, difficulty breathing, dizziness, weakness, vomiting)      denies    Protocols used: Diabetes - High Blood Sugar-ADULT-[place]    "

## 2024-10-23 RX ORDER — INSULIN GLARGINE 100 [IU]/ML
INJECTION, SOLUTION SUBCUTANEOUS
Qty: 15 ML | Refills: 1 | Status: SHIPPED | OUTPATIENT
Start: 2024-10-23

## 2024-10-23 NOTE — TELEPHONE ENCOUNTER
Please call patient and ask if she can come in for a CGM download. Any concerns for illness? Viral illness? UTI?    When she does come in, please ask her to confirm her exact insulin regimen.     Agree with PCP recommendation to increase basaglar to 10 units at this time. I have updated the chart to reflect this.

## 2024-10-25 ENCOUNTER — TELEPHONE (OUTPATIENT)
Age: 79
End: 2024-10-25

## 2024-10-25 DIAGNOSIS — R05.1 ACUTE COUGH: ICD-10-CM

## 2024-10-25 DIAGNOSIS — R10.13 DYSPEPSIA: ICD-10-CM

## 2024-10-25 RX ORDER — FAMOTIDINE 20 MG/1
20 TABLET, FILM COATED ORAL DAILY
Qty: 90 TABLET | Refills: 1 | Status: SHIPPED | OUTPATIENT
Start: 2024-10-25

## 2024-10-25 NOTE — TELEPHONE ENCOUNTER
Patient requesting refill(s) of: Pepcid 20 mg     Last filled: 5/4/24  Last appt: 8/20/24  Next appt: 2/19/25  Pharmacy: [facility]

## 2024-10-25 NOTE — TELEPHONE ENCOUNTER
Please confirm with her exactly how she is using her insulin at this time. After I know this, I will review the CGM. Thank you.

## 2024-10-25 NOTE — TELEPHONE ENCOUNTER
Patient calling back, relayed the above message she states she will bring her CGM in today. She has not had any illnesses or UTI.  Just an FYI

## 2024-10-25 NOTE — TELEPHONE ENCOUNTER
Pt called in stating she received a message from Dr Frey office on Thursday 10/23. No record on file that a call was made, however did relay that the endocrine office did call her. She wants Dr Duggan to know that she is still constipation but is taking OTC medications. Please review and advise.

## 2024-10-28 DIAGNOSIS — E10.22 TYPE 1 DIABETES MELLITUS WITH STAGE 3A CHRONIC KIDNEY DISEASE (HCC): ICD-10-CM

## 2024-10-28 DIAGNOSIS — N18.31 TYPE 1 DIABETES MELLITUS WITH STAGE 3A CHRONIC KIDNEY DISEASE (HCC): ICD-10-CM

## 2024-10-28 NOTE — TELEPHONE ENCOUNTER
Called patients  She is taking the basaglar 10 units at bedtimes  Novolog   4 units before breakfast   6 units before the lunch  6 units before dinner

## 2024-10-29 RX ORDER — INSULIN GLARGINE 100 [IU]/ML
INJECTION, SOLUTION SUBCUTANEOUS
Qty: 15 ML | Refills: 1 | Status: SHIPPED | OUTPATIENT
Start: 2024-10-29

## 2024-10-29 RX ORDER — INSULIN GLARGINE 100 [IU]/ML
INJECTION, SOLUTION SUBCUTANEOUS
Qty: 15 ML | Refills: 1 | OUTPATIENT
Start: 2024-10-29

## 2024-10-29 NOTE — TELEPHONE ENCOUNTER
CGM reviewed. Blood sugars remain highly variable.     I recommend increasing basaglar to 12 units nightly  Continue novolog 4-6-6 for now. May need a slight increase in pre-dinner novolog dose but I would like to see how her blood sugars respond to the adjustment in basaglar first.    Thank you.

## 2024-10-30 NOTE — TELEPHONE ENCOUNTER
Pt calling returning phone call. Informed of provider message. All questions answered at this time.

## 2024-11-04 ENCOUNTER — HOSPITAL ENCOUNTER (OUTPATIENT)
Dept: MAMMOGRAPHY | Facility: HOSPITAL | Age: 79
Discharge: HOME | End: 2024-11-04
Attending: INTERNAL MEDICINE
Payer: MEDICARE

## 2024-11-04 DIAGNOSIS — Z12.31 ENCOUNTER FOR SCREENING MAMMOGRAM FOR BREAST CANCER: ICD-10-CM

## 2024-11-04 PROCEDURE — 77067 SCR MAMMO BI INCL CAD: CPT

## 2024-11-04 PROCEDURE — 77063 BREAST TOMOSYNTHESIS BI: CPT

## 2024-11-11 DIAGNOSIS — N18.31 TYPE 1 DIABETES MELLITUS WITH STAGE 3A CHRONIC KIDNEY DISEASE (HCC): ICD-10-CM

## 2024-11-11 DIAGNOSIS — E10.22 TYPE 1 DIABETES MELLITUS WITH STAGE 3A CHRONIC KIDNEY DISEASE (HCC): ICD-10-CM

## 2024-11-12 RX ORDER — INSULIN ASPART 100 [IU]/ML
INJECTION, SOLUTION INTRAVENOUS; SUBCUTANEOUS
Qty: 15 ML | Refills: 1 | Status: SHIPPED | OUTPATIENT
Start: 2024-11-12

## 2024-11-15 LAB
LEFT EYE DIABETIC RETINOPATHY: NORMAL
RIGHT EYE DIABETIC RETINOPATHY: NORMAL

## 2024-11-18 ENCOUNTER — APPOINTMENT (OUTPATIENT)
Dept: LAB | Facility: CLINIC | Age: 79
End: 2024-11-18
Payer: MEDICARE

## 2024-11-18 DIAGNOSIS — E10.65 TYPE 1 DIABETES MELLITUS WITH HYPERGLYCEMIA (HCC): ICD-10-CM

## 2024-11-18 DIAGNOSIS — E10.22 TYPE 1 DIABETES MELLITUS WITH STAGE 3A CHRONIC KIDNEY DISEASE (HCC): ICD-10-CM

## 2024-11-18 DIAGNOSIS — E78.2 MIXED HYPERLIPIDEMIA: ICD-10-CM

## 2024-11-18 DIAGNOSIS — N18.31 TYPE 1 DIABETES MELLITUS WITH STAGE 3A CHRONIC KIDNEY DISEASE (HCC): ICD-10-CM

## 2024-11-18 LAB
ANION GAP SERPL CALCULATED.3IONS-SCNC: 8 MMOL/L (ref 4–13)
BUN SERPL-MCNC: 22 MG/DL (ref 5–25)
CALCIUM SERPL-MCNC: 9 MG/DL (ref 8.4–10.2)
CHLORIDE SERPL-SCNC: 105 MMOL/L (ref 96–108)
CHOLEST SERPL-MCNC: 172 MG/DL (ref ?–200)
CO2 SERPL-SCNC: 27 MMOL/L (ref 21–32)
CREAT SERPL-MCNC: 1.08 MG/DL (ref 0.6–1.3)
EST. AVERAGE GLUCOSE BLD GHB EST-MCNC: 171 MG/DL
GFR SERPL CREATININE-BSD FRML MDRD: 48 ML/MIN/1.73SQ M
GLUCOSE P FAST SERPL-MCNC: 54 MG/DL (ref 65–99)
HBA1C MFR BLD: 7.6 %
HDLC SERPL-MCNC: 79 MG/DL
LDLC SERPL CALC-MCNC: 80 MG/DL (ref 0–100)
POTASSIUM SERPL-SCNC: 4 MMOL/L (ref 3.5–5.3)
SODIUM SERPL-SCNC: 140 MMOL/L (ref 135–147)
TRIGL SERPL-MCNC: 65 MG/DL (ref ?–150)

## 2024-11-18 PROCEDURE — 80061 LIPID PANEL: CPT

## 2024-11-18 PROCEDURE — 83036 HEMOGLOBIN GLYCOSYLATED A1C: CPT

## 2024-11-18 PROCEDURE — 36415 COLL VENOUS BLD VENIPUNCTURE: CPT

## 2024-11-18 PROCEDURE — 80048 BASIC METABOLIC PNL TOTAL CA: CPT

## 2024-11-19 ENCOUNTER — RESULTS FOLLOW-UP (OUTPATIENT)
Dept: ENDOCRINOLOGY | Facility: CLINIC | Age: 79
End: 2024-11-19

## 2024-11-26 ENCOUNTER — OFFICE VISIT (OUTPATIENT)
Dept: ENDOCRINOLOGY | Facility: CLINIC | Age: 79
End: 2024-11-26
Payer: MEDICARE

## 2024-11-26 VITALS
OXYGEN SATURATION: 99 % | HEART RATE: 69 BPM | SYSTOLIC BLOOD PRESSURE: 126 MMHG | BODY MASS INDEX: 23.83 KG/M2 | DIASTOLIC BLOOD PRESSURE: 66 MMHG | RESPIRATION RATE: 18 BRPM | TEMPERATURE: 97.8 F | WEIGHT: 126.2 LBS | HEIGHT: 61 IN

## 2024-11-26 DIAGNOSIS — E10.65 TYPE 1 DIABETES MELLITUS WITH HYPERGLYCEMIA (HCC): Primary | ICD-10-CM

## 2024-11-26 DIAGNOSIS — E78.2 MIXED HYPERLIPIDEMIA: ICD-10-CM

## 2024-11-26 DIAGNOSIS — N18.31 TYPE 1 DIABETES MELLITUS WITH STAGE 3A CHRONIC KIDNEY DISEASE (HCC): ICD-10-CM

## 2024-11-26 DIAGNOSIS — M81.0 AGE-RELATED OSTEOPOROSIS WITHOUT CURRENT PATHOLOGICAL FRACTURE: ICD-10-CM

## 2024-11-26 DIAGNOSIS — E10.22 TYPE 1 DIABETES MELLITUS WITH STAGE 3A CHRONIC KIDNEY DISEASE (HCC): ICD-10-CM

## 2024-11-26 DIAGNOSIS — I10 PRIMARY HYPERTENSION: ICD-10-CM

## 2024-11-26 DIAGNOSIS — E03.9 ACQUIRED HYPOTHYROIDISM: ICD-10-CM

## 2024-11-26 PROCEDURE — 99214 OFFICE O/P EST MOD 30 MIN: CPT | Performed by: NURSE PRACTITIONER

## 2024-11-26 PROCEDURE — 95251 CONT GLUC MNTR ANALYSIS I&R: CPT | Performed by: NURSE PRACTITIONER

## 2024-11-26 RX ORDER — INSULIN GLARGINE 100 [IU]/ML
INJECTION, SOLUTION SUBCUTANEOUS
Qty: 15 ML | Refills: 1 | Status: SHIPPED | OUTPATIENT
Start: 2024-11-26

## 2024-11-26 RX ORDER — INSULIN ASPART 100 [IU]/ML
INJECTION, SOLUTION INTRAVENOUS; SUBCUTANEOUS
Qty: 15 ML | Refills: 1 | Status: SHIPPED | OUTPATIENT
Start: 2024-11-26

## 2024-11-26 NOTE — ASSESSMENT & PLAN NOTE
Lab Results   Component Value Date    HGBA1C 7.6 (H) 11/18/2024       Orders:    NovoLOG FlexPen 100 units/mL injection pen; Inject 4 units prior to breakfast. Inject 6 units prior to lunch and inject 8 units prior to dinner.    Basaglar KwikPen 100 units/mL SOPN; Inject 10 units nightly.

## 2024-11-26 NOTE — PATIENT INSTRUCTIONS
Reduce basaglar to 10 units nightly. If you still have low blood sugars in the morning, reduce to 8 units.  For the novolog: Continue 4 units before breakfast, 6 units before lunch, increase to 8 units prior to dinner.

## 2024-11-26 NOTE — Clinical Note
Please prepare to administer patient's first Prolia injection at her next follow up visit in approximately 4 months. Thank you.

## 2024-11-26 NOTE — PROGRESS NOTES
Name: Johana Alcantara      : 1945      MRN: [id number]  Encounter Provider: EUGENIA Phan  Encounter Date: 2024   Encounter department: [facility] [place]  :  Assessment & Plan  Type 1 diabetes mellitus with hyperglycemia (HCC)  Basal insulin dose appears to be too high presently.  Reduce Basaglar to 10 units nightly.  Continue with 4 units of NovoLog prior to breakfast, 6 units prior to lunch and increase to 8 units prior to dinner.  Continue to focus on a healthy diet.  Continue with regular physical activity.  Reviewed appropriate hypoglycemia surveillance and treatment.  Reviewed rule of 15.  Patient demonstrates 100% compliance with CGM use.  Continue.  Patient is to notify me with persistent hyperglycemia or episodes of hypoglycemia.  Follow-up in 4 months.  Lab Results   Component Value Date    HGBA1C 7.6 (H) 2024       Orders:    Hemoglobin A1C; Future    Basic metabolic panel; Future    Acquired hypothyroidism  Patient is clinically euthyroid.  Continue 75 mcg of levothyroxine daily.  Repeat thyroid function test prior to follow-up appointment.  Continue calcium and vitamin D supplements.  Continue with weightbearing activity.  Reviewed fall precautions.  Orders:    TSH, 3rd generation; Future    T4, free; Future    Primary hypertension  BP well-controlled at 126/66.  Continue 20 mg of lisinopril daily.         Age-related osteoporosis without current pathological fracture  Patient is now prepared to start Prolia injections as discussed.  She will receive her first injection at her follow-up appointment in 4 months.         Mixed hyperlipidemia  Well-controlled.  Continue 20 mg of atorvastatin nightly.         Type 1 diabetes mellitus with stage 3a chronic kidney disease (HCC)    Lab Results   Component Value Date    HGBA1C 7.6 (H) 2024       Orders:    NovoLOG FlexPen 100 units/mL injection pen; Inject 4 units prior to  breakfast. Inject 6 units prior to lunch and inject 8 units prior to dinner.    Basaglar KwikPen 100 units/mL SOPN; Inject 10 units nightly.        History of Present Illness     HPI  Johana Alcantara is a 79 y.o. female with type 1 diabetes and hypothyroidism presenting today for routine follow up.    For hypothyroidism, she is taking 75 mcg of levothyroxine daily.  TSH as of 7/11/2024 stable at 1.482.     Hemoglobin A1C   Latest Ref Rng Normal 4.0-5.6%; PreDiabetic 5.7-6.4%; Diabetic >=6.5%; Glycemic control for adults with diabetes <7.0% %   7/11/2024 7.7 (H)    11/18/2024 7.6 (H)       eAG, EST AVG Glucose   Latest Ref Rng mg/dl   7/11/2024 174    11/18/2024 171       Legend:  (H) High    Current regimen:  Basaglar 12 units nightly  NovoLog 4-6-6    Johana Alcantara   Device used Freestyle yaritza 2  Home use       Indication   Type 1 Diabetes    More than 72 hours of data was reviewed. Report to be scanned to chart.     Date Range: November 13, 2024-November 26, 2024    Analysis of data:   Average Glucose: 161 mg/dL  Coefficient of Variation: 45.9%  GMI: 7.2%  Time in Target Range: 59%  Time Above Range: 22% 181 to 250 mg/dL; 13% greater than 250 mg/dL  Time Below Range: 6% 54 to 69 mg/dL; 0% less than 54 mg/dL    Interpretation of data: Variability with more frequent episodes of overnight/early morning hypoglycemia noted.  Hyperglycemia occurring most commonly in the afternoon and after dinner.    For osteoporosis, patient has been approved for Prolia injections but has yet to receive her first injection which was due in July. She had concerns that her blood sugars were running high.         Review of Systems   Constitutional:  Negative for activity change, appetite change, fatigue and unexpected weight change.   HENT:  Positive for hearing loss. Negative for dental problem, sore throat, trouble swallowing and voice change.    Eyes:  Negative for visual disturbance.   Respiratory:  Negative for cough,  "chest tightness and shortness of breath.    Cardiovascular:  Negative for chest pain, palpitations and leg swelling.   Gastrointestinal:  Negative for constipation, diarrhea, nausea and vomiting.   Endocrine: Negative for cold intolerance, heat intolerance, polydipsia, polyphagia and polyuria.   Genitourinary:  Negative for frequency.   Musculoskeletal:  Negative for arthralgias, back pain, gait problem and myalgias.   Skin:  Negative for wound.   Allergic/Immunologic: Positive for environmental allergies. Negative for food allergies.   Neurological:  Negative for dizziness, weakness, light-headedness, numbness and headaches.   Psychiatric/Behavioral:  Negative for decreased concentration, dysphoric mood and sleep disturbance. The patient is not nervous/anxious.           Objective   /66 (BP Location: Right arm, Patient Position: Sitting, Cuff Size: Standard)   Pulse 69   Temp 97.8 °F (36.6 °C) (Temporal)   Resp 18   Ht 5' 1\" (1.549 m)   Wt 57.2 kg (126 lb 3.2 oz)   SpO2 99%   BMI 23.85 kg/m²      Physical Exam  Vitals reviewed.   Constitutional:       General: She is not in acute distress.     Appearance: She is well-developed. She is not ill-appearing.   HENT:      Head: Normocephalic and atraumatic.   Eyes:      Conjunctiva/sclera: Conjunctivae normal.      Pupils: Pupils are equal, round, and reactive to light.   Cardiovascular:      Rate and Rhythm: Normal rate.      Pulses: Normal pulses.   Pulmonary:      Effort: Pulmonary effort is normal. No respiratory distress.   Abdominal:      Palpations: Abdomen is soft.      Tenderness: There is no abdominal tenderness.   Musculoskeletal:         General: No swelling.      Cervical back: Normal range of motion and neck supple.   Skin:     General: Skin is warm and dry.      Capillary Refill: Capillary refill takes less than 2 seconds.   Neurological:      Mental Status: She is alert.   Psychiatric:         Mood and Affect: Mood normal.           "

## 2024-11-26 NOTE — ASSESSMENT & PLAN NOTE
Patient is now prepared to start Prolia injections as discussed.  She will receive her first injection at her follow-up appointment in 4 months.

## 2024-11-26 NOTE — ASSESSMENT & PLAN NOTE
Basal insulin dose appears to be too high presently.  Reduce Basaglar to 10 units nightly.  Continue with 4 units of NovoLog prior to breakfast, 6 units prior to lunch and increase to 8 units prior to dinner.  Continue to focus on a healthy diet.  Continue with regular physical activity.  Reviewed appropriate hypoglycemia surveillance and treatment.  Reviewed rule of 15.  Patient demonstrates 100% compliance with CGM use.  Continue.  Patient is to notify me with persistent hyperglycemia or episodes of hypoglycemia.  Follow-up in 4 months.  Lab Results   Component Value Date    HGBA1C 7.6 (H) 11/18/2024       Orders:    Hemoglobin A1C; Future    Basic metabolic panel; Future

## 2024-11-26 NOTE — ASSESSMENT & PLAN NOTE
Patient is clinically euthyroid.  Continue 75 mcg of levothyroxine daily.  Repeat thyroid function test prior to follow-up appointment.  Continue calcium and vitamin D supplements.  Continue with weightbearing activity.  Reviewed fall precautions.  Orders:    TSH, 3rd generation; Future    T4, free; Future

## 2024-12-02 DIAGNOSIS — E78.2 MIXED HYPERLIPIDEMIA: ICD-10-CM

## 2024-12-02 RX ORDER — ATORVASTATIN CALCIUM 20 MG/1
20 TABLET, FILM COATED ORAL DAILY
Qty: 90 TABLET | Refills: 2 | Status: SHIPPED | OUTPATIENT
Start: 2024-12-02

## 2024-12-02 NOTE — TELEPHONE ENCOUNTER
Patient requesting refill(s) of: Atorvastatin 20 mg     Last filled: 9/6/24  Last appt: 8/20/24  Next appt:2/19/25  [facility]: [facility]

## 2024-12-06 ENCOUNTER — TELEPHONE (OUTPATIENT)
Dept: FAMILY MEDICINE CLINIC | Facility: CLINIC | Age: 79
End: 2024-12-06

## 2024-12-06 ENCOUNTER — OFFICE VISIT (OUTPATIENT)
Dept: FAMILY MEDICINE CLINIC | Facility: CLINIC | Age: 79
End: 2024-12-06
Payer: MEDICARE

## 2024-12-06 VITALS
TEMPERATURE: 97.8 F | RESPIRATION RATE: 16 BRPM | HEIGHT: 61 IN | BODY MASS INDEX: 23.98 KG/M2 | SYSTOLIC BLOOD PRESSURE: 124 MMHG | DIASTOLIC BLOOD PRESSURE: 68 MMHG | OXYGEN SATURATION: 98 % | WEIGHT: 127 LBS | HEART RATE: 68 BPM

## 2024-12-06 DIAGNOSIS — B00.1 COLD SORE: ICD-10-CM

## 2024-12-06 DIAGNOSIS — L08.9 LOCAL SKIN INFECTION: Primary | ICD-10-CM

## 2024-12-06 PROCEDURE — 99214 OFFICE O/P EST MOD 30 MIN: CPT | Performed by: NURSE PRACTITIONER

## 2024-12-06 PROCEDURE — G2211 COMPLEX E/M VISIT ADD ON: HCPCS | Performed by: NURSE PRACTITIONER

## 2024-12-06 RX ORDER — CEPHALEXIN 500 MG/1
500 CAPSULE ORAL 3 TIMES DAILY
Qty: 15 CAPSULE | Refills: 0 | Status: SHIPPED | OUTPATIENT
Start: 2024-12-06 | End: 2024-12-11

## 2024-12-06 NOTE — TELEPHONE ENCOUNTER
Recevied call from Radha at [facility] in regards to Keflex script. They have allergy to Penicillin listed on their end.     Patient does not recall any allergic reactions to penicillin. Chart on our end lists prednisone, bactrim, benadryl, cipro, meperidine and bees.     Informed pharmacy pt does not have allergy to penicillin listed on our end and can fill since pt is denying any penicillin allergy.

## 2024-12-06 NOTE — PROGRESS NOTES
"Name: Johana Alcantara      : 1945      MRN: [id number]  Encounter Provider: EUGENIA Elkins  Encounter Date: 2024   Encounter department: [facility]  :  Assessment & Plan  Local skin infection  Use warm compress to the area, keep clean and dry   Orders:    cephalexin (KEFLEX) 500 mg capsule; Take 1 capsule (500 mg total) by mouth 3 (three) times a day for 5 days    Cold sore                History of Present Illness     Patient states she had a small area on her right corner of her mouth for approximately 3 weeks, she does get cold sores   She does have difficulty opening her mouth       Review of Systems   Constitutional:  Negative for chills and fever.   HENT:  Negative for ear pain and sore throat.    Eyes:  Negative for pain and visual disturbance.   Respiratory:  Negative for cough and shortness of breath.    Cardiovascular:  Negative for chest pain and palpitations.   Gastrointestinal:  Negative for abdominal pain and vomiting.   Genitourinary:  Negative for dysuria and hematuria.   Musculoskeletal:  Negative for arthralgias and back pain.   Skin:  Negative for color change and rash.        Patient reports small cold sore to the right side of the mouth    Neurological:  Negative for seizures and syncope.   All other systems reviewed and are negative.         Objective   /68   Pulse 68   Temp 97.8 °F (36.6 °C) (Tympanic)   Resp 16   Ht 5' 1\" (1.549 m)   Wt 57.6 kg (127 lb)   SpO2 98%   BMI 24.00 kg/m²      Physical Exam    "

## 2024-12-13 ENCOUNTER — HOSPITAL ENCOUNTER (EMERGENCY)
Facility: HOSPITAL | Age: 79
Discharge: HOME | End: 2024-12-13
Attending: EMERGENCY MEDICINE
Payer: MEDICARE

## 2024-12-13 ENCOUNTER — APPOINTMENT (EMERGENCY)
Dept: RADIOLOGY | Facility: HOSPITAL | Age: 79
End: 2024-12-13
Payer: MEDICARE

## 2024-12-13 VITALS
TEMPERATURE: 97.9 F | HEART RATE: 79 BPM | OXYGEN SATURATION: 98 % | DIASTOLIC BLOOD PRESSURE: 77 MMHG | RESPIRATION RATE: 18 BRPM | SYSTOLIC BLOOD PRESSURE: 180 MMHG

## 2024-12-13 DIAGNOSIS — S89.90XA: Primary | ICD-10-CM

## 2024-12-13 DIAGNOSIS — T14.8XXA CONTUSION: ICD-10-CM

## 2024-12-13 PROCEDURE — 73590 X-RAY EXAM OF LOWER LEG: CPT

## 2024-12-13 PROCEDURE — 99285 EMERGENCY DEPT VISIT HI MDM: CPT

## 2024-12-13 PROCEDURE — 99284 EMERGENCY DEPT VISIT MOD MDM: CPT | Performed by: EMERGENCY MEDICINE

## 2024-12-14 NOTE — ED PROVIDER NOTES
Time reflects when diagnosis was documented in both MDM as applicable and the Disposition within this note       Time User Action Codes Description Comment    12/13/2024  8:15 PM Kalr Verde Add [S89.90XA] Lower leg injury     12/13/2024  8:15 PM Karl eVrde Add [T14.8XXA] Contusion           ED Disposition       ED Disposition   Discharge    Condition   Stable    Date/Time   Fri Dec 13, 2024  8:15 PM    Comment   Johana Alcantara discharge to home/self care.                   Assessment & Plan       Medical Decision Making  79-year-old female presents with left lower leg discomfort.  Patient hit her mid lower leg on a chair about 1 week ago and has had pain and bruising since which is tracking inferiorly and superiorly on the anterior surface of her leg.  She has been elevating it.  Pain is worse with walking.    On exam she has mild ecchymosis to the anterior with no wound.  Suspect soft tissue contusion.  She did have some tenderness on the lateral side of her mid fibula.  No obvious deformity.  X-rays to look for fracture viewed interpreted by me no acute disease appreciated.    Problems Addressed:  Contusion: acute illness or injury  Lower leg injury: acute illness or injury    Amount and/or Complexity of Data Reviewed  Radiology: ordered and independent interpretation performed. Decision-making details documented in ED Course.             Medications - No data to display    ED Risk Strat Scores                          SBIRT 22yo+      Flowsheet Row Most Recent Value   Initial Alcohol Screen: US AUDIT-C     1. How often do you have a drink containing alcohol? 0 Filed at: 12/13/2024 1931   2. How many drinks containing alcohol do you have on a typical day you are drinking?  0 Filed at: 12/13/2024 1931   3a. Male UNDER 65: How often do you have five or more drinks on one occasion? 0 Filed at: 12/13/2024 1931   3b. FEMALE Any Age, or MALE 65+: How often do you have 4 or more drinks on one  occassion? 0 Filed at: 12/13/2024 1931   Audit-C Score 0 Filed at: 12/13/2024 1931   TAHMINA: How many times in the past year have you...    Used an illegal drug or used a prescription medication for non-medical reasons? Never Filed at: 12/13/2024 1931                            History of Present Illness       Chief Complaint   Patient presents with    Leg Swelling     Hit left leg off chair one week ago          Past Medical History:   Diagnosis Date    Anxiety     Colon polyp     Depression     Diabetes mellitus (HCC)     Diverticulosis     Hyperlipidemia     Hypertension       Past Surgical History:   Procedure Laterality Date    APPENDECTOMY      CATARACT EXTRACTION Right     COLONOSCOPY      difficult exam    HYSTERECTOMY      TONSILLECTOMY      WISDOM TOOTH EXTRACTION        Family History   Problem Relation Age of Onset    Parkinsonism Mother     Alzheimer's disease Father     Breast cancer additional onset Sister     Breast cancer Sister         in her 40's    Lung cancer Sister     Breast cancer Sister     No Known Problems Daughter     No Known Problems Daughter     No Known Problems Maternal Grandmother     No Known Problems Maternal Grandfather     No Known Problems Paternal Grandmother     No Known Problems Paternal Grandfather     No Known Problems Maternal Aunt     No Known Problems Maternal Aunt     No Known Problems Maternal Aunt     No Known Problems Paternal Aunt       Social History     Tobacco Use    Smoking status: Never     Passive exposure: Never    Smokeless tobacco: Never   Vaping Use    Vaping status: Never Used   Substance Use Topics    Alcohol use: Not Currently     Comment: rare    Drug use: Never      E-Cigarette/Vaping    E-Cigarette Use Never User       E-Cigarette/Vaping Substances    Nicotine No     THC No     CBD No     Flavoring No     Other No     Unknown No       I have reviewed and agree with the history as documented.     Patient presents with left leg discomfort x 1  week          Review of Systems        Objective       ED Triage Vitals [12/13/24 1928]   Temperature Pulse Blood Pressure Respirations SpO2 Patient Position - Orthostatic VS   97.9 °F (36.6 °C) 83 (!) 180/77 18 97 % Sitting      Temp Source Heart Rate Source BP Location FiO2 (%) Pain Score    Oral Monitor Left arm -- 4      Vitals      Date and Time Temp Pulse SpO2 Resp BP Pain Score FACES Pain Rating User   12/13/24 2031 -- 79 98 % -- -- -- -- AP   12/13/24 1928 97.9 °F (36.6 °C) 83 97 % 18 180/77 4 -- RTM            Physical Exam  Vitals and nursing note reviewed.   Constitutional:       Appearance: She is normal weight.   HENT:      Head: Normocephalic and atraumatic.   Eyes:      Conjunctiva/sclera: Conjunctivae normal.      Pupils: Pupils are equal, round, and reactive to light.   Cardiovascular:      Rate and Rhythm: Normal rate and regular rhythm.   Pulmonary:      Effort: Pulmonary effort is normal. No respiratory distress.   Abdominal:      General: Abdomen is flat. There is no distension.   Musculoskeletal:         General: No swelling or deformity.      Cervical back: Normal range of motion and neck supple.      Comments: Ecchymosis and mild swelling to the anterior left mid lower leg, mild tenderness to the lateral surface over the fibula, no ankle edema negative Steve   Skin:     General: Skin is dry.      Coloration: Skin is not jaundiced.   Neurological:      General: No focal deficit present.      Mental Status: She is alert and oriented to person, place, and time.   Psychiatric:         Mood and Affect: Mood normal.         Thought Content: Thought content normal.         Results Reviewed       None            XR tibia fibula 2 views LEFT    (Results Pending)       Procedures    ED Medication and Procedure Management   Prior to Admission Medications   Prescriptions Last Dose Informant Patient Reported? Taking?   ASPIRIN 81 PO  Self Yes No   Sig: Take 81 mg by mouth daily   Alcohol Swabs (HM Sterile  Alcohol Prep) PADS  Self Yes No   Basaglar KwikPen 100 units/mL SOPN   No No   Sig: Inject 10 units nightly.   Blood Glucose Monitoring Suppl (OneTouch Verio) w/Device KIT  Self No No   Sig: Use to test blood sugar 4x daily.   Cholecalciferol 2000 units CAPS  Self Yes No   Sig: Take 1 capsule by mouth   Continuous Blood Gluc Sensor (FreeStyle Kaylah 2 Sensor) MISC  Self Yes No   Sig: Use   Diclofenac Sodium (VOLTAREN) 1 %   No No   Sig: Apply 2 g topically 4 (four) times a day   EPINEPHrine (EPIPEN) 0.3 mg/0.3 mL SOAJ   No No   Sig: Inject 0.3 mL (0.3 mg total) into a muscle once for 1 dose   Insulin Pen Needle (BD Pen Needle Karma U/F) 32G X 4 MM MISC   No No   Sig: USE TO INJECT INSULIN FOUR TIMES DAILY.   Multiple Vitamins-Minerals (MULTIVITAL-M PO)  Self Yes No   Sig: Take by mouth   NovoLOG FlexPen 100 units/mL injection pen   No No   Sig: Inject 4 units prior to breakfast. Inject 6 units prior to lunch and inject 8 units prior to dinner.   OneTouch Delica Lancets 33G MISC  Self No No   Sig: Use to test blood sugar 4x daily.   Wheat Dextrin (BENEFIBER ON THE GO PO)   Yes No   Sig: Take by mouth 3 (three) times a day   atorvastatin (LIPITOR) 20 mg tablet   No No   Sig: Take 1 tablet (20 mg total) by mouth daily   famotidine (PEPCID) 20 mg tablet   No No   Sig: Take 1 tablet (20 mg total) by mouth daily   glucose blood (OneTouch Verio) test strip  Self No No   Sig: Use to test blood sugar 4x daily in case of CGM failure.   levothyroxine 75 mcg tablet   No No   Sig: Take 1 tablet (75 mcg total) by mouth daily   lisinopril (ZESTRIL) 20 mg tablet   No No   Sig: Take 1 tablet (20 mg total) by mouth daily      Facility-Administered Medications: None     Discharge Medication List as of 12/13/2024  8:28 PM        CONTINUE these medications which have NOT CHANGED    Details   Alcohol Swabs ( Sterile Alcohol Prep) PADS Starting Tue 7/11/2023, Historical Med      ASPIRIN 81 PO Take 81 mg by mouth daily, Starting Fri  1/17/2014, Historical Med      atorvastatin (LIPITOR) 20 mg tablet Take 1 tablet (20 mg total) by mouth daily, Starting Mon 12/2/2024, Normal      Basaglar KwikPen 100 units/mL SOPN Inject 10 units nightly., Fill Later      Blood Glucose Monitoring Suppl (OneTouch Verio) w/Device KIT Use to test blood sugar 4x daily., Sample      Cholecalciferol 2000 units CAPS Take 1 capsule by mouth, Historical Med      Continuous Blood Gluc Sensor (FreeStyle Kaylah 2 Sensor) MISC Use, Historical Med      Diclofenac Sodium (VOLTAREN) 1 % Apply 2 g topically 4 (four) times a day, Starting Sat 10/12/2024, Normal      EPINEPHrine (EPIPEN) 0.3 mg/0.3 mL SOAJ Inject 0.3 mL (0.3 mg total) into a muscle once for 1 dose, Starting Tue 7/30/2024, Normal      famotidine (PEPCID) 20 mg tablet Take 1 tablet (20 mg total) by mouth daily, Starting Fri 10/25/2024, Normal      glucose blood (OneTouch Verio) test strip Use to test blood sugar 4x daily in case of CGM failure., Normal      Insulin Pen Needle (BD Pen Needle Karma U/F) 32G X 4 MM MISC USE TO INJECT INSULIN FOUR TIMES DAILY., Normal      levothyroxine 75 mcg tablet Take 1 tablet (75 mcg total) by mouth daily, Starting Tue 10/8/2024, Normal      lisinopril (ZESTRIL) 20 mg tablet Take 1 tablet (20 mg total) by mouth daily, Starting Wed 10/16/2024, Normal      Multiple Vitamins-Minerals (MULTIVITAL-M PO) Take by mouth, Historical Med      NovoLOG FlexPen 100 units/mL injection pen Inject 4 units prior to breakfast. Inject 6 units prior to lunch and inject 8 units prior to dinner., Fill Later      OneTouch Delica Lancets 33G MISC Use to test blood sugar 4x daily., Normal      Wheat Dextrin (BENEFIBER ON THE GO PO) Take by mouth 3 (three) times a day, Historical Med           No discharge procedures on file.  ED SEPSIS DOCUMENTATION   Time reflects when diagnosis was documented in both MDM as applicable and the Disposition within this note       Time User Action Codes Description Comment     12/13/2024  8:15 PM Karl Verde [S89.90XA] Lower leg injury     12/13/2024  8:15 PM Karl Verde [T14.8XXA] Contusion                  Karl Verde,   12/13/24 2216

## 2024-12-20 ENCOUNTER — OFFICE VISIT (OUTPATIENT)
Dept: FAMILY MEDICINE CLINIC | Facility: CLINIC | Age: 79
End: 2024-12-20
Payer: MEDICARE

## 2024-12-20 ENCOUNTER — NURSE TRIAGE (OUTPATIENT)
Dept: OTHER | Facility: OTHER | Age: 79
End: 2024-12-20

## 2024-12-20 ENCOUNTER — APPOINTMENT (OUTPATIENT)
Dept: RADIOLOGY | Facility: CLINIC | Age: 79
End: 2024-12-20
Payer: MEDICARE

## 2024-12-20 VITALS
OXYGEN SATURATION: 98 % | SYSTOLIC BLOOD PRESSURE: 140 MMHG | WEIGHT: 128 LBS | HEIGHT: 61 IN | RESPIRATION RATE: 18 BRPM | DIASTOLIC BLOOD PRESSURE: 80 MMHG | HEART RATE: 86 BPM | BODY MASS INDEX: 24.17 KG/M2 | TEMPERATURE: 98.4 F

## 2024-12-20 DIAGNOSIS — S80.12XD CONTUSION OF LEFT LOWER EXTREMITY, SUBSEQUENT ENCOUNTER: ICD-10-CM

## 2024-12-20 DIAGNOSIS — I77.1 TORTUOUS AORTA (HCC): Primary | ICD-10-CM

## 2024-12-20 DIAGNOSIS — J06.9 UPPER RESPIRATORY TRACT INFECTION, UNSPECIFIED TYPE: ICD-10-CM

## 2024-12-20 DIAGNOSIS — R05.1 ACUTE COUGH: Primary | ICD-10-CM

## 2024-12-20 DIAGNOSIS — R05.1 ACUTE COUGH: ICD-10-CM

## 2024-12-20 PROCEDURE — 71046 X-RAY EXAM CHEST 2 VIEWS: CPT

## 2024-12-20 PROCEDURE — G2211 COMPLEX E/M VISIT ADD ON: HCPCS | Performed by: NURSE PRACTITIONER

## 2024-12-20 PROCEDURE — 99214 OFFICE O/P EST MOD 30 MIN: CPT | Performed by: NURSE PRACTITIONER

## 2024-12-20 RX ORDER — LORATADINE 10 MG/1
10 TABLET ORAL DAILY
Qty: 30 TABLET | Refills: 0 | Status: SHIPPED | OUTPATIENT
Start: 2024-12-20

## 2024-12-20 NOTE — TELEPHONE ENCOUNTER
"Reason for Disposition   SEVERE coughing spells (e.g., whooping sound after coughing, vomiting after coughing)    Answer Assessment - Initial Assessment Questions  1. ONSET: \"When did the cough begin?\"       Tuesday, started with a runny nose.     2. SEVERITY: \"How bad is the cough today?\"       Worse overnight     3. SPUTUM: \"Describe the color of your sputum\" (e.g., none, dry cough; clear, white, yellow, green)      Dry cough    4. HEMOPTYSIS: \"Are you coughing up any blood?\" If Yes, ask: \"How much?\" (e.g., flecks, streaks, tablespoons, etc.)      Dry cough    5. DIFFICULTY BREATHING: \"Are you having difficulty breathing?\" If Yes, ask: \"How bad is it?\" (e.g., mild, moderate, severe)       Denies     6. FEVER: \"Do you have a fever?\" If Yes, ask: \"What is your temperature, how was it measured, and when did it start?\"      Denies, was 99 on Wednesday       10. OTHER SYMPTOMS: \"Do you have any other symptoms?\" (e.g., runny nose, wheezing, chest pain)        Sore throat   Urinating a lot   Chills   Congestion, sinus pressure  COVID negative on home test    Protocols used: Cough - Acute Non-Productive-Adult-AH    "

## 2024-12-20 NOTE — PROGRESS NOTES
"Name: Johana Alcantara      : 1945      MRN: [id number]  Encounter Provider: EUGENIA Elkins  Encounter Date: 2024   Encounter department: [facility]  :  Assessment & Plan  Acute cough    Orders:    XR chest pa and lateral; Future    Upper respiratory tract infection, unspecified type    Orders:    loratadine (CLARITIN) 10 mg tablet; Take 1 tablet (10 mg total) by mouth daily    Contusion of left lower extremity, subsequent encounter                History of Present Illness     Patient presents for acute visit due to not feeling well.   She has been not feeling well for about 3 days.   Is not using any otc medication. She did take a covid home test and this was negative.   She recently had gone to the ED for a left leg injury dx with a contusion       Review of Systems   Constitutional:  Positive for chills and fatigue. Negative for fever.   HENT:  Positive for rhinorrhea and sore throat. Negative for ear pain.    Eyes:  Negative for pain and visual disturbance.   Respiratory:  Positive for cough. Negative for shortness of breath.    Cardiovascular:  Negative for chest pain and palpitations.   Gastrointestinal:  Negative for abdominal pain and vomiting.   Genitourinary:  Negative for dysuria and hematuria.   Musculoskeletal:  Negative for arthralgias and back pain.   Skin:  Negative for color change and rash.        Bruising to the left lower extremity    Neurological:  Negative for seizures and syncope.   All other systems reviewed and are negative.      Objective   /80   Pulse 86   Temp 98.4 °F (36.9 °C) (Tympanic)   Resp 18   Ht 5' 1\" (1.549 m)   Wt 58.1 kg (128 lb)   SpO2 98%   BMI 24.19 kg/m²      Physical Exam  Vitals and nursing note reviewed.   Constitutional:       General: She is not in acute distress.     Appearance: Normal appearance. She is well-developed. She is ill-appearing.   HENT:      Head: Normocephalic and atraumatic.      Right Ear: " Tympanic membrane, ear canal and external ear normal.      Left Ear: Tympanic membrane, ear canal and external ear normal.      Nose: Rhinorrhea present.      Mouth/Throat:      Mouth: Mucous membranes are moist.   Eyes:      Conjunctiva/sclera: Conjunctivae normal.   Cardiovascular:      Rate and Rhythm: Normal rate and regular rhythm.      Heart sounds: No murmur heard.  Pulmonary:      Effort: Pulmonary effort is normal. No respiratory distress.      Comments: Decreased throughout with dry cough   Abdominal:      Palpations: Abdomen is soft.      Tenderness: There is no abdominal tenderness.   Musculoskeletal:         General: No swelling.      Cervical back: Neck supple.   Skin:     General: Skin is warm and dry.      Capillary Refill: Capillary refill takes less than 2 seconds.      Findings: Bruising present.      Comments: To the left lower leg    Neurological:      Mental Status: She is alert and oriented to person, place, and time. Mental status is at baseline.   Psychiatric:         Mood and Affect: Mood normal.

## 2024-12-23 ENCOUNTER — RESULTS FOLLOW-UP (OUTPATIENT)
Dept: FAMILY MEDICINE CLINIC | Facility: CLINIC | Age: 79
End: 2024-12-23

## 2025-01-03 ENCOUNTER — HOSPITAL ENCOUNTER (OUTPATIENT)
Dept: ULTRASOUND IMAGING | Facility: HOSPITAL | Age: 80
End: 2025-01-03
Payer: MEDICARE

## 2025-01-03 DIAGNOSIS — I77.1 TORTUOUS AORTA (HCC): ICD-10-CM

## 2025-01-03 DIAGNOSIS — I10 PRIMARY HYPERTENSION: ICD-10-CM

## 2025-01-03 PROCEDURE — 76775 US EXAM ABDO BACK WALL LIM: CPT

## 2025-01-03 NOTE — TELEPHONE ENCOUNTER
Patient requesting refill(s) of: Lisinopril 20 mg     Last filled: 10/16/24  Last appt: 12/20/24  Next appt: 2/19/25  [facility]

## 2025-01-05 RX ORDER — LISINOPRIL 20 MG/1
20 TABLET ORAL DAILY
Qty: 90 TABLET | Refills: 1 | Status: SHIPPED | OUTPATIENT
Start: 2025-01-05

## 2025-01-06 ENCOUNTER — NURSE TRIAGE (OUTPATIENT)
Dept: OTHER | Facility: OTHER | Age: 80
End: 2025-01-06

## 2025-01-07 NOTE — TELEPHONE ENCOUNTER
Esc response from Dr Coleman : She's is a type 1 DM, she needs her Lantus she can reduce it to 7 units for tonight .Novolog is rapid acting, it works within 15-30 min and last about 1-3 hrs . She should continue to monitor her blood sugars tonight.

## 2025-01-07 NOTE — TELEPHONE ENCOUNTER
Esc to on call provider Dr Coleman:Pt took 10 units of Novolog insulin tonight instead of the long acting insulin  in error. Her BS was 235 at the time. Current BS is 244 and climbing according to the lebre. Current BS is 235 with . she last ate at 6:10 pm and a carb snack at 8pm

## 2025-01-07 NOTE — TELEPHONE ENCOUNTER
"BS was 235 took regular insulin 10 units instead on the lantus.  Reason for Disposition  • [1] Caller has URGENT medicine question about med that PCP or specialist prescribed AND [2] triager unable to answer question    Answer Assessment - Initial Assessment Questions  1. NAME of MEDICINE: \"What medicine(s) are you calling about?\"      Regular insulin 10 units  2. QUESTION: \"What is your question?\" (e.g., double dose of medicine, side effect)      What should I do for tonight?   3. PRESCRIBER: \"Who prescribed the medicine?\" Reason: if prescribed by specialist, call should be referred to that group.      Gene  4. SYMPTOMS: \"Do you have any symptoms?\" If Yes, ask: \"What symptoms are you having?\"  \"How bad are the symptoms (e.g., mild, moderate, severe)      Kaylah says 244 and climbing.    Protocols used: Medication Question Call-Adult-    "

## 2025-01-10 DIAGNOSIS — R05.1 ACUTE COUGH: ICD-10-CM

## 2025-01-10 DIAGNOSIS — R10.13 DYSPEPSIA: ICD-10-CM

## 2025-01-10 NOTE — TELEPHONE ENCOUNTER
Patient requesting refill(s) of: Pepcid 20 mg     Last filled: 10/25/24  Last appt: 12/20/24  Next appt: 2/19/25  Pharmacy: [facility]

## 2025-01-13 ENCOUNTER — OFFICE VISIT (OUTPATIENT)
Dept: OBGYN CLINIC | Facility: CLINIC | Age: 80
End: 2025-01-13
Payer: MEDICARE

## 2025-01-13 VITALS — BODY MASS INDEX: 24.17 KG/M2 | WEIGHT: 128 LBS | HEIGHT: 61 IN

## 2025-01-13 DIAGNOSIS — S80.12XA CONTUSION OF LEFT LOWER LEG, INITIAL ENCOUNTER: Primary | ICD-10-CM

## 2025-01-13 PROCEDURE — 99213 OFFICE O/P EST LOW 20 MIN: CPT | Performed by: ORTHOPAEDIC SURGERY

## 2025-01-13 RX ORDER — FAMOTIDINE 20 MG/1
20 TABLET, FILM COATED ORAL DAILY
Qty: 90 TABLET | Refills: 1 | Status: SHIPPED | OUTPATIENT
Start: 2025-01-13

## 2025-01-13 NOTE — PROGRESS NOTES
Assessment/Plan:   Diagnoses and all orders for this visit:    Contusion of left lower leg, initial encounter       The patient has a contusion of her left lower leg. Recommended that the patient use a warm, moist compress for symptomatic relief. She may also use a compression wrap to control swelling. She has a history of left knee osteoarthritis, however, her symptoms are well controlled. She will follow-up on an as needed basis. The patient expresses understanding and is in agreement with today's treatment plan.     The patient is doing quite well in regards to her left knee.  He is relatively asymptomatic.  She did sustain bruise along her left lower leg several weeks ago which is not palpable today.  Continue home exercise program.  Follow-up on an as-needed basis.  If her condition changes, she would not hesitate to let us know      Subjective:   Patient ID: Johana Alcantara  1945     HPI  Patient is a 79 y.o. female who presents for evaluation of her left lower leg. The patient states that in early December, she struck her leg on a chair. She experienced pain and swelling in the lower leg. She continues to experience swelling and point tenderness. She denies numbness or tingling. Her pain is well controlled at this time.     The following portions of the patient's history were reviewed and updated as appropriate:  Past medical history, past surgical history, Family history, social history, current medications and allergies    Past Medical History:   Diagnosis Date    Anxiety     Colon polyp     Depression     Diabetes mellitus (HCC)     Diverticulosis     Hyperlipidemia     Hypertension        Past Surgical History:   Procedure Laterality Date    APPENDECTOMY      CATARACT EXTRACTION Right     COLONOSCOPY      difficult exam    HYSTERECTOMY      TONSILLECTOMY      WISDOM TOOTH EXTRACTION         Family History   Problem Relation Age of Onset    Parkinsonism Mother     Alzheimer's disease Father      Breast cancer additional onset Sister     Breast cancer Sister         in her 40's    Lung cancer Sister     Breast cancer Sister     No Known Problems Daughter     No Known Problems Daughter     No Known Problems Maternal Grandmother     No Known Problems Maternal Grandfather     No Known Problems Paternal Grandmother     No Known Problems Paternal Grandfather     No Known Problems Maternal Aunt     No Known Problems Maternal Aunt     No Known Problems Maternal Aunt     No Known Problems Paternal Aunt        Social History     Socioeconomic History    Marital status:      Spouse name: None    Number of children: None    Years of education: None    Highest education level: None   Occupational History    None   Tobacco Use    Smoking status: Never     Passive exposure: Never    Smokeless tobacco: Never   Vaping Use    Vaping status: Never Used   Substance and Sexual Activity    Alcohol use: Not Currently     Comment: rare    Drug use: Never    Sexual activity: None   Other Topics Concern    None   Social History Narrative    None     Social Drivers of Health     Financial Resource Strain: Low Risk  (8/14/2023)    Overall Financial Resource Strain (CARDIA)     Difficulty of Paying Living Expenses: Not very hard   Food Insecurity: Not on file   Transportation Needs: No Transportation Needs (8/14/2023)    PRAPARE - Transportation     Lack of Transportation (Medical): No     Lack of Transportation (Non-Medical): No   Physical Activity: Not on file   Stress: Not on file   Social Connections: Not on file   Intimate Partner Violence: Not on file   Housing Stability: Not on file         Current Outpatient Medications:     Alcohol Swabs ( Sterile Alcohol Prep) PADS, , Disp: , Rfl:     ASPIRIN 81 PO, Take 81 mg by mouth daily, Disp: , Rfl:     atorvastatin (LIPITOR) 20 mg tablet, Take 1 tablet (20 mg total) by mouth daily, Disp: 90 tablet, Rfl: 2    Basaglar KwikPen 100 units/mL SOPN, Inject 10 units nightly., Disp:  15 mL, Rfl: 1    Blood Glucose Monitoring Suppl (OneTouch Verio) w/Device KIT, Use to test blood sugar 4x daily., Disp: 1 kit, Rfl: 0    Cholecalciferol 2000 units CAPS, Take 1 capsule by mouth, Disp: , Rfl:     Continuous Blood Gluc Sensor (FreeStyle Kaylah 2 Sensor) MISC, Use, Disp: , Rfl:     Diclofenac Sodium (VOLTAREN) 1 %, Apply 2 g topically 4 (four) times a day, Disp: 50 g, Rfl: 0    EPINEPHrine (EPIPEN) 0.3 mg/0.3 mL SOAJ, Inject 0.3 mL (0.3 mg total) into a muscle once for 1 dose, Disp: 0.6 mL, Rfl: 0    famotidine (PEPCID) 20 mg tablet, Take 1 tablet (20 mg total) by mouth daily, Disp: 90 tablet, Rfl: 1    glucose blood (OneTouch Verio) test strip, Use to test blood sugar 4x daily in case of CGM failure., Disp: 100 each, Rfl: 2    Insulin Pen Needle (BD Pen Needle Karma U/F) 32G X 4 MM MISC, USE TO INJECT INSULIN FOUR TIMES DAILY., Disp: 300 each, Rfl: 1    levothyroxine 75 mcg tablet, Take 1 tablet (75 mcg total) by mouth daily, Disp: 90 tablet, Rfl: 3    lisinopril (ZESTRIL) 20 mg tablet, Take 1 tablet (20 mg total) by mouth daily, Disp: 90 tablet, Rfl: 1    loratadine (CLARITIN) 10 mg tablet, Take 1 tablet (10 mg total) by mouth daily, Disp: 30 tablet, Rfl: 0    Multiple Vitamins-Minerals (MULTIVITAL-M PO), Take by mouth, Disp: , Rfl:     NovoLOG FlexPen 100 units/mL injection pen, Inject 4 units prior to breakfast. Inject 6 units prior to lunch and inject 8 units prior to dinner., Disp: 15 mL, Rfl: 1    OneTouch Delica Lancets 33G MISC, Use to test blood sugar 4x daily., Disp: 300 each, Rfl: 2    Wheat Dextrin (BENEFIBER ON THE GO PO), Take by mouth 3 (three) times a day, Disp: , Rfl:     Allergies   Allergen Reactions    Bee Venom Anaphylaxis    Diphenhydramine Shortness Of Breath    Meperidine Hives and Shortness Of Breath    Prednisone Hives and Shortness Of Breath    Bee Pollen     Ciprofloxacin Hives    Sulfamethoxazole-Trimethoprim GI Intolerance       Review of Systems   Constitutional:  Negative  "for chills, fever and unexpected weight change.   HENT:  Negative for hearing loss, nosebleeds and sore throat.    Eyes:  Negative for pain, redness and visual disturbance.   Respiratory:  Negative for cough, shortness of breath and wheezing.    Cardiovascular:  Negative for chest pain, palpitations and leg swelling.   Gastrointestinal:  Negative for abdominal pain, nausea and vomiting.   Endocrine: Negative for polydipsia and polyuria.   Genitourinary:  Negative for dysuria and hematuria.   Skin:  Negative for rash and wound.   Neurological:  Negative for dizziness, numbness and headaches.   Psychiatric/Behavioral:  Negative for decreased concentration and suicidal ideas. The patient is not nervous/anxious.    All other systems reviewed and are negative.       Objective:  Ht 5' 1\" (1.549 m)   Wt 58.1 kg (128 lb)   BMI 24.19 kg/m²     Ortho Exam  left ankle -   Patient ambulates with steady gait pattern  Uses No assistive device  No anatomical deformity  Skin is warm and dry to touch with no signs of erythema or infection  Ecchymosis present in the anterior shin   Mild generalized soft tissue swelling or effusion noted  ROM: WNL  TTP over tibial shaft  Strength: 5/5 throughout  - anterior drawer  - medial talar tilt, - lateral talar tilt  - syndesmotic squeeze  Calf compartments are soft and supple  2+ TP and DP pulses with brisk capillary refill to the toes  Sural, saphenous, tibial, superficial, and deep peroneal motor and sensory distributions intact  Sensation to light touch intact distally      Physical Exam  HENT:      Head: Normocephalic and atraumatic.      Nose: Nose normal.   Eyes:      Conjunctiva/sclera: Conjunctivae normal.   Cardiovascular:      Rate and Rhythm: Normal rate.   Pulmonary:      Effort: Pulmonary effort is normal.   Musculoskeletal:      Cervical back: Neck supple.   Skin:     General: Skin is warm and dry.      Capillary Refill: Capillary refill takes less than 2 seconds. "   Neurological:      Mental Status: She is alert and oriented to person, place, and time.   Psychiatric:         Mood and Affect: Mood normal.         Behavior: Behavior normal.          Diagnostic Test Review:  X-Ray of left tibia fibula obtained on 12/13/2024 were reviewed and demonstrate no acute osseous abnormalities.      Procedures   None performed.     Scribe Attestation      I,:  Mookie Wood am acting as a scribe while in the presence of the attending physician.:       I,:  Richardson Parikh DO personally performed the services described in this documentation    as scribed in my presence.:

## 2025-01-14 ENCOUNTER — TELEPHONE (OUTPATIENT)
Dept: ENDOCRINOLOGY | Facility: CLINIC | Age: 80
End: 2025-01-14

## 2025-01-14 NOTE — TELEPHONE ENCOUNTER
Summary of benefits received and scanned into chart. I called and spoke with Johana, scheduled her in April for her follow up and her first Prolia injection.

## 2025-02-10 ENCOUNTER — TELEPHONE (OUTPATIENT)
Dept: PULMONOLOGY | Facility: CLINIC | Age: 80
End: 2025-02-10

## 2025-02-10 ENCOUNTER — TELEPHONE (OUTPATIENT)
Age: 80
End: 2025-02-10

## 2025-02-10 NOTE — TELEPHONE ENCOUNTER
Patient calling in concerned that she may have forgotten to take her Novolog 6u w/ lunch today. Patient states she got super busy today and didn't realize until 1-130 that her bg was 53 w/ Freestyle Kaylah 2 sensor- denies symptoms w/ this reading. States she is unsure if Freestyle Kaylah 2 is connected to office or not. She states at that time bc she was so busy, she couldn't remember if she gave herself the Novolog or not. States she then ate lunch around 2:50 and her current bg is now 131. She is asking  since her sugar is now rising, if she should be giving herself any Novolog now since she cannot recall taking with lunch or if she should hold off until dinner to administer before dinner Novolog? Patient reports eating dinner around 6:00 pm, states she gives 8u of Novolog prior to dinner per orders. Please advise, Patient is requesting a call back to discuss. Thank you

## 2025-02-10 NOTE — TELEPHONE ENCOUNTER
I called and spoke with patient, she is aware of  I would recommend to hold off until dinner to take NovoLog before dinner.

## 2025-02-19 ENCOUNTER — TELEPHONE (OUTPATIENT)
Dept: ENDOCRINOLOGY | Facility: CLINIC | Age: 80
End: 2025-02-19

## 2025-02-19 ENCOUNTER — OFFICE VISIT (OUTPATIENT)
Dept: FAMILY MEDICINE CLINIC | Facility: CLINIC | Age: 80
End: 2025-02-19
Payer: MEDICARE

## 2025-02-19 ENCOUNTER — TELEPHONE (OUTPATIENT)
Dept: ADMINISTRATIVE | Facility: OTHER | Age: 80
End: 2025-02-19

## 2025-02-19 VITALS
WEIGHT: 130.7 LBS | DIASTOLIC BLOOD PRESSURE: 82 MMHG | SYSTOLIC BLOOD PRESSURE: 146 MMHG | HEIGHT: 61 IN | HEART RATE: 75 BPM | TEMPERATURE: 97.6 F | BODY MASS INDEX: 24.67 KG/M2 | RESPIRATION RATE: 16 BRPM

## 2025-02-19 DIAGNOSIS — N18.31 TYPE 1 DIABETES MELLITUS WITH STAGE 3A CHRONIC KIDNEY DISEASE (HCC): ICD-10-CM

## 2025-02-19 DIAGNOSIS — F41.1 GENERALIZED ANXIETY DISORDER: ICD-10-CM

## 2025-02-19 DIAGNOSIS — E10.22 TYPE 1 DIABETES MELLITUS WITH STAGE 3A CHRONIC KIDNEY DISEASE (HCC): ICD-10-CM

## 2025-02-19 DIAGNOSIS — E03.9 ACQUIRED HYPOTHYROIDISM: ICD-10-CM

## 2025-02-19 DIAGNOSIS — M54.42 ACUTE LEFT-SIDED LOW BACK PAIN WITH LEFT-SIDED SCIATICA: ICD-10-CM

## 2025-02-19 DIAGNOSIS — K59.04 CHRONIC IDIOPATHIC CONSTIPATION: ICD-10-CM

## 2025-02-19 DIAGNOSIS — I10 PRIMARY HYPERTENSION: Primary | ICD-10-CM

## 2025-02-19 PROCEDURE — 99214 OFFICE O/P EST MOD 30 MIN: CPT | Performed by: INTERNAL MEDICINE

## 2025-02-19 PROCEDURE — G2211 COMPLEX E/M VISIT ADD ON: HCPCS | Performed by: INTERNAL MEDICINE

## 2025-02-19 NOTE — TELEPHONE ENCOUNTER
Upon review of the In Basket request and the patient's chart, initial outreach has been made via fax to facility. Please see Contacts section for details.     Thank you  Lauri Eagle MA

## 2025-02-19 NOTE — LETTER
Diabetic Eye Exam Form    Date Requested: 25  Patient: Johana Alcantara  Patient : 1945   Referring Provider: Jessica Duggan MD      DIABETIC Eye Exam Date _______________________________      Type of Exam MUST be documented for Diabetic Eye Exams. Please CHECK ONE.     Retinal Exam       Dilated Retinal Exam       OCT       Optomap-Iris Exam      Fundus Photography       Left Eye - Please check Retinopathy or No Retinopathy        Exam did show retinopathy    Exam did not show retinopathy       Right Eye - Please check Retinopathy or No Retinopathy       Exam did show retinopathy    Exam did not show retinopathy       Comments __________________________________________________________    Practice Providing Exam ______________________________________________    Exam Performed By (print name) _______________________________________      Provider Signature ___________________________________________________      These reports are needed for  compliance.  Please fax this completed form and a copy of the Diabetic Eye Exam report to our office located at [address] [place] [address] as soon as possible via Fax [phone] attention Lauri: Phone [phone]  We thank you for your assistance in treating our mutual patient.

## 2025-02-19 NOTE — PATIENT INSTRUCTIONS
"Patient Education     Back exercises   The Basics   Written by the doctors and editors at [facility]   Why do I need to do back exercises? -- Back exercises can help ease back pain and might help prevent future back pain. Long term, it is important to strengthen the muscles in your lower back, buttocks, and belly. These are your \"core muscles.\" Stretching exercises are also important to keep your muscles flexible.  Below are some stretching and strengthening exercises that might help you. Other forms of movement can help ease or prevent back pain, too. For example, some people like to walk, do aerobic exercise, or do yoga or sonya chi. The most important thing is to move your body. Your doctor, nurse, or physical therapist can help you find different types of activity that work for you.  What stretching exercises should I do? -- Below are some examples of stretching exercises. Warm up your muscles before stretching to help prevent injury. To warm up, you can walk, jog, or cycle for a few minutes.  Start by repeating each of these stretches 2 to 3 times. Try to hold each stretch for 5 to 10 seconds, and try to do the stretches 2 to 3 times each day. Breathe slowly and deeply as you do the exercises. Never bounce when doing stretches.   Cat-cow stretch (figure 1) - Start on all fours on the floor, with your hands under your shoulders, knees under your hips, and your back flat. First, tuck your chin and tighten your stomach muscles as you round your back (like a \"cat\"). Hold the stretch for about 10 seconds. Rest for a few seconds as you flatten your back. Next, lift your chin and let your belly and lower back sink toward the floor (like a \"cow\"). Hold the stretch for about 10 seconds.   Single knee-to-chest stretches (figure 2) ? While lying on your back, bend your knees with your feet flat on the floor. Pull 1 knee toward your chest until you feel a stretch in your lower back and buttock area. Lower, and repeat with the " other knee. If you have knee problems, pull your knee up by grabbing the back of your thigh instead of the front of your knee. You can also do this exercise by grabbing both knees at the same time.   Lower trunk rotations (figure 3) ? While lying on your back, bend your knees with your feet flat on the floor. Keep your knees and ankles together, and then drop them to 1 side. Keep both of your shoulders touching the floor until you feel a stretch in the muscles at the side of the back. Repeat on the other side.   Lower back stretches seated (figure 4) ? Sit in a chair with your feet spread about shoulder width apart. Then, lean forward until you feel a stretch in your lower back.  What strengthening exercises should I do? -- Below are some examples of strengthening exercises.  Start by doing each exercise 2 to 3 times. Work up to doing each exercise 10 times. Hold each exercise for 3 to 5 seconds, and try to do the exercises 2 to 3 times each day. Do all exercises slowly.   Shoulder blade squeezes (figure 5) ? Pinch your shoulder blades together on your upper back, and hold 3 to 5 seconds. You can also do these 1 side at a time. Sit with good posture, and make sure that your shoulders do not rise up when you do this exercise. Relax.   Pelvic tilts (figure 6) ? Lie on your back with your knees bent and feet flat on the floor. Tighten your stomach muscles, and press your lower back down to the floor. Relax. You should be able to breathe comfortably during this exercise.   Hip lifts (figure 7) ? Lie on your back with your knees bent and feet flat on the floor. Tighten your stomach muscles, keep your back flat, and lift your buttocks off of the floor. Relax. You should feel this in your buttocks, not in your lower back.  What else should I know?    Exercise, including stretching, might be slightly uncomfortable. But you should not have sharp or severe pain. If you do get severe pain, stop what you are doing. If severe  "pain continues, call your doctor or nurse.   Do not hold your breath when exercising. If you tend to hold your breath, try counting out loud when exercising. If any exercise bothers you, stop right away.   Always warm up before exercising. Warm muscles stretch much easier than cool muscles. Stretching cool muscles can lead to injury.   Doing exercises before a meal can be a good way to get into a routine.  All topics are updated as new evidence becomes available and our peer review process is complete.  This topic retrieved from [place] on: Apr 03, 2024.  Topic 984218 Version 2.0  Release: 32.2.4 - C32.92  © 2024 UpToDate, Inc. and/or its affiliates. All rights reserved.  figure 1: Cat-cow stretch     Start on all fours on the floor, with hands under your shoulders, knees under your hips, and your back flat. First, tuck your chin and tighten your stomach muscles as you round your back (like a \"cat\"). Hold the stretch for about 10 seconds. Rest for a few seconds as you flatten your back. Next, lift your chin and let your belly and lower back sink toward the floor (like a \"cow\"). Hold the stretch for about 10 seconds.  Graphic [id number] Version 1.0  figure 2: Single knee-to-chest stretch     Lie on your back, bend your knees, and have your feet flat on the floor. Pull 1 knee toward your chest until you feel a stretch in your lower back and buttock area. Repeat with the other knee. If you have knee problems, pull your knee up by grabbing the back of your thigh instead of the front of your knee. You can also do this exercise by grabbing both knees at the same time.  Graphic [id number] Version 1.0  figure 3: Lower trunk rotation     While lying on your back, bend your knees and have your feet flat on the floor. Keep your legs together and then drop them to 1 side. Keep both of your shoulders touching the floor until you feel a stretch in the muscles at the side of the back. Repeat on the other side.  Graphic [id number] Version " 1.0  figure 4: Lower back stretch     Sit in a chair with your feet spread about shoulder width apart. Then, lean forward until you feel a stretch in your lower back.  Graphic [id number] Version 1.0  figure 5: Shoulder blade squeezes     Pinch your shoulder blades together on your upper back and hold for 3 to 5 seconds. Make sure that you are sitting with good posture and that your shoulders do not raise up when you do this exercise. Relax.  Graphic [id number] Version 1.0  figure 6: Pelvic tilts     Lie on your back with your knees bent and feet flat on the floor. Tighten your stomach muscles and press your lower back down to the floor. Relax.  Graphic [id number] Version 1.0  figure 7: Hip lifts     Lie on your back with your knees bent and feet flat on the floor. Tighten your stomach muscles and lift your buttocks off of the floor. Relax.  Graphic [id number] Version 1.0  Consumer Information Use and Disclaimer   Disclaimer: This generalized information is a limited summary of diagnosis, treatment, and/or medication information. It is not meant to be comprehensive and should be used as a tool to help the user understand and/or assess potential diagnostic and treatment options. It does NOT include all information about conditions, treatments, medications, side effects, or risks that may apply to a specific patient. It is not intended to be medical advice or a substitute for the medical advice, diagnosis, or treatment of a health care provider based on the health care provider's examination and assessment of a patient's specific and unique circumstances. Patients must speak with a health care provider for complete information about their health, medical questions, and treatment options, including any risks or benefits regarding use of medications. This information does not endorse any treatments or medications as safe, effective, or approved for treating a specific patient. UpToDate, Inc. and its affiliates disclaim any warranty or  liability relating to this information or the use thereof.The use of this information is governed by the Terms of Use, available at https://www.wolterskluwer.com/en/know/clinical-effectiveness-terms. 2024© UpToDate, Inc. and its affiliates and/or licensors. All rights reserved.  Copyright   © 2024 [employer] and/or its affiliates. All rights reserved.

## 2025-02-19 NOTE — TELEPHONE ENCOUNTER
CGM reviewed.  Please instruct patient to decrease Basaglar to 8 units nightly.  If she continues to have overnight low blood sugars after making this change, okay to reduce predinner NovoLog to 6 units.  Thank you.

## 2025-02-19 NOTE — TELEPHONE ENCOUNTER
Johana brought her Kaylah 2 in to be downloaded. Downloaded,printed, and scanned into her chart. Stated she is taking her medications the same   Novolog 4--6-8  Basaglar 10 units at night.   Stating her sugars are alerting her in the middle of the night that her sugars are low

## 2025-02-19 NOTE — TELEPHONE ENCOUNTER
I called and left a voice message for Johana to call the office so we can go over recommendations per Deysi Mills :   CGM reviewed. Please instruct patient to decrease Basaglar to 8 units nightly. If she continues to have overnight low blood sugars after making this change, okay to reduce predinner NovoLog to 6 units. Thank you.

## 2025-02-19 NOTE — TELEPHONE ENCOUNTER
----- Message from Solomon COSBY sent at 2/19/2025 10:26 AM EST -----  Regarding: Care Gap Request  02/19/25 10:26 AM    Hello, our patient Johana Alcantara has had Diabetic Eye Exam completed/performed. Please assist in updating the patient chart by making an External outreach to [facility] located in [facility]. The date of service is 2024.    Thank you,  Solomon Hernandez MA  [facility]

## 2025-02-19 NOTE — PROGRESS NOTES
"Name: Johana Alcantara      : 1945      MRN: [id number]  Encounter Provider: Jessica Duggan MD  Encounter Date: 2025   Encounter department: [facility]  :  Assessment & Plan  Primary hypertension         Acquired hypothyroidism         Chronic idiopathic constipation         Generalized anxiety disorder         Type 1 diabetes mellitus with stage 3a chronic kidney disease (HCC)    Lab Results   Component Value Date    HGBA1C 7.6 (H) 2024          Acute left-sided low back pain with left-sided sciatica                History of Present Illness   78yo female with history of type 1 diabetes, hypothyroidism, anxiety, HTN here for 6 month follow up. Reports that she hurt her back last week.     Linzess didn't help with constipation so she stopped it and using miralax and benefiber. Stools seem better, having daily soft stools. Pain and irritation at rectum, which she thinks is due to hemorrhoids.     Reports some nocturnal hypoglycemia. Woken up early morning by CGM due to BG 69. Had eye exam recently with Nova Klein       Review of Systems   Constitutional:  Negative for chills, fatigue and fever.   Respiratory:  Negative for shortness of breath.    Cardiovascular:  Negative for chest pain.   Gastrointestinal:  Positive for abdominal pain and constipation. Negative for diarrhea, nausea and vomiting.   Genitourinary:  Negative for difficulty urinating.   Musculoskeletal:  Positive for back pain. Negative for gait problem.   Neurological:  Positive for numbness. Negative for weakness.   Psychiatric/Behavioral:  Positive for sleep disturbance. The patient is nervous/anxious.        Objective   /82   Pulse 75   Temp 97.6 °F (36.4 °C) (Temporal)   Resp 16   Ht 5' 1\" (1.549 m)   Wt 59.3 kg (130 lb 11.2 oz)   BMI 24.70 kg/m²      Physical Exam  Vitals reviewed.   Constitutional:       General: She is not in acute distress.     Appearance: She is normal weight. "   HENT:      Head: Normocephalic and atraumatic.   Cardiovascular:      Rate and Rhythm: Normal rate and regular rhythm.      Heart sounds: No murmur heard.     No friction rub. No gallop.   Pulmonary:      Effort: Pulmonary effort is normal. No respiratory distress.      Breath sounds: No wheezing, rhonchi or rales.   Abdominal:      General: Abdomen is flat and scaphoid. Bowel sounds are normal.      Palpations: Abdomen is soft.      Tenderness: There is no abdominal tenderness. There is no guarding or rebound.   Neurological:      General: No focal deficit present.      Mental Status: She is alert.      Motor: Motor function is intact.      Gait: Gait is intact.   Psychiatric:         Mood and Affect: Mood and affect normal.         Behavior: Behavior normal. Behavior is cooperative.

## 2025-02-20 NOTE — TELEPHONE ENCOUNTER
Maternal Screening Survey      Flowsheet Row Responses   Facility patient discharged from? [facility]   Attempt successful? No   Unsuccessful attempts Attempt 2              HENOK MELGOZA - Registered Nurse           Patient calling back, relayed the above message and she expressed understanding.  No further action needed

## 2025-02-20 NOTE — TELEPHONE ENCOUNTER
Upon review of the In Basket request we were able to locate, review, and update the patient chart as requested for Diabetic Eye Exam.    Any additional questions or concerns should be emailed to the Practice Liaisons via the appropriate education email address, please do not reply via In Basket.    Thank you  Lauri Eagle MA   [facility]

## 2025-03-04 ENCOUNTER — NURSE TRIAGE (OUTPATIENT)
Dept: OTHER | Facility: OTHER | Age: 80
End: 2025-03-04

## 2025-03-04 ENCOUNTER — NURSE TRIAGE (OUTPATIENT)
Age: 80
End: 2025-03-04

## 2025-03-04 DIAGNOSIS — K59.04 CHRONIC IDIOPATHIC CONSTIPATION: Primary | ICD-10-CM

## 2025-03-04 NOTE — TELEPHONE ENCOUNTER
"FOLLOW UP: No follow up at this time    REASON FOR CONVERSATION: Constipation    SYMPTOMS: constipation, stool every 3 days that is soft severiano,  does have urge but unable to go    OTHER: miralax and colace every 3-5 days.  PCP prescribed Linzess but not sure if should take since we had stopped.    DISPOSITION: Home Care with provider message          Reason for Disposition   MILD constipation    Answer Assessment - Initial Assessment Questions  1. STOOL PATTERN OR FREQUENCY: \"How often do you have a bowel movement (BM)?\"  (Normal range: 3 times a day to every 3 days)  \"When was your last BM?\"        Every few days  2. STRAINING: \"Do you have to strain to have a BM?\"       yes  3. ONSET: \"When did the constipation begin?\"      2/21/25  4. RECTAL PAIN: \"Does your rectum hurt when the stool comes out?\" If Yes, ask: \"Do you have hemorrhoids? How bad is the pain?\"  (Scale 1-10; or mild, moderate, severe)      yes  5. BM COMPOSITION: \"Are the stools hard?\"       yes  6. BLOOD ON STOOLS: \"Has there been any blood on the toilet tissue or on the surface of the BM?\" If Yes, ask: \"When was the last time?\"      denies  7. CHRONIC CONSTIPATION: \"Is this a new problem for you?\"  If No, ask: \"How long have you had this problem?\" (days, weeks, months)       yes  8. CHANGES IN DIET OR HYDRATION: \"Have there been any recent changes in your diet?\" \"How much fluids are you drinking on a daily basis?\"  \"How much have you had to drink today?\"      denies  9. MEDICINES: \"Have you been taking any new medicines?\" \"Are you taking any narcotic pain medicines?\" (e.g., Dilaudid, morphine, Percocet, Vicodin)      denies  10. LAXATIVES: \"Have you been using any stool softeners, laxatives, or enemas?\"  If Yes, ask \"What, how often, and when was the last time?\"        Miralax and colace every 3-5 days  11. ACTIVITY:  \"How much walking do you do every day?\"  \"Has your activity level decreased in the past week?\"         active  12. CAUSE: \"What do " "you think is causing the constipation?\"         unsure  13. MEDICAL HISTORY: \"Do you have a history of hemorrhoids, rectal fissures, rectal surgery, or rectal abscess?\"          denies  14. OTHER SYMPTOMS: \"Do you have any other symptoms?\" (e.g., abdomen pain, bloating, fever, vomiting)        denies    Protocols used: Constipation-Adult-[place]    "

## 2025-03-04 NOTE — TELEPHONE ENCOUNTER
"Regarding: constipation  ----- Message from Nina BRO sent at 3/4/2025  7:38 AM EST -----  \"I have been constipated and I haven't been able to go to the bathroom only a small pebble or two. I am scared as to why I can't go. It has been like this since the 19th. I have been taking Miralax with a softener and nothing is happening.\"    "

## 2025-03-04 NOTE — TELEPHONE ENCOUNTER
"FOLLOW UP: Appointment scheduled for 3/4/25 at 2:00 pm with Dr. Duggan.    REASON FOR CONVERSATION: Constipation    SYMPTOMS: Constipation while taking miralax and stool softeners    OTHER: N/A    DISPOSITION: See PCP Within 3 Days  Reason for Disposition   Unable to have a bowel movement (BM) without laxative or enema    Answer Assessment - Initial Assessment Questions  1. STOOL PATTERN OR FREQUENCY: \"How often do you have a bowel movement (BM)?\"  (Normal range: 3 times a day to every 3 days)  \"When was your last BM?\"          Yesterday    2. STRAINING: \"Do you have to strain to have a BM?\"         Yes    3. ONSET: \"When did the constipation begin?\"        2/20/25    5. RECTAL PAIN: \"Does your rectum hurt when the stool comes out?\" If Yes, ask: \"Do you have hemorrhoids? How bad is the pain?\"  (Scale 1-10; or mild, moderate, severe)        Denies pain.     6. BM COMPOSITION: \"Are the stools hard?\"         Yes, pt states she's only able to pass severiano    7. BLOOD ON STOOLS: \"Has there been any blood on the toilet tissue or on the surface of the BM?\" If Yes, ask: \"When was the last time?\"        Denies    8. CHRONIC CONSTIPATION: \"Is this a new problem for you?\"  If No, ask: \"How long have you had this problem?\" (days, weeks, months)         Patient reports constipation last year    8. CHANGES IN DIET OR HYDRATION: \"Have there been any recent changes in your diet?\" \"How much fluids are you drinking on a daily basis?\"  \"How much have you had to drink today?\"        Patient has been trying to eat more fiber and drinking well    9. MEDICINES: \"Have you been taking any new medicines?\" \"Are you taking any narcotic pain medicines?\" (e.g., Dilaudid, morphine, Percocet, Vicodin)        Denies    10. LAXATIVES: \"Have you been using any stool softeners, laxatives, or enemas?\"  If Yes, ask \"What, how often, and when was the last time?\"          Miralax, stool softener, and fiber    11. ACTIVITY:  \"How much walking do you do " "every day?\"  \"Has your activity level decreased in the past week?\"           Patient reports trying to be move more    12. CAUSE: \"What do you think is causing the constipation?\"           Unsure but patient is concerned and feels scared    14. OTHER SYMPTOMS: \"Do you have any other symptoms?\" (e.g., abdomen pain, bloating, fever, vomiting)          Denies abdomen pain, bloating, fever, vomiting. Patient reports sciatic nerve pain.    Protocols used: Constipation-Adult-    "

## 2025-03-04 NOTE — TELEPHONE ENCOUNTER
Patient called in with Symptoms. Patient was transferred over to ANGELA Lopez  for further assistance.

## 2025-03-04 NOTE — TELEPHONE ENCOUNTER
Yes I think the pt should first increase miralax 17g/1 cap in 8 ounces of liquid daily.   She can also take stool softener though unclear efficacy of these meds at this time.   She can increase miralax to BID dosing if needed.   Stay hydrated with 64 ounces non-caffeinated beverages daily.   If still having issues with defecation, can then restart linzess at 72 mcg at that time.   Please provide us with an update on how she is doing in 1-2 weeks.   (I will be out of office next week though someone will be covering inbox or I can view upon my return)

## 2025-03-04 NOTE — TELEPHONE ENCOUNTER
Called and spoke with patient. Informed of PCP recommendation and she was agreeable, reports no abdominal pain or vomiting. Patient does not have any of the Linzess left and would need a refill sent in to the [facility].

## 2025-03-05 DIAGNOSIS — N18.31 TYPE 1 DIABETES MELLITUS WITH STAGE 3A CHRONIC KIDNEY DISEASE (HCC): ICD-10-CM

## 2025-03-05 DIAGNOSIS — E10.22 TYPE 1 DIABETES MELLITUS WITH STAGE 3A CHRONIC KIDNEY DISEASE (HCC): ICD-10-CM

## 2025-03-06 RX ORDER — INSULIN ASPART 100 [IU]/ML
INJECTION, SOLUTION INTRAVENOUS; SUBCUTANEOUS
Qty: 15 ML | Refills: 1 | Status: SHIPPED | OUTPATIENT
Start: 2025-03-06

## 2025-03-10 NOTE — TELEPHONE ENCOUNTER
"Pt calling to update OSMIN Carlson. Pt is using 1 capful of Miralax BID, hydrating appropriately, supplementing with fiber daily and exercising daily. She reports 1-2 BM daily. They are loose \"but not diarrhea\". Pt is only able to describe the BM as being \"like worms\". Denies straining. She reports she is feeling better and happy with her progress.    Pt notes she was unable to move her bowels in the past while being on Linzess. As soon as this was discontinued she began to move her bowels daily until 01/19.              "

## 2025-03-11 NOTE — TELEPHONE ENCOUNTER
Spoke with patient reviewed recommendations  and pt verbalized understanding.     Patient states taking miralax twice daily is working wonderfully, pt has stopped linzness

## 2025-03-24 ENCOUNTER — NURSE TRIAGE (OUTPATIENT)
Age: 80
End: 2025-03-24

## 2025-03-24 ENCOUNTER — APPOINTMENT (OUTPATIENT)
Dept: RADIOLOGY | Facility: CLINIC | Age: 80
End: 2025-03-24
Payer: MEDICARE

## 2025-03-24 DIAGNOSIS — K59.00 CONSTIPATION, UNSPECIFIED CONSTIPATION TYPE: Primary | ICD-10-CM

## 2025-03-24 DIAGNOSIS — K59.00 CONSTIPATION, UNSPECIFIED CONSTIPATION TYPE: ICD-10-CM

## 2025-03-24 PROCEDURE — 74018 RADEX ABDOMEN 1 VIEW: CPT

## 2025-03-24 RX ORDER — LUBIPROSTONE 8 UG/1
8 CAPSULE ORAL 2 TIMES DAILY WITH MEALS
Qty: 60 CAPSULE | Refills: 1 | Status: SHIPPED | OUTPATIENT
Start: 2025-03-24

## 2025-03-24 NOTE — TELEPHONE ENCOUNTER
"FOLLOW UP: review/advise (appt 5/13/25)    REASON FOR CONVERSATION: Constipation    SYMPTOMS: followed previous instructions and constipation continues    OTHER:  Patient has increased Miralax to BID as instructed along with Colace.  Passing very small amount of stool, no blood. Notes some upper abd discomfort at times and will take TUMS as needed and resolves. She continues on her famotodine twice a day.    She previously was on the Linzess and it did not help, she has better bowel movements when not taking it. She is hydrating, getting fiber (prunes and supplement). She notes a \"pulling\" sensation in her back and has seen primary care, diagnoses with sciatica and I advised she call primary to address since issue continues.   She is asking for further instruction,    DISPOSITION: Dicuss with provider and call back patient.    Reason for Disposition   Constipation persists > 1 week and no improvement after using CARE ADVICE    Answer Assessment - Initial Assessment Questions  1. STOOL PATTERN OR FREQUENCY: \"How often do you have a bowel movement (BM)?\"  (Normal range: 3 times a day to every 3 days)  \"When was your last BM?\"        Last BM a week ago and today a small amount of stool  2. STRAINING: \"Do you have to strain to have a BM?\"       Not today  3. ONSET: \"When did the constipation begin?\"      chronic  4. RECTAL PAIN: \"Does your rectum hurt when the stool comes out?\" If Yes, ask: \"Do you have hemorrhoids? How bad is the pain?\"  (Scale 1-10; or mild, moderate, severe)      Had burning when passing more stool in the past  5. BM COMPOSITION: \"Are the stools hard?\"       yes  6. BLOOD ON STOOLS: \"Has there been any blood on the toilet tissue or on the surface of the BM?\" If Yes, ask: \"When was the last time?\"      denies  7. CHRONIC CONSTIPATION: \"Is this a new problem for you?\"  If No, ask: \"How long have you had this problem?\" (days, weeks, months)       chronic  8. CHANGES IN DIET OR HYDRATION: \"Have there " "been any recent changes in your diet?\" \"How much fluids are you drinking on a daily basis?\"  \"How much have you had to drink today?\"      \"Cheats at times with DM\" basically eats healthy, prunes (2 with snacks),hydrates  9. MEDICINES: \"Have you been taking any new medicines?\" \"Are you taking any narcotic pain medicines?\" (e.g., Dilaudid, morphine, Percocet, Vicodin)      denies  10. LAXATIVES: \"Have you been using any stool softeners, laxatives, or enemas?\"  If Yes, ask \"What, how often, and when was the last time?\"        Miralax, Benefiber  11. ACTIVITY:  \"How much walking do you do every day?\"  \"Has your activity level decreased in the past week?\"         Walks dogs a few times a week  12. CAUSE: \"What do you think is causing the constipation?\"         chronic  14. OTHER SYMPTOMS: \"Do you have any other symptoms?\" (e.g., abdomen pain, bloating, fever, vomiting)        Pants tighter than usual, issue with sciatica    Protocols used: Constipation-Adult-[place]    "

## 2025-03-24 NOTE — TELEPHONE ENCOUNTER
Called and spoke with patient.informed her of new orders and recommendations.Patient verbalized understanding.

## 2025-03-24 NOTE — TELEPHONE ENCOUNTER
Regarding: constipation/excessive gas  ----- Message from Roxanne MONTEMAYOR sent at 3/24/2025  9:11 AM EDT -----  Pt called in to schedule with CLINTON Nichols as pt stated the Miralax worked for a bit but now she is constipated again and has excessive gas. I put her into the first available in May and added her to the wait list. Pt would like to speak to someone to see if there is something else she can take since the Miralax stopped working for her.

## 2025-03-24 NOTE — TELEPHONE ENCOUNTER
Sent to pharmacy, unclear of coverage options so we will need to see if med is covered.   May cause diarrhea at first, I would typically recommend trial for 1-2 weeks and reporting back.   Thanks!

## 2025-03-24 NOTE — TELEPHONE ENCOUNTER
"Reports she had BM 5 minutes ago - \"pretzel\" soft ,formed. Felt \"it was a good bowel movement\" Pt agreeable to XR and Amitiza. Please send Rx to [facility].   "

## 2025-03-24 NOTE — TELEPHONE ENCOUNTER
I would like to check an x-ray to evaluate her stool burden and it sounds that she needs a stronger medication in her regimen for pooping.  Because she did not find Linzess effective, I think we should try a different medication called Amitiza.  This is prescribed for twice daily dosing.  If she is agreeable to try, please let me know. Xray is ordered and is a walk-in test.

## 2025-03-25 ENCOUNTER — RESULTS FOLLOW-UP (OUTPATIENT)
Dept: GASTROENTEROLOGY | Facility: CLINIC | Age: 80
End: 2025-03-25

## 2025-03-28 NOTE — TELEPHONE ENCOUNTER
----- Message from Faith Nichols PA-C sent at 3/28/2025 10:54 AM EDT -----  Sounds as though things are improving on the twice daily MiraLAX.  If she finds that she is starting to get backed up or bloated the Amitiza would be the next thing to try which we did send in for her to try at the lower dose.  This is more potent MiraLAX they cause diarrhea at first.  ----- Message -----  From: Nina Robles MA  Sent: 3/26/2025   2:22 PM EDT  To: Faith Nichols PA-C    Patient stated that she is moving her bowels twice daily with taking the miralax twice daily and has stopped her stool softener. She is still having gas and she hears it rolling then she goes to the bathroom or farts and feels fine. She takes tums when this happens. She didn't feel the linzess helped because it felt like it was tightened at the surgical site of the hysterectomy.

## 2025-03-28 NOTE — TELEPHONE ENCOUNTER
Pt returned call, she says she will  the Amitiza to have in case but she's happy with the Miralax for now.

## 2025-04-10 ENCOUNTER — APPOINTMENT (OUTPATIENT)
Dept: LAB | Facility: CLINIC | Age: 80
End: 2025-04-10
Payer: MEDICARE

## 2025-04-10 DIAGNOSIS — E10.65 TYPE 1 DIABETES MELLITUS WITH HYPERGLYCEMIA (HCC): ICD-10-CM

## 2025-04-10 DIAGNOSIS — E03.9 ACQUIRED HYPOTHYROIDISM: ICD-10-CM

## 2025-04-10 LAB
ANION GAP SERPL CALCULATED.3IONS-SCNC: 9 MMOL/L (ref 4–13)
BUN SERPL-MCNC: 24 MG/DL (ref 5–25)
CALCIUM SERPL-MCNC: 9.2 MG/DL (ref 8.4–10.2)
CHLORIDE SERPL-SCNC: 105 MMOL/L (ref 96–108)
CO2 SERPL-SCNC: 29 MMOL/L (ref 21–32)
CREAT SERPL-MCNC: 1.07 MG/DL (ref 0.6–1.3)
EST. AVERAGE GLUCOSE BLD GHB EST-MCNC: 174 MG/DL
GFR SERPL CREATININE-BSD FRML MDRD: 48 ML/MIN/1.73SQ M
GLUCOSE P FAST SERPL-MCNC: 129 MG/DL (ref 65–99)
HBA1C MFR BLD: 7.7 %
POTASSIUM SERPL-SCNC: 4.8 MMOL/L (ref 3.5–5.3)
SODIUM SERPL-SCNC: 143 MMOL/L (ref 135–147)
T4 FREE SERPL-MCNC: 0.93 NG/DL (ref 0.61–1.12)
TSH SERPL DL<=0.05 MIU/L-ACNC: 2.88 UIU/ML (ref 0.45–4.5)

## 2025-04-10 PROCEDURE — 80048 BASIC METABOLIC PNL TOTAL CA: CPT

## 2025-04-10 PROCEDURE — 84439 ASSAY OF FREE THYROXINE: CPT

## 2025-04-10 PROCEDURE — 84443 ASSAY THYROID STIM HORMONE: CPT

## 2025-04-10 PROCEDURE — 83036 HEMOGLOBIN GLYCOSYLATED A1C: CPT

## 2025-04-10 PROCEDURE — 36415 COLL VENOUS BLD VENIPUNCTURE: CPT

## 2025-04-11 ENCOUNTER — RESULTS FOLLOW-UP (OUTPATIENT)
Age: 80
End: 2025-04-11

## 2025-04-18 ENCOUNTER — OFFICE VISIT (OUTPATIENT)
Dept: ENDOCRINOLOGY | Facility: CLINIC | Age: 80
End: 2025-04-18
Payer: MEDICARE

## 2025-04-18 VITALS
WEIGHT: 130 LBS | SYSTOLIC BLOOD PRESSURE: 118 MMHG | HEART RATE: 55 BPM | OXYGEN SATURATION: 99 % | RESPIRATION RATE: 18 BRPM | BODY MASS INDEX: 24.55 KG/M2 | TEMPERATURE: 97.8 F | DIASTOLIC BLOOD PRESSURE: 70 MMHG | HEIGHT: 61 IN

## 2025-04-18 DIAGNOSIS — I10 PRIMARY HYPERTENSION: ICD-10-CM

## 2025-04-18 DIAGNOSIS — M81.0 AGE-RELATED OSTEOPOROSIS WITHOUT CURRENT PATHOLOGICAL FRACTURE: ICD-10-CM

## 2025-04-18 DIAGNOSIS — E10.22 TYPE 1 DIABETES MELLITUS WITH STAGE 3A CHRONIC KIDNEY DISEASE (HCC): Primary | ICD-10-CM

## 2025-04-18 DIAGNOSIS — E03.9 ACQUIRED HYPOTHYROIDISM: ICD-10-CM

## 2025-04-18 DIAGNOSIS — E78.2 MIXED HYPERLIPIDEMIA: ICD-10-CM

## 2025-04-18 DIAGNOSIS — N18.31 TYPE 1 DIABETES MELLITUS WITH STAGE 3A CHRONIC KIDNEY DISEASE (HCC): Primary | ICD-10-CM

## 2025-04-18 PROCEDURE — 96372 THER/PROPH/DIAG INJ SC/IM: CPT | Performed by: NURSE PRACTITIONER

## 2025-04-18 PROCEDURE — 95251 CONT GLUC MNTR ANALYSIS I&R: CPT | Performed by: NURSE PRACTITIONER

## 2025-04-18 PROCEDURE — 99214 OFFICE O/P EST MOD 30 MIN: CPT | Performed by: NURSE PRACTITIONER

## 2025-04-18 RX ORDER — INSULIN ASPART 100 [IU]/ML
INJECTION, SOLUTION INTRAVENOUS; SUBCUTANEOUS
Qty: 15 ML | Refills: 1 | Status: SHIPPED | OUTPATIENT
Start: 2025-04-18

## 2025-04-18 RX ORDER — INSULIN GLARGINE 100 [IU]/ML
INJECTION, SOLUTION SUBCUTANEOUS
Qty: 15 ML | Refills: 1 | Status: SHIPPED | OUTPATIENT
Start: 2025-04-18 | End: 2025-04-23

## 2025-04-18 NOTE — PROGRESS NOTES
Name: Johana Alcantara      : 1945      MRN: [id number]  Encounter Provider: EUGENIA Phan  Encounter Date: 2025   Encounter department: [facility] [place]  :  Assessment & Plan  Type 1 diabetes mellitus with stage 3a chronic kidney disease (HCC)  Patient is having more frequent episodes of hypoglycemia, however, she reports that some of these are sensor error. TIR is at 60% with fewer episodes of BG > 250 mg/dL. Reduce basaglar to 7 units nightly. Continue with novolog 4 units before breakfast, 6 units before lunch. Reduce pre-dinner Novolog to 6 units. Continue with healthy diet and regular physical activity. Patient demonstrates 100% compliance with CGM use. Continue. Patient knows to notify me with persistent hyperglycemia or episodes of hypoglycemia.  F/U in 6 months.   Lab Results   Component Value Date    HGBA1C 7.7 (H) 04/10/2025       Orders:    Basaglar KwikPen 100 units/mL SOPN; Inject 7 units nightly.    NovoLOG FlexPen 100 units/mL injection pen; Inject 4 units before breakfast, 6 units before lunch, and 6 units before dinner.    Hemoglobin A1C; Future    Albumin / creatinine urine ratio; Future    Comprehensive metabolic panel; Future    Age-related osteoporosis without current pathological fracture  Patient will receive her first Prolia injection today. We again reviewed MOA as well as potential s/e including site irritation, transient increase in arthralgias, hypocalcemia, and ONJ, which is very rare. Continue with weight bearing activity. Continue Ca+ and Vit D supplements. Reviewed fall precautions. F/U in 6 months.   Orders:    denosumab (PROLIA) subcutaneous injection 60 mg    Vitamin D 25 hydroxy; Future    Primary hypertension  BP well-controlled at 118/70.  Continue 20 mg of lisinopril daily.       Acquired hypothyroidism  Patient is clinically and biochemically euthyroid.  Continue 75 mcg of levothyroxine daily.  Repeat TSH  and free T4 in 6 months.  Orders:    TSH, 3rd generation; Future    T4, free; Future    Mixed hyperlipidemia  Well-controlled.  Continue 20 mg of atorvastatin nightly.             History of Present Illness   HPI  Johana Alcantara is a 79 y.o. female with Type 1 diabetes, hypothyroidism, and osteoporosis presenting today for follow up.    For hypothyroidism, she is taking 75 mcg of levothyroxine daily.     TSH 3RD GENERATON   Latest Ref Rng 0.450 - 4.500 uIU/mL   4/10/2025 2.880          For osteoporosis, she will be receiving twice yearly Prolia injections.  She receives her first injection today..  Denies any recent falls or fractures.    She is supplementing her vitamin D with 2000 units daily.    Calcium?    Last DEXA scan completed 11/2/2023 demonstrated severe osteoporosis with lowest T-score of -3.0 in the right femoral neck.    Current diabetes regimen:    Basaglar 10 units nightly  NovoLog 4 units prior to breakfast, 6 units prior to lunch and dinner     Hemoglobin A1C   Latest Ref Rng Normal 4.0-5.6%; PreDiabetic 5.7-6.4%; Diabetic >=6.5%; Glycemic control for adults with diabetes <7.0% %   7/11/2024 7.7 (H)    11/18/2024 7.6 (H)    4/10/2025 7.7 (H)       eAG, EST AVG Glucose   Latest Ref Rng mg/dl   7/11/2024 174    11/18/2024 171    4/10/2025 174       Legend:  (H) High    Johana Alcantara   Device used Freestyle yaritza 2  Home use       Indication   Type 1 Diabetes    More than 72 hours of data was reviewed. Report to be scanned to chart.     Date Range: 4/5/25-4/18/25    Analysis of data:   Average Glucose: 152 mg/dL  Coefficient of Variation: 46.7%   GMI: 6.9%   Time in Target Range: 60%   Time Above Range: 17% 181-250 mg/dL; 12%> 250 mg/dL   Time Below Range: 9% 54-69 mg/dL; 2%< 65 mg/dL     Interpretation of data: Highly variable blood sugars with frequent hypoglycemia         Review of Systems   Constitutional:  Negative for activity change, appetite change, fatigue and unexpected weight  "change.   HENT:  Negative for dental problem, sore throat, trouble swallowing and voice change.    Eyes:  Negative for visual disturbance.   Respiratory:  Negative for cough, chest tightness and shortness of breath.    Cardiovascular:  Negative for chest pain, palpitations and leg swelling.   Gastrointestinal:  Negative for constipation, diarrhea, nausea and vomiting.   Endocrine: Negative for cold intolerance, heat intolerance, polydipsia, polyphagia and polyuria.   Genitourinary:  Negative for frequency.   Musculoskeletal:  Positive for arthralgias. Negative for back pain, gait problem and myalgias.   Skin:  Negative for wound.   Allergic/Immunologic: Positive for environmental allergies. Negative for food allergies.   Neurological:  Positive for numbness. Negative for dizziness, weakness, light-headedness and headaches.   Psychiatric/Behavioral:  Negative for decreased concentration, dysphoric mood and sleep disturbance. The patient is not nervous/anxious.           Objective   /70 (BP Location: Left arm, Patient Position: Sitting, Cuff Size: Standard)   Pulse 55   Temp 97.8 °F (36.6 °C) (Temporal)   Resp 18   Ht 5' 1\" (1.549 m)   Wt 59 kg (130 lb)   SpO2 99%   BMI 24.56 kg/m²      Physical Exam  Vitals reviewed.   Constitutional:       General: She is not in acute distress.     Appearance: She is well-developed. She is not ill-appearing.   HENT:      Head: Normocephalic and atraumatic.   Eyes:      Conjunctiva/sclera: Conjunctivae normal.   Cardiovascular:      Rate and Rhythm: Normal rate and regular rhythm.      Pulses: Normal pulses.      Heart sounds: Normal heart sounds. No murmur heard.  Pulmonary:      Effort: Pulmonary effort is normal. No respiratory distress.      Breath sounds: Normal breath sounds.   Abdominal:      Palpations: Abdomen is soft.      Tenderness: There is no abdominal tenderness.   Musculoskeletal:         General: No swelling.      Cervical back: Normal range of motion " and neck supple.      Right lower leg: No edema.      Left lower leg: No edema.   Skin:     General: Skin is warm and dry.      Capillary Refill: Capillary refill takes less than 2 seconds.   Neurological:      Mental Status: She is alert.   Psychiatric:         Mood and Affect: Mood normal.

## 2025-04-18 NOTE — PATIENT INSTRUCTIONS
Reduce basaglar to 7 units nightly  Continue Novolog 4 units before breakfast and 6 units before lunch and 6 units before dinner.

## 2025-04-18 NOTE — ASSESSMENT & PLAN NOTE
Patient is clinically and biochemically euthyroid.  Continue 75 mcg of levothyroxine daily.  Repeat TSH and free T4 in 6 months.  Orders:    TSH, 3rd generation; Future    T4, free; Future

## 2025-04-18 NOTE — ASSESSMENT & PLAN NOTE
Patient will receive her first Prolia injection today. We again reviewed MOA as well as potential s/e including site irritation, transient increase in arthralgias, hypocalcemia, and ONJ, which is very rare. Continue with weight bearing activity. Continue Ca+ and Vit D supplements. Reviewed fall precautions. F/U in 6 months.   Orders:    denosumab (PROLIA) subcutaneous injection 60 mg    Vitamin D 25 hydroxy; Future

## 2025-04-18 NOTE — ASSESSMENT & PLAN NOTE
Patient is having more frequent episodes of hypoglycemia, however, she reports that some of these are sensor error. TIR is at 60% with fewer episodes of BG > 250 mg/dL. Reduce basaglar to 7 units nightly. Continue with novolog 4 units before breakfast, 6 units before lunch. Reduce pre-dinner Novolog to 6 units. Continue with healthy diet and regular physical activity. Patient demonstrates 100% compliance with CGM use. Continue. Patient knows to notify me with persistent hyperglycemia or episodes of hypoglycemia.  F/U in 6 months.   Lab Results   Component Value Date    HGBA1C 7.7 (H) 04/10/2025       Orders:    Basaglar KwikPen 100 units/mL SOPN; Inject 7 units nightly.    NovoLOG FlexPen 100 units/mL injection pen; Inject 4 units before breakfast, 6 units before lunch, and 6 units before dinner.    Hemoglobin A1C; Future    Albumin / creatinine urine ratio; Future    Comprehensive metabolic panel; Future

## 2025-04-21 ENCOUNTER — TELEPHONE (OUTPATIENT)
Age: 80
End: 2025-04-21

## 2025-04-21 DIAGNOSIS — E10.22 TYPE 1 DIABETES MELLITUS WITH STAGE 3A CHRONIC KIDNEY DISEASE (HCC): ICD-10-CM

## 2025-04-21 DIAGNOSIS — N18.31 TYPE 1 DIABETES MELLITUS WITH STAGE 3A CHRONIC KIDNEY DISEASE (HCC): ICD-10-CM

## 2025-04-21 NOTE — TELEPHONE ENCOUNTER
Patient states she has high BS since receiving Prolia injection on Friday. States BS was 400 yesterday. BS this morning was 187.  Asking if provider can review Kaylah report and advise. Thank you,.

## 2025-04-23 RX ORDER — INSULIN GLARGINE 100 [IU]/ML
INJECTION, SOLUTION SUBCUTANEOUS
Qty: 15 ML | Refills: 1 | Status: SHIPPED | OUTPATIENT
Start: 2025-04-23

## 2025-04-23 NOTE — TELEPHONE ENCOUNTER
CGM reviewed. I do not believe this is related to Prolia. At last appointment, we reduced basaglar to 7 units nightly. She was to continue with 4 units of novolog before breakfast, 6 units before lunch, and we reduced novolog to 6 units before dinner.     I recommend resuming 8 units of basaglar nightly    Majority of blood sugar spikes appear to be related to her mid day meal. Please be mindful of diet. Should this continue, increase novolog prior to lunch to 8 units.

## 2025-04-24 NOTE — TELEPHONE ENCOUNTER
I called and left a voice message for Johana to call back as we would like to go over recommendations per Deysi Mills.

## 2025-04-29 NOTE — TELEPHONE ENCOUNTER
I called and left a detailed voice message informing her of Deysi's message.   CGM reviewed. I do not believe this is related to Prolia. At last appointment, we reduced basaglar to 7 units nightly. She was to continue with 4 units of novolog before breakfast, 6 units before lunch, and we reduced novolog to 6 units before dinner.      I recommend resuming 8 units of basaglar nightly     Majority of blood sugar spikes appear to be related to her mid day meal. Please be mindful of diet. Should this continue, increase novolog prior to lunch to 8 units.

## 2025-05-09 ENCOUNTER — TELEPHONE (OUTPATIENT)
Age: 80
End: 2025-05-09

## 2025-05-09 NOTE — TELEPHONE ENCOUNTER
Patient called asking if she should replace her sensor as she noticed dry blood around it. I asked if it it was reading her bs fine and she said yes no issues, she just noticed the dry blood. I informed her to leave the sensor unless she starts to notice that it is not registering her numbers than she should replace it. No further questions at this time.

## 2025-05-13 ENCOUNTER — RESULTS FOLLOW-UP (OUTPATIENT)
Dept: GASTROENTEROLOGY | Facility: CLINIC | Age: 80
End: 2025-05-13

## 2025-05-13 ENCOUNTER — OFFICE VISIT (OUTPATIENT)
Dept: GASTROENTEROLOGY | Facility: CLINIC | Age: 80
End: 2025-05-13

## 2025-05-13 ENCOUNTER — APPOINTMENT (OUTPATIENT)
Dept: RADIOLOGY | Facility: CLINIC | Age: 80
End: 2025-05-13
Payer: MEDICARE

## 2025-05-13 VITALS
RESPIRATION RATE: 18 BRPM | HEART RATE: 79 BPM | WEIGHT: 132 LBS | OXYGEN SATURATION: 99 % | HEIGHT: 61 IN | TEMPERATURE: 98.3 F | DIASTOLIC BLOOD PRESSURE: 68 MMHG | BODY MASS INDEX: 24.92 KG/M2 | SYSTOLIC BLOOD PRESSURE: 138 MMHG

## 2025-05-13 DIAGNOSIS — K59.00 CONSTIPATION, UNSPECIFIED CONSTIPATION TYPE: ICD-10-CM

## 2025-05-13 DIAGNOSIS — K21.9 GASTROESOPHAGEAL REFLUX DISEASE WITHOUT ESOPHAGITIS: ICD-10-CM

## 2025-05-13 DIAGNOSIS — Z86.0100 PERSONAL HISTORY OF COLON POLYPS, UNSPECIFIED: ICD-10-CM

## 2025-05-13 DIAGNOSIS — K59.00 CONSTIPATION, UNSPECIFIED CONSTIPATION TYPE: Primary | ICD-10-CM

## 2025-05-13 PROCEDURE — 74018 RADEX ABDOMEN 1 VIEW: CPT

## 2025-05-13 RX ORDER — LUBIPROSTONE 8 UG/1
8 CAPSULE ORAL 2 TIMES DAILY WITH MEALS
Qty: 60 CAPSULE | Refills: 1 | Status: SHIPPED | OUTPATIENT
Start: 2025-05-13

## 2025-05-13 NOTE — PATIENT INSTRUCTIONS
Have xray completed.   I think we should try amitiza/lubiprostone twice daily.   You can use stool softener and miralax as needed or even daily.   Okay for Gas-x, beano.     Stay on famotidine/pepcid twice daily.

## 2025-05-13 NOTE — ASSESSMENT & PLAN NOTE
Patient with a history of constipation which has been treatment refractory to over-the-counter laxatives and more recently to linaclotide.  Repeat x-ray now to evaluate her stool burden.  Initiate Amitiza 8 mcg twice daily.  She can also try augmenting regimen with MiraLAX 1-2 times daily if needed.    Okay for Gas-X/Beano as needed for symptom relief for her flatus.  Okay for trial of IB Fransico as well.     I suspect her symptoms are multifactorial, she may have some degree of transit constipation, IBS, and perhaps some degree of pelvic floor dysfunction/defecatory dysfunction.  Some degree of her abdominal discomfort may be due to adhesive disease as well.    UTD on colonoscopy, last in 2022 with no obstructing lesions at that time.  Orders:    XR abdomen 1 view kub; Future    lubiprostone (AMITIZA) 8 mcg capsule; Take 1 capsule (8 mcg total) by mouth 2 (two) times a day with meals

## 2025-05-13 NOTE — PROGRESS NOTES
Name: Johana Alcantara      : 1945      MRN: [id number]  Encounter Provider: Faith Nichols PA-C  Encounter Date: 2025   Encounter department: [facility]  :  Assessment & Plan  Constipation, unspecified constipation type  Patient with a history of constipation which has been treatment refractory to over-the-counter laxatives and more recently to linaclotide.  Repeat x-ray now to evaluate her stool burden.  Initiate Amitiza 8 mcg twice daily.  She can also try augmenting regimen with MiraLAX 1-2 times daily if needed.    Okay for Gas-X/Beano as needed for symptom relief for her flatus.  Okay for trial of IB Fransico as well.     I suspect her symptoms are multifactorial, she may have some degree of transit constipation, IBS, and perhaps some degree of pelvic floor dysfunction/defecatory dysfunction.  Some degree of her abdominal discomfort may be due to adhesive disease as well.    UTD on colonoscopy, last in  with no obstructing lesions at that time.  Orders:    XR abdomen 1 view kub; Future    lubiprostone (AMITIZA) 8 mcg capsule; Take 1 capsule (8 mcg total) by mouth 2 (two) times a day with meals    Personal history of colon polyps, unspecified  History of subcentimeter adenomas removed during her colonoscopy in 2022.  No additional surveillance colonoscopies were recommended at that time given patient's age.           Gastroesophageal reflux disease without esophagitis  Last EGD in  with some gastritis and hiatal hernia.  Biopsies were negative for H. pylori or intestinal metaplasia.  Continue H2RA BID.  No alarm features to the upper GI tract that would warrant EGD at this time.    Recommend diet and lifestyle modifications for GERD.  This includes avoiding spicy, saucy, greasy/oily foods, citrus, EtOH, NSAIDs, tobacco.  Avoid eating within 2 to 3 hours of bed.  Elevating height of bed 6 inches on blocks may be beneficial.         We will  "follow-up in 3 to 6 months to reassess her symptoms.    History of Present Illness   Johana Alcantara is a 79 y.o. female who presents for f/u for constipation. Pmhx sig for DM2, CHF, CKD 3, hypothyroidism, HTN, HLD, RAMILA.   Hx of hysterectomy w/ partial bowel resection (?).     HPI  History obtained from: patient    Patient was initially evaluated in 09/2024.  She had been dealing with progressive constipation over the course of several years.  She was tried on Linzess, this seemed to cause a tightness in her abdomen though did not help her to more completely evacuate her bowels.  She most recently has been on MiraLAX and fiber.  She will have a bowel movement anywhere from 1-3 times in the morning.  Is typically formed and brown.  She is having some excess gas production.  At times she develops transient \"tightness\" on the left side of the abdomen which tends to improve following administration of Tums.  No BRBPR or melena.  No nocturnal BMs.  She is tolerating oral intake without issue.    She is currently on Pepcid twice daily.  She is not having any significant heartburn, indigestion, nausea, vomiting, dysphagia or odynophagia.  Her weight has been stable.    NSAIDs: PRN  Etoh: none  Tobacco: none      07/2024: Hb 12.9, MCV 91, Plt 212, BUN 18, Cr 1.01, AST 11, ALT 8, ALP 86, albumin 4.1, t bili 0.52   08/2024: CT A/P:  No acute intra-abdominal disease. No evidence of perianal disease or fistula although limited without IV contrast. Normal caliber bowel loops. Appendectomy. No colitis or enteritis. No pelvic mass. Hysterectomy. Normal vaginal cuff. Hepatic steatosis  03/2025: Xray: nonspecific and nonobstructive appearing bowel gas pattern     Endoscopic history:   EGD: 02/2022: Mild erythematous mucosa in the antrum; medium sliding hiatal hernia  A.  Duodenum (biopsy): Small bowel mucosa with no significant pathologic abnormalities. No villous atrophy, increased intraepithelial lymphocytes or crypt " hyperplasia to suggest malabsorptive enteropathy. No active inflammation, granulomas, organisms, dysplasia or neoplasia identified  B.  Gastric antrum (biopsy): Minimal chronic inactive gastritis involving antral and oxyntic mucosa. No definitive morphologic evidence of Helicobacter on routine sections. No intestinal metaplasia, dysplasia or neoplasia identified  Colon: 02/2022: Two sessile polyps measuring smaller than 5 mm in the proximal descending colon   C.  Descending colon polyp x2 (cold biopsy): Portions of tubular adenomas; negative for high-grade dysplasia.     Review of Systems A complete review of systems is negative other than that noted above in the HPI.    Past Medical History   Past Medical History:   Diagnosis Date    Anxiety     Colon polyp     Depression     Diabetes mellitus (HCC)     Diverticulosis     Hyperlipidemia     Hypertension      Past Surgical History:   Procedure Laterality Date    APPENDECTOMY      CATARACT EXTRACTION Right     COLONOSCOPY      difficult exam    HYSTERECTOMY      TONSILLECTOMY      WISDOM TOOTH EXTRACTION       Family History   Problem Relation Age of Onset    Parkinsonism Mother     Alzheimer's disease Father     Breast cancer additional onset Sister     Breast cancer Sister         in her 40's    Lung cancer Sister     Breast cancer Sister     No Known Problems Daughter     No Known Problems Daughter     No Known Problems Maternal Grandmother     No Known Problems Maternal Grandfather     No Known Problems Paternal Grandmother     No Known Problems Paternal Grandfather     No Known Problems Maternal Aunt     No Known Problems Maternal Aunt     No Known Problems Maternal Aunt     No Known Problems Paternal Aunt       reports that she has never smoked. She has never been exposed to tobacco smoke. She has never used smokeless tobacco. She reports that she does not currently use alcohol. She reports that she does not use drugs.  Current Outpatient Medications    Medication Instructions    Alcohol Swabs ( Sterile Alcohol Prep) PADS No dose, route, or frequency recorded.    ASPIRIN 81 PO 81 mg, Daily    atorvastatin (LIPITOR) 20 mg, Oral, Daily    Basaglar KwikPen 100 units/mL SOPN Inject 8 units nightly.    Blood Glucose Monitoring Suppl (OneTouch Verio) w/Device KIT Use to test blood sugar 4x daily.    Cholecalciferol 2000 units CAPS 1 capsule    Continuous Blood Gluc Sensor (FreeStyle Kaylah 2 Sensor) MISC Use    Diclofenac Sodium (VOLTAREN) 2 g, Topical, 4 times daily    EPINEPHrine (EPIPEN) 0.3 mg, Intramuscular, Once    famotidine (PEPCID) 20 mg, Oral, Daily    glucose blood (OneTouch Verio) test strip Use to test blood sugar 4x daily in case of CGM failure.    Insulin Pen Needle (BD Pen Needle Karma U/F) 32G X 4 MM MISC USE TO INJECT INSULIN FOUR TIMES DAILY.    levothyroxine 75 mcg, Oral, Daily    lisinopril (ZESTRIL) 20 mg, Oral, Daily    loratadine (CLARITIN) 10 mg, Oral, Daily    lubiprostone (AMITIZA) 8 mcg, Oral, 2 times daily with meals    Multiple Vitamins-Minerals (MULTIVITAL-M PO) Take by mouth    NovoLOG FlexPen 100 units/mL injection pen Inject 4 units before breakfast, 6 units before lunch, and 6 units before dinner.    OneTouch Delica Lancets 33G MISC Use to test blood sugar 4x daily.    Wheat Dextrin (BENEFIBER ON THE GO PO) 3 times daily     Allergies   Allergen Reactions    Bee Venom Anaphylaxis    Diphenhydramine Shortness Of Breath    Meperidine Hives and Shortness Of Breath    Prednisone Hives and Shortness Of Breath    Bee Pollen     Ciprofloxacin Hives    Sulfamethoxazole-Trimethoprim GI Intolerance      Current Outpatient Medications   Medication Sig Dispense Refill    Alcohol Swabs ( Sterile Alcohol Prep) PADS       ASPIRIN 81 PO Take 81 mg by mouth daily      atorvastatin (LIPITOR) 20 mg tablet Take 1 tablet (20 mg total) by mouth daily 90 tablet 2    Basaglar KwikPen 100 units/mL SOPN Inject 8 units nightly. 15 mL 1    Blood Glucose  "Monitoring Suppl (OneTouch Verio) w/Device KIT Use to test blood sugar 4x daily. 1 kit 0    Cholecalciferol 2000 units CAPS Take 1 capsule by mouth      Continuous Blood Gluc Sensor (FreeStyle Kaylah 2 Sensor) MISC Use      Diclofenac Sodium (VOLTAREN) 1 % Apply 2 g topically 4 (four) times a day 50 g 0    famotidine (PEPCID) 20 mg tablet Take 1 tablet (20 mg total) by mouth daily 90 tablet 1    glucose blood (OneTouch Verio) test strip Use to test blood sugar 4x daily in case of CGM failure. 100 each 2    Insulin Pen Needle (BD Pen Needle Karma U/F) 32G X 4 MM MISC USE TO INJECT INSULIN FOUR TIMES DAILY. 300 each 1    levothyroxine 75 mcg tablet Take 1 tablet (75 mcg total) by mouth daily 90 tablet 3    lisinopril (ZESTRIL) 20 mg tablet Take 1 tablet (20 mg total) by mouth daily 90 tablet 1    loratadine (CLARITIN) 10 mg tablet Take 1 tablet (10 mg total) by mouth daily 30 tablet 0    lubiprostone (AMITIZA) 8 mcg capsule Take 1 capsule (8 mcg total) by mouth 2 (two) times a day with meals 60 capsule 1    Multiple Vitamins-Minerals (MULTIVITAL-M PO) Take by mouth      NovoLOG FlexPen 100 units/mL injection pen Inject 4 units before breakfast, 6 units before lunch, and 6 units before dinner. 15 mL 1    OneTouch Delica Lancets 33G MISC Use to test blood sugar 4x daily. 300 each 2    Wheat Dextrin (BENEFIBER ON THE GO PO) Take by mouth 3 (three) times a day      EPINEPHrine (EPIPEN) 0.3 mg/0.3 mL SOAJ Inject 0.3 mL (0.3 mg total) into a muscle once for 1 dose 0.6 mL 0     No current facility-administered medications for this visit.     Objective   /68 (BP Location: Right arm, Patient Position: Sitting, Cuff Size: Standard)   Pulse 79   Temp 98.3 °F (36.8 °C) (Temporal)   Resp 18   Ht 5' 1\" (1.549 m)   Wt 59.9 kg (132 lb)   SpO2 99%   BMI 24.94 kg/m²     Physical Exam  Vitals and nursing note reviewed.   Constitutional:       General: She is not in acute distress.     Appearance: She is well-developed. "   HENT:      Head: Normocephalic and atraumatic.   Eyes:      Conjunctiva/sclera: Conjunctivae normal.   Cardiovascular:      Rate and Rhythm: Normal rate.   Pulmonary:      Effort: Pulmonary effort is normal. No respiratory distress.      Breath sounds: Normal breath sounds.   Abdominal:      General: Bowel sounds are normal. There is no distension.      Palpations: Abdomen is soft.      Tenderness: There is no abdominal tenderness. There is no guarding or rebound.   Musculoskeletal:         General: No swelling.   Skin:     General: Skin is warm and dry.      Coloration: Skin is not jaundiced.   Neurological:      General: No focal deficit present.      Mental Status: She is alert.   Psychiatric:         Mood and Affect: Mood normal.        Lab Results: I personally reviewed relevant lab results. CBC/BMP: No new results in last 24 hours. , Creatinine Clearance: CrCl cannot be calculated (Patient's most recent lab result is older than the maximum 7 days allowed.)., LFTs: No new results in last 24 hours.     Radiology Results Review : No pertinent imaging studies reviewed.  Results for orders placed during the hospital encounter of 02/10/22    EGD    Impression  The duodenum appeared normal.  Performed biopsies to rule out celiac disease in the duodenal bulb and 2nd part of the duodenum  Mild erythematous mucosa in the antrum; performed cold forceps biopsy to rule out H. pylori  Medium sliding hiatal hernia (type I hiatal hernia)  Regular Z-line 38 cm from the incisors  The esophagus appeared normal.    RECOMMENDATION:  Await pathology results        Moiz Armstrong MD    **Please note:  Dictation voice to text software may have been used in the creation of this record.  Occasional wrong word or “sound alike” substitutions may have occurred due to the inherent limitations of voice recognition software.  Read the chart carefully and recognize, using context, where substitutions have occurred.**

## 2025-05-14 NOTE — TELEPHONE ENCOUNTER
----- Message from Faith Nichols PA-C sent at 5/13/2025  8:20 PM EDT -----  Please contact patient and share that there is a significant amount of stool in her colon and rectum.  I would actually recommend that if possible she complete the MiraLAX bowel cleanse which is a 238 g bottle of MiraLAX in 64 ounces of liquid.  I would also recommend she use a suppository for the next few nights such as a Dulcolax suppository to help soften up and get the stool in the rectum moving so that we are able to evacuate some of this stool.  After the bowel cleanse I would recommend she initiate the Amitiza that was sent to the pharmacy twice daily.

## 2025-05-15 ENCOUNTER — TELEPHONE (OUTPATIENT)
Age: 80
End: 2025-05-15

## 2025-05-15 DIAGNOSIS — K59.00 CONSTIPATION, UNSPECIFIED CONSTIPATION TYPE: Primary | ICD-10-CM

## 2025-05-15 NOTE — TELEPHONE ENCOUNTER
Pt. Called back, pharmacy reporting that Amitiza is $500 and patient can't afford it, pt. Did have a BM this morning after taking stool softener and Miralax last night , pt. Will take suppository and Magnesium Citrate over the weekend when she is not running around, unsure how effective bowel regimen will be as she has not started it yet, any other medication alternatives please advise

## 2025-05-16 NOTE — TELEPHONE ENCOUNTER
I'm wondering if pt would want to retry linzess but perhaps at a higher dose?   I think this medication was covered historically but didn't seem to be strong enough for her.   If she does not want to try, other options are trulance and motegrity. I would recommend she check with insurance to see what alternate options may be covered.

## 2025-05-19 NOTE — TELEPHONE ENCOUNTER
Pt called in to advise that she was able to pass a little more stool and feels a little better now that she can get more out. She said she took some Miralax with water, and it was easier to take down versus with juice. She just wanted to let Faith know that she was finally able to use the bathroom.

## 2025-05-19 NOTE — TELEPHONE ENCOUNTER
Spoke with patient reviewed Miralax instructions again and patient is going to do it this time and then start the higher dose of linzess and pt verbalized understanding.

## 2025-05-19 NOTE — TELEPHONE ENCOUNTER
Spoke with patient reviewed recommendations and pt verbalized understanding.     Patient is willing to try the higher dose of linzess and see if it works. However she does state she has been moving her bowels 1-2 times daily and only alittle bit at a time. She is doing Miralax twice daily. Has not drank the 32oz of gatorade with Miralax, patient is scared to complete due to becoming nauseated . She is taking soft softner daily.     Should she try dulcolax?

## 2025-05-28 DIAGNOSIS — K59.00 CONSTIPATION, UNSPECIFIED CONSTIPATION TYPE: ICD-10-CM

## 2025-05-28 RX ORDER — LINACLOTIDE 290 UG/1
CAPSULE, GELATIN COATED ORAL EVERY MORNING
Qty: 30 CAPSULE | Refills: 1 | OUTPATIENT
Start: 2025-05-28

## 2025-05-29 ENCOUNTER — TELEPHONE (OUTPATIENT)
Dept: ENDOCRINOLOGY | Facility: CLINIC | Age: 80
End: 2025-05-29

## 2025-05-29 NOTE — TELEPHONE ENCOUNTER
PA needed for Novolog, contacted [facility] and was informed it was not needed through them and it was with primary insurance. Contacted pharmacy, auth was not needed and script was able to be filled for patient.

## 2025-06-25 DIAGNOSIS — R05.1 ACUTE COUGH: ICD-10-CM

## 2025-06-25 DIAGNOSIS — R10.13 DYSPEPSIA: ICD-10-CM

## 2025-06-25 NOTE — TELEPHONE ENCOUNTER
Patient requesting refill(s) of: Pepcid 20 mg     Last filled: 1/13/25  Last appt: 2/19/25  Next appt: None  Pharmacy: [facility]

## 2025-06-26 RX ORDER — FAMOTIDINE 20 MG/1
20 TABLET, FILM COATED ORAL DAILY
Qty: 90 TABLET | Refills: 1 | Status: SHIPPED | OUTPATIENT
Start: 2025-06-26

## 2025-07-03 DIAGNOSIS — E10.22 TYPE 1 DIABETES MELLITUS WITH STAGE 3A CHRONIC KIDNEY DISEASE (HCC): ICD-10-CM

## 2025-07-03 DIAGNOSIS — N18.31 TYPE 1 DIABETES MELLITUS WITH STAGE 3A CHRONIC KIDNEY DISEASE (HCC): ICD-10-CM

## 2025-07-03 NOTE — TELEPHONE ENCOUNTER
Requested Prescriptions     Pending Prescriptions Disp Refills    Basaglar KwikPen 100 units/mL SOPN 15 mL 1     Sig: Inject 8 units nightly.     Quantity: 15 mL     Pharmacy: [facility] - [place], [address]      Office:   [] PCP/Provider -   [x] Speciality/Provider - EUGENIA Phan     Does the patient have enough for 3 days?   [x] Yes   [] No - Send as HP to P[facility]

## 2025-07-06 RX ORDER — INSULIN GLARGINE 100 [IU]/ML
INJECTION, SOLUTION SUBCUTANEOUS
Qty: 15 ML | Refills: 1 | Status: SHIPPED | OUTPATIENT
Start: 2025-07-06

## 2025-07-08 DIAGNOSIS — K59.00 CONSTIPATION, UNSPECIFIED CONSTIPATION TYPE: ICD-10-CM

## 2025-07-09 RX ORDER — LINACLOTIDE 290 UG/1
CAPSULE, GELATIN COATED ORAL EVERY MORNING
Qty: 90 CAPSULE | Refills: 1 | Status: SHIPPED | OUTPATIENT
Start: 2025-07-09

## 2025-08-11 ENCOUNTER — TELEPHONE (OUTPATIENT)
Age: 80
End: 2025-08-11

## 2025-08-13 ENCOUNTER — TELEPHONE (OUTPATIENT)
Age: 80
End: 2025-08-13

## 2025-08-15 ENCOUNTER — RESULTS FOLLOW-UP (OUTPATIENT)
Dept: GASTROENTEROLOGY | Facility: CLINIC | Age: 80
End: 2025-08-15

## 2025-08-15 ENCOUNTER — APPOINTMENT (OUTPATIENT)
Dept: RADIOLOGY | Facility: CLINIC | Age: 80
End: 2025-08-15
Payer: MEDICARE

## 2025-08-18 ENCOUNTER — OFFICE VISIT (OUTPATIENT)
Dept: FAMILY MEDICINE CLINIC | Facility: CLINIC | Age: 80
End: 2025-08-18
Payer: MEDICARE

## 2025-08-18 VITALS
WEIGHT: 128.1 LBS | OXYGEN SATURATION: 98 % | RESPIRATION RATE: 18 BRPM | SYSTOLIC BLOOD PRESSURE: 128 MMHG | HEIGHT: 61 IN | TEMPERATURE: 97.2 F | BODY MASS INDEX: 24.19 KG/M2 | HEART RATE: 67 BPM | DIASTOLIC BLOOD PRESSURE: 72 MMHG

## 2025-08-18 DIAGNOSIS — E10.22 TYPE 1 DIABETES MELLITUS WITH STAGE 3A CHRONIC KIDNEY DISEASE (HCC): ICD-10-CM

## 2025-08-18 DIAGNOSIS — M54.6 CHRONIC BILATERAL THORACIC BACK PAIN: ICD-10-CM

## 2025-08-18 DIAGNOSIS — M54.50 LUMBAR PAIN: ICD-10-CM

## 2025-08-18 DIAGNOSIS — G89.29 CHRONIC BILATERAL THORACIC BACK PAIN: ICD-10-CM

## 2025-08-18 DIAGNOSIS — I10 PRIMARY HYPERTENSION: ICD-10-CM

## 2025-08-18 DIAGNOSIS — L98.491 SUPERFICIAL ULCER OF SKIN (HCC): Primary | ICD-10-CM

## 2025-08-18 DIAGNOSIS — I50.9 CONGESTIVE HEART FAILURE, UNSPECIFIED HF CHRONICITY, UNSPECIFIED HEART FAILURE TYPE (HCC): ICD-10-CM

## 2025-08-18 DIAGNOSIS — Z13.220 SCREENING FOR HYPERLIPIDEMIA: ICD-10-CM

## 2025-08-18 DIAGNOSIS — N18.31 TYPE 1 DIABETES MELLITUS WITH STAGE 3A CHRONIC KIDNEY DISEASE (HCC): ICD-10-CM

## 2025-08-18 PROCEDURE — G2211 COMPLEX E/M VISIT ADD ON: HCPCS | Performed by: FAMILY MEDICINE

## 2025-08-18 PROCEDURE — 99214 OFFICE O/P EST MOD 30 MIN: CPT | Performed by: FAMILY MEDICINE

## 2025-08-19 ENCOUNTER — NURSE TRIAGE (OUTPATIENT)
Age: 80
End: 2025-08-19